# Patient Record
Sex: MALE | Race: BLACK OR AFRICAN AMERICAN | NOT HISPANIC OR LATINO | Employment: OTHER | URBAN - METROPOLITAN AREA
[De-identification: names, ages, dates, MRNs, and addresses within clinical notes are randomized per-mention and may not be internally consistent; named-entity substitution may affect disease eponyms.]

---

## 2017-01-02 DIAGNOSIS — I10 ESSENTIAL HYPERTENSION WITH GOAL BLOOD PRESSURE LESS THAN 140/90: Primary | ICD-10-CM

## 2017-01-02 NOTE — TELEPHONE ENCOUNTER
carvedilol (COREG) 12.5 MG tablet      Last Written Prescription Date: 7/1/16  Last Fill Quantity: 180, # refills: 1  Last Office Visit with G, P or [facility] prescribing provider: 10/17/16       POTASSIUM   Date Value Ref Range Status   10/17/2016 4.4 3.4 - 5.3 mmol/L Final     CREATININE   Date Value Ref Range Status   10/17/2016 1.74* 0.66 - 1.25 mg/dL Final     BP Readings from Last 3 Encounters:   12/14/16 97/67   10/17/16 120/80   09/29/16 131/87

## 2017-01-03 RX ORDER — CARVEDILOL 12.5 MG/1
TABLET ORAL
Qty: 180 TABLET | Refills: 1 | Status: SHIPPED | OUTPATIENT
Start: 2017-01-03 | End: 2017-09-04

## 2017-01-03 NOTE — TELEPHONE ENCOUNTER
Routing refill request to provider for review/approval because:  Labs out of range:  Creatinine  Nan Tapia RN

## 2017-01-29 DIAGNOSIS — F33.0 MAJOR DEPRESSIVE DISORDER, RECURRENT EPISODE, MILD (H): Primary | ICD-10-CM

## 2017-01-29 NOTE — TELEPHONE ENCOUNTER
DULoxetine (CYMBALTA) 60 MG capsule    Last Written Prescription Date: 11/1/16  Last Fill Quantity: 90, # refills: 0  Last Office Visit with [facility], [facility] or [facility] prescribing provider: 12/14/16        BP Readings from Last 3 Encounters:   12/14/16 97/67   10/17/16 120/80   09/29/16 131/87     Pulse: (for Fetzima)  CREATININE   Date Value Ref Range Status   10/17/2016 1.74* 0.66 - 1.25 mg/dL Final   ]    Last PHQ-9 score on record=   PHQ-9 SCORE 12/22/2016   Total Score -   Total Score 5

## 2017-01-30 RX ORDER — DULOXETIN HYDROCHLORIDE 60 MG/1
CAPSULE, DELAYED RELEASE ORAL
Qty: 90 CAPSULE | Refills: 1 | Status: SHIPPED | OUTPATIENT
Start: 2017-01-30 | End: 2017-06-11

## 2017-02-09 RX ORDER — AMLODIPINE BESYLATE 5 MG/1
TABLET ORAL
Qty: 90 TABLET | Refills: 0 | OUTPATIENT
Start: 2017-02-09

## 2017-02-14 DIAGNOSIS — F33.0 MAJOR DEPRESSIVE DISORDER, RECURRENT EPISODE, MILD (H): ICD-10-CM

## 2017-02-14 NOTE — TELEPHONE ENCOUNTER
traZODone (DESYREL) 100 MG tablet       Last Written Prescription Date: 5/23/16  Last Fill Quantity: 90; # refills: 3  Last Office Visit with [facility], [facility] or [facility] prescribing provider:  10/17/16        Last PHQ-9 score on record=   PHQ-9 SCORE 12/22/2016   Total Score -   Total Score 5       Lab Results   Component Value Date    AST 44 06/16/2016     Lab Results   Component Value Date    ALT 49 06/16/2016

## 2017-02-17 RX ORDER — TRAZODONE HYDROCHLORIDE 100 MG/1
TABLET ORAL
Qty: 180 TABLET | Refills: 0 | Status: SHIPPED | OUTPATIENT
Start: 2017-02-17 | End: 2017-05-19

## 2017-02-17 NOTE — TELEPHONE ENCOUNTER
For last refill remaining, patient was sent qty#180 tabs per request for 90 day supply to preferred pharmacy.   Jana Mendoza RN

## 2017-03-21 DIAGNOSIS — E78.5 HYPERLIPIDEMIA, UNSPECIFIED HYPERLIPIDEMIA TYPE: ICD-10-CM

## 2017-03-21 NOTE — TELEPHONE ENCOUNTER
simvastatin (ZOCOR) 40 MG tablet     Last Written Prescription Date: 07/20/16  Last Fill Quantity: 30, # refills: 4  Last Office Visit with G, P or [facility] prescribing provider: 10/17/16       Lab Results   Component Value Date    CHOL 122 06/16/2016     Lab Results   Component Value Date    HDL 44 06/16/2016     Lab Results   Component Value Date    LDL 59 06/16/2016     Lab Results   Component Value Date    TRIG 95 06/16/2016     Lab Results   Component Value Date    CHOLHDLRATIO 3.1 07/30/2015           [facility]

## 2017-03-23 RX ORDER — SIMVASTATIN 40 MG
TABLET ORAL
Qty: 30 TABLET | Refills: 0 | Status: SHIPPED | OUTPATIENT
Start: 2017-03-23 | End: 2017-05-03

## 2017-03-23 NOTE — TELEPHONE ENCOUNTER
Medication is being filled for 1 time refill only due to:  Patient needs labs for further refills.   Suzanne Sanabria RN

## 2017-04-24 DIAGNOSIS — E87.6 HYPOPOTASSEMIA: ICD-10-CM

## 2017-04-24 NOTE — TELEPHONE ENCOUNTER
potassium chloride SA (K-DUR,KLOR-CON M) 20 MEQ tablet      Last Written Prescription Date: 10/03/16  Last Fill Quantity: 90, # refills: 1  Last Office Visit with G, P or [facility] prescribing provider: 10/17/16    Next 5 appointments (look out 90 days)     Apr 28, 2017  8:40 AM CDT   Office Visit with Tori Rizvi MD   [facility] ([facility])    [address] [place] [id number] [phone]                   Potassium   Date Value Ref Range Status   10/17/2016 4.4 3.4 - 5.3 mmol/L Final     Creatinine   Date Value Ref Range Status   10/17/2016 1.74 (H) 0.66 - 1.25 mg/dL Final     BP Readings from Last 3 Encounters:   12/14/16 97/67   10/17/16 120/80   09/29/16 131/87         Krystal Xie  [facility]

## 2017-04-26 RX ORDER — POTASSIUM CHLORIDE 1500 MG/1
20 TABLET, EXTENDED RELEASE ORAL DAILY
Qty: 90 TABLET | Refills: 1 | Status: SHIPPED | OUTPATIENT
Start: 2017-04-26 | End: 2017-10-23

## 2017-04-28 ENCOUNTER — OFFICE VISIT (OUTPATIENT)
Dept: FAMILY MEDICINE | Facility: CLINIC | Age: 62
End: 2017-04-28
Payer: MEDICARE

## 2017-04-28 VITALS
HEART RATE: 90 BPM | OXYGEN SATURATION: 96 % | HEIGHT: 66 IN | BODY MASS INDEX: 33.62 KG/M2 | TEMPERATURE: 97.7 F | DIASTOLIC BLOOD PRESSURE: 75 MMHG | SYSTOLIC BLOOD PRESSURE: 120 MMHG | WEIGHT: 209.19 LBS

## 2017-04-28 DIAGNOSIS — E11.42 DIABETIC POLYNEUROPATHY ASSOCIATED WITH TYPE 2 DIABETES MELLITUS (H): ICD-10-CM

## 2017-04-28 DIAGNOSIS — N18.30 TYPE 2 DIABETES MELLITUS WITH STAGE 3 CHRONIC KIDNEY DISEASE, WITH LONG-TERM CURRENT USE OF INSULIN (H): ICD-10-CM

## 2017-04-28 DIAGNOSIS — Z13.89 SCREENING FOR DIABETIC PERIPHERAL NEUROPATHY: ICD-10-CM

## 2017-04-28 DIAGNOSIS — J45.31 MILD PERSISTENT ASTHMA WITH ACUTE EXACERBATION: ICD-10-CM

## 2017-04-28 DIAGNOSIS — E11.22 TYPE 2 DIABETES MELLITUS WITH STAGE 3 CHRONIC KIDNEY DISEASE, WITH LONG-TERM CURRENT USE OF INSULIN (H): ICD-10-CM

## 2017-04-28 DIAGNOSIS — I25.10 CORONARY ARTERY DISEASE INVOLVING NATIVE CORONARY ARTERY OF NATIVE HEART WITHOUT ANGINA PECTORIS: ICD-10-CM

## 2017-04-28 DIAGNOSIS — E78.5 HYPERLIPIDEMIA LDL GOAL <100: ICD-10-CM

## 2017-04-28 DIAGNOSIS — I50.23 ACUTE ON CHRONIC SYSTOLIC CONGESTIVE HEART FAILURE (H): ICD-10-CM

## 2017-04-28 DIAGNOSIS — Z79.4 TYPE 2 DIABETES MELLITUS WITH STAGE 3 CHRONIC KIDNEY DISEASE, WITH LONG-TERM CURRENT USE OF INSULIN (H): ICD-10-CM

## 2017-04-28 DIAGNOSIS — N18.2 CKD (CHRONIC KIDNEY DISEASE) STAGE 2, GFR 60-89 ML/MIN: ICD-10-CM

## 2017-04-28 DIAGNOSIS — I10 HYPERTENSION GOAL BP (BLOOD PRESSURE) < 140/90: ICD-10-CM

## 2017-04-28 DIAGNOSIS — B18.2 CHRONIC HEPATITIS C WITHOUT HEPATIC COMA (H): ICD-10-CM

## 2017-04-28 DIAGNOSIS — F33.0 MILD RECURRENT MAJOR DEPRESSION (H): ICD-10-CM

## 2017-04-28 DIAGNOSIS — L50.1 CHRONIC IDIOPATHIC URTICARIA: Primary | ICD-10-CM

## 2017-04-28 LAB
ALBUMIN SERPL-MCNC: 3.7 G/DL (ref 3.4–5)
ALP SERPL-CCNC: 73 U/L (ref 40–150)
ALT SERPL W P-5'-P-CCNC: 25 U/L (ref 0–70)
ANION GAP SERPL CALCULATED.3IONS-SCNC: 7 MMOL/L (ref 3–14)
AST SERPL W P-5'-P-CCNC: 14 U/L (ref 0–45)
BASOPHILS # BLD AUTO: 0 10E9/L (ref 0–0.2)
BASOPHILS NFR BLD AUTO: 0.2 %
BILIRUB SERPL-MCNC: 0.5 MG/DL (ref 0.2–1.3)
BUN SERPL-MCNC: 20 MG/DL (ref 7–30)
CALCIUM SERPL-MCNC: 9.4 MG/DL (ref 8.5–10.1)
CHLORIDE SERPL-SCNC: 103 MMOL/L (ref 94–109)
CHOLEST SERPL-MCNC: 135 MG/DL
CO2 SERPL-SCNC: 29 MMOL/L (ref 20–32)
CREAT SERPL-MCNC: 1.42 MG/DL (ref 0.66–1.25)
CREAT UR-MCNC: 17 MG/DL
CRP SERPL-MCNC: 13.5 MG/L (ref 0–8)
DIFFERENTIAL METHOD BLD: NORMAL
EOSINOPHIL # BLD AUTO: 0.3 10E9/L (ref 0–0.7)
EOSINOPHIL NFR BLD AUTO: 3.1 %
ERYTHROCYTE [DISTWIDTH] IN BLOOD BY AUTOMATED COUNT: 13.3 % (ref 10–15)
ERYTHROCYTE [SEDIMENTATION RATE] IN BLOOD BY WESTERGREN METHOD: 11 MM/H (ref 0–20)
GFR SERPL CREATININE-BSD FRML MDRD: 51 ML/MIN/1.7M2
GLUCOSE SERPL-MCNC: 104 MG/DL (ref 70–99)
HBA1C MFR BLD: 5.7 % (ref 4.3–6)
HCT VFR BLD AUTO: 46.8 % (ref 40–53)
HDLC SERPL-MCNC: 47 MG/DL
HGB BLD-MCNC: 15.9 G/DL (ref 13.3–17.7)
LDLC SERPL CALC-MCNC: 70 MG/DL
LYMPHOCYTES # BLD AUTO: 2 10E9/L (ref 0.8–5.3)
LYMPHOCYTES NFR BLD AUTO: 20.8 %
MCH RBC QN AUTO: 31.1 PG (ref 26.5–33)
MCHC RBC AUTO-ENTMCNC: 34 G/DL (ref 31.5–36.5)
MCV RBC AUTO: 91 FL (ref 78–100)
MICROALBUMIN UR-MCNC: <5 MG/L
MICROALBUMIN/CREAT UR: NORMAL MG/G CR (ref 0–17)
MONOCYTES # BLD AUTO: 1.2 10E9/L (ref 0–1.3)
MONOCYTES NFR BLD AUTO: 12.3 %
NEUTROPHILS # BLD AUTO: 6.2 10E9/L (ref 1.6–8.3)
NEUTROPHILS NFR BLD AUTO: 63.6 %
NONHDLC SERPL-MCNC: 88 MG/DL
PLATELET # BLD AUTO: 245 10E9/L (ref 150–450)
POTASSIUM SERPL-SCNC: 4.3 MMOL/L (ref 3.4–5.3)
PROT SERPL-MCNC: 8.2 G/DL (ref 6.8–8.8)
RBC # BLD AUTO: 5.12 10E12/L (ref 4.4–5.9)
SODIUM SERPL-SCNC: 139 MMOL/L (ref 133–144)
TRIGL SERPL-MCNC: 92 MG/DL
TSH SERPL DL<=0.005 MIU/L-ACNC: 0.74 MU/L (ref 0.4–4)
WBC # BLD AUTO: 9.8 10E9/L (ref 4–11)

## 2017-04-28 PROCEDURE — 36415 COLL VENOUS BLD VENIPUNCTURE: CPT | Performed by: FAMILY MEDICINE

## 2017-04-28 PROCEDURE — 80061 LIPID PANEL: CPT | Performed by: FAMILY MEDICINE

## 2017-04-28 PROCEDURE — 84443 ASSAY THYROID STIM HORMONE: CPT | Performed by: FAMILY MEDICINE

## 2017-04-28 PROCEDURE — 85025 COMPLETE CBC W/AUTO DIFF WBC: CPT | Performed by: FAMILY MEDICINE

## 2017-04-28 PROCEDURE — 83036 HEMOGLOBIN GLYCOSYLATED A1C: CPT | Performed by: FAMILY MEDICINE

## 2017-04-28 PROCEDURE — 82043 UR ALBUMIN QUANTITATIVE: CPT | Performed by: FAMILY MEDICINE

## 2017-04-28 PROCEDURE — 99207 C FOOT EXAM  NO CHARGE: CPT | Performed by: FAMILY MEDICINE

## 2017-04-28 PROCEDURE — 80053 COMPREHEN METABOLIC PANEL: CPT | Performed by: FAMILY MEDICINE

## 2017-04-28 PROCEDURE — 85652 RBC SED RATE AUTOMATED: CPT | Performed by: FAMILY MEDICINE

## 2017-04-28 PROCEDURE — 99214 OFFICE O/P EST MOD 30 MIN: CPT | Performed by: FAMILY MEDICINE

## 2017-04-28 PROCEDURE — 86140 C-REACTIVE PROTEIN: CPT | Performed by: FAMILY MEDICINE

## 2017-04-28 RX ORDER — TRAMADOL HYDROCHLORIDE 50 MG/1
50-100 TABLET ORAL 2 TIMES DAILY PRN
Qty: 60 TABLET | Refills: 3 | Status: SHIPPED | OUTPATIENT
Start: 2017-04-28 | End: 2017-09-29

## 2017-04-28 RX ORDER — CETIRIZINE HYDROCHLORIDE 10 MG/1
10 TABLET ORAL EVERY EVENING
Qty: 90 TABLET | Refills: 3 | Status: SHIPPED | OUTPATIENT
Start: 2017-04-28 | End: 2018-05-26

## 2017-04-28 NOTE — PATIENT INSTRUCTIONS
How to contact your care team: [phone] Pharmacy [phone]   MIGUEL OLEARY MD KATYA GEORGIEV, PA-C CHRIS JONES, PA-C NAM HO, MD JONATHAN BATES, MD ARVIN VOCAL, MD    Clinic hours M-Th 7am-7pm Fri 7am-5pm.   Urgent care M-F 11am-9pm  Sat/Sun 9am-5pm.   Pharmacy   Mon-Th:  8:00am-8pm   Fri:  8:00am-6:00pm  Sat/Sun  8:00am-5:00 pm       Hives (Adult)  Hives are pink or red bumps on the skin. These bumps are also known as  wheals.  The bumps can itch, burn, or sting. Hives can occur anywhere on the body. They vary in size and shape and can form in clusters. Individual hives can appear and go away quickly. New hives may develop as old ones fade. Hives are common and usually harmless. Occasionally hives are a sign of a serious allergy.  Hives are often caused by an allergic reaction. It may be an allergic reaction to foods such as fruit, shellfish, chocolate, nuts, or tomatoes. It may be a reaction to pollens, animal fur, or mold spores. Medications, chemicals, and insect bites can also cause hives. And hives can be caused by hot sun or cold air. The cause of hives can be difficult to find.  You may be given medications to relieve swelling and itching. Follow all instructions when using these medications. The hives will fade in a few days, but can last up to 2 weeks.  Home care  Follow these tips:    Try to find the cause of the hives and eliminate it. Discuss possible causes with your health care provider. Future reactions to the same allergen may be worse.    Don t scratch the hives. Scratching will delay healing. To reduce itching, apply cool, wet compresses to the skin.    Dress in soft, loose cotton clothing.    Don t bathe in hot water. This can make the itching worse.    Apply an ice pack or cool pack wrapped in a thin towel to your skin. This will help reduce redness and itching.    Try a topical spray or cream with benzocaine to help reduce itching.    Use oral  diphenhydramine to help reduce itching. This is an antihistamine you can buy at drug and grocery stores. It can make you sleepy, so use lower doses during the daytime. Or you can use loratadine. This is an antihistamine that will not make you sleepy. Don t use diphenhydramine if you have glaucoma or have trouble urinating because of an enlarged prostate.  Follow-up care  Follow up with your health care provider if your symptoms don't get better in 2 days. Ask your provider about allergy testing if you have had a severe reaction, or have had several episodes of hives. He or she can use the allergy testing to find out what you are allergic to.  When to seek medical advice  Call your health care provider right away if any of these occur:    Fever of 100.4 F (38.0 C) or higher    Swelling of the face, throat, or tongue    Trouble breathing or swallowing    Redness, swelling, or pain    Foul-smelling fluid coming from the rash    Dizziness, weakness, or fainting    5037-7389 [employer]. [address] [place] [address]. All rights reserved. This information is not intended as a substitute for professional medical care. Always follow your healthcare professional's instructions.        Understanding Urticaria (Hives)  Urticaria (hives) are red, itchy, and swollen areas on the skin. They are most often an allergic reaction from eating a food or taking a medicine. Sometimes the cause may be unknown. A single hive can vary in size from a half inch to several inches. Hives can appear all over the body. Or they may appear on only one part of the body.  Causes of Hives  Hives can be caused by food and beverages such as:    Nuts    Peanuts    Eggs    Shellfish    Milk  Hives can also be caused by medicines such as:    Antibiotics, especially penicillin and sulfa-based medicines     Anticonvulsant drugs or antiseizure medicines     Chemotherapy medicines   Other causes of hives include:    Dermatographism.  These are hives caused by scratching or rubbing of the skin, or wearing tight-fitting clothes that rub the skin.    Cold-induced. These are hives caused by exposure to cold air or water.    Solar hives. These are hives caused by exposure to sunlight or light bulb light.    Exercise-induced urticaria. These are allergic symptoms brought on by physical activity.    Chronic urticaria. These are hives that occur again and again with no known cause.  If You Have Hives    Avoid the food, drink, medicine, or other factor that may be causing the hives.    Make a thick paste of baking soda and water. Apply the paste directly to your skin. This can help lessen itching.    Talk with your health care provider right away if you think a medication gave you hives.  Watch for Anaphalaxis  If you have hives, watch for symptoms of a severe reaction that affects your entire body, called anaphylaxis. Symptoms can include swollen areas of the body, wheezing, trouble breathing or swallowing, and a hoarse voice. This reaction may happen right away. Or it may happen in an hour or more. In extreme cases, the airways from mouth to lungs may swell and prevent breathing. This is a medical emergency. Use epinephrine medication if you have it, and call 911 or go to the emergency room.     When to Call the Health Care Provider  Call 911 right away if you have:    Swelling in the lips, tongue, or throat (angioedema)     Trouble breathing or swallowing        0701-6485 [employer]. [address] [place] [address], [place] [address]. All rights reserved. This information is not intended as a substitute for professional medical care. Always follow your healthcare professional's instructions.

## 2017-04-28 NOTE — LETTER
[address] [id number] [phone]             May 1, 2017    Santiago Hylton  [address]  [facility] [address]            Dear Santiago Hylton,     Your test results are attached. I am happy to let you know that they are stable and your medications can stay the same.     The blood tests and urine all look great. No signs of severe allergic reaction in your body. Try the cetrizine to see if this helps with the rash. Enclosed are the results.  Results for orders placed or performed in visit on 04/28/17   Albumin Random Urine Quantitative   Result Value Ref Range    Creatinine Urine 17 mg/dL    Albumin Urine mg/L <5 mg/L    Albumin Urine mg/g Cr Unable to calculate due to low value 0 - 17 mg/g Cr   CBC with platelets differential   Result Value Ref Range    WBC 9.8 4.0 - 11.0 10e9/L    RBC Count 5.12 4.4 - 5.9 10e12/L    Hemoglobin 15.9 13.3 - 17.7 g/dL    Hematocrit 46.8 40.0 - 53.0 %    MCV 91 78 - 100 fl    MCH 31.1 26.5 - 33.0 pg    MCHC 34.0 31.5 - 36.5 g/dL    RDW 13.3 10.0 - 15.0 %    Platelet Count 245 150 - 450 10e9/L    Diff Method Automated Method     % Neutrophils 63.6 %    % Lymphocytes 20.8 %    % Monocytes 12.3 %    % Eosinophils 3.1 %    % Basophils 0.2 %    Absolute Neutrophil 6.2 1.6 - 8.3 10e9/L    Absolute Lymphocytes 2.0 0.8 - 5.3 10e9/L    Absolute Monocytes 1.2 0.0 - 1.3 10e9/L    Absolute Eosinophils 0.3 0.0 - 0.7 10e9/L    Absolute Basophils 0.0 0.0 - 0.2 10e9/L   Comprehensive metabolic panel   Result Value Ref Range    Sodium 139 133 - 144 mmol/L    Potassium 4.3 3.4 - 5.3 mmol/L    Chloride 103 94 - 109 mmol/L    Carbon Dioxide 29 20 - 32 mmol/L    Anion Gap 7 3 - 14 mmol/L    Glucose 104 (H) 70 - 99 mg/dL    Urea Nitrogen 20 7 - 30 mg/dL    Creatinine 1.42 (H) 0.66 - 1.25 mg/dL    GFR Estimate 51 (L) >60 mL/min/1.7m2    GFR Estimate If Black 61 >60 mL/min/1.7m2    Calcium 9.4 8.5 - 10.1 mg/dL    Bilirubin Total 0.5  0.2 - 1.3 mg/dL    Albumin 3.7 3.4 - 5.0 g/dL    Protein Total 8.2 6.8 - 8.8 g/dL    Alkaline Phosphatase 73 40 - 150 U/L    ALT 25 0 - 70 U/L    AST 14 0 - 45 U/L   CRP inflammation   Result Value Ref Range    CRP Inflammation 13.5 (H) 0.0 - 8.0 mg/L   Erythrocyte sedimentation rate auto   Result Value Ref Range    Sed Rate 11 0 - 20 mm/h   TSH with free T4 reflex   Result Value Ref Range    TSH 0.74 0.40 - 4.00 mU/L   Lipid panel reflex to direct LDL   Result Value Ref Range    Cholesterol 135 <200 mg/dL    Triglycerides 92 <150 mg/dL    HDL Cholesterol 47 >39 mg/dL    LDL Cholesterol Calculated 70 <100 mg/dL    Non HDL Cholesterol 88 <130 mg/dL   Hemoglobin A1c   Result Value Ref Range    Hemoglobin A1C 5.7 4.3 - 6.0 %       Please call me if you have any questions about these test results or about your care.     Sincerely,     Tori Rizvi MD/sukhi

## 2017-04-28 NOTE — NURSING NOTE
"Chief Complaint   Patient presents with     Derm Problem       Initial /75  Pulse 90  Temp 97.7  F (36.5  C) (Oral)  Ht 5' 5.7\" (1.669 m)  Wt 209 lb 3 oz (94.9 kg)  SpO2 96%  BMI 34.07 kg/m2 Estimated body mass index is 34.07 kg/(m^2) as calculated from the following:    Height as of this encounter: 5' 5.7\" (1.669 m).    Weight as of this encounter: 209 lb 3 oz (94.9 kg).  Medication Reconciliation: complete     Fouzia Berrios CMA      "

## 2017-04-28 NOTE — PROGRESS NOTES
SUBJECTIVE:                                                    Santiago Hylton is a 62 year old male who presents to clinic today for the following health issues:      Rash     Onset: 1 week ago most recent flare up but has had these for the past 2 years every few months.     Description:   Location: on legs and on right flank side and on neck   Character: blotchy, painful, red and change locations during the day from right to left chest and onto thighs.   Itching (Pruritis): YES    Progression of Symptoms:  worsening    Accompanying Signs & Symptoms:  Fever: no   Feet are painful: yes stabbing pain in feet  Body aches or joint pain: YES- sometimes   Sore throat symptoms: no   Recent cold symptoms: YES   History:   Previous similar rash: YES    Precipitating factors:   Exposure to similar rash: no   New exposures: None   Recent travel: no     Alleviating factors:  Nothing      Therapies Tried and outcome: itching cream       Diabetes Follow-up      Patient is checking blood sugars: not at all    Diabetic concerns: None     Symptoms of hypoglycemia (low blood sugar): none     Paresthesias (numbness or burning in feet) or sores: Yes both feet burning and numb     Date of last diabetic eye exam: 6 months     Hyperlipidemia Follow-Up      Rate your low fat/cholesterol diet?: good    Taking statin?  Yes, no muscle aches from statin    Other lipid medications/supplements?:  none     Hypertension Follow-up      Outpatient blood pressures are not being checked.    Low Salt Diet: no added salt     Vascular Disease Follow-up:  Coronary Artery and Peripheral Vascular Disease      Chest pain or pressure, left side neck or arm pain: No    Shortness of breath/increased sweats/nausea with exertion: No    Pain in calves walking 1-2 blocks: No    Worsened or new symptoms since last visit: No    Nitroglycerin use: no    Daily aspirin use: Yes     Heart Failure Follow-up    Symptoms:    Shortness of breath: happens with exertion only -  stable    Lower extremity edema: none    Chest pain: No    Using more pillows than normal: No    Cough at night: No    Weight:    Checking weight daily: No    Weight change: none    Cardiology visits, ER/UC, or hospital admissions since last visit: None    Medication side effects: fatigue         Depression and Anxiety Follow-Up    Status since last visit: No change    Other associated symptoms:None    Complicating factors:     Significant life event: No     Current substance abuse: None    PHQ-9 SCORE 6/16/2016 10/13/2016 12/22/2016   Total Score - - -   Total Score 10 7 5     GENARO-7 SCORE 11/13/2015 10/13/2016 12/22/2016   Total Score 4 0 6        PHQ-9  English      PHQ-9   Any Language     GAD7     Chronic Kidney Disease Follow-up      Current NSAID use?  No      Problem list and histories reviewed & adjusted, as indicated.  Additional history: as documented    Patient Active Problem List   Diagnosis     Hypertension goal BP (blood pressure) < 140/90     Hyperlipidemia LDL goal <100     CHF (congestive heart failure) (H)     Mild recurrent major depression (H)     Gout     GERD (gastroesophageal reflux disease)     Chronic rhinitis     CAD (coronary artery disease)     CKD (chronic kidney disease) stage 2, GFR 60-89 ml/min     Mild persistent asthma     Tobacco abuse     History of colonic polyps     Advanced directives, counseling/discussion     Chronic hepatitis C (H)     Type 2 diabetes mellitus with diabetic chronic kidney disease (H)     Microalbuminuria due to type 2 diabetes mellitus (H)     Obesity (BMI 30-39.9)     Herpes zoster without complication     Cataract, bilateral     Type 2 diabetes mellitus with diabetic polyneuropathy (H)     Coronary artery disease involving native coronary artery of native heart without angina pectoris     Past Surgical History:   Procedure Laterality Date     CARDIAC SURGERY      stent     CATARACT IOL, RT/LT       COLONOSCOPY  9/18/2012    Procedure: COLONOSCOPY;   COLONOSCOPY, SCREEN;  Surgeon: Cl Johnson MD;  Location: [facility]     ORTHOPEDIC SURGERY      ankle     PHACOEMULSIFICATION WITH STANDARD INTRAOCULAR LENS IMPLANT Right 1/28/2016    Procedure: PHACOEMULSIFICATION WITH STANDARD INTRAOCULAR LENS IMPLANT;  Surgeon: Fly Merrill MD;  Location: [facility]     PHACOEMULSIFICATION WITH STANDARD INTRAOCULAR LENS IMPLANT Left 2/11/2016    Procedure: PHACOEMULSIFICATION WITH STANDARD INTRAOCULAR LENS IMPLANT;  Surgeon: Fly Merrill MD;  Location: [facility] OR       Social History   Substance Use Topics     Smoking status: Former Smoker     Packs/day: 0.25     Years: 15.00     Types: Cigarettes     Quit date: 9/21/2016     Smokeless tobacco: Never Used      Comment: 3-4 a day     Alcohol use Yes      Comment: weekend beer     Family History   Problem Relation Age of Onset     HEART DISEASE Maternal Grandmother      Hypertension Maternal Grandmother      Hypertension Father      Hypertension Mother      Hypertension Sister      Thyroid Disease Sister      CANCER Brother      DIABETES Brother      Hypertension Brother      Hypertension Maternal Aunt      CANCER Maternal Uncle      Hypertension Maternal Uncle      Hypertension Paternal Aunt      Hypertension Paternal Uncle      Hypertension Maternal Grandfather      Hypertension Paternal Grandmother      Hypertension Paternal Grandfather      CEREBROVASCULAR DISEASE No family hx of      Glaucoma No family hx of      Macular Degeneration No family hx of          Current Outpatient Prescriptions   Medication Sig Dispense Refill     traMADol (ULTRAM) 50 MG tablet Take 1-2 tablets ( mg) by mouth 2 times daily as needed for pain maximum 2 tablet(s) per day 60 tablet 3     cetirizine (ZYRTEC) 10 MG tablet Take 1 tablet (10 mg) by mouth every evening 90 tablet 3     potassium chloride SA (K-DUR/KLOR-CON M) 20 MEQ CR tablet Take 1 tablet (20 mEq) by mouth daily 90 tablet 1     simvastatin (ZOCOR) 40 MG tablet  TAKE 1 TABLET BY MOUTH EVERY EVENING AT BEDTIME 30 tablet 0     traZODone (DESYREL) 100 MG tablet TAKE 1 TO 2 TABLETS BY MOUTH EVERY NIGHT AT BEDTIME 180 tablet 0     DULoxetine (CYMBALTA) 60 MG EC capsule TAKE 1 CAPSULE BY MOUTH DAILY 90 capsule 1     carvedilol (COREG) 12.5 MG tablet TAKE ONE TABLET BY MOUTH TWICE DAILY WITH MEALS 180 tablet 1     amLODIPine (NORVASC) 5 MG tablet TAKE 1 TABLET BY MOUTH EVERY DAY FOR BLOOD PRESSURE 90 tablet 1     lisinopril (PRINIVIL,ZESTRIL) 40 MG tablet TAKE ONE-HALF TABLET BY MOUTH ONCE DAILY 45 tablet 1     furosemide (LASIX) 40 MG tablet Take 1 tablet (40 mg) by mouth 2 times daily If weight increases or increased shortness of breath, then decrease to once a day when better. 135 tablet 2     ranitidine (ZANTAC) 150 MG tablet TAKE 1 TABLET BY MOUTH TWICE DAILY 180 tablet 2     montelukast (SINGULAIR) 10 MG tablet Take 1 tablet (10 mg) by mouth At Bedtime 90 tablet 3     isosorbide mononitrate (ISMO,MONOKET) 20 MG tablet TAKE 1 TABLET BY MOUTH TWICE DAILY 180 tablet 0     loratadine (CLARITIN) 10 MG tablet TAKE ONE TABLET BY MOUTH EVERY DAY AS NEEDED 90 tablet 1     aspirin  MG tablet TAKE 1 TABLET BY MOUTH EVERY DAY 90 tablet 3     SYMBICORT 160-4.5 MCG/ACT inhaler INHALE 2 PUFFS IN TO THE LUNGS TWICE DAILY (Patient not taking: Reported on 4/28/2017) 1 Inhaler 0     albuterol (2.5 MG/3ML) 0.083% nebulizer solution Take 1 vial (2.5 mg) by nebulization every 6 hours as needed for shortness of breath / dyspnea (Patient not taking: Reported on 4/28/2017) 120 vial 3     nitroglycerin (NITROSTAT) 0.4 MG SL tablet Place 1 tablet (0.4 mg) under the tongue every 5 minutes as needed for chest pain 0.4 mg by Sublingual route every 5 (five) minutes as needed. (Patient not taking: Reported on 4/28/2017) 25 tablet 11     Allergies   Allergen Reactions     No Known Drug Allergies      Recent Labs   Lab Test  04/28/17   0942  10/17/16   1206  06/16/16   0836  01/07/16   0818  07/30/15    "0752  04/20/15   0820   09/13/13   1103   A1C  5.7  5.5  5.6  5.4  5.6  5.3   < >   --    LDL   --    --   59  60  56   --    < >   --    HDL   --    --   44  46  36*   --    < >   --    TRIG   --    --   95  91  98   --    < >   --    ALT   --    --   49   --   27  37   < >   --    CR   --   1.74*  1.20  1.30*  1.36*  1.25   < >  1.17   GFRESTIMATED   --   40*  62  56*  54*  59*   < >  64   GFRESTBLACK   --   49*  75  68  65  71   < >  78   POTASSIUM   --   4.4  4.0  4.9  4.8  4.2   < >  4.1   TSH   --    --    --   0.71   --    --    --   1.11    < > = values in this interval not displayed.      BP Readings from Last 3 Encounters:   04/28/17 120/75   12/14/16 97/67   10/17/16 120/80    Wt Readings from Last 3 Encounters:   04/28/17 209 lb 3 oz (94.9 kg)   12/14/16 204 lb 1.6 oz (92.6 kg)   10/17/16 200 lb (90.7 kg)                  Labs reviewed in EPIC    Reviewed and updated as needed this visit by clinical staff       Reviewed and updated as needed this visit by Provider         ROS:  Constitutional, HEENT, cardiovascular, pulmonary, gi and gu systems are negative, except as otherwise noted.    OBJECTIVE:                                                    /75  Pulse 90  Temp 97.7  F (36.5  C) (Oral)  Ht 5' 5.7\" (1.669 m)  Wt 209 lb 3 oz (94.9 kg)  SpO2 96%  BMI 34.07 kg/m2  Body mass index is 34.07 kg/(m^2).  GENERAL: healthy, alert and no distress, is obese  EYES: Eyes grossly normal to inspection, PERRL and conjunctivae and sclerae normal  HENT: ear canals and TM's normal, nose and mouth without ulcers or lesions  NECK: no adenopathy, no asymmetry, masses, or scars and thyroid normal to palpation  RESP: lungs clear to auscultation - no rales, rhonchi or wheezes  CV: regular rate and rhythm, normal S1 S2, no S3 or S4, no murmur, click or rub, no peripheral edema and peripheral pulses strong  ABDOMEN: soft, nontender, no hepatosplenomegaly, no masses and bowel sounds normal  MS: no gross " "musculoskeletal defects noted, no edema  SKIN: no suspicious lesions, wheals and papules over right side of chest consistent with hives or urticaria.   NEURO: Normal strength and tone, mentation intact and speech normal  BACK: no CVA tenderness, no paralumbar tenderness  PSYCH: mentation appears normal, affect normal/bright  Diabetic foot exam: normal DP and PT pulses, no trophic changes or ulcerative lesions, normal calluses, decreased sensory exam and impaired monofilament exam     Diagnostic Test Results:  Results for orders placed or performed in visit on 04/28/17 (from the past 24 hour(s))   CBC with platelets differential   Result Value Ref Range    WBC 9.8 4.0 - 11.0 10e9/L    RBC Count 5.12 4.4 - 5.9 10e12/L    Hemoglobin 15.9 13.3 - 17.7 g/dL    Hematocrit 46.8 40.0 - 53.0 %    MCV 91 78 - 100 fl    MCH 31.1 26.5 - 33.0 pg    MCHC 34.0 31.5 - 36.5 g/dL    RDW 13.3 10.0 - 15.0 %    Platelet Count 245 150 - 450 10e9/L    Diff Method Automated Method     % Neutrophils 63.6 %    % Lymphocytes 20.8 %    % Monocytes 12.3 %    % Eosinophils 3.1 %    % Basophils 0.2 %    Absolute Neutrophil 6.2 1.6 - 8.3 10e9/L    Absolute Lymphocytes 2.0 0.8 - 5.3 10e9/L    Absolute Monocytes 1.2 0.0 - 1.3 10e9/L    Absolute Eosinophils 0.3 0.0 - 0.7 10e9/L    Absolute Basophils 0.0 0.0 - 0.2 10e9/L   Erythrocyte sedimentation rate auto   Result Value Ref Range    Sed Rate 11 0 - 20 mm/h   Hemoglobin A1c   Result Value Ref Range    Hemoglobin A1C 5.7 4.3 - 6.0 %        ASSESSMENT/PLAN:                                                        Tobacco Cessation:   reports that he quit smoking about 7 months ago. His smoking use included Cigarettes. He has a 3.75 pack-year smoking history. He has never used smokeless tobacco.      BMI:   Estimated body mass index is 34.07 kg/(m^2) as calculated from the following:    Height as of this encounter: 5' 5.7\" (1.669 m).    Weight as of this encounter: 209 lb 3 oz (94.9 kg).   Weight " management plan: Discussed healthy diet and exercise guidelines and patient will follow up in 3 months in clinic to re-evaluate.        ICD-10-CM    1. Chronic idiopathic urticaria- no real triggers as far as patient can tell. He will pay attention to foods and medications  L50.1 CRP inflammation     Erythrocyte sedimentation rate auto     cetirizine (ZYRTEC) 10 MG tablet     ALLERGY/ASTHMA ADULT REFERRAL   2. Acute on chronic systolic congestive heart failure (H) I50.23 Weight stable but persistent shortness of breath and dyspnea on exertion    3. Type 2 diabetes mellitus with stage 3 chronic kidney disease, with long-term current use of insulin (H)  Lab Results   Component Value Date    A1C 5.7 04/28/2017    A1C 5.5 10/17/2016    A1C 5.6 06/16/2016    A1C 5.4 01/07/2016    A1C 5.6 07/30/2015     E11.22 Albumin Random Urine Quantitative    N18.3 TSH with free T4 reflex    Z79.4 Hemoglobin A1c   4. Hypertension goal BP (blood pressure) < 140/90- well controlled on medications  I10 CBC with platelets differential     Comprehensive metabolic panel   5. Chronic hepatitis C without hepatic coma (H)- treatment last year and follow up with gastroenterology scheduled B18.2 Comprehensive metabolic panel   6. Hyperlipidemia LDL goal <100 E78.5 Lipid panel reflex to direct LDL   7. Mild recurrent major depression (H) F33.0 More of an issue with the chronic pain.   8. Coronary artery disease involving native coronary artery of native heart without angina pectoris I25.10 Stable with no chest pain but has progressive shortness of breath    9. CKD (chronic kidney disease) stage 2, GFR 60-89 ml/min N18.2 stable   10. Screening for diabetic peripheral neuropathy Z13.89 FOOT EXAM  NO CHARGE [23005.114]   11. Mild persistent asthma with acute exacerbation J45.31 Recurrent bronchitis this winter with hospital x2   12. Diabetic polyneuropathy associated with type 2 diabetes mellitus (H) E11.42 traMADol (ULTRAM) 50 MG tablet- will see  if this helps with pain. Did not tolerate gabapentin.        CONSULTATION/REFERRAL to allergist if rash persistent.   FUTURE LABS:       - Schedule fasting labs in 3 months  FUTURE APPOINTMENTS:       - Follow-up visit in 3 months or sooner if any questions or concerns.   Work on weight loss  Regular exercise  See Patient Instructions    Tori Rizvi MD  [facility]

## 2017-04-28 NOTE — MR AVS SNAPSHOT
After Visit Summary   4/28/2017    Santiago Hylton    MRN: [id number]           Patient Information     Date Of Birth          1955        Visit Information        Provider Department      4/28/2017 8:40 AM Tori Rizvi MD [facility]        Today's Diagnoses     Screening for diabetic peripheral neuropathy    -  1    Chronic idiopathic urticaria        Acute on chronic systolic congestive heart failure (H)        Type 2 diabetes mellitus with stage 3 chronic kidney disease, with long-term current use of insulin (H)        Hypertension goal BP (blood pressure) < 140/90        Chronic hepatitis C without hepatic coma (H)        Hyperlipidemia LDL goal <100        Mild recurrent major depression (H)        Coronary artery disease involving native coronary artery of native heart without angina pectoris        CKD (chronic kidney disease) stage 2, GFR 60-89 ml/min        Mild persistent asthma with acute exacerbation        Diabetic polyneuropathy associated with type 2 diabetes mellitus (H)          Care Instructions    How to contact your care team: [phone] Pharmacy [phone]   MIGUEL OLEARY MD KATYA GEORGIEV, PA-C CHRIS JONES, PA-C NAM HO, MD JONATHAN BATES, MD ARVIN VOCAL, MD    Clinic hours M-Th 7am-7pm Fri 7am-5pm.   Urgent care M-F 11am-9pm  Sat/Sun 9am-5pm.   Pharmacy   Mon-Th:  8:00am-8pm   Fri:  8:00am-6:00pm  Sat/Sun  8:00am-5:00 pm       Hives (Adult)  Hives are pink or red bumps on the skin. These bumps are also known as  wheals.  The bumps can itch, burn, or sting. Hives can occur anywhere on the body. They vary in size and shape and can form in clusters. Individual hives can appear and go away quickly. New hives may develop as old ones fade. Hives are common and usually harmless. Occasionally hives are a sign of a serious allergy.  Hives are often caused by an allergic reaction. It may be an allergic reaction to foods such  as fruit, shellfish, chocolate, nuts, or tomatoes. It may be a reaction to pollens, animal fur, or mold spores. Medications, chemicals, and insect bites can also cause hives. And hives can be caused by hot sun or cold air. The cause of hives can be difficult to find.  You may be given medications to relieve swelling and itching. Follow all instructions when using these medications. The hives will fade in a few days, but can last up to 2 weeks.  Home care  Follow these tips:    Try to find the cause of the hives and eliminate it. Discuss possible causes with your health care provider. Future reactions to the same allergen may be worse.    Don t scratch the hives. Scratching will delay healing. To reduce itching, apply cool, wet compresses to the skin.    Dress in soft, loose cotton clothing.    Don t bathe in hot water. This can make the itching worse.    Apply an ice pack or cool pack wrapped in a thin towel to your skin. This will help reduce redness and itching.    Try a topical spray or cream with benzocaine to help reduce itching.    Use oral diphenhydramine to help reduce itching. This is an antihistamine you can buy at drug and grocery stores. It can make you sleepy, so use lower doses during the daytime. Or you can use loratadine. This is an antihistamine that will not make you sleepy. Don t use diphenhydramine if you have glaucoma or have trouble urinating because of an enlarged prostate.  Follow-up care  Follow up with your health care provider if your symptoms don't get better in 2 days. Ask your provider about allergy testing if you have had a severe reaction, or have had several episodes of hives. He or she can use the allergy testing to find out what you are allergic to.  When to seek medical advice  Call your health care provider right away if any of these occur:    Fever of 100.4 F (38.0 C) or higher    Swelling of the face, throat, or tongue    Trouble breathing or swallowing    Redness, swelling, or  pain    Foul-smelling fluid coming from the rash    Dizziness, weakness, or fainting    2364-1864 [employer]. [address] [place] [address], [place] [address]. All rights reserved. This information is not intended as a substitute for professional medical care. Always follow your healthcare professional's instructions.        Understanding Urticaria (Hives)  Urticaria (hives) are red, itchy, and swollen areas on the skin. They are most often an allergic reaction from eating a food or taking a medicine. Sometimes the cause may be unknown. A single hive can vary in size from a half inch to several inches. Hives can appear all over the body. Or they may appear on only one part of the body.  Causes of Hives  Hives can be caused by food and beverages such as:    Nuts    Peanuts    Eggs    Shellfish    Milk  Hives can also be caused by medicines such as:    Antibiotics, especially penicillin and sulfa-based medicines     Anticonvulsant drugs or antiseizure medicines     Chemotherapy medicines   Other causes of hives include:    Dermatographism. These are hives caused by scratching or rubbing of the skin, or wearing tight-fitting clothes that rub the skin.    Cold-induced. These are hives caused by exposure to cold air or water.    Solar hives. These are hives caused by exposure to sunlight or light bulb light.    Exercise-induced urticaria. These are allergic symptoms brought on by physical activity.    Chronic urticaria. These are hives that occur again and again with no known cause.  If You Have Hives    Avoid the food, drink, medicine, or other factor that may be causing the hives.    Make a thick paste of baking soda and water. Apply the paste directly to your skin. This can help lessen itching.    Talk with your health care provider right away if you think a medication gave you hives.  Watch for Anaphalaxis  If you have hives, watch for symptoms of a severe reaction that affects your entire body, called  anaphylaxis. Symptoms can include swollen areas of the body, wheezing, trouble breathing or swallowing, and a hoarse voice. This reaction may happen right away. Or it may happen in an hour or more. In extreme cases, the airways from mouth to lungs may swell and prevent breathing. This is a medical emergency. Use epinephrine medication if you have it, and call 911 or go to the emergency room.     When to Call the Health Care Provider  Call 911 right away if you have:    Swelling in the lips, tongue, or throat (angioedema)     Trouble breathing or swallowing        3671-0010 [employer]. [address] [place] [address]. All rights reserved. This information is not intended as a substitute for professional medical care. Always follow your healthcare professional's instructions.              Follow-ups after your visit        Additional Services     ALLERGY/ASTHMA ADULT REFERRAL       Your provider has referred you to: [facility] [phone]  [email or web address]    Please be aware that coverage of these services is subject to the terms and limitations of your health insurance plan.  Call member services at your health plan with any benefit or coverage questions.      Please bring the following with you to your appointment:    (1) Any X-Rays, CTs or MRIs which have been performed.  Contact the facility where they were done to arrange for  prior to your scheduled appointment.    (2) List of current medications  (3) This referral request   (4) Any documents/labs given to you for this referral                  Follow-up notes from your care team     Return in about 3 months (around 7/28/2017) for BP Recheck, Lab Work, medication follow up.      Who to contact     If you have questions or need follow up information about today's clinic visit or your schedule please contact [facility] directly at [phone].  Normal or  "non-critical lab and imaging results will be communicated to you by MyChart, letter or phone within 4 business days after the clinic has received the results. If you do not hear from us within 7 days, please contact the clinic through [employer] or phone. If you have a critical or abnormal lab result, we will notify you by phone as soon as possible.  Submit refill requests through [employer] or call your pharmacy and they will forward the refill request to us. Please allow 3 business days for your refill to be completed.          Additional Information About Your Visit        [employer] Information     [employer] lets you send messages to your doctor, view your test results, renew your prescriptions, schedule appointments and more. To sign up, go to [email or web address] . Click on \"Log in\" on the left side of the screen, which will take you to the Welcome page. Then click on \"Sign up Now\" on the right side of the page.     You will be asked to enter the access code listed below, as well as some personal information. Please follow the directions to create your username and password.     Your access code is: [email or web address]  Expires: 2017  9:40 AM     Your access code will  in 90 days. If you need help or a new code, please call your [place] clinic or [phone].        Care EveryWhere ID     This is your Care EveryWhere ID. This could be used by other organizations to access your [place] medical records  [id number]        Your Vitals Were     Pulse Temperature Height Pulse Oximetry BMI (Body Mass Index)       90 97.7  F (36.5  C) (Oral) 5' 5.7\" (1.669 m) 96% 34.07 kg/m2        Blood Pressure from Last 3 Encounters:   17 120/75   16 97/67   10/17/16 120/80    Weight from Last 3 Encounters:   17 209 lb 3 oz (94.9 kg)   16 204 lb 1.6 oz (92.6 kg)   10/17/16 200 lb (90.7 kg)              We Performed the Following     Albumin Random Urine Quantitative     ALLERGY/ASTHMA ADULT REFERRAL     CBC " with platelets differential     Comprehensive metabolic panel     CRP inflammation     Erythrocyte sedimentation rate auto     FOOT EXAM  NO CHARGE [[id number]]     Hemoglobin A1c     Lipid panel reflex to direct LDL     TSH with free T4 reflex          Today's Medication Changes          These changes are accurate as of: 4/28/17  9:40 AM.  If you have any questions, ask your nurse or doctor.               Start taking these medicines.        Dose/Directions    cetirizine 10 MG tablet   Commonly known as:  zyrTEC   Used for:  Chronic idiopathic urticaria   Started by:  Tori Rizvi MD        Dose:  10 mg   Take 1 tablet (10 mg) by mouth every evening   Quantity:  90 tablet   Refills:  3       traMADol 50 MG tablet   Commonly known as:  ULTRAM   Used for:  Diabetic polyneuropathy associated with type 2 diabetes mellitus (H)   Started by:  Tori Rizvi MD        Dose:   mg   Take 1-2 tablets ( mg) by mouth 2 times daily as needed for pain maximum 2 tablet(s) per day   Quantity:  60 tablet   Refills:  3            Where to get your medicines      These medications were sent to [facility] [id number] [place] [address] AT SEC OF [employer] [address]     Phone:  [phone]     cetirizine 10 MG tablet         Some of these will need a paper prescription and others can be bought over the counter.  Ask your nurse if you have questions.     Bring a paper prescription for each of these medications     traMADol 50 MG tablet                Primary Care Provider Office Phone # Fax #    Tori Rizvi [phone]       [facility] [address] [place]  [facility] [address]        Thank you!     Thank you for choosing [facility]  for your care. Our goal is always to provide you with excellent care. Hearing back from our patients is one way we can continue to improve our services. Please  take a few minutes to complete the written survey that you may receive in the mail after your visit with us. Thank you!             Your Updated Medication List - Protect others around you: Learn how to safely use, store and throw away your medicines at www.disposemymeds.org.          This list is accurate as of: 4/28/17  9:40 AM.  Always use your most recent med list.                   Brand Name Dispense Instructions for use    albuterol (2.5 MG/3ML) 0.083% neb solution     120 vial    Take 1 vial (2.5 mg) by nebulization every 6 hours as needed for shortness of breath / dyspnea       amLODIPine 5 MG tablet    NORVASC    90 tablet    TAKE 1 TABLET BY MOUTH EVERY DAY FOR BLOOD PRESSURE       aspirin  MG EC tablet     90 tablet    TAKE 1 TABLET BY MOUTH EVERY DAY       carvedilol 12.5 MG tablet    COREG    180 tablet    TAKE ONE TABLET BY MOUTH TWICE DAILY WITH MEALS       cetirizine 10 MG tablet    zyrTEC    90 tablet    Take 1 tablet (10 mg) by mouth every evening       DULoxetine 60 MG EC capsule    CYMBALTA    90 capsule    TAKE 1 CAPSULE BY MOUTH DAILY       furosemide 40 MG tablet    LASIX    135 tablet    Take 1 tablet (40 mg) by mouth 2 times daily If weight increases or increased shortness of breath, then decrease to once a day when better.       isosorbide mononitrate 20 MG tablet    ISMO/MONOKET    180 tablet    TAKE 1 TABLET BY MOUTH TWICE DAILY       lisinopril 40 MG tablet    PRINIVIL/ZESTRIL    45 tablet    TAKE ONE-HALF TABLET BY MOUTH ONCE DAILY       loratadine 10 MG tablet    CLARITIN    90 tablet    TAKE ONE TABLET BY MOUTH EVERY DAY AS NEEDED       montelukast 10 MG tablet    SINGULAIR    90 tablet    Take 1 tablet (10 mg) by mouth At Bedtime       nitroglycerin 0.4 MG sublingual tablet    NITROSTAT    25 tablet    Place 1 tablet (0.4 mg) under the tongue every 5 minutes as needed for chest pain 0.4 mg by Sublingual route every 5 (five) minutes as needed.       potassium chloride SA 20 MEQ  CR tablet    K-DUR/KLOR-CON M    90 tablet    Take 1 tablet (20 mEq) by mouth daily       ranitidine 150 MG tablet    ZANTAC    180 tablet    TAKE 1 TABLET BY MOUTH TWICE DAILY       simvastatin 40 MG tablet    ZOCOR    30 tablet    TAKE 1 TABLET BY MOUTH EVERY EVENING AT BEDTIME       SYMBICORT 160-4.5 MCG/ACT Inhaler   Generic drug:  budesonide-formoterol     1 Inhaler    INHALE 2 PUFFS IN TO THE LUNGS TWICE DAILY       traMADol 50 MG tablet    ULTRAM    60 tablet    Take 1-2 tablets ( mg) by mouth 2 times daily as needed for pain maximum 2 tablet(s) per day       traZODone 100 MG tablet    DESYREL    180 tablet    TAKE 1 TO 2 TABLETS BY MOUTH EVERY NIGHT AT BEDTIME

## 2017-04-29 ASSESSMENT — ASTHMA QUESTIONNAIRES: ACT_TOTALSCORE: 11

## 2017-04-29 NOTE — PROGRESS NOTES
Dear Santiago Hylton,    Your test results are attached. I am happy to let you know that they are stable and your medications can stay the same.    The blood tests and urine all look great. No signs of severe allergic reaction in your body. Try the cetrizine to see if this helps with the rash.     Please call me if you have any questions about these test results or about your care.    Sincerely,    Tori Rizvi MD

## 2017-05-10 DIAGNOSIS — I10 ESSENTIAL HYPERTENSION WITH GOAL BLOOD PRESSURE LESS THAN 140/90: ICD-10-CM

## 2017-05-10 RX ORDER — LISINOPRIL 40 MG/1
TABLET ORAL
Qty: 45 TABLET | Refills: 3 | Status: SHIPPED | OUTPATIENT
Start: 2017-05-10 | End: 2019-06-04

## 2017-05-10 NOTE — TELEPHONE ENCOUNTER
lisinopril (PRINIVIL,ZESTRIL) 40 MG tablet      Last Written Prescription Date: 11/1/16  Last Fill Quantity: 45, # refills: 1  Last Office Visit with [facility], [facility] or [facility] prescribing provider: 4/28/17       Potassium   Date Value Ref Range Status   04/28/2017 4.3 3.4 - 5.3 mmol/L Final     Creatinine   Date Value Ref Range Status   04/28/2017 1.42 (H) 0.66 - 1.25 mg/dL Final     BP Readings from Last 3 Encounters:   04/28/17 120/75   12/14/16 97/67   10/17/16 120/80           Koby Faarax  [facility]

## 2017-05-19 DIAGNOSIS — F33.0 MAJOR DEPRESSIVE DISORDER, RECURRENT EPISODE, MILD (H): ICD-10-CM

## 2017-05-19 NOTE — TELEPHONE ENCOUNTER
traZODone (DESYREL) 100 MG tablet       Last Written Prescription Date: 02/17/17  Last Fill Quantity: 180; # refills: 0  Last Office Visit with [facility], [facility] or [facility] prescribing provider:  04/28/17        Last PHQ-9 score on record=   PHQ-9 SCORE 12/22/2016   Total Score -   Total Score 5       Lab Results   Component Value Date    AST 14 04/28/2017     Lab Results   Component Value Date    ALT 25 04/28/2017         [facility]

## 2017-05-23 RX ORDER — TRAZODONE HYDROCHLORIDE 100 MG/1
200 TABLET ORAL AT BEDTIME
Qty: 180 TABLET | Refills: 1 | Status: SHIPPED | OUTPATIENT
Start: 2017-05-23 | End: 2018-07-23

## 2017-05-23 NOTE — TELEPHONE ENCOUNTER
Routing refill request to provider for review/approval because:  PHQ-9 over 4  Nan Tapia RN

## 2017-06-11 DIAGNOSIS — F33.0 MAJOR DEPRESSIVE DISORDER, RECURRENT EPISODE, MILD (H): ICD-10-CM

## 2017-06-13 RX ORDER — DULOXETIN HYDROCHLORIDE 60 MG/1
CAPSULE, DELAYED RELEASE ORAL
Qty: 90 CAPSULE | Refills: 1 | Status: SHIPPED | OUTPATIENT
Start: 2017-06-13 | End: 2018-05-19

## 2017-06-15 DIAGNOSIS — E78.5 HYPERLIPIDEMIA LDL GOAL <100: ICD-10-CM

## 2017-06-15 NOTE — TELEPHONE ENCOUNTER
isosorbide mononitrate (ISMO,MONOKET) 20 MG tablet      Last Written Prescription Date: 4/25/16  Last Fill Quantity: 180,  # refills: 0   Last Office Visit with [facility], [facility] or [facility] prescribing provider: 4/28/17          Koby Faarax  [facility]

## 2017-06-18 DIAGNOSIS — J45.31 MILD PERSISTENT ASTHMA WITH ACUTE EXACERBATION: ICD-10-CM

## 2017-06-18 DIAGNOSIS — I10 ESSENTIAL HYPERTENSION WITH GOAL BLOOD PRESSURE LESS THAN 140/90: ICD-10-CM

## 2017-06-18 DIAGNOSIS — K21.9 GASTROESOPHAGEAL REFLUX DISEASE, ESOPHAGITIS PRESENCE NOT SPECIFIED: ICD-10-CM

## 2017-06-18 NOTE — TELEPHONE ENCOUNTER
furosemide (LASIX) 40 MG tablet      Last Written Prescription Date: 6/16/16  Last Fill Quantity: 90, # refills: 3  Last Office Visit with G, P or [facility] prescribing provider: 10/7/16       Potassium   Date Value Ref Range Status   04/28/2017 4.3 3.4 - 5.3 mmol/L Final     Creatinine   Date Value Ref Range Status   04/28/2017 1.42 (H) 0.66 - 1.25 mg/dL Final     BP Readings from Last 3 Encounters:   04/28/17 120/75   12/14/16 97/67   10/17/16 120/80

## 2017-06-18 NOTE — TELEPHONE ENCOUNTER
ranitidine (ZANTAC) 150 MG tablet      Last Written Prescription Date: 9/27/16  Last Fill Quantity: 180,  # refills: 2   Last Office Visit with [facility], [facility] or [facility] prescribing provider: 10/17/16      [facility]

## 2017-06-19 ENCOUNTER — TELEPHONE (OUTPATIENT)
Dept: FAMILY MEDICINE | Facility: CLINIC | Age: 62
End: 2017-06-19

## 2017-06-19 RX ORDER — MONTELUKAST SODIUM 10 MG/1
TABLET ORAL
Qty: 90 TABLET | Refills: 3 | Status: SHIPPED | OUTPATIENT
Start: 2017-06-19 | End: 2018-09-06

## 2017-06-19 NOTE — TELEPHONE ENCOUNTER
What type of form? [employer] forms    What day did you drop off your forms? 06/19/2017  Is there a due date? ASAP  How would you like to receive these forms? Mail     What is the best number to contact you?[phone]   What time works best to contact you with in 4 hrs? Any  Is it okay to leave a message? Yes    Nataly Del Angel

## 2017-06-19 NOTE — TELEPHONE ENCOUNTER
Routing refill request to provider for review/approval because:  ACT less than 20  Nan Tapia RN

## 2017-06-20 RX ORDER — ISOSORBIDE MONONITRATE 20 MG/1
TABLET ORAL
Qty: 180 TABLET | Refills: 3 | Status: SHIPPED | OUTPATIENT
Start: 2017-06-20 | End: 2018-08-01

## 2017-06-20 NOTE — TELEPHONE ENCOUNTER
Routing refill request to provider for review/approval because:  A break in medication  Nan Tapia RN

## 2017-06-20 NOTE — TELEPHONE ENCOUNTER
Form is put in our out going mail bin to be mailed to the self stamped envelope provided by pt to pt's home address.  Rene Alvarez,  [facility]

## 2017-06-20 NOTE — TELEPHONE ENCOUNTER
Form is received from the  and forward to Dr. Rizvi to address.  Rene Alvarez,  [facility]

## 2017-06-21 RX ORDER — FUROSEMIDE 40 MG
40 TABLET ORAL 2 TIMES DAILY
Qty: 135 TABLET | Refills: 2 | Status: SHIPPED | OUTPATIENT
Start: 2017-06-21 | End: 2018-05-19

## 2017-06-21 NOTE — TELEPHONE ENCOUNTER
Routing refill request to provider for review/approval because:  Labs out of range:  Creatinine    TONE Grossman, Clinical RN [facility] [place].

## 2017-07-01 DIAGNOSIS — J45.30 MILD PERSISTENT ASTHMA, UNCOMPLICATED: ICD-10-CM

## 2017-07-01 NOTE — TELEPHONE ENCOUNTER
albuterol (2.5 MG/3ML) 0.083% nebulizer solution       Last Written Prescription Date: 7/30/15  Last Fill Quantity: 120, # refills: 3    Last Office Visit with [facility], [facility] or [facility] prescribing provider:  4/28/17   Future Office Visit:       Date of Last Asthma Action Plan Letter:   Asthma Action Plan Q1 Year    Topic Date Due     Asthma Action Plan - yearly  09/29/2017      Asthma Control Test:   ACT Total Scores 4/28/2017   ACT TOTAL SCORE (Goal Greater than or Equal to 20) 11   In the past 12 months, how many times did you visit the emergency room for your asthma without being admitted to the hospital? 2   In the past 12 months, how many times were you hospitalized overnight because of your asthma? 0       Date of Last Spirometry Test:   No results found for this or any previous visit.

## 2017-07-03 RX ORDER — ALBUTEROL SULFATE 0.83 MG/ML
1 SOLUTION RESPIRATORY (INHALATION) EVERY 6 HOURS PRN
Qty: 120 VIAL | Refills: 3 | Status: SHIPPED | OUTPATIENT
Start: 2017-07-03 | End: 2018-07-04

## 2017-07-13 ENCOUNTER — OFFICE VISIT (OUTPATIENT)
Dept: FAMILY MEDICINE | Facility: CLINIC | Age: 62
End: 2017-07-13
Payer: MEDICARE

## 2017-07-13 VITALS
TEMPERATURE: 98.8 F | SYSTOLIC BLOOD PRESSURE: 133 MMHG | WEIGHT: 213 LBS | BODY MASS INDEX: 34.23 KG/M2 | HEIGHT: 66 IN | DIASTOLIC BLOOD PRESSURE: 88 MMHG | HEART RATE: 81 BPM | OXYGEN SATURATION: 95 %

## 2017-07-13 DIAGNOSIS — B18.2 CHRONIC HEPATITIS C WITHOUT HEPATIC COMA (H): ICD-10-CM

## 2017-07-13 DIAGNOSIS — E66.9 OBESITY (BMI 30-39.9): ICD-10-CM

## 2017-07-13 DIAGNOSIS — N52.1 ERECTILE DYSFUNCTION DUE TO DISEASES CLASSIFIED ELSEWHERE: ICD-10-CM

## 2017-07-13 DIAGNOSIS — R68.82 DECREASED LIBIDO: Primary | ICD-10-CM

## 2017-07-13 DIAGNOSIS — I25.10 CORONARY ARTERY DISEASE INVOLVING NATIVE CORONARY ARTERY OF NATIVE HEART WITHOUT ANGINA PECTORIS: ICD-10-CM

## 2017-07-13 DIAGNOSIS — E11.42 DIABETIC POLYNEUROPATHY ASSOCIATED WITH TYPE 2 DIABETES MELLITUS (H): ICD-10-CM

## 2017-07-13 DIAGNOSIS — I10 HYPERTENSION GOAL BP (BLOOD PRESSURE) < 140/90: ICD-10-CM

## 2017-07-13 DIAGNOSIS — F33.0 MILD RECURRENT MAJOR DEPRESSION (H): ICD-10-CM

## 2017-07-13 DIAGNOSIS — E78.5 HYPERLIPIDEMIA LDL GOAL <100: ICD-10-CM

## 2017-07-13 DIAGNOSIS — I50.23 ACUTE ON CHRONIC SYSTOLIC CONGESTIVE HEART FAILURE (H): ICD-10-CM

## 2017-07-13 DIAGNOSIS — Z13.5 SCREENING FOR DIABETIC RETINOPATHY: ICD-10-CM

## 2017-07-13 LAB
ALBUMIN SERPL-MCNC: 3.7 G/DL (ref 3.4–5)
ANION GAP SERPL CALCULATED.3IONS-SCNC: 8 MMOL/L (ref 3–14)
BUN SERPL-MCNC: 19 MG/DL (ref 7–30)
CALCIUM SERPL-MCNC: 9.8 MG/DL (ref 8.5–10.1)
CHLORIDE SERPL-SCNC: 104 MMOL/L (ref 94–109)
CO2 SERPL-SCNC: 30 MMOL/L (ref 20–32)
CREAT SERPL-MCNC: 1.43 MG/DL (ref 0.66–1.25)
ERYTHROCYTE [DISTWIDTH] IN BLOOD BY AUTOMATED COUNT: 13.9 % (ref 10–15)
GFR SERPL CREATININE-BSD FRML MDRD: 50 ML/MIN/1.7M2
GLUCOSE SERPL-MCNC: 93 MG/DL (ref 70–99)
HBA1C MFR BLD: 5.5 % (ref 4.3–6)
HCT VFR BLD AUTO: 44.8 % (ref 40–53)
HGB BLD-MCNC: 15.2 G/DL (ref 13.3–17.7)
MCH RBC QN AUTO: 31 PG (ref 26.5–33)
MCHC RBC AUTO-ENTMCNC: 33.9 G/DL (ref 31.5–36.5)
MCV RBC AUTO: 91 FL (ref 78–100)
PHOSPHATE SERPL-MCNC: 3.8 MG/DL (ref 2.5–4.5)
PLATELET # BLD AUTO: 215 10E9/L (ref 150–450)
POTASSIUM SERPL-SCNC: 4.4 MMOL/L (ref 3.4–5.3)
RBC # BLD AUTO: 4.9 10E12/L (ref 4.4–5.9)
SODIUM SERPL-SCNC: 142 MMOL/L (ref 133–144)
WBC # BLD AUTO: 11 10E9/L (ref 4–11)

## 2017-07-13 PROCEDURE — 80069 RENAL FUNCTION PANEL: CPT | Performed by: FAMILY MEDICINE

## 2017-07-13 PROCEDURE — 85027 COMPLETE CBC AUTOMATED: CPT | Performed by: FAMILY MEDICINE

## 2017-07-13 PROCEDURE — 36415 COLL VENOUS BLD VENIPUNCTURE: CPT | Performed by: FAMILY MEDICINE

## 2017-07-13 PROCEDURE — 83036 HEMOGLOBIN GLYCOSYLATED A1C: CPT | Performed by: FAMILY MEDICINE

## 2017-07-13 PROCEDURE — 99214 OFFICE O/P EST MOD 30 MIN: CPT | Performed by: FAMILY MEDICINE

## 2017-07-13 PROCEDURE — 84403 ASSAY OF TOTAL TESTOSTERONE: CPT | Performed by: FAMILY MEDICINE

## 2017-07-13 RX ORDER — GABAPENTIN 300 MG/1
CAPSULE ORAL
Qty: 90 CAPSULE | Refills: 3 | Status: SHIPPED | OUTPATIENT
Start: 2017-07-13 | End: 2018-05-19

## 2017-07-13 ASSESSMENT — PAIN SCALES - GENERAL: PAINLEVEL: EXTREME PAIN (9)

## 2017-07-13 NOTE — PATIENT INSTRUCTIONS
How to contact your care team: [phone] Pharmacy [phone]   MD KATHERINE FLORES PA-C CHRIS JONES, PA-C NAM HO, MD JONATHAN BATES, MD ARVIN VOCAL, MD    Clinic hours M-Th 7am-7pm Fri 7am-5pm.   Urgent care M-F 11am-9pm  Sat/Sun 9am-5pm.   Pharmacy   Mon-Th:  8:00am-8pm   Fri:  8:00am-6:00pm  Sat/Sun  8:00am-5:00 pm       Diabetes: Inspecting Your Feet    Diabetes increases your chances of developing foot problems. So inspect your feet every day. This helps you find small skin irritations before they become serious ulcers or infections. If you have trouble seeing the bottoms of your feet, use a mirror or ask a family member or friend to help.  How to check your feet  Below are tips to help you look for foot problems. Try to check your feet at the same time each day, such as when you get out of bed in the morning:    Check the top of each foot. The tops of toes, back of the heel, and outer edge of the foot can get a lot of rubbing from poor-fitting shoes.    Check the bottom of each foot. Daily wear and tear often leads to problems at pressure spots.    Check the toes and nails. Fungal infections often occur between toes. Toenail problems can also be a sign of fungal infections or lead to breaks in the skin.    Check your shoes, too. Loose objects inside a shoe can injure the foot. Use your hand to feel inside your shoes for things like raymond, loose stitching, or rough areas that could irritate your skin.  Warning signs  Look for any color changes in the foot. Redness with streaks can signal a severe infection, which needs immediate medical attention. Tell your healthcare provider right away if you have any of these problems:    Swelling, sometimes with color changes, may be a sign of poor blood flow or infection. Symptoms include tenderness and an increase in the size of your foot.    Warm or hot areas on your feet may be signs of infection. A foot that is cold may not be getting  enough blood.    Sensations such as burning, tingling, or  pins and needles  can be signs of a problem. Also check for areas that may be numb.    Hot spots are caused by friction or pressure. Look for hot spots in areas that get a lot of rubbing. Hot spots can turn into blisters, calluses, or sores.    Cracks and sores are caused by dry or irritated skin. They are a sign that the skin is breaking down, which can lead to infection.    Toenail problems to watch for include nails growing into the skin (ingrown toenail) and causing redness or pain. Thick, yellow, or discolored nails can signal a fungal infection.    Drainage and odor can develop from untreated sores and ulcers. Call your healthcare provider right away if you notice white or yellow drainage, bleeding, or unpleasant odor.   Date Last Reviewed: 6/1/2016 2000-2017 [employer]. [address] [place] [address], [place] [address]. All rights reserved. This information is not intended as a substitute for professional medical care. Always follow your healthcare professional's instructions.

## 2017-07-13 NOTE — PROGRESS NOTES
SUBJECTIVE:                                                    Santiago Hylton is a 62 year old male who presents to clinic today for the following health issues:    Diabetes Follow-up      Patient is checking blood sugars: stable    Diabetic concerns: None     Symptoms of hypoglycemia (low blood sugar): none     Paresthesias (numbness or burning in feet) or sores: Yes severe burning in the feet with numbness     Date of last diabetic eye exam: due    Hyperlipidemia Follow-Up      Rate your low fat/cholesterol diet?: good    Taking statin?  Yes, no muscle aches from statin    Other lipid medications/supplements?:  none    Hypertension Follow-up      Outpatient blood pressures are not being checked.    Low Salt Diet: no added salt    Vascular Disease Follow-up:  Coronary Artery and Peripheral Vascular Disease      Chest pain or pressure, left side neck or arm pain: No    Shortness of breath/increased sweats/nausea with exertion: No    Pain in calves walking 1-2 blocks: No    Worsened or new symptoms since last visit: No    Nitroglycerin use: no    Daily aspirin use: Yes    Heart Failure Follow-up    Symptoms:    Shortness of breath: happens with exertion only - stable    Lower extremity edema: stable     Chest pain: No    Using more pillows than normal: No    Cough at night: No    Weight:    Checking weight daily: Yes    Weight change: none    Cardiology visits, ER/UC, or hospital admissions since last visit: None    Medication side effects: none      Amount of exercise or physical activity: 2-3 days/week for an average of 15-30 minutes    Problems taking medications regularly: No    Medication side effects: none    Diet: low salt and low fat/cholesterol    Joint Pain - Foot pain follow up    Onset: Persistent    Description:   Location: bilateral feet  Character: Sharp    Intensity: severe, 9/10    Progression of Symptoms: worse, daily    Accompanying Signs & Symptoms:  Other symptoms: numbness and tingling, sore  "heels    History:   Previous similar pain: YES      Precipitating factors:   Trauma or overuse: no     Alleviating factors:  Improved by: nothing    Therapies Tried and outcome: Tramadol, elevating tried without relief        Concern - Erectile Dysfunction  Onset: x1year    Description:   Patient complains of lack of sex \"drive\"    Intensity: severe    Progression of Symptoms:  worsening    Accompanying Signs & Symptoms:  None    Previous history of similar problem:   Hx    Precipitating factors:   Worsened by: None    Alleviating factors:  Improved by: None    Therapies Tried and outcome: Testosterone tried and failed    Problem list and histories reviewed & adjusted, as indicated.  Additional history: as documented    Patient Active Problem List   Diagnosis     Hypertension goal BP (blood pressure) < 140/90     Hyperlipidemia LDL goal <100     CHF (congestive heart failure) (H)     Mild recurrent major depression (H)     Gout     GERD (gastroesophageal reflux disease)     Chronic rhinitis     CAD (coronary artery disease)     CKD (chronic kidney disease) stage 2, GFR 60-89 ml/min     Mild persistent asthma     Tobacco abuse     History of colonic polyps     Advanced directives, counseling/discussion     Chronic hepatitis C (H)     Type 2 diabetes mellitus with diabetic chronic kidney disease (H)     Microalbuminuria due to type 2 diabetes mellitus (H)     Obesity (BMI 30-39.9)     Herpes zoster without complication     Cataract, bilateral     Type 2 diabetes mellitus with diabetic polyneuropathy (H)     Coronary artery disease involving native coronary artery of native heart without angina pectoris     Diabetic polyneuropathy associated with type 2 diabetes mellitus (H)     Past Surgical History:   Procedure Laterality Date     CARDIAC SURGERY      stent     CATARACT IOL, RT/LT       COLONOSCOPY  9/18/2012    Procedure: COLONOSCOPY;  COLONOSCOPY, SCREEN;  Surgeon: Cl Johnson MD;  Location:  OR     " ORTHOPEDIC SURGERY      ankle     PHACOEMULSIFICATION WITH STANDARD INTRAOCULAR LENS IMPLANT Right 1/28/2016    Procedure: PHACOEMULSIFICATION WITH STANDARD INTRAOCULAR LENS IMPLANT;  Surgeon: Fly Merrill MD;  Location: [facility] OR     PHACOEMULSIFICATION WITH STANDARD INTRAOCULAR LENS IMPLANT Left 2/11/2016    Procedure: PHACOEMULSIFICATION WITH STANDARD INTRAOCULAR LENS IMPLANT;  Surgeon: Fly Merrill MD;  Location: [facility] OR       Social History   Substance Use Topics     Smoking status: Former Smoker     Packs/day: 0.25     Years: 15.00     Types: Cigarettes     Quit date: 9/21/2016     Smokeless tobacco: Never Used      Comment: 3-4 a day     Alcohol use Yes      Comment: weekend beer     Family History   Problem Relation Age of Onset     HEART DISEASE Maternal Grandmother      Hypertension Maternal Grandmother      Hypertension Father      Hypertension Mother      Hypertension Sister      Thyroid Disease Sister      CANCER Brother      DIABETES Brother      Hypertension Brother      Hypertension Maternal Aunt      CANCER Maternal Uncle      Hypertension Maternal Uncle      Hypertension Paternal Aunt      Hypertension Paternal Uncle      Hypertension Maternal Grandfather      Hypertension Paternal Grandmother      Hypertension Paternal Grandfather      CEREBROVASCULAR DISEASE No family hx of      Glaucoma No family hx of      Macular Degeneration No family hx of          Current Outpatient Prescriptions   Medication Sig Dispense Refill     gabapentin (NEURONTIN) 300 MG capsule Take 1 tablet (300 mg) every night for 1 week, then 1 tablet twice daily for 1 week, then 1 tablet three times daily if tolerated. 90 capsule 3     albuterol (2.5 MG/3ML) 0.083% neb solution Take 1 vial (2.5 mg) by nebulization every 6 hours as needed for shortness of breath / dyspnea 120 vial 3     ranitidine (ZANTAC) 150 MG tablet Take 1 tablet (150 mg) by mouth 2 times daily 180 tablet 2     furosemide (LASIX) 40 MG  tablet Take 1 tablet (40 mg) by mouth 2 times daily If weight increases or increased shortness of breath, then decrease to once a day when better. 135 tablet 2     isosorbide mononitrate (ISMO/MONOKET) 20 MG tablet TAKE 1 TABLET BY MOUTH TWICE DAILY 180 tablet 3     montelukast (SINGULAIR) 10 MG tablet TAKE 1 TABLET BY MOUTH AT BEDTIME 90 tablet 3     DULoxetine (CYMBALTA) 60 MG EC capsule TAKE 1 CAPSULE BY MOUTH DAILY 90 capsule 1     traZODone (DESYREL) 100 MG tablet Take 2 tablets (200 mg) by mouth At Bedtime 180 tablet 1     lisinopril (PRINIVIL/ZESTRIL) 40 MG tablet TAKE 1/2 TABLET BY MOUTH EVERY DAY 45 tablet 3     simvastatin (ZOCOR) 40 MG tablet Take 1 tablet (40 mg) by mouth At Bedtime 90 tablet 3     traMADol (ULTRAM) 50 MG tablet Take 1-2 tablets ( mg) by mouth 2 times daily as needed for pain maximum 2 tablet(s) per day 60 tablet 3     cetirizine (ZYRTEC) 10 MG tablet Take 1 tablet (10 mg) by mouth every evening 90 tablet 3     potassium chloride SA (K-DUR/KLOR-CON M) 20 MEQ CR tablet Take 1 tablet (20 mEq) by mouth daily 90 tablet 1     carvedilol (COREG) 12.5 MG tablet TAKE ONE TABLET BY MOUTH TWICE DAILY WITH MEALS 180 tablet 1     amLODIPine (NORVASC) 5 MG tablet TAKE 1 TABLET BY MOUTH EVERY DAY FOR BLOOD PRESSURE 90 tablet 1     SYMBICORT 160-4.5 MCG/ACT inhaler INHALE 2 PUFFS IN TO THE LUNGS TWICE DAILY 1 Inhaler 0     loratadine (CLARITIN) 10 MG tablet TAKE ONE TABLET BY MOUTH EVERY DAY AS NEEDED 90 tablet 1     aspirin  MG tablet TAKE 1 TABLET BY MOUTH EVERY DAY 90 tablet 3     nitroglycerin (NITROSTAT) 0.4 MG SL tablet Place 1 tablet (0.4 mg) under the tongue every 5 minutes as needed for chest pain 0.4 mg by Sublingual route every 5 (five) minutes as needed. 25 tablet 11     Allergies   Allergen Reactions     No Known Drug Allergies      Recent Labs   Lab Test  07/13/17   0854  04/28/17   0942  10/17/16   1206  06/16/16   0836  01/07/16   0818  07/30/15   0752   A1C  5.5  5.7  5.5   "5.6  5.4  5.6   LDL   --   70   --   59  60  56   HDL   --   47   --   44  46  36*   TRIG   --   92   --   95  91  98   ALT   --   25   --   49   --   27   CR  1.43*  1.42*  1.74*  1.20  1.30*  1.36*   GFRESTIMATED  50*  51*  40*  62  56*  54*   GFRESTBLACK  61  61  49*  75  68  65   POTASSIUM  4.4  4.3  4.4  4.0  4.9  4.8   TSH   --   0.74   --    --   0.71   --       BP Readings from Last 3 Encounters:   07/13/17 133/88   04/28/17 120/75   12/14/16 97/67    Wt Readings from Last 3 Encounters:   07/13/17 213 lb (96.6 kg)   04/28/17 209 lb 3 oz (94.9 kg)   12/14/16 204 lb 1.6 oz (92.6 kg)                  Labs reviewed in EPIC    Reviewed and updated as needed this visit by clinical staff       Reviewed and updated as needed this visit by Provider         ROS:  Constitutional, HEENT, cardiovascular, pulmonary, gi and gu systems are negative, except as otherwise noted.    OBJECTIVE:     /88 (BP Location: Right arm, Patient Position: Chair, Cuff Size: Adult Regular)  Pulse 81  Temp 98.8  F (37.1  C) (Oral)  Ht 5' 5.7\" (1.669 m)  Wt 213 lb (96.6 kg)  SpO2 95%  BMI 34.69 kg/m2  Body mass index is 34.69 kg/(m^2).  GENERAL: healthy, alert and no distress, is obese  EYES: Eyes grossly normal to inspection, PERRL and conjunctivae and sclerae normal  HENT: ear canals and TM's normal, nose and mouth without ulcers or lesions  NECK: no adenopathy, no asymmetry, masses, or scars and thyroid normal to palpation  RESP: lungs clear to auscultation - no rales, rhonchi or wheezes  CV: regular rate and rhythm, normal S1 S2, no S3 or S4, no murmur, click or rub, no peripheral edema and peripheral pulses strong  ABDOMEN: soft, nontender, no hepatosplenomegaly, no masses and bowel sounds normal  MS: no gross musculoskeletal defects noted, no edema  SKIN: no suspicious lesions or rashes  NEURO: Normal strength and tone, mentation intact and speech normal  BACK: no CVA tenderness, no paralumbar tenderness  PSYCH: mentation " "appears normal, affect normal/bright  Diabetic foot exam: normal DP and PT pulses, no trophic changes or ulcerative lesions, normal calluses, decreased sensory exam and impaired monofilament exam     Diagnostic Test Results:  Results for orders placed or performed in visit on 07/13/17 (from the past 24 hour(s))   CBC with platelets   Result Value Ref Range    WBC 11.0 4.0 - 11.0 10e9/L    RBC Count 4.90 4.4 - 5.9 10e12/L    Hemoglobin 15.2 13.3 - 17.7 g/dL    Hematocrit 44.8 40.0 - 53.0 %    MCV 91 78 - 100 fl    MCH 31.0 26.5 - 33.0 pg    MCHC 33.9 31.5 - 36.5 g/dL    RDW 13.9 10.0 - 15.0 %    Platelet Count 215 150 - 450 10e9/L   Renal panel   Result Value Ref Range    Sodium 142 133 - 144 mmol/L    Potassium 4.4 3.4 - 5.3 mmol/L    Chloride 104 94 - 109 mmol/L    Carbon Dioxide 30 20 - 32 mmol/L    Anion Gap 8 3 - 14 mmol/L    Glucose 93 70 - 99 mg/dL    Urea Nitrogen 19 7 - 30 mg/dL    Creatinine 1.43 (H) 0.66 - 1.25 mg/dL    GFR Estimate 50 (L) >60 mL/min/1.7m2    GFR Estimate If Black 61 >60 mL/min/1.7m2    Calcium 9.8 8.5 - 10.1 mg/dL    Phosphorus 3.8 2.5 - 4.5 mg/dL    Albumin 3.7 3.4 - 5.0 g/dL   Hemoglobin A1c   Result Value Ref Range    Hemoglobin A1C 5.5 4.3 - 6.0 %       ASSESSMENT/PLAN:         Tobacco Cessation:   reports that he quit smoking about 9 months ago. His smoking use included Cigarettes. He has a 3.75 pack-year smoking history. He has never used smokeless tobacco.      BMI:   Estimated body mass index is 34.69 kg/(m^2) as calculated from the following:    Height as of this encounter: 5' 5.7\" (1.669 m).    Weight as of this encounter: 213 lb (96.6 kg).   Weight management plan: Discussed healthy diet and exercise guidelines and patient will follow up in 3 months in clinic to re-evaluate.        ICD-10-CM    1. Decreased libido R68.82 Testosterone, total- to screen for very low testosterone level   2. Acute on chronic systolic congestive heart failure (H) I50.23 Improved but some continued " weight gain and dependent edema   3. Diabetic polyneuropathy associated with type 2 diabetes mellitus (H) E11.42 gabapentin (NEURONTIN) 300 MG capsule- start at night and increase to three times a day as tolerated.     Hemoglobin A1c   4. Mild recurrent major depression (H) F33.0 stable   5. Chronic hepatitis C without hepatic coma (H) B18.2 Cured with treatment   6. Hypertension goal BP (blood pressure) < 140/90 I10 CBC with platelets, well controlled on medications      Renal panel   7. Hyperlipidemia LDL goal <100 E78.5 stable   8. Coronary artery disease involving native coronary artery of native heart without angina pectoris I25.10 No recurrent angina   9. Screening for diabetic retinopathy Z13.5 OPHTHALMOLOGY ADULT REFERRAL   10. Obesity (BMI 30-39.9) E66.9 Weight loss counseling done    11. Erectile dysfunction due to diseases classified elsewhere N52.1 May need urology referral. Not a candidate for medications       CONSULTATION/REFERRAL to endocrinology or urology depending on lab results.   FUTURE LABS:       - Schedule fasting labs in 3 months  FUTURE APPOINTMENTS:       - Follow-up visit in 1-2 months or sooner if any questions or concerns.   Work on weight loss  Regular exercise  See Patient Instructions    Tori Rizvi MD  [facility]

## 2017-07-13 NOTE — MR AVS SNAPSHOT
After Visit Summary   7/13/2017    Santiago Hylton    MRN: [id number]           Patient Information     Date Of Birth          1955        Visit Information        Provider Department      7/13/2017 8:00 AM Tori Rizvi MD [facility]        Today's Diagnoses     Hypertension goal BP (blood pressure) < 140/90    -  1    Hyperlipidemia LDL goal <100        Acute on chronic systolic congestive heart failure (H)        Mild recurrent major depression (H)        Coronary artery disease involving native coronary artery of native heart without angina pectoris        Screening for diabetic retinopathy        Chronic hepatitis C without hepatic coma (H)        Obesity (BMI 30-39.9)        Decreased libido        Erectile dysfunction due to diseases classified elsewhere        Diabetic polyneuropathy associated with type 2 diabetes mellitus (H)          Care Instructions    How to contact your care team: [phone] Pharmacy [phone]   MD KATHERINE FLORES PA-C CHRIS JONES, PA-C NAM HO, MD JONATHAN BATES, MD ARVIN VOCAL, MD    Clinic hours M-Th 7am-7pm Fri 7am-5pm.   Urgent care M-F 11am-9pm  Sat/Sun 9am-5pm.   Pharmacy   Mon-Th:  8:00am-8pm   Fri:  8:00am-6:00pm  Sat/Sun  8:00am-5:00 pm       Diabetes: Inspecting Your Feet    Diabetes increases your chances of developing foot problems. So inspect your feet every day. This helps you find small skin irritations before they become serious ulcers or infections. If you have trouble seeing the bottoms of your feet, use a mirror or ask a family member or friend to help.  How to check your feet  Below are tips to help you look for foot problems. Try to check your feet at the same time each day, such as when you get out of bed in the morning:    Check the top of each foot. The tops of toes, back of the heel, and outer edge of the foot can get a lot of rubbing from poor-fitting shoes.    Check the bottom of each  foot. Daily wear and tear often leads to problems at pressure spots.    Check the toes and nails. Fungal infections often occur between toes. Toenail problems can also be a sign of fungal infections or lead to breaks in the skin.    Check your shoes, too. Loose objects inside a shoe can injure the foot. Use your hand to feel inside your shoes for things like raymond, loose stitching, or rough areas that could irritate your skin.  Warning signs  Look for any color changes in the foot. Redness with streaks can signal a severe infection, which needs immediate medical attention. Tell your healthcare provider right away if you have any of these problems:    Swelling, sometimes with color changes, may be a sign of poor blood flow or infection. Symptoms include tenderness and an increase in the size of your foot.    Warm or hot areas on your feet may be signs of infection. A foot that is cold may not be getting enough blood.    Sensations such as burning, tingling, or  pins and needles  can be signs of a problem. Also check for areas that may be numb.    Hot spots are caused by friction or pressure. Look for hot spots in areas that get a lot of rubbing. Hot spots can turn into blisters, calluses, or sores.    Cracks and sores are caused by dry or irritated skin. They are a sign that the skin is breaking down, which can lead to infection.    Toenail problems to watch for include nails growing into the skin (ingrown toenail) and causing redness or pain. Thick, yellow, or discolored nails can signal a fungal infection.    Drainage and odor can develop from untreated sores and ulcers. Call your healthcare provider right away if you notice white or yellow drainage, bleeding, or unpleasant odor.   Date Last Reviewed: 6/1/2016 2000-2017 [employer]. [address] [place] [address], [place] [address]. All rights reserved. This information is not intended as a substitute for professional medical care. Always follow your  "healthcare professional's instructions.                Follow-ups after your visit        Additional Services     OPHTHALMOLOGY ADULT REFERRAL       Your provider has referred you to: [facility]: [facility] [place] [phone]   [email or web address]    Please be aware that coverage of these services is subject to the terms and limitations of your health insurance plan.  Call member services at your health plan with any benefit or coverage questions.      Please bring the following with you to your appointment:    (1) Any X-Rays, CTs or MRIs which have been performed.  Contact the facility where they were done to arrange for  prior to your scheduled appointment.    (2) List of current medications  (3) This referral request   (4) Any documents/labs given to you for this referral                  Who to contact     If you have questions or need follow up information about today's clinic visit or your schedule please contact [facility] [place] directly at [phone].  Normal or non-critical lab and imaging results will be communicated to you by [employer], letter or phone within 4 business days after the clinic has received the results. If you do not hear from us within 7 days, please contact the clinic through [employer] or phone. If you have a critical or abnormal lab result, we will notify you by phone as soon as possible.  Submit refill requests through [employer] or call your pharmacy and they will forward the refill request to us. Please allow 3 business days for your refill to be completed.          Additional Information About Your Visit        [employer] Information     [employer] lets you send messages to your doctor, view your test results, renew your prescriptions, schedule appointments and more. To sign up, go to [email or web address] . Click on \"Log in\" on the left side of the screen, which will take you to the Welcome page. Then click on \"Sign up Now\" on the right " "side of the page.     You will be asked to enter the access code listed below, as well as some personal information. Please follow the directions to create your username and password.     Your access code is: [email or web address]  Expires: 2017  9:40 AM     Your access code will  in 90 days. If you need help or a new code, please call your [facility] or [phone].        Care EveryWhere ID     This is your Care EveryWhere ID. This could be used by other organizations to access your [facility] medical records  [id number]        Your Vitals Were     Pulse Temperature Height Pulse Oximetry BMI (Body Mass Index)       81 98.8  F (37.1  C) (Oral) 5' 5.7\" (1.669 m) 95% 34.69 kg/m2        Blood Pressure from Last 3 Encounters:   17 133/88   17 120/75   16 97/67    Weight from Last 3 Encounters:   17 213 lb (96.6 kg)   17 209 lb 3 oz (94.9 kg)   16 204 lb 1.6 oz (92.6 kg)              We Performed the Following     CBC with platelets     Hemoglobin A1c     OPHTHALMOLOGY ADULT REFERRAL     Renal panel     Testosterone, total          Today's Medication Changes          These changes are accurate as of: 17  8:53 AM.  If you have any questions, ask your nurse or doctor.               Start taking these medicines.        Dose/Directions    gabapentin 300 MG capsule   Commonly known as:  NEURONTIN   Used for:  Diabetic polyneuropathy associated with type 2 diabetes mellitus (H)   Started by:  Tori Rizvi MD        Take 1 tablet (300 mg) every night for 1 week, then 1 tablet twice daily for 1 week, then 1 tablet three times daily if tolerated.   Quantity:  90 capsule   Refills:  3            Where to get your medicines      These medications were sent to [facility] [address] [facility] [address] [address]     Phone:  [phone]     gabapentin 300 MG capsule                Primary Care " Provider Office Phone # Fax #    Tori Tessa Rizvi [phone]       [facility] [address]  [facility] [address]        Equal Access to Services     SARAI CALDWELL : Hadii aad ku hadricardoo Soomaali, waaxda luqadaha, qaybta kaalmada adeegyada, waxay idiin donyan adeliza cruz ladenis ortiz. So [facility] [phone].    ATENCIÓN: Si habla español, tiene a paz disposición servicios gratuitos de asistencia lingüística. Llame al [phone].    We comply with applicable federal civil rights laws and [place] laws. We do not discriminate on the basis of race, color, national origin, age, disability sex, sexual orientation or gender identity.            Thank you!     Thank you for choosing [facility]  for your care. Our goal is always to provide you with excellent care. Hearing back from our patients is one way we can continue to improve our services. Please take a few minutes to complete the written survey that you may receive in the mail after your visit with us. Thank you!             Your Updated Medication List - Protect others around you: Learn how to safely use, store and throw away your medicines at www.disposemymeds.org.          This list is accurate as of: 7/13/17  8:53 AM.  Always use your most recent med list.                   Brand Name Dispense Instructions for use Diagnosis    albuterol (2.5 MG/3ML) 0.083% neb solution     120 vial    Take 1 vial (2.5 mg) by nebulization every 6 hours as needed for shortness of breath / dyspnea    Mild persistent asthma, uncomplicated       amLODIPine 5 MG tablet    NORVASC    90 tablet    TAKE 1 TABLET BY MOUTH EVERY DAY FOR BLOOD PRESSURE    Essential hypertension with goal blood pressure less than 140/90       aspirin  MG EC tablet     90 tablet    TAKE 1 TABLET BY MOUTH EVERY DAY    Cardiovascular disease       carvedilol 12.5 MG tablet    COREG    180 tablet    TAKE ONE TABLET BY MOUTH TWICE DAILY WITH MEALS     Essential hypertension with goal blood pressure less than 140/90       cetirizine 10 MG tablet    zyrTEC    90 tablet    Take 1 tablet (10 mg) by mouth every evening    Chronic idiopathic urticaria       DULoxetine 60 MG EC capsule    CYMBALTA    90 capsule    TAKE 1 CAPSULE BY MOUTH DAILY    Major depressive disorder, recurrent episode, mild (H)       furosemide 40 MG tablet    LASIX    135 tablet    Take 1 tablet (40 mg) by mouth 2 times daily If weight increases or increased shortness of breath, then decrease to once a day when better.    Essential hypertension with goal blood pressure less than 140/90       gabapentin 300 MG capsule    NEURONTIN    90 capsule    Take 1 tablet (300 mg) every night for 1 week, then 1 tablet twice daily for 1 week, then 1 tablet three times daily if tolerated.    Diabetic polyneuropathy associated with type 2 diabetes mellitus (H)       isosorbide mononitrate 20 MG tablet    ISMO/MONOKET    180 tablet    TAKE 1 TABLET BY MOUTH TWICE DAILY    Hyperlipidemia LDL goal <100       lisinopril 40 MG tablet    PRINIVIL/ZESTRIL    45 tablet    TAKE 1/2 TABLET BY MOUTH EVERY DAY    Essential hypertension with goal blood pressure less than 140/90       loratadine 10 MG tablet    CLARITIN    90 tablet    TAKE ONE TABLET BY MOUTH EVERY DAY AS NEEDED    Allergic rhinitis, unspecified allergic rhinitis type       montelukast 10 MG tablet    SINGULAIR    90 tablet    TAKE 1 TABLET BY MOUTH AT BEDTIME    Mild persistent asthma with acute exacerbation       nitroGLYcerin 0.4 MG sublingual tablet    NITROSTAT    25 tablet    Place 1 tablet (0.4 mg) under the tongue every 5 minutes as needed for chest pain 0.4 mg by Sublingual route every 5 (five) minutes as needed.    CAD (coronary artery disease)       potassium chloride SA 20 MEQ CR tablet    K-DUR/KLOR-CON M    90 tablet    Take 1 tablet (20 mEq) by mouth daily    Hypopotassemia       ranitidine 150 MG tablet    ZANTAC    180 tablet    Take 1  tablet (150 mg) by mouth 2 times daily    Gastroesophageal reflux disease, esophagitis presence not specified       simvastatin 40 MG tablet    ZOCOR    90 tablet    Take 1 tablet (40 mg) by mouth At Bedtime    Hyperlipidemia, unspecified hyperlipidemia type       SYMBICORT 160-4.5 MCG/ACT Inhaler   Generic drug:  budesonide-formoterol     1 Inhaler    INHALE 2 PUFFS IN TO THE LUNGS TWICE DAILY    Mild persistent asthma without complication       traMADol 50 MG tablet    ULTRAM    60 tablet    Take 1-2 tablets ( mg) by mouth 2 times daily as needed for pain maximum 2 tablet(s) per day    Diabetic polyneuropathy associated with type 2 diabetes mellitus (H)       traZODone 100 MG tablet    DESYREL    180 tablet    Take 2 tablets (200 mg) by mouth At Bedtime    Major depressive disorder, recurrent episode, mild (H)

## 2017-07-13 NOTE — LETTER
July 18, 2017      Santiago Hylton  [address]  [facility] [address]          Dear Santiago,    Your test results are attached. I am happy to let you know that they are stable and your medications can stay the same.    Your testosterone level is in good range. The diabetes test looks great. The kidneys are healthy. I think that the problem you are having may be medication related or due to heart problems. We can try cutting back on the antidepressant at night to see if this helps. You can try taking one trazodone at night and see if this makes any difference.    Please call me if you have any questions about these test results or about your care.    Sincerely,      Tori Rizvi MD/myles    Results for orders placed or performed in visit on 07/13/17   Testosterone, total   Result Value Ref Range    Testosterone Total 256 240 - 950 ng/dL   CBC with platelets   Result Value Ref Range    WBC 11.0 4.0 - 11.0 10e9/L    RBC Count 4.90 4.4 - 5.9 10e12/L    Hemoglobin 15.2 13.3 - 17.7 g/dL    Hematocrit 44.8 40.0 - 53.0 %    MCV 91 78 - 100 fl    MCH 31.0 26.5 - 33.0 pg    MCHC 33.9 31.5 - 36.5 g/dL    RDW 13.9 10.0 - 15.0 %    Platelet Count 215 150 - 450 10e9/L   Renal panel   Result Value Ref Range    Sodium 142 133 - 144 mmol/L    Potassium 4.4 3.4 - 5.3 mmol/L    Chloride 104 94 - 109 mmol/L    Carbon Dioxide 30 20 - 32 mmol/L    Anion Gap 8 3 - 14 mmol/L    Glucose 93 70 - 99 mg/dL    Urea Nitrogen 19 7 - 30 mg/dL    Creatinine 1.43 (H) 0.66 - 1.25 mg/dL    GFR Estimate 50 (L) >60 mL/min/1.7m2    GFR Estimate If Black 61 >60 mL/min/1.7m2    Calcium 9.8 8.5 - 10.1 mg/dL    Phosphorus 3.8 2.5 - 4.5 mg/dL    Albumin 3.7 3.4 - 5.0 g/dL   Hemoglobin A1c   Result Value Ref Range    Hemoglobin A1C 5.5 4.3 - 6.0 %

## 2017-07-13 NOTE — NURSING NOTE
"Chief Complaint   Patient presents with     Foot Pain     Medication check     Erectile Dysfunction     Follow up       Initial /88 (BP Location: Right arm, Patient Position: Chair, Cuff Size: Adult Regular)  Pulse 81  Temp 98.8  F (37.1  C) (Oral)  Ht 5' 5.7\" (1.669 m)  Wt 213 lb (96.6 kg)  SpO2 95%  BMI 34.69 kg/m2 Estimated body mass index is 34.69 kg/(m^2) as calculated from the following:    Height as of this encounter: 5' 5.7\" (1.669 m).    Weight as of this encounter: 213 lb (96.6 kg).  Medication Reconciliation: complete     Brandon Mills CMA    "

## 2017-07-14 ASSESSMENT — PATIENT HEALTH QUESTIONNAIRE - PHQ9: SUM OF ALL RESPONSES TO PHQ QUESTIONS 1-9: 9

## 2017-07-17 LAB — TESTOST SERPL-MCNC: 256 NG/DL (ref 240–950)

## 2017-07-18 NOTE — PROGRESS NOTES
Dear Santiago Hylton,    Your test results are attached. I am happy to let you know that they are stable and your medications can stay the same.    Your testosterone level is in good range. The diabetes test looks great. The kidneys are healthy. I think that the problem you are having may be medication related or due to heart problems. We can try cutting back on the antidepressant at night to see if this helps. You can try taking one trazodone at night and see if this makes any difference.    Please call me if you have any questions about these test results or about your care.    Sincerely,    Tori Rizvi MD

## 2017-09-04 DIAGNOSIS — I10 ESSENTIAL HYPERTENSION WITH GOAL BLOOD PRESSURE LESS THAN 140/90: ICD-10-CM

## 2017-09-05 RX ORDER — CARVEDILOL 12.5 MG/1
TABLET ORAL
Qty: 180 TABLET | Refills: 1 | Status: SHIPPED | OUTPATIENT
Start: 2017-09-05 | End: 2018-03-26

## 2017-09-05 NOTE — TELEPHONE ENCOUNTER
carvedilol (COREG) 12.5 MG tablet      Last Written Prescription Date: 01/03/17  Last Fill Quantity: 180, # refills: 1    Last Office Visit with [facility], [facility] or [facility] prescribing provider:  07/13/17   Future Office Visit:        BP Readings from Last 3 Encounters:   07/13/17 133/88   04/28/17 120/75   12/14/16 97/67           Krystal Xie  [facility]

## 2017-10-23 DIAGNOSIS — E87.6 HYPOPOTASSEMIA: ICD-10-CM

## 2017-10-26 RX ORDER — POTASSIUM CHLORIDE 1500 MG/1
TABLET, EXTENDED RELEASE ORAL
Qty: 90 TABLET | Refills: 0 | Status: SHIPPED | OUTPATIENT
Start: 2017-10-26 | End: 2018-01-02

## 2017-11-06 ENCOUNTER — OFFICE VISIT (OUTPATIENT)
Dept: FAMILY MEDICINE | Facility: CLINIC | Age: 62
End: 2017-11-06
Payer: MEDICARE

## 2017-11-06 VITALS
TEMPERATURE: 97.2 F | DIASTOLIC BLOOD PRESSURE: 82 MMHG | HEIGHT: 66 IN | HEART RATE: 87 BPM | RESPIRATION RATE: 20 BRPM | WEIGHT: 216 LBS | SYSTOLIC BLOOD PRESSURE: 150 MMHG | BODY MASS INDEX: 34.72 KG/M2 | OXYGEN SATURATION: 93 %

## 2017-11-06 DIAGNOSIS — E78.5 HYPERLIPIDEMIA LDL GOAL <100: ICD-10-CM

## 2017-11-06 DIAGNOSIS — F33.0 MILD RECURRENT MAJOR DEPRESSION (H): ICD-10-CM

## 2017-11-06 DIAGNOSIS — J18.9 PNEUMONIA OF RIGHT LOWER LOBE DUE TO INFECTIOUS ORGANISM: Primary | ICD-10-CM

## 2017-11-06 DIAGNOSIS — J45.30 MILD PERSISTENT ASTHMA WITHOUT COMPLICATION: ICD-10-CM

## 2017-11-06 DIAGNOSIS — I25.10 CORONARY ARTERY DISEASE INVOLVING NATIVE CORONARY ARTERY OF NATIVE HEART WITHOUT ANGINA PECTORIS: ICD-10-CM

## 2017-11-06 DIAGNOSIS — I10 HYPERTENSION GOAL BP (BLOOD PRESSURE) < 140/90: ICD-10-CM

## 2017-11-06 DIAGNOSIS — G89.4 CHRONIC PAIN SYNDROME: ICD-10-CM

## 2017-11-06 DIAGNOSIS — B18.2 CHRONIC HEPATITIS C WITHOUT HEPATIC COMA (H): ICD-10-CM

## 2017-11-06 DIAGNOSIS — I50.23 ACUTE ON CHRONIC SYSTOLIC CONGESTIVE HEART FAILURE (H): ICD-10-CM

## 2017-11-06 PROCEDURE — 99214 OFFICE O/P EST MOD 30 MIN: CPT | Performed by: FAMILY MEDICINE

## 2017-11-06 RX ORDER — CEFDINIR 300 MG/1
300 CAPSULE ORAL
COMMUNITY
Start: 2017-11-02 | End: 2017-11-10

## 2017-11-06 RX ORDER — GABAPENTIN 300 MG/1
300 CAPSULE ORAL
COMMUNITY
End: 2017-12-07

## 2017-11-06 RX ORDER — AZITHROMYCIN 250 MG/1
250 TABLET, FILM COATED ORAL
COMMUNITY
Start: 2017-11-02 | End: 2019-01-16

## 2017-11-06 RX ORDER — OXYCODONE HYDROCHLORIDE 5 MG/1
5 TABLET ORAL
COMMUNITY
Start: 2017-11-02 | End: 2017-11-06

## 2017-11-06 RX ORDER — PREDNISONE 20 MG/1
40 TABLET ORAL
COMMUNITY
Start: 2017-11-02 | End: 2017-11-06

## 2017-11-06 RX ORDER — OXYCODONE HYDROCHLORIDE 5 MG/1
5 TABLET ORAL 2 TIMES DAILY PRN
Qty: 60 TABLET | Refills: 0 | Status: SHIPPED | OUTPATIENT
Start: 2017-11-06 | End: 2018-01-11

## 2017-11-06 RX ORDER — LIDOCAINE 50 MG/G
1 PATCH TOPICAL
COMMUNITY
Start: 2017-11-02 | End: 2020-08-27

## 2017-11-06 ASSESSMENT — PAIN SCALES - GENERAL: PAINLEVEL: MODERATE PAIN (4)

## 2017-11-06 ASSESSMENT — PATIENT HEALTH QUESTIONNAIRE - PHQ9: SUM OF ALL RESPONSES TO PHQ QUESTIONS 1-9: 3

## 2017-11-06 NOTE — PATIENT INSTRUCTIONS
At [facility], we strive to deliver an exceptional experience to you, every time we see you.  If you receive a survey in the mail, please send us back your thoughts. We really do value your feedback.    Based on your medical history, these are the current health maintenance/preventive care services that you are due for (some may have been done at this visit.)  Health Maintenance Due   Topic Date Due     EYE EXAM Q1 YEAR  12/09/2016     HF ACTION PLAN Q3 YR  02/12/2017     INFLUENZA VACCINE (SYSTEM ASSIGNED)  09/01/2017     ASTHMA ACTION PLAN Q1 YR  09/29/2017     DEPRESSION ACTION PLAN Q1 YR  09/29/2017     ASTHMA CONTROL TEST Q6 MOS  10/28/2017         Suggested websites for health information:  Www.[employer].org : Up to date and easily searchable information on multiple topics.  Www.medlineplus.gov : medication info, interactive tutorials, watch real surgeries online  Www.familydoctor.org : good info from the Academy of Family Physicians  Www.cdc.gov : public health info, travel advisories, epidemics (H1N1)  Www.aap.org : children's health info, normal development, vaccinations  Www.health.state.[place].us : [place] dept of health, public health issues in [place], N1N1    Your care team:                            Family Medicine Internal Medicine   MD Robb Teague MD Shantel Branch-Fleming, MD Katya Georgiev PA-C Nam Ho, MD Pediatrics   MIGUEL Ramírez, DERIC Muller APRN MD Yoly Ramirez MD Deborah Mielke, MD Kim Thein, APRN CNP      Clinic hours: Monday - Thursday 7 am-7 pm; Fridays 7 am-5 pm.   Urgent care: Monday - Friday 11 am-9 pm; Saturday and Sunday 9 am-5 pm.  Pharmacy : Monday -Thursday 8 am-8 pm; Friday 8 am-6 pm; Saturday and Sunday 9 am-5 pm.     Clinic: [phone]   Pharmacy: [phone]

## 2017-11-06 NOTE — NURSING NOTE
"Chief Complaint   Patient presents with     Hospital F/U       Initial /82 (BP Location: Left arm, Patient Position: Chair, Cuff Size: Adult Large)  Pulse 87  Temp 97.2  F (36.2  C) (Oral)  Resp 20  Ht 5' 5.7\" (1.669 m)  Wt 216 lb (98 kg)  SpO2 93%  BMI 35.18 kg/m2 Estimated body mass index is 35.18 kg/(m^2) as calculated from the following:    Height as of this encounter: 5' 5.7\" (1.669 m).    Weight as of this encounter: 216 lb (98 kg).  Medication Reconciliation: complete     Brandon Mills CMA    "

## 2017-11-06 NOTE — PROGRESS NOTES
"  SUBJECTIVE:   Santiago Hylton is a 62 year old male who presents to clinic today for the following health issues:    Hospital Follow-up Visit:    Hospital/Nursing Home/IP Rehab Facility: [facility]  Date of Admission: 10/31/17  Date of Discharge: 11/2/17  Reason(s) for Admission: Pneumonia            Problems taking medications regularly:  None       Medication changes since discharge: yes antibiotic       Problems adhering to non-medication therapy:  None    Summary of hospitalization:  CareEverywhere information obtained and reviewed  Diagnostic Tests/Treatments reviewed.  Follow up needed: none  Other Healthcare Providers Involved in Patient s Care:         None  Update since discharge: improved.     Post Discharge Medication Reconciliation: discharge medications reconciled, continue medications without change.  Plan of care communicated with patient     Coding guidelines for this visit:  Type of Medical   Decision Making Face-to-Face Visit       within 7 Days of discharge Face-to-Face Visit        within 14 days of discharge   Moderate Complexity [id number]   High Complexity [id number]          Admission Date/Time: 10/31/2017 12:04 PM  Admitting provider: No admitting provider for patient encounter.  Primary Care Provider: Kassandra Ogden   Informant: patient as well as chart review.    CHIEF COMPLAINT: shortness of breath, right sided chest pain     HPI: Santiago Hylton is a 62 y.o. male with a past medical history significant for asthma, Diabetes Mellitus type 2, Chronic Kidney Disease, Coronary Artery Disease, hypertension, hyperlipidemia who presents to the ED with shortness of breath and right sided chest pain. He states he was at a [employer] cleaning yesterday and when he returned, he developed worsened shortness of breath and right sided chest pain that radiates down to his right flank. He states these symptoms \"came out of the blue\". He has some dyspnea on exertion at baseline and " this has not really worsened. He has inhalers and nebulizers he uses for asthma but states he only uses his nebulizers when the weather gets colder and stops using his inhalers. He has had to use his nebulizer 2 times this week. He quit smoking in April of this year. He does feel as though his chest is tight. He denies exertional chest pain. He notes the chest pain is worse with deep inspiration. He endorses a dry, nonproductive cough. He reports a subjective fever yesterday evening. He denies sick contacts. He denies dysuria, hematuria, nausea, vomiting, diarrhea, abdominal pain. He states he took his medications today.     VQ Scan on 10/31/2017:  Per Radiology:  IMPRESSION:  Moderate sized triple matched defect in the right lower lobe as detailed above. Findings meet PIOPED criteria for intermediate probability (by definition, 20 -- 79% likelihood) of pulmonary embolism diagnosis. The diagnosis of pulmonary embolism is therefore neither established nor excluded by this exam.    CT Abdomen and Pelvis on 10/31/2017:  Per Radiology:  IMPRESSION:   1. No renal or ureteral stone is seen.  2. Right lower lobe pneumonia with a small loculated right pleural effusion.  3. No other abnormality is identified.    EKG on 10/31/2017:  Sinus rhythm     Diabetes Follow-up      Patient is checking blood sugars: not at all    Diabetic concerns: None     Symptoms of hypoglycemia (low blood sugar): none     Paresthesias (numbness or burning in feet) or sores: No     Date of last diabetic eye exam: due    Hyperlipidemia Follow-Up      Rate your low fat/cholesterol diet?: good    Taking statin?  Yes, no muscle aches from statin    Other lipid medications/supplements?:  none    Hypertension Follow-up      Outpatient blood pressures are not being checked.    Low Salt Diet: no added salt    Vascular Disease Follow-up:  Coronary Artery Disease (CAD)      Chest pain or pressure, left side neck or arm pain: No    Shortness of  breath/increased sweats/nausea with exertion: No    Pain in calves walking 1-2 blocks: No    Worsened or new symptoms since last visit: No    Nitroglycerin use: no    Daily aspirin use: Yes    Heart Failure Follow-up    Symptoms:    Shortness of breath: happens with rest and exertion - stable    Lower extremity edema: stable     Chest pain: No    Using more pillows than normal: No    Cough at night: Yes-  Since pneumonia    Weight:    Checking weight daily: No    Weight change: none    Cardiology visits, ER/UC, or hospital admissions since last visit: Hospitalizations - see above    Medication side effects: none                  Problem list and histories reviewed & adjusted, as indicated.  Additional history: as documented    Patient Active Problem List   Diagnosis     Hypertension goal BP (blood pressure) < 140/90     Hyperlipidemia LDL goal <100     CHF (congestive heart failure) (H)     Mild recurrent major depression (H)     Gout     GERD (gastroesophageal reflux disease)     Chronic rhinitis     CAD (coronary artery disease)     CKD (chronic kidney disease) stage 2, GFR 60-89 ml/min     Mild persistent asthma     Tobacco abuse     History of colonic polyps     Advanced directives, counseling/discussion     Chronic hepatitis C (H)     Type 2 diabetes mellitus with diabetic chronic kidney disease (H)     Microalbuminuria due to type 2 diabetes mellitus (H)     Obesity (BMI 30-39.9)     Herpes zoster without complication     Cataract, bilateral     Type 2 diabetes mellitus with diabetic polyneuropathy (H)     Coronary artery disease involving native coronary artery of native heart without angina pectoris     Diabetic polyneuropathy associated with type 2 diabetes mellitus (H)     Past Surgical History:   Procedure Laterality Date     CARDIAC SURGERY      stent     CATARACT IOL, RT/LT       COLONOSCOPY  9/18/2012    Procedure: COLONOSCOPY;  COLONOSCOPY, SCREEN;  Surgeon: Cl Johnson MD;  Location:  OR      ORTHOPEDIC SURGERY      ankle     PHACOEMULSIFICATION WITH STANDARD INTRAOCULAR LENS IMPLANT Right 1/28/2016    Procedure: PHACOEMULSIFICATION WITH STANDARD INTRAOCULAR LENS IMPLANT;  Surgeon: Fly Merrlil MD;  Location: [facility] OR     PHACOEMULSIFICATION WITH STANDARD INTRAOCULAR LENS IMPLANT Left 2/11/2016    Procedure: PHACOEMULSIFICATION WITH STANDARD INTRAOCULAR LENS IMPLANT;  Surgeon: Fly Merrill MD;  Location: [facility] OR       Social History   Substance Use Topics     Smoking status: Former Smoker     Packs/day: 0.25     Years: 15.00     Types: Cigarettes     Quit date: 9/21/2016     Smokeless tobacco: Never Used      Comment: 3-4 a day     Alcohol use Yes      Comment: weekend beer     Family History   Problem Relation Age of Onset     HEART DISEASE Maternal Grandmother      Hypertension Maternal Grandmother      Hypertension Father      Hypertension Mother      Hypertension Sister      Thyroid Disease Sister      CANCER Brother      DIABETES Brother      Hypertension Brother      Hypertension Maternal Aunt      CANCER Maternal Uncle      Hypertension Maternal Uncle      Hypertension Paternal Aunt      Hypertension Paternal Uncle      Hypertension Maternal Grandfather      Hypertension Paternal Grandmother      Hypertension Paternal Grandfather      CEREBROVASCULAR DISEASE No family hx of      Glaucoma No family hx of      Macular Degeneration No family hx of          Current Outpatient Prescriptions   Medication Sig Dispense Refill     azithromycin (ZITHROMAX) 250 MG tablet Take 250 mg by mouth       lidocaine (LIDODERM) 5 % Patch 1 patch       oxyCODONE IR (ROXICODONE) 5 MG tablet Take 5 mg by mouth       predniSONE (DELTASONE) 20 MG tablet Take 40 mg by mouth       gabapentin (NEURONTIN) 300 MG capsule Take 300 mg by mouth       cefdinir (OMNICEF) 300 MG capsule Take 300 mg by mouth       potassium chloride SA (K-DUR/KLOR-CON M) 20 MEQ CR tablet TAKE 1 TABLET(20 MEQ) BY MOUTH DAILY 90  tablet 0     traMADol (ULTRAM) 50 MG tablet TAKE 1-2 TABLETS BY MOUTH TWICE DAILY AS NEEDED. MAX OF 2 TABLETS IN 24 HOURS 60 tablet 3     carvedilol (COREG) 12.5 MG tablet TAKE 1 TABLET BY MOUTH TWICE DAILY WITH MEALS 180 tablet 1     gabapentin (NEURONTIN) 300 MG capsule Take 1 tablet (300 mg) every night for 1 week, then 1 tablet twice daily for 1 week, then 1 tablet three times daily if tolerated. 90 capsule 3     albuterol (2.5 MG/3ML) 0.083% neb solution Take 1 vial (2.5 mg) by nebulization every 6 hours as needed for shortness of breath / dyspnea 120 vial 3     ranitidine (ZANTAC) 150 MG tablet Take 1 tablet (150 mg) by mouth 2 times daily 180 tablet 2     furosemide (LASIX) 40 MG tablet Take 1 tablet (40 mg) by mouth 2 times daily If weight increases or increased shortness of breath, then decrease to once a day when better. 135 tablet 2     isosorbide mononitrate (ISMO/MONOKET) 20 MG tablet TAKE 1 TABLET BY MOUTH TWICE DAILY 180 tablet 3     montelukast (SINGULAIR) 10 MG tablet TAKE 1 TABLET BY MOUTH AT BEDTIME 90 tablet 3     DULoxetine (CYMBALTA) 60 MG EC capsule TAKE 1 CAPSULE BY MOUTH DAILY 90 capsule 1     traZODone (DESYREL) 100 MG tablet Take 2 tablets (200 mg) by mouth At Bedtime 180 tablet 1     lisinopril (PRINIVIL/ZESTRIL) 40 MG tablet TAKE 1/2 TABLET BY MOUTH EVERY DAY 45 tablet 3     simvastatin (ZOCOR) 40 MG tablet Take 1 tablet (40 mg) by mouth At Bedtime 90 tablet 3     cetirizine (ZYRTEC) 10 MG tablet Take 1 tablet (10 mg) by mouth every evening 90 tablet 3     amLODIPine (NORVASC) 5 MG tablet TAKE 1 TABLET BY MOUTH EVERY DAY FOR BLOOD PRESSURE 90 tablet 1     SYMBICORT 160-4.5 MCG/ACT inhaler INHALE 2 PUFFS IN TO THE LUNGS TWICE DAILY 1 Inhaler 0     loratadine (CLARITIN) 10 MG tablet TAKE ONE TABLET BY MOUTH EVERY DAY AS NEEDED 90 tablet 1     aspirin  MG tablet TAKE 1 TABLET BY MOUTH EVERY DAY 90 tablet 3     nitroglycerin (NITROSTAT) 0.4 MG SL tablet Place 1 tablet (0.4 mg) under  "the tongue every 5 minutes as needed for chest pain 0.4 mg by Sublingual route every 5 (five) minutes as needed. 25 tablet 11     Allergies   Allergen Reactions     No Known Drug Allergies      Recent Labs   Lab Test  07/13/17   0854  04/28/17   0942  10/17/16   1206  06/16/16   0836  01/07/16   0818  07/30/15   0752   A1C  5.5  5.7  5.5  5.6  5.4  5.6   LDL   --   70   --   59  60  56   HDL   --   47   --   44  46  36*   TRIG   --   92   --   95  91  98   ALT   --   25   --   49   --   27   CR  1.43*  1.42*  1.74*  1.20  1.30*  1.36*   GFRESTIMATED  50*  51*  40*  62  56*  54*   GFRESTBLACK  61  61  49*  75  68  65   POTASSIUM  4.4  4.3  4.4  4.0  4.9  4.8   TSH   --   0.74   --    --   0.71   --       BP Readings from Last 3 Encounters:   11/06/17 150/82   07/13/17 133/88   04/28/17 120/75    Wt Readings from Last 3 Encounters:   11/06/17 216 lb (98 kg)   07/13/17 213 lb (96.6 kg)   04/28/17 209 lb 3 oz (94.9 kg)                  Labs reviewed in EPIC          Reviewed and updated as needed this visit by clinical staff       Reviewed and updated as needed this visit by Provider         ROS:  Constitutional, HEENT, cardiovascular, pulmonary, gi and gu systems are negative, except as otherwise noted.      OBJECTIVE:   /82 (BP Location: Left arm, Patient Position: Chair, Cuff Size: Adult Large)  Pulse 87  Temp 97.2  F (36.2  C) (Oral)  Resp 20  Ht 5' 5.7\" (1.669 m)  Wt 216 lb (98 kg)  SpO2 93%  BMI 35.18 kg/m2  Body mass index is 35.18 kg/(m^2).  GENERAL: healthy, alert, well nourished, well hydrated, no distress, obese  HENT: ear canals- normal; TMs- normal; Nose- normal; Mouth- no ulcers, no lesions, throat is clear with no erythema or exudate.   NECK: no tenderness, no adenopathy, no asymmetry, no masses, no stiffness; thyroid- normal to palpation  RESP: lungs clear to auscultation - no rales, no rhonchi, no wheezes  CV: regular rates and rhythm, normal S1 S2, no S3 or S4 and no murmur, no click or " rub -  ABDOMEN: soft, no tenderness, no  hepatosplenomegaly, no masses, normal bowel sounds  MS: extremities- no gross deformities noted, no edema  SKIN: no suspicious lesions, no rashes  NEURO: strength and tone- normal, sensory exam- grossly normal, mentation- intact, speech- normal, reflexes- symmetric  BACK: no CVA tenderness, no paralumbar tenderness  PSYCH: Alert and oriented times 3; speech- coherent , normal rate and volume; able to articulate logical thoughts, able to abstract reason, no tangential thoughts, no hallucinations or delusions, affect- normal     Diagnostic Test Results:  Results for orders placed or performed in visit on 07/13/17   Testosterone, total   Result Value Ref Range    Testosterone Total 256 240 - 950 ng/dL   CBC with platelets   Result Value Ref Range    WBC 11.0 4.0 - 11.0 10e9/L    RBC Count 4.90 4.4 - 5.9 10e12/L    Hemoglobin 15.2 13.3 - 17.7 g/dL    Hematocrit 44.8 40.0 - 53.0 %    MCV 91 78 - 100 fl    MCH 31.0 26.5 - 33.0 pg    MCHC 33.9 31.5 - 36.5 g/dL    RDW 13.9 10.0 - 15.0 %    Platelet Count 215 150 - 450 10e9/L   Renal panel   Result Value Ref Range    Sodium 142 133 - 144 mmol/L    Potassium 4.4 3.4 - 5.3 mmol/L    Chloride 104 94 - 109 mmol/L    Carbon Dioxide 30 20 - 32 mmol/L    Anion Gap 8 3 - 14 mmol/L    Glucose 93 70 - 99 mg/dL    Urea Nitrogen 19 7 - 30 mg/dL    Creatinine 1.43 (H) 0.66 - 1.25 mg/dL    GFR Estimate 50 (L) >60 mL/min/1.7m2    GFR Estimate If Black 61 >60 mL/min/1.7m2    Calcium 9.8 8.5 - 10.1 mg/dL    Phosphorus 3.8 2.5 - 4.5 mg/dL    Albumin 3.7 3.4 - 5.0 g/dL   Hemoglobin A1c   Result Value Ref Range    Hemoglobin A1C 5.5 4.3 - 6.0 %       ASSESSMENT/PLAN:         Tobacco Cessation:   reports that he quit smoking about 13 months ago. His smoking use included Cigarettes. He has a 3.75 pack-year smoking history. He has never used smokeless tobacco.      BMI:   Estimated body mass index is 35.18 kg/(m^2) as calculated from the following:     "Height as of this encounter: 5' 5.7\" (1.669 m).    Weight as of this encounter: 216 lb (98 kg).   Weight management plan: Discussed healthy diet and exercise guidelines and patient will follow up in 3 months in clinic to re-evaluate.            ICD-10-CM    1. Pneumonia of right lower lobe due to infectious organism (H) J18.1 XR Chest 2 Views in 3 days to check on resolution of pneumonia. Will also need x ray in 6 weeks.    2. Hypertension goal BP (blood pressure) < 140/90 I10 Renal panel- renal function recheck and potassium check     CBC with platelets   3. Acute on chronic systolic congestive heart failure (H) I50.23 Some fluid retention and increased shortness of breath, orthopnea. Will increase furosemide 40 mg to twice a day and recheck in 3 days. Follow up with cardiology.    4. Mild recurrent major depression (H) F33.0 Stable and was feeling better. Doing part time work at [employer]   5. Hyperlipidemia LDL goal <100 E78.5 Stable on statin   6. Coronary artery disease involving native coronary artery of native heart without angina pectoris I25.10 No chest pain at this time.    7. Mild persistent asthma without complication J45.30 Stable    8. Chronic hepatitis C without hepatic coma (H) B18.2 Resolved after treatment   9. Chronic pain syndrome G89.4 oxyCODONE IR (ROXICODONE) 5 MG tablet up to 2 a day for chronic pain syndrome. Tramadol was not effective.        CONSULTATION/REFERRAL to cardiology  FUTURE LABS:       - Schedule fasting labs in 3 months  FUTURE APPOINTMENTS:       - Follow-up visit in 3 months or sooner if any questions or concerns.   Work on weight loss  Regular exercise  See Patient Instructions    Tori Rizvi MD  [facility]  "

## 2017-11-30 ENCOUNTER — TRANSFERRED RECORDS (OUTPATIENT)
Dept: HEALTH INFORMATION MANAGEMENT | Facility: CLINIC | Age: 62
End: 2017-11-30

## 2017-12-08 DIAGNOSIS — E11.42 DIABETIC POLYNEUROPATHY ASSOCIATED WITH TYPE 2 DIABETES MELLITUS (H): ICD-10-CM

## 2017-12-08 RX ORDER — GABAPENTIN 300 MG/1
CAPSULE ORAL
Qty: 90 CAPSULE | Refills: 0 | OUTPATIENT
Start: 2017-12-08

## 2018-01-02 DIAGNOSIS — E87.6 HYPOPOTASSEMIA: ICD-10-CM

## 2018-01-02 DIAGNOSIS — J45.30 MILD PERSISTENT ASTHMA WITHOUT COMPLICATION: ICD-10-CM

## 2018-01-02 NOTE — TELEPHONE ENCOUNTER
Reason for Call:  Medication or medication refill:    Do you use a [facility]?  Name of the pharmacy and phone number for the current request:  [facility]/pharmacy #[id number] - [address] AT CORNER OF [address] [place] [address]    Name of the medication requested: potassium chloride SA (K-DUR/KLOR-CON M) 20 MEQ CR tablet   SYMBICORT 160-4.5 MCG/ACT inhaler    Other request:     Can we leave a detailed message on this number? YES    Phone number patient can be reached at: Other phone number:  [phone]    Best Time: anytime     Call taken on 1/2/2018 at 10:19 AM by Goldie Fernandez

## 2018-01-02 NOTE — TELEPHONE ENCOUNTER
Requested Prescriptions   Pending Prescriptions Disp Refills     budesonide-formoterol (SYMBICORT) 160-4.5 MCG/ACT Inhaler    Last Written Prescription Date:  8/25/16  Last Fill Quantity: 1,  # refills: 0   Last Office Visit with [facility], [facility] or [facility] prescribing provider:  11/6/17   Future Office Visit:      1 Inhaler 0    Inhaled Steroids Protocol Failed    1/2/2018 10:23 AM       Failed - Asthma control test 20 or greater in last 6 months    Please review ACT score.          Passed - Patient is age 12 or older       Passed - Recent (6 mo) or future visit with authorizing provider's specialty    Patient had office visit in the last 6 months or has a visit in the next 30 days with authorizing provider.  See chart review.             potassium chloride SA (K-DUR/KLOR-CON M) 20 MEQ CR tablet    Last Written Prescription Date:  10/26/17  Last Fill Quantity: 90,  # refills: 0   Last Office Visit with [facility], [facility] or [facility] prescribing provider:  11/6/17   Future Office Visit:      90 tablet 0    Potassium Supplements Protocol Passed    1/2/2018 10:23 AM       Passed - Recent or future visit with authorizing provider's specialty    Patient had office visit in the last year or has a visit in the next 30 days with authorizing provider.  See chart review.              Passed - Patient is age 18 or older       Passed - Normal serum potassium in past 12 months    Recent Labs   Lab Test  07/13/17   0854   POTASSIUM  4.4                          Koby Faarax  [facility]

## 2018-01-05 RX ORDER — POTASSIUM CHLORIDE 1500 MG/1
TABLET, EXTENDED RELEASE ORAL
Qty: 90 TABLET | Refills: 0 | Status: SHIPPED | OUTPATIENT
Start: 2018-01-05 | End: 2018-01-23

## 2018-01-05 RX ORDER — BUDESONIDE AND FORMOTEROL FUMARATE DIHYDRATE 160; 4.5 UG/1; UG/1
AEROSOL RESPIRATORY (INHALATION)
Qty: 1 INHALER | Refills: 0 | Status: SHIPPED | OUTPATIENT
Start: 2018-01-05 | End: 2018-02-01

## 2018-01-09 ENCOUNTER — OFFICE VISIT (OUTPATIENT)
Dept: FAMILY MEDICINE | Facility: CLINIC | Age: 63
End: 2018-01-09
Payer: MEDICARE

## 2018-01-09 VITALS
OXYGEN SATURATION: 97 % | SYSTOLIC BLOOD PRESSURE: 130 MMHG | TEMPERATURE: 97.8 F | HEIGHT: 66 IN | DIASTOLIC BLOOD PRESSURE: 88 MMHG | WEIGHT: 209.1 LBS | HEART RATE: 73 BPM | BODY MASS INDEX: 33.61 KG/M2

## 2018-01-09 DIAGNOSIS — E11.42 DIABETIC POLYNEUROPATHY ASSOCIATED WITH TYPE 2 DIABETES MELLITUS (H): ICD-10-CM

## 2018-01-09 DIAGNOSIS — J44.89 CHRONIC OBSTRUCTIVE AIRWAY DISEASE WITH ASTHMA (H): Primary | ICD-10-CM

## 2018-01-09 DIAGNOSIS — B18.2 CHRONIC HEPATITIS C WITHOUT HEPATIC COMA (H): ICD-10-CM

## 2018-01-09 DIAGNOSIS — I25.10 CORONARY ARTERY DISEASE INVOLVING NATIVE CORONARY ARTERY OF NATIVE HEART WITHOUT ANGINA PECTORIS: ICD-10-CM

## 2018-01-09 LAB — HBA1C MFR BLD: 6 % (ref 4.3–6)

## 2018-01-09 PROCEDURE — 36415 COLL VENOUS BLD VENIPUNCTURE: CPT | Performed by: FAMILY MEDICINE

## 2018-01-09 PROCEDURE — 99214 OFFICE O/P EST MOD 30 MIN: CPT | Performed by: FAMILY MEDICINE

## 2018-01-09 PROCEDURE — 83036 HEMOGLOBIN GLYCOSYLATED A1C: CPT | Performed by: FAMILY MEDICINE

## 2018-01-09 RX ORDER — PREDNISONE 20 MG/1
20 TABLET ORAL 2 TIMES DAILY
Qty: 10 TABLET | Refills: 1 | Status: SHIPPED | OUTPATIENT
Start: 2018-01-09 | End: 2019-07-09

## 2018-01-09 ASSESSMENT — PAIN SCALES - GENERAL: PAINLEVEL: NO PAIN (0)

## 2018-01-09 NOTE — MR AVS SNAPSHOT
"              After Visit Summary   1/9/2018    Santiago Hylton    MRN: [id number]           Patient Information     Date Of Birth          1955        Visit Information        Provider Department      1/9/2018 11:40 AM Tori Rizvi MD [facility]        Today's Diagnoses     Chronic obstructive airway disease with asthma (H)    -  1    Diabetic polyneuropathy associated with type 2 diabetes mellitus (H)        Coronary artery disease involving native coronary artery of native heart without angina pectoris        Chronic hepatitis C without hepatic coma (H)           Follow-ups after your visit        Who to contact     If you have questions or need follow up information about today's clinic visit or your schedule please contact [facility] directly at [phone].  Normal or non-critical lab and imaging results will be communicated to you by 3DMGAMEhart, letter or phone within 4 business days after the clinic has received the results. If you do not hear from us within 7 days, please contact the clinic through 3DMGAMEhart or phone. If you have a critical or abnormal lab result, we will notify you by phone as soon as possible.  Submit refill requests through Streaming Era or call your pharmacy and they will forward the refill request to us. Please allow 3 business days for your refill to be completed.          Additional Information About Your Visit        MyChart Information     [employer] lets you send messages to your doctor, view your test results, renew your prescriptions, schedule appointments and more. To sign up, go to [email or web address] . Click on \"Log in\" on the left side of the screen, which will take you to the Welcome page. Then click on \"Sign up Now\" on the right side of the page.     You will be asked to enter the access code listed below, as well as some personal information. Please follow the directions to create your username and password.     Your access code is: " "[email or web address]  Expires: 2018  3:15 PM     Your access code will  in 90 days. If you need help or a new code, please call your [place] clinic or [phone].        Care EveryWhere ID     This is your Care EveryWhere ID. This could be used by other organizations to access your [place] medical records  [id number]        Your Vitals Were     Pulse Temperature Height Pulse Oximetry BMI (Body Mass Index)       73 97.8  F (36.6  C) (Oral) 5' 5.7\" (1.669 m) 97% 34.06 kg/m2        Blood Pressure from Last 3 Encounters:   18 130/88   17 150/82   17 133/88    Weight from Last 3 Encounters:   18 209 lb 1.6 oz (94.8 kg)   17 216 lb (98 kg)   17 213 lb (96.6 kg)              We Performed the Following     Hemoglobin A1c          Today's Medication Changes          These changes are accurate as of: 18 12:20 PM.  If you have any questions, ask your nurse or doctor.               Start taking these medicines.        Dose/Directions    predniSONE 20 MG tablet   Commonly known as:  DELTASONE   Used for:  Chronic obstructive airway disease with asthma (H)   Started by:  Tori Rizvi MD        Dose:  20 mg   Take 1 tablet (20 mg) by mouth 2 times daily   Quantity:  10 tablet   Refills:  1            Where to get your medicines      These medications were sent to [facility]/pharmacy #[id number] [address] AT CORNER OF [address] [place] [address]     Phone:  [phone]     predniSONE 20 MG tablet                Primary Care Provider Office Phone # Fax #    Tori Rizvi [phone]       [address]  [facility] [address]        Equal Access to Services     ADAN CALDWELL AH: Naya saez Soalexandria, waaxda luqadaha, qaybta kaalmada adeegyabreezy, oren ortiz. [facility] [phone].    ATENCIÓN: Si habla español, tiene a paz disposición servicios gratuitos de asistencia " lingüística. [place] al [phone].    We comply with applicable federal civil rights laws and [place] laws. We do not discriminate on the basis of race, color, national origin, age, disability, sex, sexual orientation, or gender identity.            Thank you!     Thank you for choosing [facility]  for your care. Our goal is always to provide you with excellent care. Hearing back from our patients is one way we can continue to improve our services. Please take a few minutes to complete the written survey that you may receive in the mail after your visit with us. Thank you!             Your Updated Medication List - Protect others around you: Learn how to safely use, store and throw away your medicines at www.disposemymeds.org.          This list is accurate as of: 1/9/18 12:20 PM.  Always use your most recent med list.                   Brand Name Dispense Instructions for use Diagnosis    albuterol (2.5 MG/3ML) 0.083% neb solution     120 vial    Take 1 vial (2.5 mg) by nebulization every 6 hours as needed for shortness of breath / dyspnea    Mild persistent asthma, uncomplicated       amLODIPine 5 MG tablet    NORVASC    90 tablet    TAKE 1 TABLET BY MOUTH EVERY DAY FOR BLOOD PRESSURE    Essential hypertension with goal blood pressure less than 140/90       aspirin  MG EC tablet     90 tablet    TAKE 1 TABLET BY MOUTH EVERY DAY    Cardiovascular disease       azithromycin 250 MG tablet    ZITHROMAX     Take 250 mg by mouth        budesonide-formoterol 160-4.5 MCG/ACT Inhaler    SYMBICORT    1 Inhaler    INHALE 2 PUFFS IN TO THE LUNGS TWICE DAILY    Mild persistent asthma without complication       carvedilol 12.5 MG tablet    COREG    180 tablet    TAKE 1 TABLET BY MOUTH TWICE DAILY WITH MEALS    Essential hypertension with goal blood pressure less than 140/90       cetirizine 10 MG tablet    zyrTEC    90 tablet    Take 1 tablet (10 mg) by mouth every evening    Chronic idiopathic  urticaria       DULoxetine 60 MG EC capsule    CYMBALTA    90 capsule    TAKE 1 CAPSULE BY MOUTH DAILY    Major depressive disorder, recurrent episode, mild (H)       * furosemide 40 MG tablet    LASIX    135 tablet    Take 1 tablet (40 mg) by mouth 2 times daily If weight increases or increased shortness of breath, then decrease to once a day when better.    Essential hypertension with goal blood pressure less than 140/90       * furosemide 40 MG tablet    LASIX    135 tablet    TAKE 1 TABLET BY MOUTH 2 TIMES DAILY IF WEIGHT OR SHORTNESS OF BREATH INCREASES, THEN DECREASE TO ONCE A DAY WHEN BETTER    Essential hypertension with goal blood pressure less than 140/90       * gabapentin 300 MG capsule    NEURONTIN    90 capsule    Take 1 tablet (300 mg) every night for 1 week, then 1 tablet twice daily for 1 week, then 1 tablet three times daily if tolerated.    Diabetic polyneuropathy associated with type 2 diabetes mellitus (H)       * gabapentin 300 MG capsule    NEURONTIN    270 capsule    Take 1 capsule (300 mg) by mouth 3 times daily    Diabetic polyneuropathy associated with type 2 diabetes mellitus (H)       isosorbide mononitrate 20 MG tablet    ISMO/MONOKET    180 tablet    TAKE 1 TABLET BY MOUTH TWICE DAILY    Hyperlipidemia LDL goal <100       lidocaine 5 % Patch    LIDODERM     1 patch        lisinopril 40 MG tablet    PRINIVIL/ZESTRIL    45 tablet    TAKE 1/2 TABLET BY MOUTH EVERY DAY    Essential hypertension with goal blood pressure less than 140/90       loratadine 10 MG tablet    CLARITIN    90 tablet    TAKE ONE TABLET BY MOUTH EVERY DAY AS NEEDED    Allergic rhinitis, unspecified allergic rhinitis type       montelukast 10 MG tablet    SINGULAIR    90 tablet    TAKE 1 TABLET BY MOUTH AT BEDTIME    Mild persistent asthma with acute exacerbation       nitroGLYcerin 0.4 MG sublingual tablet    NITROSTAT    25 tablet    Place 1 tablet (0.4 mg) under the tongue every 5 minutes as needed for chest pain  0.4 mg by Sublingual route every 5 (five) minutes as needed.    CAD (coronary artery disease)       oxyCODONE IR 5 MG tablet    ROXICODONE    60 tablet    Take 1 tablet (5 mg) by mouth 2 times daily as needed for moderate to severe pain (up to 2 a day)    Chronic pain syndrome       potassium chloride SA 20 MEQ CR tablet    K-DUR/KLOR-CON M    90 tablet    TAKE 1 TABLET(20 MEQ) BY MOUTH DAILY    Hypopotassemia       predniSONE 20 MG tablet    DELTASONE    10 tablet    Take 1 tablet (20 mg) by mouth 2 times daily    Chronic obstructive airway disease with asthma (H)       ranitidine 150 MG tablet    ZANTAC    180 tablet    Take 1 tablet (150 mg) by mouth 2 times daily    Gastroesophageal reflux disease, esophagitis presence not specified       simvastatin 40 MG tablet    ZOCOR    90 tablet    Take 1 tablet (40 mg) by mouth At Bedtime    Hyperlipidemia, unspecified hyperlipidemia type       traZODone 100 MG tablet    DESYREL    180 tablet    Take 2 tablets (200 mg) by mouth At Bedtime    Major depressive disorder, recurrent episode, mild (H)       * Notice:  This list has 4 medication(s) that are the same as other medications prescribed for you. Read the directions carefully, and ask your doctor or other care provider to review them with you.

## 2018-01-09 NOTE — PROGRESS NOTES
SUBJECTIVE:   Santiago Hylton is a 62 year old male who presents to clinic today for the following health issues:    Diabetes Follow-up      Patient is checking blood sugars: not at all    Diabetic concerns: None     Symptoms of hypoglycemia (low blood sugar): none     Paresthesias (numbness or burning in feet) or sores: Yes      Date of last diabetic eye exam: Yearly    Hyperlipidemia Follow-Up      Rate your low fat/cholesterol diet?: good    Taking statin?  Yes, no muscle aches from statin    Other lipid medications/supplements?:  none    Hypertension Follow-up      Outpatient blood pressures are not being checked.    Low Salt Diet: no added salt    BP Readings from Last 2 Encounters:   01/09/18 130/88   11/06/17 150/82     Hemoglobin A1C (%)   Date Value   07/13/2017 5.5   04/28/2017 5.7     LDL Cholesterol Calculated (mg/dL)   Date Value   04/28/2017 70   06/16/2016 59     Heart Failure Follow-up    Symptoms:    Shortness of breath: happens with exertion only - slightly worsened    Lower extremity edema: none    Chest pain: No    Using more pillows than normal: No    Cough at night: Yes-  ER visit     Weight:    Checking weight daily: Yes    Weight change: none    Cardiology visits, ER/UC, or hospital admissions since last visit: None    Medication side effects: none    Depression Followup    Status since last visit: Stable     See PHQ-9 for current symptoms.  Other associated symptoms: None    Complicating factors:   Significant life event:  No   Current substance abuse:  None  Anxiety or Panic symptoms:  No    PHQ-9 Score and MyChart F/U Questions 12/22/2016 7/13/2017 11/6/2017   Total Score 5 9 3   Q9: Suicide Ideation Not at all Not at all Not at all     none  PHQ-9  English  PHQ-9   Any Language  Suicide Assessment Five-step Evaluation and Treatment (SAFE-T)  Asthma Follow-Up    Was ACT completed today?    Yes    ACT Total Scores 4/28/2017   ACT TOTAL SCORE -   ASTHMA ER VISITS -   ASTHMA HOSPITALIZATIONS -    ACT TOTAL SCORE (Goal Greater than or Equal to 20) 11   In the past 12 months, how many times did you visit the emergency room for your asthma without being admitted to the hospital? 2   In the past 12 months, how many times were you hospitalized overnight because of your asthma? 0       Recent asthma triggers that patient is dealing with: upper respiratory infections  Was better after being seen and treated in emergency department as below, but now has recurrent upper respiratory infection symptoms with cough and is getting wheezy again. Does not want to end up in the emergency department. No fever or chills. Runny nose and cough for 2 days.     ED COURSE:    EKG:  (0533 Hours):  Indication: Shortness of breath  Ventricular Rate: 60  Interpretation: Septal myocardial infarction of indeterminate age. Compared to ECG 11/08/2017. Sinus tachycardia no longer present.     Laboratory:   Troponin: <0.045 WNL  Venous Blood Gases: pH 7.29 (low), O2 23.5 (low), PO2 21 (low), BE 3.1 (high), PCO2 67 (high), HCO3 32 (high)  BNP: 64 WNL  CBC: MCHC 32 (low), RDW 15.5 (high), ANC 7.81 (high) o/w WNL (WBC 10.5, RBC 4.93, HGB 14.7, )  Basic Metabolic Panel: CR 1.57 (high), EGFRAB 55 (low),  (high) o/w WNL    Imaging:   Chest x-ray (PA & Lateral):  No active disease in the chest.  Reading per radiology     Interventions:   0534: Solu-Medrol 125 mg, IV    MDM:   Santiago Hylton is a very nice 62 y.o. male with a PMH of COPD, who presents here for further evaluation of wheezing, cough for the last week. He tells me that he has been coughing up a little bit of sputum. Here, in triage, he was hypoxic and had significant wheezing. He was given 2 duo-nebs. On my assessment, he tells me that he actually feels a lot better now. He did have what seemed to be a kanika-pneumonic effusion in November. Chest x-ray here today is clear. On reassessment, his oxygen saturations are in the mid 90's on room air and he tells me that he feels  much better and would like to go home. At this point, I do not see a clear indication for antibiotics. At this point, he will be started on some Prednisone at home. I discussed at length with him that if he develops new or worsening symptoms, he should be re-evaluated and he is very agreeable to that.     DIAGNOSIS:   COPD Exacerbation    DISPOSITION:   Discharge    New Prescriptions   PREDNISONE (DELTASONE) 20 MG ORAL TABLET Take 3 tablets (60 mg) by mouth once daily for 5 days. .         Chronic Kidney Disease Follow-up      Current NSAID use?  No      Amount of exercise or physical activity: 2-3 days/week for an average of greater than 60 minutes    Problems taking medications regularly: No    Medication side effects: none  Diet: regular (no restrictions)          Problem list and histories reviewed & adjusted, as indicated.  Additional history: as documented    Patient Active Problem List   Diagnosis     Hypertension goal BP (blood pressure) < 140/90     Hyperlipidemia LDL goal <100     CHF (congestive heart failure) (H)     Mild recurrent major depression (H)     Gout     GERD (gastroesophageal reflux disease)     Chronic rhinitis     CAD (coronary artery disease)     CKD (chronic kidney disease) stage 2, GFR 60-89 ml/min     Mild persistent asthma     History of colonic polyps     Advanced directives, counseling/discussion     Chronic hepatitis C (H)     Type 2 diabetes mellitus with diabetic chronic kidney disease (H)     Microalbuminuria due to type 2 diabetes mellitus (H)     Obesity (BMI 30-39.9)     Herpes zoster without complication     Cataract, bilateral     Type 2 diabetes mellitus with diabetic polyneuropathy (H)     Coronary artery disease involving native coronary artery of native heart without angina pectoris     Diabetic polyneuropathy associated with type 2 diabetes mellitus (H)     Chronic obstructive airway disease with asthma (H)     Past Surgical History:   Procedure Laterality Date      CARDIAC SURGERY      stent     CATARACT IOL, RT/LT       COLONOSCOPY  9/18/2012    Procedure: COLONOSCOPY;  COLONOSCOPY, SCREEN;  Surgeon: Cl Johnson MD;  Location: [facility] OR     ORTHOPEDIC SURGERY      ankle     PHACOEMULSIFICATION WITH STANDARD INTRAOCULAR LENS IMPLANT Right 1/28/2016    Procedure: PHACOEMULSIFICATION WITH STANDARD INTRAOCULAR LENS IMPLANT;  Surgeon: Fly Merrill MD;  Location: [facility] OR     PHACOEMULSIFICATION WITH STANDARD INTRAOCULAR LENS IMPLANT Left 2/11/2016    Procedure: PHACOEMULSIFICATION WITH STANDARD INTRAOCULAR LENS IMPLANT;  Surgeon: Fly Merrill MD;  Location: [facility] OR       Social History   Substance Use Topics     Smoking status: Former Smoker     Packs/day: 0.25     Years: 15.00     Types: Cigarettes     Quit date: 9/21/2016     Smokeless tobacco: Never Used      Comment: 3-4 a day     Alcohol use Yes      Comment: weekend beer     Family History   Problem Relation Age of Onset     HEART DISEASE Maternal Grandmother      Hypertension Maternal Grandmother      Hypertension Father      Hypertension Mother      Hypertension Sister      Thyroid Disease Sister      CANCER Brother      DIABETES Brother      Hypertension Brother      Hypertension Maternal Aunt      CANCER Maternal Uncle      Hypertension Maternal Uncle      Hypertension Paternal Aunt      Hypertension Paternal Uncle      Hypertension Maternal Grandfather      Hypertension Paternal Grandmother      Hypertension Paternal Grandfather      CEREBROVASCULAR DISEASE No family hx of      Glaucoma No family hx of      Macular Degeneration No family hx of          Current Outpatient Prescriptions   Medication Sig Dispense Refill     predniSONE (DELTASONE) 20 MG tablet Take 1 tablet (20 mg) by mouth 2 times daily 10 tablet 1     budesonide-formoterol (SYMBICORT) 160-4.5 MCG/ACT Inhaler INHALE 2 PUFFS IN TO THE LUNGS TWICE DAILY 1 Inhaler 0     potassium chloride SA (K-DUR/KLOR-CON M) 20 MEQ CR tablet TAKE 1  TABLET(20 MEQ) BY MOUTH DAILY 90 tablet 0     furosemide (LASIX) 40 MG tablet TAKE 1 TABLET BY MOUTH 2 TIMES DAILY IF WEIGHT OR SHORTNESS OF BREATH INCREASES, THEN DECREASE TO ONCE A DAY WHEN BETTER 135 tablet 3     gabapentin (NEURONTIN) 300 MG capsule Take 1 capsule (300 mg) by mouth 3 times daily 270 capsule 3     azithromycin (ZITHROMAX) 250 MG tablet Take 250 mg by mouth       lidocaine (LIDODERM) 5 % Patch 1 patch       oxyCODONE IR (ROXICODONE) 5 MG tablet Take 1 tablet (5 mg) by mouth 2 times daily as needed for moderate to severe pain (up to 2 a day) 60 tablet 0     carvedilol (COREG) 12.5 MG tablet TAKE 1 TABLET BY MOUTH TWICE DAILY WITH MEALS 180 tablet 1     gabapentin (NEURONTIN) 300 MG capsule Take 1 tablet (300 mg) every night for 1 week, then 1 tablet twice daily for 1 week, then 1 tablet three times daily if tolerated. 90 capsule 3     albuterol (2.5 MG/3ML) 0.083% neb solution Take 1 vial (2.5 mg) by nebulization every 6 hours as needed for shortness of breath / dyspnea 120 vial 3     ranitidine (ZANTAC) 150 MG tablet Take 1 tablet (150 mg) by mouth 2 times daily 180 tablet 2     furosemide (LASIX) 40 MG tablet Take 1 tablet (40 mg) by mouth 2 times daily If weight increases or increased shortness of breath, then decrease to once a day when better. 135 tablet 2     isosorbide mononitrate (ISMO/MONOKET) 20 MG tablet TAKE 1 TABLET BY MOUTH TWICE DAILY 180 tablet 3     montelukast (SINGULAIR) 10 MG tablet TAKE 1 TABLET BY MOUTH AT BEDTIME 90 tablet 3     DULoxetine (CYMBALTA) 60 MG EC capsule TAKE 1 CAPSULE BY MOUTH DAILY 90 capsule 1     traZODone (DESYREL) 100 MG tablet Take 2 tablets (200 mg) by mouth At Bedtime 180 tablet 1     lisinopril (PRINIVIL/ZESTRIL) 40 MG tablet TAKE 1/2 TABLET BY MOUTH EVERY DAY 45 tablet 3     simvastatin (ZOCOR) 40 MG tablet Take 1 tablet (40 mg) by mouth At Bedtime 90 tablet 3     cetirizine (ZYRTEC) 10 MG tablet Take 1 tablet (10 mg) by mouth every evening 90 tablet 3  "    amLODIPine (NORVASC) 5 MG tablet TAKE 1 TABLET BY MOUTH EVERY DAY FOR BLOOD PRESSURE 90 tablet 1     loratadine (CLARITIN) 10 MG tablet TAKE ONE TABLET BY MOUTH EVERY DAY AS NEEDED 90 tablet 1     aspirin  MG tablet TAKE 1 TABLET BY MOUTH EVERY DAY 90 tablet 3     nitroglycerin (NITROSTAT) 0.4 MG SL tablet Place 1 tablet (0.4 mg) under the tongue every 5 minutes as needed for chest pain 0.4 mg by Sublingual route every 5 (five) minutes as needed. 25 tablet 11     Allergies   Allergen Reactions     No Known Drug Allergies      Recent Labs   Lab Test  01/09/18   1153  07/13/17   0854  04/28/17   0942   06/16/16   0836  01/07/16   0818  07/30/15   0752   A1C  6.0  5.5  5.7   < >  5.6  5.4  5.6   LDL   --    --   70   --   59  60  56   HDL   --    --   47   --   44  46  36*   TRIG   --    --   92   --   95  91  98   ALT   --    --   25   --   49   --   27   CR   --   1.43*  1.42*   < >  1.20  1.30*  1.36*   GFRESTIMATED   --   50*  51*   < >  62  56*  54*   GFRESTBLACK   --   61  61   < >  75  68  65   POTASSIUM   --   4.4  4.3   < >  4.0  4.9  4.8   TSH   --    --   0.74   --    --   0.71   --     < > = values in this interval not displayed.      BP Readings from Last 3 Encounters:   01/09/18 130/88   11/06/17 150/82   07/13/17 133/88    Wt Readings from Last 3 Encounters:   01/09/18 209 lb 1.6 oz (94.8 kg)   11/06/17 216 lb (98 kg)   07/13/17 213 lb (96.6 kg)                  Labs reviewed in EPIC          Reviewed and updated as needed this visit by clinical staffTobacco  Problems       Reviewed and updated as needed this visit by Provider         ROS:  Constitutional, HEENT, cardiovascular, pulmonary, gi and gu systems are negative, except as otherwise noted.      OBJECTIVE:   /88 (BP Location: Left arm, Patient Position: Chair, Cuff Size: Adult Large)  Pulse 73  Temp 97.8  F (36.6  C) (Oral)  Ht 5' 5.7\" (1.669 m)  Wt 209 lb 1.6 oz (94.8 kg)  SpO2 97%  BMI 34.06 kg/m2  Body mass index is " "34.06 kg/(m^2).  GENERAL: acutely ill, alert, well nourished, well hydrated, no distress, with cough  HENT: ear canals- normal; TMs- normal; Nose- rhinorrhea ; Mouth- no ulcers, no lesions, throat is erythematous without exudate. Sinuses without tenderness to percussion.   NECK: no tenderness, negative anterior cervical adenopathy, no asymmetry, no masses, no stiffness; thyroid- normal to palpation  RESP: lungs clear to auscultation - no rales, no rhonchi, some expiratory wheezes  CV: regular rates and rhythm, normal S1 S2, no S3 or S4 and no murmur, no click or rub -  ABDOMEN: soft, no tenderness, no  hepatosplenomegaly, no masses, normal bowel sounds  MS: extremities- no gross deformities noted, no edema  SKIN: no suspicious lesions, no rashes  PSYCH: Alert and oriented times 3; affect- normal     Diagnostic Test Results:  Results for orders placed or performed in visit on 01/09/18 (from the past 24 hour(s))   Hemoglobin A1c   Result Value Ref Range    Hemoglobin A1C 6.0 4.3 - 6.0 %       ASSESSMENT/PLAN:         Tobacco Cessation:   reports that he quit smoking about 15 months ago. His smoking use included Cigarettes. He has a 3.75 pack-year smoking history. He has never used smokeless tobacco.      BMI:   Estimated body mass index is 34.06 kg/(m^2) as calculated from the following:    Height as of this encounter: 5' 5.7\" (1.669 m).    Weight as of this encounter: 209 lb 1.6 oz (94.8 kg).   Weight management plan: Discussed healthy diet and exercise guidelines and patient will follow up in 3 months in clinic to re-evaluate.            ICD-10-CM    1. Chronic obstructive airway disease with asthma (H) J44.9 predniSONE (DELTASONE) 20 MG tablet twice a day for 5 days. Call if symptoms persist or worse. Seems to be more of a flare up of symptoms with a viral upper respiratory infection.    2. Diabetic polyneuropathy associated with type 2 diabetes mellitus (H) E11.42 Hemoglobin A1c  Lab Results   Component Value Date "    A1C 6.0 01/09/2018    A1C 5.5 07/13/2017    A1C 5.7 04/28/2017    A1C 5.5 10/17/2016    A1C 5.6 06/16/2016         3. Coronary artery disease involving native coronary artery of native heart without angina pectoris I25.10 No recurrent chest pain. Able to lay flat at night.   4. Chronic hepatitis C without hepatic coma (H) B18.2 Resolved with treatment.       CONSULTATION/REFERRAL to cardiology as scheduled.   FUTURE LABS:       - Schedule fasting labs in 3 months  FUTURE APPOINTMENTS:       - Follow-up visit in 3 months or sooner if any questions or concerns.   Work on weight loss  Regular exercise  See Patient Instructions    Tori Rizvi MD  [facility]

## 2018-01-11 ENCOUNTER — TELEPHONE (OUTPATIENT)
Dept: FAMILY MEDICINE | Facility: CLINIC | Age: 63
End: 2018-01-11

## 2018-01-11 DIAGNOSIS — G89.4 CHRONIC PAIN SYNDROME: ICD-10-CM

## 2018-01-11 RX ORDER — OXYCODONE HYDROCHLORIDE 5 MG/1
5 TABLET ORAL 2 TIMES DAILY PRN
Qty: 60 TABLET | Refills: 0 | Status: SHIPPED | OUTPATIENT
Start: 2018-01-11 | End: 2018-03-01

## 2018-01-11 NOTE — TELEPHONE ENCOUNTER
Reason for Call:  Medication or medication refill:    Do you use a [facility]?  Name of the pharmacy and phone number for the current request:  WRITTEN PRESCRIPTION REQUESTED    Name of the medication requested: Pending Prescriptions:                       Disp   Refills    oxyCODONE IR (ROXICODONE) 5 MG tablet     60 tab*0            Sig: Take 1 tablet (5 mg) by mouth 2 times daily as           needed for moderate to severe pain (up to 2 a           day)    Other request: please call when approved    Can we leave a detailed message on this number? YES    Phone number patient can be reached at: [phone]    Best Time: any    Call taken on 1/11/2018 at 12:09 PM by Meron Nelson

## 2018-01-11 NOTE — TELEPHONE ENCOUNTER
Requested Prescriptions   Pending Prescriptions Disp Refills     oxyCODONE IR (ROXICODONE) 5 MG tablet 60 tablet 0     Sig: Take 1 tablet (5 mg) by mouth 2 times daily as needed for moderate to severe pain (up to 2 a day)    There is no refill protocol information for this order        Justyna Tafoya RN, [facility]

## 2018-01-12 NOTE — TELEPHONE ENCOUNTER
Written rx will be deliver to the  this afternoon for pickup after 1pm.  Rene Alvarez,  [facility]    Please call patient  to let them know the above info.  And remind the person who is picking up to bring their photo ID.  Rene Alvarez,  [facility]     NOTE: If patient/gaurdian calls the clinic before the care teams gets a chance to call them, please let pt know the above information regarding pickup times, remind them to bring a photo ID and close the encounter.  Rene Alvarez,  [facility]    FYI:  Anything completed after 2:00p will not be delivered until the next business day after 11a.   Rene Alvarez,  for [facility].

## 2018-01-22 DIAGNOSIS — E87.6 HYPOPOTASSEMIA: ICD-10-CM

## 2018-01-22 NOTE — TELEPHONE ENCOUNTER
Rx sent to [facility] is now requesting this.  potassium chloride SA (K-DUR/KLOR-CON M) 20 MEQ CR tablet 90 tablet 0 1/5/2018

## 2018-01-23 RX ORDER — POTASSIUM CHLORIDE 1500 MG/1
TABLET, EXTENDED RELEASE ORAL
Qty: 90 TABLET | Refills: 0 | Status: SHIPPED | OUTPATIENT
Start: 2018-01-23 | End: 2019-05-07

## 2018-01-23 RX ORDER — POTASSIUM CHLORIDE 1500 MG/1
TABLET, EXTENDED RELEASE ORAL
Qty: 90 TABLET | Refills: 0
Start: 2018-01-23

## 2018-01-23 NOTE — TELEPHONE ENCOUNTER
"New prescription sent to [facility] pharmacy.   Last OV: 1/5/18    Requested Prescriptions   Pending Prescriptions Disp Refills     potassium chloride SA (K-DUR/KLOR-CON M) 20 MEQ CR tablet 90 tablet 0     Sig: TAKE 1 TABLET(20 MEQ) BY MOUTH DAILY    There is no refill protocol information for this order      Refused Prescriptions Disp Refills     potassium chloride SA (K-DUR/KLOR-CON M) 20 MEQ CR tablet [Pharmacy Med Name: POTASSIUM CL 20MEQ ER TABLETS] 90 tablet 0     Sig: TAKE 1 TABLET(20 MEQ) BY MOUTH DAILY    Potassium Supplements Protocol Passed    1/22/2018 11:57 AM       Passed - Recent or future visit with authorizing provider's specialty    Patient had office visit in the last year or has a visit in the next 30 days with authorizing provider.  See \"Patient Info\" tab in inbasket, or \"Choose Columns\" in Meds & Orders section of the refill encounter.            Passed - Patient is age 18 or older       Passed - Normal serum potassium in past 12 months    Recent Labs   Lab Test  07/13/17   0854   POTASSIUM  4.4                    Prescription approved per [facility] Refill Protocol.    Ibeth Deluna RN, BSN    "

## 2018-02-01 DIAGNOSIS — J45.30 MILD PERSISTENT ASTHMA WITHOUT COMPLICATION: ICD-10-CM

## 2018-02-01 NOTE — TELEPHONE ENCOUNTER
"Requested Prescriptions   Pending Prescriptions Disp Refills     SYMBICORT 160-4.5 MCG/ACT Inhaler [Pharmacy Med Name: SYMBICORT 160-4.5 MCG INHALER]  Last Written Prescription Date:  01/05/18  Last Fill Quantity: 1,  # refills: 0   Last Office Visit with [facility], [facility] or [facility] prescribing provider:  01/09/18   Future Office Visit:    10.2 Inhaler 0     Sig: INHALE 2 PUFFS IN TO THE LUNGS TWICE DAILY    Inhaled Steroids Protocol Failed    2/1/2018 10:28 AM       Failed - Asthma control test 20 or greater in last 6 months    Please review ACT score.          Passed - Patient is age 12 or older       Passed - Recent (6 mo) or future visit with authorizing provider's specialty    Patient had office visit in the last 6 months or has a visit in the next 30 days with authorizing provider.  See \"Patient Info\" tab in inbasket, or \"Choose Columns\" in Meds & Orders section of the refill encounter.            ACT Total Scores 1/7/2016 6/16/2016 4/28/2017   ACT TOTAL SCORE - - -   ASTHMA ER VISITS - - -   ASTHMA HOSPITALIZATIONS - - -   ACT TOTAL SCORE (Goal Greater than or Equal to 20) 9 10 11   In the past 12 months, how many times did you visit the emergency room for your asthma without being admitted to the hospital? 0 0 2   In the past 12 months, how many times were you hospitalized overnight because of your asthma? 0 0 0       "

## 2018-02-03 DIAGNOSIS — F33.0 MILD RECURRENT MAJOR DEPRESSION (H): ICD-10-CM

## 2018-02-03 NOTE — TELEPHONE ENCOUNTER
"Requested Prescriptions   Pending Prescriptions Disp Refills     DULoxetine (CYMBALTA) 60 MG EC capsule [Pharmacy Med Name: DULOXETINE DR 60MG CAPSULES]    Last Written Prescription Date:  6/13/17  Last Fill Quantity: 90,  # refills: 1   Last Office Visit with [facility], [facility] or [facility] prescribing provider:  1/9/18   Future Office Visit:      90 capsule 0     Sig: TAKE 1 CAPSULE BY MOUTH DAILY    Serotonin-Norepinephrine Reuptake Inhibitors  Passed    2/3/2018  3:11 AM       Passed - Blood pressure under 140/90    BP Readings from Last 3 Encounters:   01/09/18 130/88   11/06/17 150/82   07/13/17 133/88                Passed - PHQ-9 score of less than 5 in past 6 months    The PHQ-9 criteria is meant to fail. It requires a PHQ-9 score review         Passed - Patient is age 18 or older       Passed - Recent (6 mo) or future visit with authorizing provider's specialty    Patient had office visit in the last 6 months or has a visit in the next 30 days with authorizing provider.  See \"Patient Info\" tab in inbasket, or \"Choose Columns\" in Meds & Orders section of the refill encounter.                  Koby Faarax  [facility]  "

## 2018-02-06 RX ORDER — BUDESONIDE AND FORMOTEROL FUMARATE DIHYDRATE 160; 4.5 UG/1; UG/1
AEROSOL RESPIRATORY (INHALATION)
Qty: 10.2 INHALER | Refills: 3 | Status: SHIPPED | OUTPATIENT
Start: 2018-02-06 | End: 2018-06-04

## 2018-02-06 NOTE — TELEPHONE ENCOUNTER
"Requested Prescriptions   Pending Prescriptions Disp Refills     SYMBICORT 160-4.5 MCG/ACT Inhaler [Pharmacy Med Name: SYMBICORT 160-4.5 MCG INHALER] 10.2 Inhaler 0     Sig: INHALE 2 PUFFS IN TO THE LUNGS TWICE DAILY    Inhaled Steroids Protocol Failed    2/1/2018 10:46 AM       Failed - Asthma control test 20 or greater in last 6 months    Please review ACT score.          Passed - Patient is age 12 or older       Passed - Recent (6 mo) or future visit with authorizing provider's specialty    Patient had office visit in the last 6 months or has a visit in the next 30 days with authorizing provider.  See \"Patient Info\" tab in inbasket, or \"Choose Columns\" in Meds & Orders section of the refill encounter.            Routing refill request to provider for review/approval because:  Labs not current:  ACT score    Ibeth Deluna RN, BSN          "

## 2018-02-07 RX ORDER — DULOXETIN HYDROCHLORIDE 60 MG/1
CAPSULE, DELAYED RELEASE ORAL
Qty: 90 CAPSULE | Refills: 0 | Status: SHIPPED | OUTPATIENT
Start: 2018-02-07 | End: 2018-05-19

## 2018-02-07 NOTE — TELEPHONE ENCOUNTER
Prescription approved per [facility] Refill Protocol.    America Acosta RN

## 2018-03-01 ENCOUNTER — TELEPHONE (OUTPATIENT)
Dept: FAMILY MEDICINE | Facility: CLINIC | Age: 63
End: 2018-03-01

## 2018-03-01 DIAGNOSIS — G89.4 CHRONIC PAIN SYNDROME: ICD-10-CM

## 2018-03-01 RX ORDER — OXYCODONE HYDROCHLORIDE 5 MG/1
5 TABLET ORAL 2 TIMES DAILY PRN
Qty: 60 TABLET | Refills: 0 | Status: SHIPPED | OUTPATIENT
Start: 2018-03-01 | End: 2018-03-30 | Stop reason: ALTCHOICE

## 2018-03-01 NOTE — TELEPHONE ENCOUNTER
Requested Prescriptions   Pending Prescriptions Disp Refills     oxyCODONE IR (ROXICODONE) 5 MG tablet 60 tablet 0     Sig: Take 1 tablet (5 mg) by mouth 2 times daily as needed for moderate to severe pain (up to 2 a day)    There is no refill protocol information for this order        Routing refill request to provider for review/approval because:  Drug not on the [facility] refill protocol     Ibeth Deluna RN, BSN

## 2018-03-01 NOTE — TELEPHONE ENCOUNTER
Reason for Call:  Medication or medication refill:    Do you use a [facility]?  Name of the pharmacy and phone number for the current request:  Written RX    Name of the medication requested: Pending Prescriptions:                       Disp   Refills    oxyCODONE IR (ROXICODONE) 5 MG tablet     60 tab*0            Sig: Take 1 tablet (5 mg) by mouth 2 times daily as           needed for moderate to severe pain (up to 2 a           day)    Other request: Please call when approved so I can  prescription.    Can we leave a detailed message on this number? YES    Phone number patient can be reached at: Cell number on file:    Telephone Information:   Mobile [phone]     Best Time: any    Call taken on 3/1/2018 at 2:56 PM by Meron Nelson

## 2018-03-26 DIAGNOSIS — K21.9 GASTROESOPHAGEAL REFLUX DISEASE, ESOPHAGITIS PRESENCE NOT SPECIFIED: ICD-10-CM

## 2018-03-26 DIAGNOSIS — I10 ESSENTIAL HYPERTENSION WITH GOAL BLOOD PRESSURE LESS THAN 140/90: ICD-10-CM

## 2018-03-26 NOTE — TELEPHONE ENCOUNTER
"Requested Prescriptions   Pending Prescriptions Disp Refills     carvedilol (COREG) 12.5 MG tablet [Pharmacy Med Name: CARVEDILOL 12.5 MG TABLET]  Last Written Prescription Date:  09/05/17  Last Fill Quantity: 180,  # refills: 1   [facility] Office Visit with [facility] or [facility] prescribing provider:  01/09/18   Future Office Visit:    180 tablet 0     Sig: TAKE 1 TABLET BY MOUTH TWICE DAILY WITH MEALS    Beta-Blockers Protocol Passed    3/26/2018  2:34 AM       Passed - Blood pressure under 140/90 in past 12 months    BP Readings from Last 3 Encounters:   01/09/18 130/88   11/06/17 150/82   07/13/17 133/88                Passed - Patient is age 6 or older       Passed - Recent (12 mo) or future (30 days) visit within the authorizing provider's specialty    Patient had office visit in the last 12 months or has a visit in the next 30 days with authorizing provider or within the authorizing provider's specialty.  See \"Patient Info\" tab in [employer], or \"Choose Columns\" in Meds & Orders section of the refill encounter.            ranitidine (ZANTAC) 150 MG tablet [Pharmacy Med Name: RANITIDINE 150 MG TABLET]  Last Written Prescription Date:  06/21/17  Last Fill Quantity: 180,  # refills: 2   Last Office Visit with [facility] or [facility] prescribing provider:  01/09/18   Future Office Visit:      180 tablet 0     Sig: TAKE 1 TABLET BY MOUTH TWICE DAILY    H2 Blockers Protocol Passed    3/26/2018  2:34 AM       Passed - Patient is age 12 or older       Passed - Recent (12 mo) or future (30 days) visit within the authorizing provider's specialty    Patient had office visit in the last 12 months or has a visit in the next 30 days with authorizing provider or within the authorizing provider's specialty.  See \"Patient Info\" tab in inbasket, or \"Choose Columns\" in Meds & Orders section of the refill encounter.              "

## 2018-03-28 RX ORDER — CARVEDILOL 12.5 MG/1
TABLET ORAL
Qty: 180 TABLET | Refills: 1 | Status: SHIPPED | OUTPATIENT
Start: 2018-03-28 | End: 2018-10-24

## 2018-03-28 NOTE — TELEPHONE ENCOUNTER
Prescription approved per [facility] Refill Protocol.    Ibeth Deluna RN, BSN

## 2018-03-29 ENCOUNTER — TELEPHONE (OUTPATIENT)
Dept: FAMILY MEDICINE | Facility: CLINIC | Age: 63
End: 2018-03-29

## 2018-03-29 DIAGNOSIS — E11.42 TYPE 2 DIABETES MELLITUS WITH DIABETIC POLYNEUROPATHY, UNSPECIFIED LONG TERM INSULIN USE STATUS: Primary | ICD-10-CM

## 2018-03-29 NOTE — TELEPHONE ENCOUNTER
Requested Prescriptions   Pending Prescriptions Disp Refills     traMADol (ULTRAM) 50 MG tablet [Pharmacy Med Name: TRAMADOL HCL 50 MG TABLET] 60 tablet      Sig: TAKE 1-2 TABLET BY MOUTH TWICE DAILY. NO MORE THAN 2 TABLETS IN 24 HOURS    There is no refill protocol information for this order        Last Written Prescription Date:  9/27/17  Last Fill Quantity: 60,  # refills: 3   Last Office Visit with [facility], [facility] or [facility] prescribing provider:  1/9/2018     Future Office Visit:

## 2018-03-30 RX ORDER — TRAMADOL HYDROCHLORIDE 50 MG/1
TABLET ORAL
Qty: 60 TABLET | Refills: 3 | Status: SHIPPED | OUTPATIENT
Start: 2018-03-30 | End: 2018-05-26

## 2018-03-30 NOTE — TELEPHONE ENCOUNTER
Written rx faxed to the pharmacy, Pharmacy will contact pt when medication is ready for pickup.  Rene Alvarez,  [facility]

## 2018-03-30 NOTE — TELEPHONE ENCOUNTER
Routing refill request to provider for review/approval because:  Drug not on the [facility] refill protocol   Not on active medication list    Ibeth Deluna RN, BSN

## 2018-04-03 ENCOUNTER — TELEPHONE (OUTPATIENT)
Dept: FAMILY MEDICINE | Facility: CLINIC | Age: 63
End: 2018-04-03

## 2018-04-03 NOTE — TELEPHONE ENCOUNTER
This writer attempted to contact patient on 04/03/18    Reason for call see provider's notes below and left message.    If patient calls back:   Patient contacted by 1st floor [facility] Team (MA/TC). Inform patient that someone from the team will contact them, document that pt called and route to care team.     Gladis Knight MA

## 2018-04-03 NOTE — LETTER
April 5, 2018          Santiago Hylton  [address]  [place] [address]          Dear Santiago,    At [facility] we care about your health and are committed to providing quality patient care. Regular appointments are a vital part of the care and management of your health and can help prevent many of the complications that can occur.      It has come to our attention that you are due for a Spirometry test in the office now that your Pneumonia is cleared up to check on baseline lung function.  Please call [facility] at [phone] to schedule you appointment at your earliest convenience. We can also do this test at your follow up visit and check the diabetes labs also.    Sincerely,        Tori Rizvi MD/myles

## 2018-04-03 NOTE — TELEPHONE ENCOUNTER
Please call Santiago. Need to do a spirometry in clinic now that pneumonia is cleared up to check on baseline lung function. Please schedule when convenient. We can do this at the next follow up visit and check the diabetes labs also.  Tori Rizvi MD

## 2018-04-03 NOTE — LETTER
April 5, 2018          Santiago Hyltno  [address]  [place] [address]          Dear Santiago,    At [facility] we care about your health and are committed to providing quality patient care. Regular appointments are a vital part of the care and management of your health and can help prevent many of the complications that can occur.      It has come to our attention that you are due for a Spirometry test in the office now that your Pneumonia is cleared up to check on baseline lung function.  Please call [facility] at [phone] to schedule you appointment at your earliest convenience. We can also do this test at your follow up visit and check the diabetes labs also.    Sincerely,        Tori Rizvi MD team/[facility]

## 2018-04-04 NOTE — TELEPHONE ENCOUNTER
This writer attempted to contact patient on 04/04/18    Reason for call see provider's note and left message.    If patient calls back:   Patient contacted by a first floor care team. If patient return call, please contact first floor MAs    Gladis Knight MA

## 2018-04-05 NOTE — TELEPHONE ENCOUNTER
Phone does not accept incoming calls. Due to multiple attempts, letter will be mailed to patient.    Brandon Mills CMA

## 2018-04-11 DIAGNOSIS — E87.6 HYPOPOTASSEMIA: ICD-10-CM

## 2018-04-12 NOTE — TELEPHONE ENCOUNTER
"Requested Prescriptions   Pending Prescriptions Disp Refills     KLOR-CON 20 MEQ CR tablet [Pharmacy Med Name: KLOR-CON M20 TABLET]    Last Written Prescription Date:  1/23/18  Last Fill Quantity: 90,  # refills: 0   Last Office Visit with [facility], [facility] or [facility] prescribing provider:  1/9/18   Future Office Visit:      90 tablet 0     Sig: TAKE 1 TABLET(20 MEQ) BY MOUTH DAILY    Potassium Supplements Protocol Passed    4/11/2018  5:39 PM       Passed - Recent (12 mo) or future (30 days) visit within the authorizing provider's specialty    Patient had office visit in the last 12 months or has a visit in the next 30 days with authorizing provider or within the authorizing provider's specialty.  See \"Patient Info\" tab in inbasket, or \"Choose Columns\" in Meds & Orders section of the refill encounter.           Passed - Patient is age 18 or older       Passed - Normal serum potassium in past 12 months    Recent Labs   Lab Test  07/13/17   0854   POTASSIUM  4.4                          Koby Faarax  [facility]  "

## 2018-04-16 RX ORDER — POTASSIUM CHLORIDE 1500 MG/1
TABLET, EXTENDED RELEASE ORAL
Qty: 90 TABLET | Refills: 0 | Status: SHIPPED | OUTPATIENT
Start: 2018-04-16 | End: 2018-07-04

## 2018-04-16 NOTE — TELEPHONE ENCOUNTER
Prescription approved per [facility] Refill Protocol.      Justyna Tafoya RN, [facility]

## 2018-04-29 DIAGNOSIS — F33.0 MAJOR DEPRESSIVE DISORDER, RECURRENT EPISODE, MILD (H): Primary | ICD-10-CM

## 2018-05-02 RX ORDER — DULOXETIN HYDROCHLORIDE 60 MG/1
CAPSULE, DELAYED RELEASE ORAL
Qty: 90 CAPSULE | Refills: 1 | Status: SHIPPED | OUTPATIENT
Start: 2018-05-02 | End: 2018-11-10

## 2018-05-02 NOTE — TELEPHONE ENCOUNTER
Routing refill request to provider for review/approval because:  Labs out of range:  Creat  No associated DXN on pended medication.    Saskia Padron RN

## 2018-05-07 ENCOUNTER — TELEPHONE (OUTPATIENT)
Dept: FAMILY MEDICINE | Facility: CLINIC | Age: 63
End: 2018-05-07

## 2018-05-07 DIAGNOSIS — G89.4 CHRONIC PAIN SYNDROME: ICD-10-CM

## 2018-05-07 NOTE — TELEPHONE ENCOUNTER
..Reason for Call:  Medication or medication refill:    Do you use a [facility]?  Name of the pharmacy and phone number for the current request:  will  script    Name of the medication requested: oxycodone 5 mg    Other request: this medication is not on medication list    Can we leave a detailed message on this number? YES    Phone number patient can be reached at: Home number on file [phone] (home)    Best Time: anytime    Call taken on 5/7/2018 at 2:00 PM by Pamela Ryan

## 2018-05-07 NOTE — TELEPHONE ENCOUNTER
Requested Prescriptions   Pending Prescriptions Disp Refills     oxyCODONE IR (ROXICODONE) 5 MG tablet 60 tablet 0     Sig: Take 1 tablet (5 mg) by mouth 2 times daily as needed for moderate to severe pain (up to 2 a day)    There is no refill protocol information for this order        Routing refill request to provider for review/approval because:  Drug not on the [facility] refill protocol   Drug not active on patient's medication list    Ibeth Deluna RN, BSN

## 2018-05-08 RX ORDER — OXYCODONE HYDROCHLORIDE 5 MG/1
5 TABLET ORAL 2 TIMES DAILY PRN
Qty: 60 TABLET | Refills: 0 | Status: SHIPPED | OUTPATIENT
Start: 2018-05-08 | End: 2018-06-05

## 2018-05-08 NOTE — TELEPHONE ENCOUNTER
Please call patient to schedule follow up appointment. Needs spirometry to check his breathing. Prescription signed and put in box/printer. Dr.Deborah Rizvi

## 2018-05-08 NOTE — TELEPHONE ENCOUNTER
Written rx will be deliver to the  this afternoon for pickup after 3pm.  Rene Alvarez,  [facility]    Please call patient to let them know the above info.  And remind the person who is picking up to bring their photo ID.  Also notify patient he needs to schedule a follow up appointment to check his breathing, patient needs a spirometry done during that appointment.  Rene Alvarez,  [facility]     NOTE: If patient/gaurdian calls the clinic before the care teams gets a chance to call them, please let pt know the above information regarding pickup times, remind them to bring a photo ID and close the encounter.  Rene Alvarez,  [facility]    FYI:  Anything completed after 2:00p will not be delivered until the next business day after 11a.   Rene Alvarez,  for [facility].

## 2018-05-17 ENCOUNTER — OFFICE VISIT (OUTPATIENT)
Dept: FAMILY MEDICINE | Facility: CLINIC | Age: 63
End: 2018-05-17
Payer: MEDICARE

## 2018-05-17 VITALS
OXYGEN SATURATION: 96 % | BODY MASS INDEX: 34.23 KG/M2 | HEART RATE: 66 BPM | DIASTOLIC BLOOD PRESSURE: 78 MMHG | RESPIRATION RATE: 16 BRPM | TEMPERATURE: 97.7 F | SYSTOLIC BLOOD PRESSURE: 115 MMHG | WEIGHT: 213 LBS | HEIGHT: 66 IN

## 2018-05-17 DIAGNOSIS — I25.118 CORONARY ARTERY DISEASE OF NATIVE ARTERY OF NATIVE HEART WITH STABLE ANGINA PECTORIS (H): ICD-10-CM

## 2018-05-17 DIAGNOSIS — Z13.89 SCREENING FOR DIABETIC PERIPHERAL NEUROPATHY: ICD-10-CM

## 2018-05-17 DIAGNOSIS — I25.10 CORONARY ARTERY DISEASE INVOLVING NATIVE CORONARY ARTERY OF NATIVE HEART WITHOUT ANGINA PECTORIS: ICD-10-CM

## 2018-05-17 DIAGNOSIS — I10 HYPERTENSION GOAL BP (BLOOD PRESSURE) < 140/90: Primary | ICD-10-CM

## 2018-05-17 DIAGNOSIS — Z13.5 SCREENING FOR DIABETIC RETINOPATHY: ICD-10-CM

## 2018-05-17 DIAGNOSIS — E55.9 VITAMIN D DEFICIENCY: ICD-10-CM

## 2018-05-17 DIAGNOSIS — I50.23 ACUTE ON CHRONIC SYSTOLIC CONGESTIVE HEART FAILURE (H): ICD-10-CM

## 2018-05-17 DIAGNOSIS — N18.30 TYPE 2 DIABETES MELLITUS WITH STAGE 3 CHRONIC KIDNEY DISEASE, WITH LONG-TERM CURRENT USE OF INSULIN (H): ICD-10-CM

## 2018-05-17 DIAGNOSIS — J30.1 CHRONIC SEASONAL ALLERGIC RHINITIS DUE TO POLLEN: ICD-10-CM

## 2018-05-17 DIAGNOSIS — J44.89 CHRONIC OBSTRUCTIVE AIRWAY DISEASE WITH ASTHMA (H): ICD-10-CM

## 2018-05-17 DIAGNOSIS — F33.0 MILD RECURRENT MAJOR DEPRESSION (H): ICD-10-CM

## 2018-05-17 DIAGNOSIS — E78.5 HYPERLIPIDEMIA LDL GOAL <100: ICD-10-CM

## 2018-05-17 DIAGNOSIS — B18.2 CHRONIC HEPATITIS C WITHOUT HEPATIC COMA (H): ICD-10-CM

## 2018-05-17 DIAGNOSIS — E11.22 TYPE 2 DIABETES MELLITUS WITH STAGE 3 CHRONIC KIDNEY DISEASE, WITH LONG-TERM CURRENT USE OF INSULIN (H): ICD-10-CM

## 2018-05-17 DIAGNOSIS — Z79.4 TYPE 2 DIABETES MELLITUS WITH STAGE 3 CHRONIC KIDNEY DISEASE, WITH LONG-TERM CURRENT USE OF INSULIN (H): ICD-10-CM

## 2018-05-17 DIAGNOSIS — M1A.3790 CHRONIC GOUT DUE TO RENAL IMPAIRMENT INVOLVING ANKLE WITHOUT TOPHUS, UNSPECIFIED LATERALITY: ICD-10-CM

## 2018-05-17 LAB
ALBUMIN SERPL-MCNC: 3.9 G/DL (ref 3.4–5)
ALP SERPL-CCNC: 76 U/L (ref 40–150)
ALT SERPL W P-5'-P-CCNC: 25 U/L (ref 0–70)
ANION GAP SERPL CALCULATED.3IONS-SCNC: 3 MMOL/L (ref 3–14)
AST SERPL W P-5'-P-CCNC: 22 U/L (ref 0–45)
BILIRUB SERPL-MCNC: 0.5 MG/DL (ref 0.2–1.3)
BUN SERPL-MCNC: 22 MG/DL (ref 7–30)
CALCIUM SERPL-MCNC: 9.3 MG/DL (ref 8.5–10.1)
CHLORIDE SERPL-SCNC: 102 MMOL/L (ref 94–109)
CHOLEST SERPL-MCNC: 146 MG/DL
CO2 SERPL-SCNC: 35 MMOL/L (ref 20–32)
CREAT SERPL-MCNC: 1.44 MG/DL (ref 0.66–1.25)
CREAT UR-MCNC: 26 MG/DL
ERYTHROCYTE [DISTWIDTH] IN BLOOD BY AUTOMATED COUNT: 13.8 % (ref 10–15)
FEF 25/75: NORMAL
FEV-1: NORMAL
FEV1/FVC: NORMAL
FVC: NORMAL
GFR SERPL CREATININE-BSD FRML MDRD: 50 ML/MIN/1.7M2
GLUCOSE SERPL-MCNC: 97 MG/DL (ref 70–99)
HBA1C MFR BLD: 5.8 % (ref 0–5.6)
HCT VFR BLD AUTO: 47.7 % (ref 40–53)
HDLC SERPL-MCNC: 52 MG/DL
HGB BLD-MCNC: 15.8 G/DL (ref 13.3–17.7)
LDLC SERPL CALC-MCNC: 70 MG/DL
MCH RBC QN AUTO: 30.6 PG (ref 26.5–33)
MCHC RBC AUTO-ENTMCNC: 33.1 G/DL (ref 31.5–36.5)
MCV RBC AUTO: 92 FL (ref 78–100)
MICROALBUMIN UR-MCNC: 9 MG/L
MICROALBUMIN/CREAT UR: 34.81 MG/G CR (ref 0–17)
NONHDLC SERPL-MCNC: 94 MG/DL
PLATELET # BLD AUTO: 190 10E9/L (ref 150–450)
POTASSIUM SERPL-SCNC: 4.4 MMOL/L (ref 3.4–5.3)
PROT SERPL-MCNC: 7.8 G/DL (ref 6.8–8.8)
RBC # BLD AUTO: 5.16 10E12/L (ref 4.4–5.9)
SODIUM SERPL-SCNC: 140 MMOL/L (ref 133–144)
TRIGL SERPL-MCNC: 118 MG/DL
TSH SERPL DL<=0.005 MIU/L-ACNC: 2.04 MU/L (ref 0.4–4)
URATE SERPL-MCNC: 10.4 MG/DL (ref 3.5–7.2)
WBC # BLD AUTO: 9.3 10E9/L (ref 4–11)

## 2018-05-17 PROCEDURE — 94010 BREATHING CAPACITY TEST: CPT | Performed by: FAMILY MEDICINE

## 2018-05-17 PROCEDURE — 80061 LIPID PANEL: CPT | Performed by: FAMILY MEDICINE

## 2018-05-17 PROCEDURE — 99214 OFFICE O/P EST MOD 30 MIN: CPT | Mod: 25 | Performed by: FAMILY MEDICINE

## 2018-05-17 PROCEDURE — 82306 VITAMIN D 25 HYDROXY: CPT | Performed by: FAMILY MEDICINE

## 2018-05-17 PROCEDURE — 80053 COMPREHEN METABOLIC PANEL: CPT | Performed by: FAMILY MEDICINE

## 2018-05-17 PROCEDURE — 84443 ASSAY THYROID STIM HORMONE: CPT | Performed by: FAMILY MEDICINE

## 2018-05-17 PROCEDURE — 84550 ASSAY OF BLOOD/URIC ACID: CPT | Performed by: FAMILY MEDICINE

## 2018-05-17 PROCEDURE — 85027 COMPLETE CBC AUTOMATED: CPT | Performed by: FAMILY MEDICINE

## 2018-05-17 PROCEDURE — 83036 HEMOGLOBIN GLYCOSYLATED A1C: CPT | Performed by: FAMILY MEDICINE

## 2018-05-17 PROCEDURE — 36415 COLL VENOUS BLD VENIPUNCTURE: CPT | Performed by: FAMILY MEDICINE

## 2018-05-17 PROCEDURE — 82043 UR ALBUMIN QUANTITATIVE: CPT | Performed by: FAMILY MEDICINE

## 2018-05-17 RX ORDER — NITROGLYCERIN 0.4 MG/1
0.4 TABLET SUBLINGUAL EVERY 5 MIN PRN
Qty: 25 TABLET | Refills: 11 | Status: SHIPPED | OUTPATIENT
Start: 2018-05-17 | End: 2020-08-27

## 2018-05-17 RX ORDER — LORATADINE 10 MG/1
10 TABLET ORAL DAILY
Qty: 90 TABLET | Refills: 1 | Status: SHIPPED | OUTPATIENT
Start: 2018-05-17 | End: 2020-08-27

## 2018-05-17 ASSESSMENT — ANXIETY QUESTIONNAIRES
5. BEING SO RESTLESS THAT IT IS HARD TO SIT STILL: NOT AT ALL
GAD7 TOTAL SCORE: 7
2. NOT BEING ABLE TO STOP OR CONTROL WORRYING: NOT AT ALL
3. WORRYING TOO MUCH ABOUT DIFFERENT THINGS: NEARLY EVERY DAY
IF YOU CHECKED OFF ANY PROBLEMS ON THIS QUESTIONNAIRE, HOW DIFFICULT HAVE THESE PROBLEMS MADE IT FOR YOU TO DO YOUR WORK, TAKE CARE OF THINGS AT HOME, OR GET ALONG WITH OTHER PEOPLE: SOMEWHAT DIFFICULT
7. FEELING AFRAID AS IF SOMETHING AWFUL MIGHT HAPPEN: SEVERAL DAYS
1. FEELING NERVOUS, ANXIOUS, OR ON EDGE: SEVERAL DAYS
6. BECOMING EASILY ANNOYED OR IRRITABLE: MORE THAN HALF THE DAYS

## 2018-05-17 ASSESSMENT — PAIN SCALES - GENERAL: PAINLEVEL: WORST PAIN (10)

## 2018-05-17 ASSESSMENT — PATIENT HEALTH QUESTIONNAIRE - PHQ9: 5. POOR APPETITE OR OVEREATING: NOT AT ALL

## 2018-05-17 NOTE — PATIENT INSTRUCTIONS
At [facility], we strive to deliver an exceptional experience to you, every time we see you.  If you receive a survey in the mail, please send us back your thoughts. We really do value your feedback.    Based on your medical history, these are the current health maintenance/preventive care services that you are due for (some may have been done at this visit.)  Health Maintenance Due   Topic Date Due     COPD ACTION PLAN Q1 YR  1955     URINE DRUG SCREEN Q1 YR 02/02/1971     EYE EXAM Q1 YEAR  12/09/2016     HF ACTION PLAN Q3 YR  02/12/2017     ASTHMA ACTION PLAN Q1 YR 09/29/2017     DEPRESSION ACTION PLAN Q1 YR  09/29/2017     ASTHMA CONTROL TEST Q6 MOS  10/28/2017     BMP Q6 MOS  01/13/2018     FOOT EXAM Q1 YEAR  04/28/2018     ALT Q1 YR  04/28/2018     LIPID MONITORING Q1 YEAR  04/28/2018     MICROALBUMIN Q1 YEAR  04/28/2018     PHQ-9 Q6 MONTHS  05/06/2018         Suggested websites for health information:  Www.[facility].[employer] : Up to date and easily searchable information on multiple topics.  Www.medlineplus.gov : medication info, interactive tutorials, watch real surgeries online  Www.familydoctor.org : good info from the Academy of Family Physicians  Www.cdc.gov : public health info, travel advisories, epidemics (H1N1)  Www.aap.org : children's health info, normal development, vaccinations  Www.health.state.[place].us : [place] dept of health, public health issues in [place], N1N1    Your care team:                            Family Medicine Internal Medicine   MD Robb Teague MD Shantel Branch-Fleming, MD Katya Georgiev PA-C Nam Ho, MD Pediatrics   MIGUEL Ramírez, MD Yoly Ling CNP, MD Deborah Mielke, MD Kim Thein, APRN DERIC      Clinic hours: Monday - Thursday 7 am-7 pm; Fridays 7 am-5 pm.   Urgent care: Monday - Friday 11 am-9 pm; Saturday and Sunday 9 am-5 pm.  Pharmacy : Monday -Thursday 8 am-8 pm;  Friday 8 am-6 pm; Saturday and Sunday 9 am-5 pm.     Clinic: [phone]   Pharmacy: [phone]

## 2018-05-17 NOTE — MR AVS SNAPSHOT
After Visit Summary   5/17/2018    Santiago Hylton    MRN: [id number]           Patient Information     Date Of Birth          1955        Visit Information        Provider Department      5/17/2018 9:20 AM Tori Rizvi MD [facility]        Today's Diagnoses     Hypertension goal BP (blood pressure) < 140/90    -  1    Screening for diabetic retinopathy        Screening for diabetic peripheral neuropathy        Hyperlipidemia LDL goal <100        Acute on chronic systolic congestive heart failure (H)        Mild recurrent major depression (H)        Chronic hepatitis C without hepatic coma (H)        Type 2 diabetes mellitus with stage 3 chronic kidney disease, with long-term current use of insulin (H)        Coronary artery disease involving native coronary artery of native heart without angina pectoris        Chronic obstructive airway disease with asthma (H)        Vitamin D deficiency        Coronary artery disease of native artery of native heart with stable angina pectoris (H)        Chronic gout due to renal impairment involving ankle without tophus, unspecified laterality        Chronic seasonal allergic rhinitis due to pollen          Care Instructions    At [facility], we strive to deliver an exceptional experience to you, every time we see you.  If you receive a survey in the mail, please send us back your thoughts. We really do value your feedback.    Based on your medical history, these are the current health maintenance/preventive care services that you are due for (some may have been done at this visit.)  Health Maintenance Due   Topic Date Due     COPD ACTION PLAN Q1 YR  1955     URINE DRUG SCREEN Q1 YR  02/02/1971     EYE EXAM Q1 YEAR  12/09/2016     HF ACTION PLAN Q3 YR  02/12/2017     ASTHMA ACTION PLAN Q1 YR  09/29/2017     DEPRESSION ACTION PLAN Q1 YR  09/29/2017     ASTHMA CONTROL TEST Q6 MOS  10/28/2017     BMP Q6 MOS   01/13/2018     FOOT EXAM Q1 YEAR  04/28/2018     ALT Q1 YR  04/28/2018     LIPID MONITORING Q1 YEAR  04/28/2018     MICROALBUMIN Q1 YEAR  04/28/2018     PHQ-9 Q6 MONTHS  05/06/2018         Suggested websites for health information:  Www.[employer].org : Up to date and easily searchable information on multiple topics.  Www.medlineplus.gov : medication info, interactive tutorials, watch real surgeries online  Www.familydoctor.org : good info from the Academy of Family Physicians  Www.cdc.gov : public health info, travel advisories, epidemics (H1N1)  Www.aap.org : children's health info, normal development, vaccinations  Www.health.[facility].[place].us : [place] dept of health, public health issues in [place], N1N1    Your care team:                            Family Medicine Internal Medicine   MD Robb Teague MD Shantel Branch-Fleming, MD Katya Georgiev PA-C Nam Ho, MD Pediatrics   MIGUEL Ramírez, CNP Araceli Muller APRN CNP   MD Yoly Thomas MD Deborah Mielke, MD Kim Thein, APRN CNP      Clinic hours: Monday - Thursday 7 am-7 pm; Fridays 7 am-5 pm.   Urgent care: Monday - Friday 11 am-9 pm; Saturday and Sunday 9 am-5 pm.  Pharmacy : Monday -Thursday 8 am-8 pm; Friday 8 am-6 pm; Saturday and Sunday 9 am-5 pm.     Clinic: [phone]   Pharmacy: [phone]            Follow-ups after your visit        Follow-up notes from your care team     Return in about 3 months (around 8/17/2018) for medication follow up, Lab Work, Weight check, Routine Visit.      Who to contact     If you have questions or need follow up information about today's clinic visit or your schedule please contact [facility] directly at [phone].  Normal or non-critical lab and imaging results will be communicated to you by MyChart, letter or phone within 4 business days after the clinic has received the results. If you do not hear from us within 7 days, please contact the  "clinic through [employer] or phone. If you have a critical or abnormal lab result, we will notify you by phone as soon as possible.  Submit refill requests through [employer] or call your pharmacy and they will forward the refill request to us. Please allow 3 business days for your refill to be completed.          Additional Information About Your Visit        [employer] Information     [employer] lets you send messages to your doctor, view your test results, renew your prescriptions, schedule appointments and more. To sign up, go to [email or web address] . Click on \"Log in\" on the left side of the screen, which will take you to the Welcome page. Then click on \"Sign up Now\" on the right side of the page.     You will be asked to enter the access code listed below, as well as some personal information. Please follow the directions to create your username and password.     Your access code is: [email or web address]  Expires: 8/15/2018  6:57 PM     Your access code will  in 90 days. If you need help or a new code, please call your [facility] clinic or [phone].        Care EveryWhere ID     This is your Care EveryWhere ID. This could be used by other organizations to access your [facility] medical records  [id number]        Your Vitals Were     Pulse Temperature Respirations Height Pulse Oximetry BMI (Body Mass Index)    66 97.7  F (36.5  C) (Oral) 16 5' 5.7\" (1.669 m) 96% 34.69 kg/m2       Blood Pressure from Last 3 Encounters:   18 115/78   18 130/88   17 150/82    Weight from Last 3 Encounters:   18 213 lb (96.6 kg)   18 209 lb 1.6 oz (94.8 kg)   17 216 lb (98 kg)              We Performed the Following     Albumin Random Urine Quantitative with Creat Ratio     CBC with platelets     Comprehensive metabolic panel     FOOT EXAM  NO CHARGE [[id number]]     Hemoglobin A1c     Lipid panel reflex to direct LDL Fasting     Spirometry, Breathing Capacity: Normal Order, Clinic Performed     TSH with " free T4 reflex     Uric acid     Vitamin D Deficiency          Today's Medication Changes          These changes are accurate as of 5/17/18  6:57 PM.  If you have any questions, ask your nurse or doctor.               These medicines have changed or have updated prescriptions.        Dose/Directions    loratadine 10 MG tablet   Commonly known as:  CLARITIN   This may have changed:  See the new instructions.   Used for:  Chronic seasonal allergic rhinitis due to pollen   Changed by:  Tori Rizvi MD        Dose:  10 mg   Take 1 tablet (10 mg) by mouth daily   Quantity:  90 tablet   Refills:  1            Where to get your medicines      These medications were sent to [facility]/pharmacy #[id number] - [place], [place] - [address] [place] [address] AT CORNER OF [address] [place] [id number]     Phone:  [phone]     loratadine 10 MG tablet         Some of these will need a paper prescription and others can be bought over the counter.  Ask your nurse if you have questions.     Bring a paper prescription for each of these medications     nitroGLYcerin 0.4 MG sublingual tablet                Primary Care Provider Office Phone # Fax #    Tori Rizvi [phone]       [id number] [place]  [facility] [address]        Equal Access to Services     ADAN CALDWELL AH: Hadii javier ku hadasho Sosheilaali, waaxda luqadaha, qaybta kaalmada adeegyada, oren ortiz. So [facility] [phone].    ATENCIÓN: Si habla español, tiene a paz disposición servicios gratuitos de asistencia lingüística. [place] al [phone].    We comply with applicable federal civil rights laws and [place] laws. We do not discriminate on the basis of race, color, national origin, age, disability, sex, sexual orientation, or gender identity.            Thank you!     Thank you for choosing [facility]  for your care. Our goal is always to provide you with excellent care. Hearing back  from our patients is one way we can continue to improve our services. Please take a few minutes to complete the written survey that you may receive in the mail after your visit with us. Thank you!             Your Updated Medication List - Protect others around you: Learn how to safely use, store and throw away your medicines at www.disposemymeds.org.          This list is accurate as of 5/17/18  6:57 PM.  Always use your most recent med list.                   Brand Name Dispense Instructions for use Diagnosis    albuterol (2.5 MG/3ML) 0.083% neb solution     120 vial    Take 1 vial (2.5 mg) by nebulization every 6 hours as needed for shortness of breath / dyspnea    Mild persistent asthma, uncomplicated       amLODIPine 5 MG tablet    NORVASC    90 tablet    TAKE 1 TABLET BY MOUTH EVERY DAY FOR BLOOD PRESSURE    Essential hypertension with goal blood pressure less than 140/90       aspirin 325 MG EC tablet     90 tablet    TAKE 1 TABLET BY MOUTH EVERY DAY    Cardiovascular disease       azithromycin 250 MG tablet    ZITHROMAX     Take 250 mg by mouth        carvedilol 12.5 MG tablet    COREG    180 tablet    TAKE 1 TABLET BY MOUTH TWICE DAILY WITH MEALS    Essential hypertension with goal blood pressure less than 140/90       cetirizine 10 MG tablet    zyrTEC    90 tablet    Take 1 tablet (10 mg) by mouth every evening    Chronic idiopathic urticaria       * DULoxetine 60 MG EC capsule    CYMBALTA    90 capsule    TAKE 1 CAPSULE BY MOUTH DAILY    Major depressive disorder, recurrent episode, mild (H)       * DULoxetine 60 MG EC capsule    CYMBALTA    90 capsule    TAKE 1 CAPSULE BY MOUTH DAILY    Mild recurrent major depression (H)       * DULoxetine 60 MG EC capsule    CYMBALTA    90 capsule    TAKE ONE CAPSULE BY MOUTH EVERY DAY    Major depressive disorder, recurrent episode, mild (H)       * furosemide 40 MG tablet    LASIX    135 tablet    Take 1 tablet (40 mg) by mouth 2 times daily If weight increases or  increased shortness of breath, then decrease to once a day when better.    Essential hypertension with goal blood pressure less than 140/90       * furosemide 40 MG tablet    LASIX    135 tablet    TAKE 1 TABLET BY MOUTH 2 TIMES DAILY IF WEIGHT OR SHORTNESS OF BREATH INCREASES, THEN DECREASE TO ONCE A DAY WHEN BETTER    Essential hypertension with goal blood pressure less than 140/90       * gabapentin 300 MG capsule    NEURONTIN    90 capsule    Take 1 tablet (300 mg) every night for 1 week, then 1 tablet twice daily for 1 week, then 1 tablet three times daily if tolerated.    Diabetic polyneuropathy associated with type 2 diabetes mellitus (H)       * gabapentin 300 MG capsule    NEURONTIN    270 capsule    Take 1 capsule (300 mg) by mouth 3 times daily    Diabetic polyneuropathy associated with type 2 diabetes mellitus (H)       isosorbide mononitrate 20 MG tablet    ISMO/MONOKET    180 tablet    TAKE 1 TABLET BY MOUTH TWICE DAILY    Hyperlipidemia LDL goal <100       lidocaine 5 % Patch    LIDODERM     1 patch        lisinopril 40 MG tablet    PRINIVIL/ZESTRIL    45 tablet    TAKE 1/2 TABLET BY MOUTH EVERY DAY    Essential hypertension with goal blood pressure less than 140/90       loratadine 10 MG tablet    CLARITIN    90 tablet    Take 1 tablet (10 mg) by mouth daily    Chronic seasonal allergic rhinitis due to pollen       montelukast 10 MG tablet    SINGULAIR    90 tablet    TAKE 1 TABLET BY MOUTH AT BEDTIME    Mild persistent asthma with acute exacerbation       nitroGLYcerin 0.4 MG sublingual tablet    NITROSTAT    25 tablet    Place 1 tablet (0.4 mg) under the tongue every 5 minutes as needed for chest pain 0.4 mg by Sublingual route every 5 (five) minutes as needed.    Coronary artery disease of native artery of native heart with stable angina pectoris (H)       oxyCODONE IR 5 MG tablet    ROXICODONE    60 tablet    Take 1 tablet (5 mg) by mouth 2 times daily as needed for moderate to severe pain (up to  2 a day)    Chronic pain syndrome       * potassium chloride SA 20 MEQ CR tablet    K-DUR/KLOR-CON M    90 tablet    TAKE 1 TABLET(20 MEQ) BY MOUTH DAILY    Hypopotassemia       * KLOR-CON 20 MEQ CR tablet   Generic drug:  potassium chloride SA     90 tablet    TAKE 1 TABLET(20 MEQ) BY MOUTH DAILY    Hypopotassemia       predniSONE 20 MG tablet    DELTASONE    10 tablet    Take 1 tablet (20 mg) by mouth 2 times daily    Chronic obstructive airway disease with asthma (H)       ranitidine 150 MG tablet    ZANTAC    180 tablet    TAKE 1 TABLET BY MOUTH TWICE DAILY    Gastroesophageal reflux disease, esophagitis presence not specified       simvastatin 40 MG tablet    ZOCOR    90 tablet    Take 1 tablet (40 mg) by mouth At Bedtime    Hyperlipidemia, unspecified hyperlipidemia type       SYMBICORT 160-4.5 MCG/ACT Inhaler   Generic drug:  budesonide-formoterol     10.2 Inhaler    INHALE 2 PUFFS IN TO THE LUNGS TWICE DAILY    Mild persistent asthma without complication       traMADol 50 MG tablet    ULTRAM    60 tablet    TAKE 1-2 TABLET BY MOUTH TWICE DAILY. NO MORE THAN 2 TABLETS IN 24 HOURS    Type 2 diabetes mellitus with diabetic polyneuropathy, unspecified long term insulin use status (H)       traZODone 100 MG tablet    DESYREL    180 tablet    Take 2 tablets (200 mg) by mouth At Bedtime    Major depressive disorder, recurrent episode, mild (H)       * Notice:  This list has 9 medication(s) that are the same as other medications prescribed for you. Read the directions carefully, and ask your doctor or other care provider to review them with you.

## 2018-05-17 NOTE — PROGRESS NOTES
SUBJECTIVE:   Santiago Hylton is a 63 year old male who presents to clinic today for the following health issues:      Diabetes Follow-up      Patient is checking blood sugars: not at all    Diabetic concerns: None     Symptoms of hypoglycemia (low blood sugar): shaky, dizzy, weak then finds something to eat to help     Paresthesias (numbness or burning in feet) or sores: Yes both and pain, swelling     Date of last diabetic eye exam: Due and will schedule, needs new glasses.     Hyperlipidemia Follow-Up      Rate your low fat/cholesterol diet?: good    Taking statin?  Yes, no muscle aches from statin    Other lipid medications/supplements?:  none    Hypertension Follow-up      Outpatient blood pressures are not being checked.    Low Salt Diet: no added salt    BP Readings from Last 2 Encounters:   18 115/78   18 130/88     Hemoglobin A1C (%)   Date Value   2018 5.8 (H)   2018 6.0     LDL Cholesterol Calculated (mg/dL)   Date Value   2017 70   2016 59     Vascular Disease Follow-up:  Coronary Artery Disease (CAD)      Chest pain or pressure, left side neck or arm pain: Yes chest pain left side more frequently, left sided arm/neck pain intermittent    Shortness of breath/increased sweats/nausea with exertion: Yes all the time    Pain in calves walking 1-2 blocks: No    Worsened or new symptoms since last visit: No    Nitroglycerin use: have at home but  have not used lately    Daily aspirin use: Yes    Heart Failure Follow-up    Symptoms:    Shortness of breath: happens with rest and exertion - slightly worsened    Lower extremity edema: worsened from baseline last night but improving today    Chest pain: Yes-  Frequently lately    Using more pillows than normal: Yes    Cough at night: Yes-  Sometimes when head slips off the pillow    Weight:    Checking weight daily: No    Weight change: none    Cardiology visits, ER/UC, or hospital admissions since last visit:  None    Medication side effects: none    Depression Followup    Status since last visit: Stable    See PHQ-9 for current symptoms.  Other associated symptoms: None    Complicating factors:   Significant life event:  Yes-  currently within the 2 month period before moving out, looking for a house to buy   Current substance abuse:  None  Anxiety or Panic symptoms:  Yes-  sometimes    PHQ-9 7/13/2017 11/6/2017 5/17/2018   Total Score 9 3 7   Q9: Suicide Ideation Not at all Not at all Not at all       PHQ-9  English  PHQ-9   Any Language  Suicide Assessment Five-step Evaluation and Treatment (SAFE-T)  Asthma Follow-Up    Was ACT completed today?  Complicated by COPD  Yes    ACT Total Scores 5/17/2018   ACT TOTAL SCORE -   ASTHMA ER VISITS -   ASTHMA HOSPITALIZATIONS -   ACT TOTAL SCORE (Goal Greater than or Equal to 20) 13   In the past 12 months, how many times did you visit the emergency room for your asthma without being admitted to the hospital? 3   In the past 12 months, how many times were you hospitalized overnight because of your asthma? 1       Recent asthma triggers that patient is dealing with: dust mites and pollens      Chronic Kidney Disease Follow-up      Current NSAID use?  Yes:   ibuprofen (Motrin)  Frequency: sometimes      Amount of exercise or physical activity: 2-3 days/week for an average of greater than 60 minutes    Problems taking medications regularly: No    Medication side effects: none    Diet: low salt, low fat/cholesterol and low sugar intake              Problem list and histories reviewed & adjusted, as indicated.  Additional history: as documented    Patient Active Problem List   Diagnosis     Hypertension goal BP (blood pressure) < 140/90     Hyperlipidemia LDL goal <100     CHF (congestive heart failure) (H)     Mild recurrent major depression (H)     Gout     GERD (gastroesophageal reflux disease)     Chronic rhinitis     CKD (chronic kidney disease) stage 2, GFR 60-89 ml/min     Mild  persistent asthma     History of colonic polyps     Advanced directives, counseling/discussion     Chronic hepatitis C (H)     Type 2 diabetes mellitus with diabetic chronic kidney disease (H)     Microalbuminuria due to type 2 diabetes mellitus (H)     Obesity (BMI 30-39.9)     Herpes zoster without complication     Cataract, bilateral     Type 2 diabetes mellitus with diabetic polyneuropathy (H)     Coronary artery disease involving native coronary artery of native heart without angina pectoris     Diabetic polyneuropathy associated with type 2 diabetes mellitus (H)     Chronic obstructive airway disease with asthma (H)     Past Surgical History:   Procedure Laterality Date     CARDIAC SURGERY      stent     CATARACT IOL, RT/LT       COLONOSCOPY  9/18/2012    Procedure: COLONOSCOPY;  COLONOSCOPY, SCREEN;  Surgeon: Cl Johnson MD;  Location: [facility]     ORTHOPEDIC SURGERY      ankle     PHACOEMULSIFICATION WITH STANDARD INTRAOCULAR LENS IMPLANT Right 1/28/2016    Procedure: PHACOEMULSIFICATION WITH STANDARD INTRAOCULAR LENS IMPLANT;  Surgeon: Fly Merrill MD;  Location: [facility] OR     PHACOEMULSIFICATION WITH STANDARD INTRAOCULAR LENS IMPLANT Left 2/11/2016    Procedure: PHACOEMULSIFICATION WITH STANDARD INTRAOCULAR LENS IMPLANT;  Surgeon: Fly Merrill MD;  Location: [facility] OR       Social History   Substance Use Topics     Smoking status: Former Smoker     Packs/day: 0.25     Years: 15.00     Types: Cigarettes     Quit date: 9/21/2016     Smokeless tobacco: Never Used      Comment: 3-4 a day     Alcohol use Yes      Comment: weekend beer     Family History   Problem Relation Age of Onset     HEART DISEASE Maternal Grandmother      Hypertension Maternal Grandmother      Hypertension Father      Hypertension Mother      Hypertension Sister      Thyroid Disease Sister      CANCER Brother      DIABETES Brother      Hypertension Brother      Hypertension Maternal Aunt      CANCER Maternal Uncle       Hypertension Maternal Uncle      Hypertension Paternal Aunt      Hypertension Paternal Uncle      Hypertension Maternal Grandfather      Hypertension Paternal Grandmother      Hypertension Paternal Grandfather      CEREBROVASCULAR DISEASE No family hx of      Glaucoma No family hx of      Macular Degeneration No family hx of          Current Outpatient Prescriptions   Medication Sig Dispense Refill     albuterol (2.5 MG/3ML) 0.083% neb solution Take 1 vial (2.5 mg) by nebulization every 6 hours as needed for shortness of breath / dyspnea 120 vial 3     amLODIPine (NORVASC) 5 MG tablet TAKE 1 TABLET BY MOUTH EVERY DAY FOR BLOOD PRESSURE 90 tablet 1     aspirin  MG tablet TAKE 1 TABLET BY MOUTH EVERY DAY 90 tablet 3     azithromycin (ZITHROMAX) 250 MG tablet Take 250 mg by mouth       carvedilol (COREG) 12.5 MG tablet TAKE 1 TABLET BY MOUTH TWICE DAILY WITH MEALS 180 tablet 1     cetirizine (ZYRTEC) 10 MG tablet Take 1 tablet (10 mg) by mouth every evening 90 tablet 3     DULoxetine (CYMBALTA) 60 MG EC capsule TAKE 1 CAPSULE BY MOUTH DAILY 90 capsule 1     DULoxetine (CYMBALTA) 60 MG EC capsule TAKE 1 CAPSULE BY MOUTH DAILY 90 capsule 0     DULoxetine (CYMBALTA) 60 MG EC capsule TAKE ONE CAPSULE BY MOUTH EVERY DAY 90 capsule 1     furosemide (LASIX) 40 MG tablet Take 1 tablet (40 mg) by mouth 2 times daily If weight increases or increased shortness of breath, then decrease to once a day when better. 135 tablet 2     furosemide (LASIX) 40 MG tablet TAKE 1 TABLET BY MOUTH 2 TIMES DAILY IF WEIGHT OR SHORTNESS OF BREATH INCREASES, THEN DECREASE TO ONCE A DAY WHEN BETTER 135 tablet 3     gabapentin (NEURONTIN) 300 MG capsule Take 1 tablet (300 mg) every night for 1 week, then 1 tablet twice daily for 1 week, then 1 tablet three times daily if tolerated. 90 capsule 3     gabapentin (NEURONTIN) 300 MG capsule Take 1 capsule (300 mg) by mouth 3 times daily 270 capsule 3     isosorbide mononitrate (ISMO/MONOKET) 20  MG tablet TAKE 1 TABLET BY MOUTH TWICE DAILY 180 tablet 3     KLOR-CON 20 MEQ CR tablet TAKE 1 TABLET(20 MEQ) BY MOUTH DAILY 90 tablet 0     lidocaine (LIDODERM) 5 % Patch 1 patch       lisinopril (PRINIVIL/ZESTRIL) 40 MG tablet TAKE 1/2 TABLET BY MOUTH EVERY DAY 45 tablet 3     loratadine (CLARITIN) 10 MG tablet Take 1 tablet (10 mg) by mouth daily 90 tablet 1     montelukast (SINGULAIR) 10 MG tablet TAKE 1 TABLET BY MOUTH AT BEDTIME 90 tablet 3     nitroGLYcerin (NITROSTAT) 0.4 MG sublingual tablet Place 1 tablet (0.4 mg) under the tongue every 5 minutes as needed for chest pain 0.4 mg by Sublingual route every 5 (five) minutes as needed. 25 tablet 11     oxyCODONE IR (ROXICODONE) 5 MG tablet Take 1 tablet (5 mg) by mouth 2 times daily as needed for moderate to severe pain (up to 2 a day) 60 tablet 0     potassium chloride SA (K-DUR/KLOR-CON M) 20 MEQ CR tablet TAKE 1 TABLET(20 MEQ) BY MOUTH DAILY 90 tablet 0     predniSONE (DELTASONE) 20 MG tablet Take 1 tablet (20 mg) by mouth 2 times daily 10 tablet 1     ranitidine (ZANTAC) 150 MG tablet TAKE 1 TABLET BY MOUTH TWICE DAILY 180 tablet 1     simvastatin (ZOCOR) 40 MG tablet Take 1 tablet (40 mg) by mouth At Bedtime 90 tablet 3     SYMBICORT 160-4.5 MCG/ACT Inhaler INHALE 2 PUFFS IN TO THE LUNGS TWICE DAILY 10.2 Inhaler 3     traMADol (ULTRAM) 50 MG tablet TAKE 1-2 TABLET BY MOUTH TWICE DAILY. NO MORE THAN 2 TABLETS IN 24 HOURS 60 tablet 3     traZODone (DESYREL) 100 MG tablet Take 2 tablets (200 mg) by mouth At Bedtime 180 tablet 1     [DISCONTINUED] nitroglycerin (NITROSTAT) 0.4 MG SL tablet Place 1 tablet (0.4 mg) under the tongue every 5 minutes as needed for chest pain 0.4 mg by Sublingual route every 5 (five) minutes as needed. 25 tablet 11     Allergies   Allergen Reactions     No Known Drug Allergies      Recent Labs   Lab Test  05/17/18   1045  01/09/18   1153  07/13/17   0854  04/28/17   0942   06/16/16   0836   A1C  5.8*  6.0  5.5  5.7   < >  5.6   LDL   "70   --    --   70   --   59   HDL  52   --    --   47   --   44   TRIG  118   --    --   92   --   95   ALT  25   --    --   25   --   49   CR  1.44*   --   1.43*  1.42*   < >  1.20   GFRESTIMATED  50*   --   50*  51*   < >  62   GFRESTBLACK  60*   --   61  61   < >  75   POTASSIUM  4.4   --   4.4  4.3   < >  4.0   TSH  2.04   --    --   0.74   --    --     < > = values in this interval not displayed.      BP Readings from Last 3 Encounters:   05/17/18 115/78   01/09/18 130/88   11/06/17 150/82    Wt Readings from Last 3 Encounters:   05/17/18 213 lb (96.6 kg)   01/09/18 209 lb 1.6 oz (94.8 kg)   11/06/17 216 lb (98 kg)                  Labs reviewed in EPIC    Reviewed and updated as needed this visit by clinical staff  Tobacco  Allergies  Meds  Med Hx  Surg Hx  Fam Hx  Soc Hx      Reviewed and updated as needed this visit by Provider         ROS:  Constitutional, HEENT, cardiovascular, pulmonary, gi and gu systems are negative, except as otherwise noted.    OBJECTIVE:     /78 (BP Location: Right arm, Patient Position: Chair, Cuff Size: Adult Regular)  Pulse 66  Temp 97.7  F (36.5  C) (Oral)  Resp 16  Ht 5' 5.7\" (1.669 m)  Wt 213 lb (96.6 kg)  SpO2 96%  BMI 34.69 kg/m2  Body mass index is 34.69 kg/(m^2).  GENERAL: healthy, alert, well nourished, well hydrated, no distress, obese  HENT: ear canals- normal; TMs- normal; Nose- normal; Mouth- no ulcers, no lesions, throat is clear with no erythema or exudate.   NECK: no tenderness, no adenopathy, no asymmetry, no masses, no stiffness; thyroid- normal to palpation  RESP: lungs clear to auscultation - no rales, no rhonchi, no wheezes  CV: regular rates and rhythm, normal S1 S2, no S3 or S4 and no murmur, no click or rub -  ABDOMEN: soft, no tenderness, no  hepatosplenomegaly, no masses, normal bowel sounds  MS: extremities- no gross deformities noted, no edema  SKIN: no suspicious lesions, no rashes  NEURO: strength and tone- normal, sensory exam- " grossly normal, mentation- intact, speech- normal, reflexes- symmetric  BACK: no CVA tenderness, no paralumbar tenderness  PSYCH: Alert and oriented times 3; speech- coherent , normal rate and volume; able to articulate logical thoughts, able to abstract reason, no tangential thoughts, no hallucinations or delusions, affect- normal     Diagnostic Test Results:  Results for orders placed or performed in visit on 05/17/18 (from the past 24 hour(s))   Spirometry, Breathing Capacity: Normal Order, Clinic Performed   Result Value Ref Range    FEV-1 50% pred     FVC 59% pred     FEV1/FVC 86% pred     FEF 25/75 40% pred     Impression    Moderately severe obstruction   Lipid panel reflex to direct LDL Fasting   Result Value Ref Range    Cholesterol 146 <200 mg/dL    Triglycerides 118 <150 mg/dL    HDL Cholesterol 52 >39 mg/dL    LDL Cholesterol Calculated 70 <100 mg/dL    Non HDL Cholesterol 94 <130 mg/dL   Albumin Random Urine Quantitative with Creat Ratio   Result Value Ref Range    Creatinine Urine 26 mg/dL    Albumin Urine mg/L 9 mg/L    Albumin Urine mg/g Cr 34.81 (H) 0 - 17 mg/g Cr   Comprehensive metabolic panel   Result Value Ref Range    Sodium 140 133 - 144 mmol/L    Potassium 4.4 3.4 - 5.3 mmol/L    Chloride 102 94 - 109 mmol/L    Carbon Dioxide 35 (H) 20 - 32 mmol/L    Anion Gap 3 3 - 14 mmol/L    Glucose 97 70 - 99 mg/dL    Urea Nitrogen 22 7 - 30 mg/dL    Creatinine 1.44 (H) 0.66 - 1.25 mg/dL    GFR Estimate 50 (L) >60 mL/min/1.7m2    GFR Estimate If Black 60 (L) >60 mL/min/1.7m2    Calcium 9.3 8.5 - 10.1 mg/dL    Bilirubin Total 0.5 0.2 - 1.3 mg/dL    Albumin 3.9 3.4 - 5.0 g/dL    Protein Total 7.8 6.8 - 8.8 g/dL    Alkaline Phosphatase 76 40 - 150 U/L    ALT 25 0 - 70 U/L    AST 22 0 - 45 U/L   Hemoglobin A1c   Result Value Ref Range    Hemoglobin A1C 5.8 (H) 0 - 5.6 %   TSH with free T4 reflex   Result Value Ref Range    TSH 2.04 0.40 - 4.00 mU/L   CBC with platelets   Result Value Ref Range    WBC 9.3 4.0  "- 11.0 10e9/L    RBC Count 5.16 4.4 - 5.9 10e12/L    Hemoglobin 15.8 13.3 - 17.7 g/dL    Hematocrit 47.7 40.0 - 53.0 %    MCV 92 78 - 100 fl    MCH 30.6 26.5 - 33.0 pg    MCHC 33.1 31.5 - 36.5 g/dL    RDW 13.8 10.0 - 15.0 %    Platelet Count 190 150 - 450 10e9/L   Uric acid   Result Value Ref Range    Uric Acid 10.4 (H) 3.5 - 7.2 mg/dL       ASSESSMENT/PLAN:         Tobacco Cessation:   reports that he quit smoking about 19 months ago. His smoking use included Cigarettes. He has a 3.75 pack-year smoking history. He has never used smokeless tobacco.      BMI:   Estimated body mass index is 34.69 kg/(m^2) as calculated from the following:    Height as of this encounter: 5' 5.7\" (1.669 m).    Weight as of this encounter: 213 lb (96.6 kg).   Weight management plan: Discussed healthy diet and exercise guidelines and patient will follow up in 3 months in clinic to re-evaluate.        ICD-10-CM    1. Hypertension goal BP (blood pressure) < 140/90- well controlled on medications  I10 CBC with platelets   2. Screening for diabetic retinopathy Z13.5 Goes to [facility] for his screening eye exams   3. Screening for diabetic peripheral neuropathy Z13.89 FOOT EXAM  NO CHARGE [83926.114]   4. Hyperlipidemia LDL goal <100 E78.5 Lipid panel reflex to direct LDL Fasting   5. Acute on chronic systolic congestive heart failure (H) I50.23 Stable with no edema or shortness of breath    6. Mild recurrent major depression (H) F33.0 Doing OK but has to find new housing. 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    7. Chronic hepatitis C without hepatic coma (H) B18.2 Comprehensive metabolic panel- has been in remission. Recheck liver function tests with renal function   8. Type 2 diabetes mellitus with stage 3 chronic kidney disease, with long-term current use of insulin (H) E11.22 Albumin Random Urine Quantitative with Creat Ratio    N18.3 Comprehensive metabolic panel    Z79.4 Hemoglobin A1c     TSH with free T4 reflex   9. Coronary artery disease " involving native coronary artery of native heart without angina pectoris I25.10 No recurrent chest pain. Follow up with cardiology   10. Chronic obstructive airway disease with asthma (H) J44.9 Spirometry, Breathing Capacity: Normal Order, Clinic Performed- moderate obstruction. Did not check post inhaler.   11. Vitamin D deficiency E55.9 Vitamin D Deficiency   12. Coronary artery disease of native artery of native heart with stable angina pectoris (H) I25.118 nitroGLYcerin (NITROSTAT) 0.4 MG sublingual tablet- refilled.    13. 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 Chronic seasonal allergic rhinitis due to pollen J30.1 loratadine (CLARITIN) 10 MG tablet- may use twice a day if needed.        CONSULTATION/REFERRAL to cardiology and optometry are priorities at this time.   FUTURE LABS:       - Schedule fasting labs in 3 months  FUTURE APPOINTMENTS:       - Follow-up visit in 3 months or sooner if any questions or concerns.   Work on weight loss  Regular exercise  See Patient Instructions    Tori Rizvi MD  [facility]

## 2018-05-18 LAB — DEPRECATED CALCIDIOL+CALCIFEROL SERPL-MC: 40 UG/L (ref 20–75)

## 2018-05-18 ASSESSMENT — ASTHMA QUESTIONNAIRES: ACT_TOTALSCORE: 13

## 2018-05-18 ASSESSMENT — PATIENT HEALTH QUESTIONNAIRE - PHQ9: SUM OF ALL RESPONSES TO PHQ QUESTIONS 1-9: 7

## 2018-05-18 ASSESSMENT — ANXIETY QUESTIONNAIRES: GAD7 TOTAL SCORE: 7

## 2018-05-19 DIAGNOSIS — M1A.3790 CHRONIC GOUT DUE TO RENAL IMPAIRMENT INVOLVING ANKLE WITHOUT TOPHUS, UNSPECIFIED LATERALITY: ICD-10-CM

## 2018-05-19 RX ORDER — ALLOPURINOL 100 MG/1
200 TABLET ORAL DAILY
Qty: 180 TABLET | Refills: 3 | Status: SHIPPED | OUTPATIENT
Start: 2018-05-19 | End: 2019-05-16

## 2018-05-19 NOTE — PROGRESS NOTES
Please call patient with results (If unable to reach patient, this may be sent as a letter instead):    Dear Santiago Hylton,     The test for gout is pretty high so I am sending in a prescription for Allopurinol 100 mg take 2 a day. We can recheck the level in 3 months to make sure it is coming down. This should help lower your risk of getting a gout attack.     The diabetes test looks great. The thyroid test is normal. The cholesterol looks good also. The kidney test is stable. We can recheck labs in 3 months. I will put in the future lab order but also see me if you are having any problems also. Let me know if you have any problems scheduling with the heart doctor or the eye clinic.       Tori Rizvi MD

## 2018-05-21 ENCOUNTER — TELEPHONE (OUTPATIENT)
Dept: FAMILY MEDICINE | Facility: CLINIC | Age: 63
End: 2018-05-21

## 2018-05-21 NOTE — TELEPHONE ENCOUNTER
Notes Recorded by Tori Rizvi MD on 5/19/2018 at 8:49 AM  Please call patient with results (If unable to reach patient, this may be sent as a letter instead):    Dear Santiago Concetta,     The test for gout is pretty high so I am sending in a prescription for Allopurinol 100 mg take 2 a day. We can recheck the level in 3 months to make sure it is coming down. This should help lower your risk of getting a gout attack.     The diabetes test looks great. The thyroid test is normal. The cholesterol looks good also. The kidney test is stable. We can recheck labs in 3 months. I will put in the future lab order but also see me if you are having any problems also. Let me know if you have any problems scheduling with the heart doctor or the eye clinic.       Tori Rizvi MD    This writer attempted to contact Santiago on 05/21/18      Reason for call abnormal results/new Rx above and left message.      If patient calls back:   Patient contacted by a Registered Nurse. Inform patient that someone from the RN group will contact them, document that pt called and route to P DYAD 3 RN POOL [[id number]]        Saskia Padron RN

## 2018-05-22 NOTE — TELEPHONE ENCOUNTER
Called and spoke with patient. Results notice per Dr. Rizvi read and reviewed with patient. Patient verblaized understanding and agreeable with plans. Patient stated he has already picked up the Allopurinol and has started this medication. RN agreed and advised to continue as instructed and will recheck blood level in 3 months, along with other orders labs. Patient advised to schedule lab only appt at that time, but to schedule with provider if need to or if has any questions or concerns at that time. Patient also advised to contact provider if having any difficultly scheduling with cardiology or eye clinic. Patient agreed. No questions or concerns at this time.    Samra Luna RN  [facility] Triage

## 2018-05-26 DIAGNOSIS — L50.1 CHRONIC IDIOPATHIC URTICARIA: ICD-10-CM

## 2018-05-26 DIAGNOSIS — E11.42 TYPE 2 DIABETES MELLITUS WITH DIABETIC POLYNEUROPATHY, UNSPECIFIED LONG TERM INSULIN USE STATUS: ICD-10-CM

## 2018-05-26 NOTE — TELEPHONE ENCOUNTER
"Requested Prescriptions   Pending Prescriptions Disp Refills     cetirizine (ZYRTEC) 10 MG tablet [Pharmacy Med Name: CETIRIZINE HCL 10 MG TABLET]    Last Written Prescription Date:  4/28/17  Last Fill Quantity: 90,  # refills: 3   Last Office Visit with [facility], [facility] or [facility] prescribing provider:  5/17/18   Future Office Visit:      90 tablet 1     Sig: TAKE 1 TABLET BY MOUTH DAILY AT BEDTIME    Antihistamines Protocol Passed    5/26/2018  4:24 PM       Passed - Patient is 3-64 years of age    Apply weight-based dosing for peds patients age 3 - 12 years of age.    Forward request to provider for patients under the age of 3 or over the age of 64.         Passed - Recent (12 mo) or future (30 days) visit within the authorizing provider's specialty    Patient had office visit in the last 12 months or has a visit in the next 30 days with authorizing provider or within the authorizing provider's specialty.  See \"Patient Info\" tab in inbasket, or \"Choose Columns\" in Meds & Orders section of the refill encounter.            traMADol (ULTRAM) 50 MG tablet [Pharmacy Med Name: TRAMADOL HCL 50 MG TABLET]    Last Written Prescription Date:  3/30/18  Last Fill Quantity: 60,  # refills: 3   Last Office Visit with [facility], [facility] or [facility] prescribing provider:  5/17/18   Future Office Visit:      60 tablet      Sig: TAKE 1-2 TABLET BY MOUTH TWICE DAILY. NO MORE THAN 2 TABLETS IN 24 HOURS    There is no refill protocol information for this order              Koby Faarax  [facility]  "

## 2018-05-29 DIAGNOSIS — E11.42 TYPE 2 DIABETES MELLITUS WITH DIABETIC POLYNEUROPATHY, UNSPECIFIED LONG TERM INSULIN USE STATUS: ICD-10-CM

## 2018-05-29 DIAGNOSIS — G89.4 CHRONIC PAIN SYNDROME: ICD-10-CM

## 2018-05-29 RX ORDER — OXYCODONE HYDROCHLORIDE 5 MG/1
5 TABLET ORAL 2 TIMES DAILY PRN
Qty: 60 TABLET | Refills: 0 | OUTPATIENT
Start: 2018-05-29

## 2018-05-29 RX ORDER — TRAMADOL HYDROCHLORIDE 50 MG/1
TABLET ORAL
Qty: 60 TABLET | Refills: 3 | Status: SHIPPED | OUTPATIENT
Start: 2018-05-29 | End: 2018-05-29

## 2018-05-29 RX ORDER — TRAMADOL HYDROCHLORIDE 50 MG/1
TABLET ORAL
Qty: 60 TABLET | Refills: 3 | Status: SHIPPED | OUTPATIENT
Start: 2018-05-29 | End: 2018-10-09

## 2018-05-29 RX ORDER — CETIRIZINE HYDROCHLORIDE 10 MG/1
TABLET ORAL
Qty: 90 TABLET | Refills: 3 | Status: SHIPPED | OUTPATIENT
Start: 2018-05-29 | End: 2018-10-16

## 2018-05-29 NOTE — TELEPHONE ENCOUNTER
Requested Prescriptions   Pending Prescriptions Disp Refills     traMADol (ULTRAM) 50 MG tablet 60 tablet 3     Sig: TAKE 1-2 TABLET BY MOUTH TWICE DAILY. NO MORE THAN 2 TABLETS IN 24 HOURS    There is no refill protocol information for this order        oxyCODONE IR (ROXICODONE) 5 MG tablet 60 tablet 0     Sig: Take 1 tablet (5 mg) by mouth 2 times daily as needed for moderate to severe pain (up to 2 a day)    There is no refill protocol information for this order        Routing refill request to provider for review/approval because:  Drug not on the [facility] refill protocol     Ibeth Deluna RN, BSN

## 2018-05-29 NOTE — TELEPHONE ENCOUNTER
Prescription approved per [facility] Refill Protocol. - Cetrizine   Routing refill request to provider for review/approval because:  Drug not on the [facility] refill protocol - Tramadol should have refills at pharmacy?   Pharmacy message in Rx for provider to review.   Jana Mendoza RN

## 2018-05-29 NOTE — TELEPHONE ENCOUNTER
Reason for Call:  Medication or medication refill:    Do you use a [facility]?  Name of the pharmacy and phone number for the current request:  WRITTEN PRESCRIPTION REQUESTED    Name of the medication requested: Pending Prescriptions:                       Disp   Refills    traMADol (ULTRAM) 50 MG tablet            60 tab*3          oxyCODONE IR (ROXICODONE) 5 MG tablet     60 tab*0            Sig: Take 1 tablet (5 mg) by mouth 2 times daily as           needed for moderate to severe pain (up to 2 a           day)    Other request: please call when approved for pickup, in a lot of pain so sending high priority message    Can we leave a detailed message on this number? YES    Phone number patient can be reached at: Cell number on file:    Telephone Information:   Mobile [phone]     Best Time: any    Call taken on 5/29/2018 at 1:22 PM by Meron Nelson

## 2018-05-30 NOTE — TELEPHONE ENCOUNTER
Called, spoke to patient, he would like the script to be faxed to his pharmacy.  Rene Alvarez,  [facility]    Written rx faxed to the pharmacy, Pharmacy will contact pt when medication is ready for pickup.  Rene Alvarez,  [facility]

## 2018-06-04 DIAGNOSIS — J45.30 MILD PERSISTENT ASTHMA WITHOUT COMPLICATION: ICD-10-CM

## 2018-06-04 NOTE — TELEPHONE ENCOUNTER
"Requested Prescriptions   Pending Prescriptions Disp Refills     SYMBICORT 160-4.5 MCG/ACT Inhaler [Pharmacy Med Name: SYMBICORT 160-4.5 MCG INHALER]  Last Written Prescription Date:  02/06/18  Last Fill Quantity: 10.2,  # refills: 3   Last Office Visit with G, P or [facility] prescribing provider:  05/17/18   Future Office Visit:    10.2 Inhaler 3     Sig: INHALE 2 PUFFS IN TO THE LUNGS TWICE DAILY    Inhaled Steroids Protocol Failed    6/4/2018  1:58 AM       Failed - Asthma control assessment score within normal limits in last 6 months    Please review ACT score.          Passed - Patient is age 12 or older       Passed - Recent (6 mo) or future (30 days) visit within the authorizing provider's specialty    Patient had office visit in the last 6 months or has a visit in the next 30 days with authorizing provider or within the authorizing provider's specialty.  See \"Patient Info\" tab in inbasket, or \"Choose Columns\" in Meds & Orders section of the refill encounter.              "

## 2018-06-05 ENCOUNTER — TELEPHONE (OUTPATIENT)
Dept: FAMILY MEDICINE | Facility: CLINIC | Age: 63
End: 2018-06-05

## 2018-06-05 DIAGNOSIS — G89.4 CHRONIC PAIN SYNDROME: ICD-10-CM

## 2018-06-05 RX ORDER — OXYCODONE HYDROCHLORIDE 5 MG/1
5 TABLET ORAL 2 TIMES DAILY PRN
Qty: 60 TABLET | Refills: 0 | Status: SHIPPED | OUTPATIENT
Start: 2018-06-05 | End: 2018-07-13

## 2018-06-05 RX ORDER — BUDESONIDE AND FORMOTEROL FUMARATE DIHYDRATE 160; 4.5 UG/1; UG/1
AEROSOL RESPIRATORY (INHALATION)
Qty: 10.2 INHALER | Refills: 3 | Status: SHIPPED | OUTPATIENT
Start: 2018-06-05 | End: 2019-05-07

## 2018-06-05 NOTE — TELEPHONE ENCOUNTER
Visit 5/17/18 and patient was instructed on when to follow up in clinic. Refilled     Justyna Tafoya RN, [facility]

## 2018-06-05 NOTE — TELEPHONE ENCOUNTER
Routing refill request to provider for review/approval because:  Drug not on the [facility] refill protocol   Bhumi Martínez RN

## 2018-06-05 NOTE — TELEPHONE ENCOUNTER
Reason for Call:  Medication or medication refill:    Do you use a [facility]?  Name of the pharmacy and phone number for the current request:  Pt will come to clinic to  hard copy of Rx at     Name of the medication requested: oxyCODONE IR (ROXICODONE) 5 MG tablet    Other request: Pt calling to notify that his Tramdaol Rx is not working and is therefore requesting a refill on Oxycodone.    Can we leave a detailed message on this number? YES    Phone number patient can be reached at: Home number on file [phone] (home)    Best Time: anytime    Call taken on 6/5/2018 at 2:28 PM by Satya Matos

## 2018-06-06 ENCOUNTER — TELEPHONE (OUTPATIENT)
Dept: FAMILY MEDICINE | Facility: CLINIC | Age: 63
End: 2018-06-06

## 2018-06-06 NOTE — TELEPHONE ENCOUNTER
Patient was notified regarding MSG below. Form: [employer]  Patient restated the msg, denied having any questions and stated clarification at the end of the conversation.  Copy was faxed, send to [employer] and mailed.  Roslyn Simeon

## 2018-06-06 NOTE — TELEPHONE ENCOUNTER
What type of form? [employer]  What day did you drop off your forms? Wednesday June 6th 2018  Is there a due date? ASAP  (7-10 business day to compete forms)   How would you like to receive these forms? Fax to [phone] and mail back in enclosed envelope   Which clinic was the form dropped off at? [facility]   What is the best number to contact you? Pt. Can be reached at [phone]  What time works best to contact you with in 4 hrs?   Is it okay to leave a message? YES     FORM IN TEAM HEART BOX     Kailash Shultz, Patient Rep  [facility]

## 2018-06-07 DIAGNOSIS — E78.5 HYPERLIPIDEMIA, UNSPECIFIED HYPERLIPIDEMIA TYPE: ICD-10-CM

## 2018-06-07 NOTE — TELEPHONE ENCOUNTER
"Requested Prescriptions   Pending Prescriptions Disp Refills     simvastatin (ZOCOR) 40 MG tablet [Pharmacy Med Name: SIMVASTATIN 40 MG TABLET]  Last Written Prescription Date:  05/04/17  Last Fill Quantity: 90,  # refills: 3   Last Office Visit with [facility], P or [facility] prescribing provider:  05/17/18   Future Office Visit:    90 tablet 0     Sig: TAKE 1 TABLET BY MOUTH AT BEDTIME    Statins Protocol Passed    6/7/2018 12:12 PM       Passed - LDL on file in past 12 months    Recent Labs   Lab Test  05/17/18   1045   LDL  70            Passed - No abnormal creatine kinase in past 12 months    No lab results found.            Passed - Recent (12 mo) or future (30 days) visit within the authorizing provider's specialty    Patient had office visit in the last 12 months or has a visit in the next 30 days with authorizing provider or within the authorizing provider's specialty.  See \"Patient Info\" tab in inbasket, or \"Choose Columns\" in Meds & Orders section of the refill encounter.           Passed - Patient is age 18 or older          "

## 2018-06-08 RX ORDER — SIMVASTATIN 40 MG
TABLET ORAL
Qty: 90 TABLET | Refills: 0 | OUTPATIENT
Start: 2018-06-08

## 2018-06-15 RX ORDER — SIMVASTATIN 40 MG
40 TABLET ORAL AT BEDTIME
Qty: 90 TABLET | Refills: 2 | Status: SHIPPED | OUTPATIENT
Start: 2018-06-15 | End: 2019-03-08

## 2018-06-15 NOTE — TELEPHONE ENCOUNTER
Additional fax came in from pharmacy. Requesting refill again on Simvastatin. Medication was last refilled on 5/4/2017. Is now due for refill. FLP last done 5/17/18. Request passed protocol below.   Prescription approved per [facility] Refill Protocol.    Samra Luna RN  [facility]

## 2018-07-04 DIAGNOSIS — E87.6 HYPOPOTASSEMIA: ICD-10-CM

## 2018-07-04 DIAGNOSIS — J45.30 MILD PERSISTENT ASTHMA, UNCOMPLICATED: ICD-10-CM

## 2018-07-04 NOTE — TELEPHONE ENCOUNTER
"Requested Prescriptions   Pending Prescriptions Disp Refills     albuterol (2.5 MG/3ML) 0.083% neb solution [Pharmacy Med Name: ALBUTEROL SUL 2.5 MG/3 ML SOLN]    Last Written Prescription Date:  7/3/17  Last Fill Quantity: 120 vial,  # refills: 3   Last Office Visit with [facility], P or [facility] prescribing provider:  5/17/18   Future Office Visit:      360 mL 1     Sig: INHALE 3 ML BY NEBULIZATION METHOD EVERY 6 HOURS AS NEEDED FOR SHORTNESS OF BREATH    Asthma Maintenance Inhalers - Anticholinergics Failed    7/4/2018 11:41 AM       Failed - Asthma control assessment score within normal limits in last 6 months    Please review ACT score.          Passed - Patient is age 12 years or older       Passed - Recent (6 mo) or future (30 days) visit within the authorizing provider's specialty    Patient had office visit in the last 6 months or has a visit in the next 30 days with authorizing provider or within the authorizing provider's specialty.  See \"Patient Info\" tab in inbasket, or \"Choose Columns\" in Meds & Orders section of the refill encounter.            KLOR-CON 20 MEQ CR tablet [Pharmacy Med Name: KLOR-CON M20 TABLET] 90 tablet 0     Sig: TAKE 1 TABLET(20 MEQ) BY MOUTH DAILY    Potassium Supplements Protocol Passed    7/4/2018 11:41 AM       Passed - Recent (12 mo) or future (30 days) visit within the authorizing provider's specialty    Patient had office visit in the last 12 months or has a visit in the next 30 days with authorizing provider or within the authorizing provider's specialty.  See \"Patient Info\" tab in inbasket, or \"Choose Columns\" in Meds & Orders section of the refill encounter.           Passed - Patient is age 18 or older       Passed - Normal serum potassium in past 12 months    Recent Labs   Lab Test  05/17/18   1045   POTASSIUM  4.4                          Koby Faarax  [facility]  "

## 2018-07-04 NOTE — TELEPHONE ENCOUNTER
"Requested Prescriptions   Pending Prescriptions Disp Refills     albuterol (2.5 MG/3ML) 0.083% neb solution [Pharmacy Med Name: ALBUTEROL SUL 2.5 MG/3 ML SOLN]    Last Written Prescription Date:  5/3/17  Last Fill Quantity: 120 vial,  # refills: 3   Last Office Visit with [facility], [facility] or [facility] prescribing provider:  5/17/18   Future Office Visit:      360 mL 1     Sig: INHALE 3 ML BY NEBULIZATION METHOD EVERY 6 HOURS AS NEEDED FOR SHORTNESS OF BREATH    Asthma Maintenance Inhalers - Anticholinergics Failed    7/4/2018 12:03 PM       Failed - Asthma control assessment score within normal limits in last 6 months    Please review ACT score.          Passed - Patient is age 12 years or older       Passed - Recent (6 mo) or future (30 days) visit within the authorizing provider's specialty    Patient had office visit in the last 6 months or has a visit in the next 30 days with authorizing provider or within the authorizing provider's specialty.  See \"Patient Info\" tab in inbasket, or \"Choose Columns\" in Meds & Orders section of the refill encounter.            KLOR-CON 20 MEQ CR tablet [Pharmacy Med Name: KLOR-CON M20 TABLET]    Last Written Prescription Date:  4/16/18  Last Fill Quantity: 90,  # refills: 0   Last Office Visit with [facility] or [facility] prescribing provider:  5/17/18   Future Office Visit:      90 tablet 0     Sig: TAKE 1 TABLET(20 MEQ) BY MOUTH DAILY    Potassium Supplements Protocol Passed    7/4/2018 12:03 PM       Passed - Recent (12 mo) or future (30 days) visit within the authorizing provider's specialty    Patient had office visit in the last 12 months or has a visit in the next 30 days with authorizing provider or within the authorizing provider's specialty.  See \"Patient Info\" tab in inbasket, or \"Choose Columns\" in Meds & Orders section of the refill encounter.           Passed - Patient is age 18 or older       Passed - Normal serum potassium in past 12 months    Recent Labs   Lab Test  " 05/17/18   1045   POTASSIUM  4.4                          [facility] Fa[facility]  [facility]

## 2018-07-05 RX ORDER — POTASSIUM CHLORIDE 1500 MG/1
TABLET, EXTENDED RELEASE ORAL
Qty: 90 TABLET | Refills: 0 | Status: SHIPPED | OUTPATIENT
Start: 2018-07-05 | End: 2018-10-24

## 2018-07-05 RX ORDER — ALBUTEROL SULFATE 0.83 MG/ML
SOLUTION RESPIRATORY (INHALATION)
Qty: 360 ML | Refills: 1 | Status: SHIPPED | OUTPATIENT
Start: 2018-07-05 | End: 2021-12-07

## 2018-07-05 NOTE — TELEPHONE ENCOUNTER
Routing refill request to provider for review/approval because:  Protocol failed  America Acosta RN

## 2018-07-13 DIAGNOSIS — G89.4 CHRONIC PAIN SYNDROME: ICD-10-CM

## 2018-07-13 RX ORDER — OXYCODONE HYDROCHLORIDE 5 MG/1
5 TABLET ORAL 2 TIMES DAILY PRN
Qty: 60 TABLET | Refills: 0 | Status: SHIPPED | OUTPATIENT
Start: 2018-07-13 | End: 2018-08-13

## 2018-07-13 NOTE — TELEPHONE ENCOUNTER
Requested Prescriptions   Pending Prescriptions Disp Refills     oxyCODONE IR (ROXICODONE) 5 MG tablet 60 tablet 0     Sig: Take 1 tablet (5 mg) by mouth 2 times daily as needed for moderate to severe pain (up to 2 a day)    There is no refill protocol information for this order        Routing refill request to provider for review/approval because:  Drug not on the [facility] refill protocol     Ibeth Deluna RN, BSN

## 2018-07-13 NOTE — TELEPHONE ENCOUNTER
Reason for Call:  Medication or medication refill:    Do you use a [facility]?  Name of the pharmacy and phone number for the current request: Pt will come to clinic to  hard copy of Rx at .    Name of the medication requested: oxyCODONE IR (ROXICODONE) 5 MG tablet    Can we leave a detailed message on this number? YES    Phone number patient can be reached at: Home number on file [phone] (home)    Best Time: anytime    Call taken on 7/13/2018 at 8:00 AM by Satya Matos     18

## 2018-07-13 NOTE — TELEPHONE ENCOUNTER
This writer attempted to contact Santiago on 07/13/18      Reason for call informed Rx ready for   and left detailed message.      If patient calls back:   Relay message, (read verbatim), document that pt called and close encounter        Adalberto Aguero MA

## 2018-07-23 DIAGNOSIS — F33.0 MAJOR DEPRESSIVE DISORDER, RECURRENT EPISODE, MILD (H): ICD-10-CM

## 2018-07-23 NOTE — TELEPHONE ENCOUNTER
"Requested Prescriptions   Pending Prescriptions Disp Refills     traZODone (DESYREL) 100 MG tablet [Pharmacy Med Name: TRAZODONE 100 MG TABLET] 180 tablet 1    Last Written Prescription Date:  5/23/17  Last Fill Quantity: 180,  # refills: 1   Last Office Visit with G, P or [facility] prescribing provider:  5/17/2018     Future Office Visit:      Sig: TAKE 2 TABLETS BY MOUTH AT BEDTIME    Serotonin Modulators Passed    7/23/2018  3:48 PM       Passed - Recent (12 mo) or future (30 days) visit within the authorizing provider's specialty    Patient had office visit in the last 12 months or has a visit in the next 30 days with authorizing provider or within the authorizing provider's specialty.  See \"Patient Info\" tab in inbasket, or \"Choose Columns\" in Meds & Orders section of the refill encounter.           Passed - Patient is age 18 or older          "

## 2018-07-25 RX ORDER — TRAZODONE HYDROCHLORIDE 100 MG/1
TABLET ORAL
Qty: 180 TABLET | Refills: 1 | Status: SHIPPED | OUTPATIENT
Start: 2018-07-25 | End: 2019-01-27

## 2018-07-25 NOTE — TELEPHONE ENCOUNTER
Routing refill request to provider for review/approval because:  Labs out of range:  PHQ-9 > 5 in the last 6 months.     Saskia Padron RN

## 2018-08-01 DIAGNOSIS — E78.5 HYPERLIPIDEMIA LDL GOAL <100: ICD-10-CM

## 2018-08-01 NOTE — TELEPHONE ENCOUNTER
"Requested Prescriptions   Pending Prescriptions Disp Refills     isosorbide mononitrate (ISMO/MONOKET) 20 MG tablet [Pharmacy Med Name: ISOSORBIDE MONONIT 20 MG TAB]  Last Written Prescription Date:  06/20/17  Last Fill Quantity: 90,  # refills: 3   Last Office Visit with [facility], JEAN CARLOS or [facility] prescribing provider:  05/17/18   Future Office Visit:    180 tablet 0     Sig: TAKE 1 TABLET BY MOUTH TWICE DAILY    Nitrates Passed    8/1/2018  8:24 AM       Passed - Blood pressure under 140/90 in past 12 months    BP Readings from Last 3 Encounters:   05/17/18 115/78   01/09/18 130/88   11/06/17 150/82                Passed - Pt is not on erectile dysfunction medications       Passed - Recent (12 mo) or future (30 days) visit within the authorizing provider's specialty    Patient had office visit in the last 12 months or has a visit in the next 30 days with authorizing provider or within the authorizing provider's specialty.  See \"Patient Info\" tab in inbasket, or \"Choose Columns\" in Meds & Orders section of the refill encounter.           Passed - Patient is age 18 or older          "

## 2018-08-06 RX ORDER — ISOSORBIDE MONONITRATE 20 MG/1
TABLET ORAL
Qty: 180 TABLET | Refills: 0 | Status: SHIPPED | OUTPATIENT
Start: 2018-08-06 | End: 2018-10-24

## 2018-08-06 NOTE — TELEPHONE ENCOUNTER
Prescription approved per [facility] Refill Protocol.    Samra Luna RN  [facility]

## 2018-08-12 DIAGNOSIS — G89.4 CHRONIC PAIN SYNDROME: ICD-10-CM

## 2018-08-12 DIAGNOSIS — I10 ESSENTIAL HYPERTENSION WITH GOAL BLOOD PRESSURE LESS THAN 140/90: ICD-10-CM

## 2018-08-12 NOTE — TELEPHONE ENCOUNTER
"Requested Prescriptions   Pending Prescriptions Disp Refills     furosemide (LASIX) 40 MG tablet [Pharmacy Med Name: FUROSEMIDE 40 MG TABLET] 135 tablet 1    Last Written Prescription Date:  12/20/17  Last Fill Quantity: 135,  # refills: 3   Last Office Visit with [facility], P or [facility] prescribing provider:  5/17/2018     Future Office Visit:      Sig: TAKE 1 TABLET BY MOUTH TIWCE DAILY IF WEIGHT INCREASES/SHORTNESS OF BREATH.TAKE 1 TABLET IF BETTER    Diuretics (Including Combos) Protocol Failed    8/12/2018  2:41 PM       Failed - Normal serum creatinine on file in past 12 months    Recent Labs   Lab Test  05/17/18   1045   CR  1.44*             Passed - Blood pressure under 140/90 in past 12 months    BP Readings from Last 3 Encounters:   05/17/18 115/78   01/09/18 130/88   11/06/17 150/82                Passed - Recent (12 mo) or future (30 days) visit within the authorizing provider's specialty    Patient had office visit in the last 12 months or has a visit in the next 30 days with authorizing provider or within the authorizing provider's specialty.  See \"Patient Info\" tab in inbasket, or \"Choose Columns\" in Meds & Orders section of the refill encounter.           Passed - Patient is age 18 or older       Passed - Normal serum potassium on file in past 12 months    Recent Labs   Lab Test  05/17/18   1045   POTASSIUM  4.4                   Passed - Normal serum sodium on file in past 12 months    Recent Labs   Lab Test  05/17/18   1045   NA  140                "

## 2018-08-13 RX ORDER — FUROSEMIDE 40 MG
TABLET ORAL
Qty: 135 TABLET | Refills: 1 | Status: SHIPPED | OUTPATIENT
Start: 2018-08-13 | End: 2019-01-26

## 2018-08-13 RX ORDER — OXYCODONE HYDROCHLORIDE 5 MG/1
5 TABLET ORAL 2 TIMES DAILY PRN
Qty: 60 TABLET | Refills: 0 | Status: SHIPPED | OUTPATIENT
Start: 2018-08-13 | End: 2018-09-11

## 2018-08-13 NOTE — TELEPHONE ENCOUNTER
Routing refill request to provider for review/approval because:  Drug not on the [facility] refill protocol-oxycodone   Labs out of range:  creatinine for Osman Deluna RN, BSN

## 2018-08-13 NOTE — TELEPHONE ENCOUNTER
Pending Prescriptions:                       Disp   Refills    furosemide (LASIX) 40 MG tablet [Pharmacy*135 ta*1            Sig: TAKE 1 TABLET BY MOUTH TIWCE DAILY IF WEIGHT           INCREASES/SHORTNESS OF BREATH.TAKE 1 TABLET IF           BETTER    oxyCODONE IR (ROXICODONE) 5 MG tablet     60 tab*0            Sig: Take 1 tablet (5 mg) by mouth 2 times daily as           needed for moderate to severe pain (up to 2 a           day)        Patient also needs his prescription for   oxyCODONE IR (ROXICODONE) 5 MG tablet    Brought to the PHARMACY at [facility]  Call when ready and ok to leave message

## 2018-09-06 DIAGNOSIS — J45.31 MILD PERSISTENT ASTHMA WITH ACUTE EXACERBATION: ICD-10-CM

## 2018-09-06 NOTE — TELEPHONE ENCOUNTER
"Requested Prescriptions   Pending Prescriptions Disp Refills     montelukast (SINGULAIR) 10 MG tablet [Pharmacy Med Name: MONTELUKAST SOD 10 MG TABLET]  Last Written Prescription Date:  06/19/18  Last Fill Quantity: 90,  # refills: 3   Last Office Visit with [facility], P or [facility] prescribing provider:  05/17/18Scott   Future Office Visit:    Next 5 appointments (look out 90 days)     Sep 11, 2018 11:20 AM CDT   Office Visit with Tori Rizvi MD   [facility]    [address] [place] [id number]   [phone]                90 tablet 0     Sig: TAKE 1 TABLET BY MOUTH DAILY AT BEDTIME    Leukotriene Inhibitors Protocol Failed    9/6/2018  8:06 AM       Failed - Asthma control assessment score within normal limits in last 6 months    Please review ACT score.          Passed - Patient is age 12 or older    If patient is under 16, ok to refill using age based dosing.          Passed - Recent (6 mo) or future (30 days) visit within the authorizing provider's specialty    Patient had office visit in the last 6 months or has a visit in the next 30 days with authorizing provider or within the authorizing provider's specialty.  See \"Patient Info\" tab in inbasket, or \"Choose Columns\" in Meds & Orders section of the refill encounter.              "

## 2018-09-11 ENCOUNTER — OFFICE VISIT (OUTPATIENT)
Dept: FAMILY MEDICINE | Facility: CLINIC | Age: 63
End: 2018-09-11
Payer: MEDICARE

## 2018-09-11 VITALS
OXYGEN SATURATION: 96 % | TEMPERATURE: 98.4 F | BODY MASS INDEX: 33.54 KG/M2 | DIASTOLIC BLOOD PRESSURE: 67 MMHG | HEIGHT: 66 IN | SYSTOLIC BLOOD PRESSURE: 104 MMHG | HEART RATE: 86 BPM | WEIGHT: 208.7 LBS | RESPIRATION RATE: 18 BRPM

## 2018-09-11 DIAGNOSIS — I10 HYPERTENSION GOAL BP (BLOOD PRESSURE) < 140/90: Primary | ICD-10-CM

## 2018-09-11 DIAGNOSIS — B18.2 CHRONIC HEPATITIS C WITHOUT HEPATIC COMA (H): ICD-10-CM

## 2018-09-11 DIAGNOSIS — E11.42 DIABETIC POLYNEUROPATHY ASSOCIATED WITH TYPE 2 DIABETES MELLITUS (H): ICD-10-CM

## 2018-09-11 DIAGNOSIS — I25.10 CORONARY ARTERY DISEASE INVOLVING NATIVE CORONARY ARTERY OF NATIVE HEART WITHOUT ANGINA PECTORIS: ICD-10-CM

## 2018-09-11 DIAGNOSIS — M1A.39X0 CHRONIC GOUT DUE TO RENAL IMPAIRMENT OF MULTIPLE SITES WITHOUT TOPHUS: ICD-10-CM

## 2018-09-11 DIAGNOSIS — I50.23 ACUTE ON CHRONIC SYSTOLIC CONGESTIVE HEART FAILURE (H): ICD-10-CM

## 2018-09-11 DIAGNOSIS — E78.5 HYPERLIPIDEMIA LDL GOAL <100: ICD-10-CM

## 2018-09-11 DIAGNOSIS — Z23 NEED FOR PROPHYLACTIC VACCINATION AND INOCULATION AGAINST INFLUENZA: ICD-10-CM

## 2018-09-11 DIAGNOSIS — J44.89 CHRONIC OBSTRUCTIVE AIRWAY DISEASE WITH ASTHMA (H): ICD-10-CM

## 2018-09-11 DIAGNOSIS — G89.4 CHRONIC PAIN SYNDROME: ICD-10-CM

## 2018-09-11 LAB
ALBUMIN SERPL-MCNC: 3.9 G/DL (ref 3.4–5)
ALP SERPL-CCNC: 86 U/L (ref 40–150)
ALT SERPL W P-5'-P-CCNC: 42 U/L (ref 0–70)
ANION GAP SERPL CALCULATED.3IONS-SCNC: 7 MMOL/L (ref 3–14)
AST SERPL W P-5'-P-CCNC: 29 U/L (ref 0–45)
BILIRUB SERPL-MCNC: 0.5 MG/DL (ref 0.2–1.3)
BUN SERPL-MCNC: 22 MG/DL (ref 7–30)
CALCIUM SERPL-MCNC: 9.4 MG/DL (ref 8.5–10.1)
CHLORIDE SERPL-SCNC: 101 MMOL/L (ref 94–109)
CHOLEST SERPL-MCNC: 136 MG/DL
CO2 SERPL-SCNC: 29 MMOL/L (ref 20–32)
CREAT SERPL-MCNC: 1.2 MG/DL (ref 0.66–1.25)
CREAT UR-MCNC: 19 MG/DL
ERYTHROCYTE [DISTWIDTH] IN BLOOD BY AUTOMATED COUNT: 14.2 % (ref 10–15)
GFR SERPL CREATININE-BSD FRML MDRD: 61 ML/MIN/1.7M2
GLUCOSE SERPL-MCNC: 105 MG/DL (ref 70–99)
HBA1C MFR BLD: 5.4 % (ref 0–5.6)
HCT VFR BLD AUTO: 46.4 % (ref 40–53)
HDLC SERPL-MCNC: 40 MG/DL
HGB BLD-MCNC: 15.7 G/DL (ref 13.3–17.7)
LDLC SERPL CALC-MCNC: 58 MG/DL
MCH RBC QN AUTO: 30.5 PG (ref 26.5–33)
MCHC RBC AUTO-ENTMCNC: 33.8 G/DL (ref 31.5–36.5)
MCV RBC AUTO: 90 FL (ref 78–100)
MICROALBUMIN UR-MCNC: 11 MG/L
MICROALBUMIN/CREAT UR: 56.84 MG/G CR (ref 0–17)
NONHDLC SERPL-MCNC: 96 MG/DL
PLATELET # BLD AUTO: 248 10E9/L (ref 150–450)
POTASSIUM SERPL-SCNC: 4.5 MMOL/L (ref 3.4–5.3)
PROT SERPL-MCNC: 8.3 G/DL (ref 6.8–8.8)
RBC # BLD AUTO: 5.15 10E12/L (ref 4.4–5.9)
SODIUM SERPL-SCNC: 137 MMOL/L (ref 133–144)
TRIGL SERPL-MCNC: 190 MG/DL
TSH SERPL DL<=0.005 MIU/L-ACNC: 0.79 MU/L (ref 0.4–4)
URATE SERPL-MCNC: 5.2 MG/DL (ref 3.5–7.2)
WBC # BLD AUTO: 8.6 10E9/L (ref 4–11)

## 2018-09-11 PROCEDURE — 80061 LIPID PANEL: CPT | Performed by: FAMILY MEDICINE

## 2018-09-11 PROCEDURE — 84550 ASSAY OF BLOOD/URIC ACID: CPT | Performed by: FAMILY MEDICINE

## 2018-09-11 PROCEDURE — 85027 COMPLETE CBC AUTOMATED: CPT | Performed by: FAMILY MEDICINE

## 2018-09-11 PROCEDURE — 90686 IIV4 VACC NO PRSV 0.5 ML IM: CPT | Performed by: FAMILY MEDICINE

## 2018-09-11 PROCEDURE — 83036 HEMOGLOBIN GLYCOSYLATED A1C: CPT | Performed by: FAMILY MEDICINE

## 2018-09-11 PROCEDURE — 84443 ASSAY THYROID STIM HORMONE: CPT | Performed by: FAMILY MEDICINE

## 2018-09-11 PROCEDURE — 36415 COLL VENOUS BLD VENIPUNCTURE: CPT | Performed by: FAMILY MEDICINE

## 2018-09-11 PROCEDURE — 80053 COMPREHEN METABOLIC PANEL: CPT | Performed by: FAMILY MEDICINE

## 2018-09-11 PROCEDURE — 99214 OFFICE O/P EST MOD 30 MIN: CPT | Mod: 25 | Performed by: FAMILY MEDICINE

## 2018-09-11 PROCEDURE — G0008 ADMIN INFLUENZA VIRUS VAC: HCPCS | Performed by: FAMILY MEDICINE

## 2018-09-11 PROCEDURE — 82043 UR ALBUMIN QUANTITATIVE: CPT | Performed by: FAMILY MEDICINE

## 2018-09-11 RX ORDER — MONTELUKAST SODIUM 10 MG/1
TABLET ORAL
Qty: 90 TABLET | Refills: 3 | Status: SHIPPED | OUTPATIENT
Start: 2018-09-11 | End: 2019-08-19

## 2018-09-11 RX ORDER — GABAPENTIN 300 MG/1
300 CAPSULE ORAL 4 TIMES DAILY
Qty: 360 CAPSULE | Refills: 3 | Status: SHIPPED | OUTPATIENT
Start: 2018-09-11 | End: 2019-05-07 | Stop reason: ALTCHOICE

## 2018-09-11 RX ORDER — OXYCODONE HYDROCHLORIDE 5 MG/1
5 TABLET ORAL 2 TIMES DAILY PRN
Qty: 60 TABLET | Refills: 0 | Status: SHIPPED | OUTPATIENT
Start: 2018-09-11 | End: 2018-10-08

## 2018-09-11 ASSESSMENT — PAIN SCALES - GENERAL: PAINLEVEL: SEVERE PAIN (6)

## 2018-09-11 NOTE — PROGRESS NOTES
SUBJECTIVE:   Santiago Hylton is a 63 year old male who presents to clinic today for the following health issues:      Joint Pain    Onset: about a couple of years    Description:   Location: body pain, finger pain, and feet pain  Character: Dull ache    Intensity: moderate    Progression of Symptoms: worse    Accompanying Signs & Symptoms:  Other symptoms: radiation of pain to toes,fingers, numbness, tingling and swelling    History:   Previous similar pain: no       Precipitating factors:   Trauma or overuse: no     Therapies Tried and outcome: OTC, no relief        Diabetes Follow-up      Patient is checking blood sugars: running under 130 in mornings    Diabetic concerns: None     Symptoms of hypoglycemia (low blood sugar): none     Paresthesias (numbness or burning in feet) or sores: No     Date of last diabetic eye exam: up to date     BP Readings from Last 2 Encounters:   09/11/18 104/67   05/17/18 115/78     Hemoglobin A1C (%)   Date Value   09/11/2018 5.4   05/17/2018 5.8 (H)     LDL Cholesterol Calculated (mg/dL)   Date Value   05/17/2018 70   04/28/2017 70       Diabetes Management Resources  Hyperlipidemia Follow-Up      Rate your low fat/cholesterol diet?: good    Taking statin?  Yes, no muscle aches from statin    Other lipid medications/supplements?:  none    Hypertension Follow-up      Outpatient blood pressures are not being checked.    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 [employer]    Pain in calves walking 1-2 blocks: No    Worsened or new symptoms since last visit: No    Nitroglycerin use: no    Daily aspirin use: Yes    Heart Failure Follow-up    Symptoms:    Shortness of breath: happens with exertion only - stable    Lower extremity edema: intermittent    Chest pain:  No    Using more pillows than normal: No    Cough at night: No    Weight:    Checking weight daily: No    Weight change: none    Cardiology visits, ER/UC, or hospital admissions since last visit: Cardiology Visit - May 2018 and due in November    Medication side effects: none    COPD Follow-Up    Symptoms are currently: stable    Current fatigue or dyspnea with ambulation: none    Shortness of breath: stable    Increased or change in Cough/Sputum: No    Fever(s): No    Baseline ambulation without stopping to rest:  1 blocks. Able to walk up 1 flights of stairs without stopping to rest.    Any ER/UC or hospital admissions since your last visit? No     History   Smoking Status     Former Smoker     Packs/day: 0.25     Years: 15.00     Types: Cigarettes     Quit date: 9/21/2016   Smokeless Tobacco     Never Used     Comment: 3-4 a day     Lab Results   Component Value Date    FEV1 50% pred 05/17/2018    FBN2WNY 86% pred 05/17/2018       Chronic Pain Follow-Up       Type / Location of Pain: mostly hands and feet  Analgesia/pain control:       Recent changes:  worse      Overall control: Tolerable with discomfort  Activity level/function:      Daily activities:  Able to do light housework, cooking    Work:  Able to work part time with limitations  Adverse effects:  No  Adherance    Taking medication as directed?  Yes    Participating in other treatments: yes  Risk Factors:    Sleep:  Fair    Mood/anxiety:  controlled    Recent family or social stressors:  none noted    Other aggravating factors: none  PHQ-9 SCORE 7/13/2017 11/6/2017 5/17/2018   Total Score - - -   Total Score 9 3 7     GENARO-7 SCORE 10/13/2016 12/22/2016 5/17/2018   Total Score 0 6 7     Encounter-Level CSA:     There are no encounter-level csa.          Problem list and histories reviewed & adjusted, as indicated.  Additional history: as documented    Patient Active Problem List   Diagnosis     Hypertension goal BP (blood pressure) < 140/90      Hyperlipidemia LDL goal <100     CHF (congestive heart failure) (H)     Mild recurrent major depression (H)     Gout     GERD (gastroesophageal reflux disease)     Chronic rhinitis     CKD (chronic kidney disease) stage 2, GFR 60-89 ml/min     History of colonic polyps     Advanced directives, counseling/discussion     Chronic hepatitis C (H)     Type 2 diabetes mellitus with diabetic chronic kidney disease (H)     Microalbuminuria due to type 2 diabetes mellitus (H)     Obesity (BMI 30-39.9)     Type 2 diabetes mellitus with diabetic polyneuropathy (H)     Coronary artery disease involving native coronary artery of native heart without angina pectoris     Diabetic polyneuropathy associated with type 2 diabetes mellitus (H)     Chronic obstructive airway disease with asthma (H)     Past Surgical History:   Procedure Laterality Date     CARDIAC SURGERY      stent     CATARACT IOL, RT/LT       COLONOSCOPY  9/18/2012    Procedure: COLONOSCOPY;  COLONOSCOPY, SCREEN;  Surgeon: Cl Johnson MD;  Location: [facility]     ORTHOPEDIC SURGERY      ankle     PHACOEMULSIFICATION WITH STANDARD INTRAOCULAR LENS IMPLANT Right 1/28/2016    Procedure: PHACOEMULSIFICATION WITH STANDARD INTRAOCULAR LENS IMPLANT;  Surgeon: Fly Merrill MD;  Location: [facility] OR     PHACOEMULSIFICATION WITH STANDARD INTRAOCULAR LENS IMPLANT Left 2/11/2016    Procedure: PHACOEMULSIFICATION WITH STANDARD INTRAOCULAR LENS IMPLANT;  Surgeon: Fly Merrill MD;  Location: [facility] OR       Social History   Substance Use Topics     Smoking status: Former Smoker     Packs/day: 0.25     Years: 15.00     Types: Cigarettes     Quit date: 9/21/2016     Smokeless tobacco: Never Used      Comment: 3-4 a day     Alcohol use Yes      Comment: weekend beer     Family History   Problem Relation Age of Onset     HEART DISEASE Maternal Grandmother      Hypertension Maternal Grandmother      Hypertension Father      Hypertension Mother      Hypertension  Sister      Thyroid Disease Sister      Cancer Brother      Diabetes Brother      Hypertension Brother      Hypertension Maternal Aunt      Cancer Maternal Uncle      Hypertension Maternal Uncle      Hypertension Paternal Aunt      Hypertension Paternal Uncle      Hypertension Maternal Grandfather      Hypertension Paternal Grandmother      Hypertension Paternal Grandfather      Cerebrovascular Disease No family hx of      Glaucoma No family hx of      Macular Degeneration No family hx of          Current Outpatient Prescriptions   Medication Sig Dispense Refill     albuterol (2.5 MG/3ML) 0.083% neb solution INHALE 3 ML BY NEBULIZATION METHOD EVERY 6 HOURS AS NEEDED FOR SHORTNESS OF BREATH 360 mL 1     allopurinol (ZYLOPRIM) 100 MG tablet Take 2 tablets (200 mg) by mouth daily 180 tablet 3     amLODIPine (NORVASC) 5 MG tablet TAKE 1 TABLET BY MOUTH EVERY DAY FOR BLOOD PRESSURE 90 tablet 1     aspirin  MG tablet TAKE 1 TABLET BY MOUTH EVERY DAY 90 tablet 3     azithromycin (ZITHROMAX) 250 MG tablet Take 250 mg by mouth       carvedilol (COREG) 12.5 MG tablet TAKE 1 TABLET BY MOUTH TWICE DAILY WITH MEALS 180 tablet 1     cetirizine (ZYRTEC) 10 MG tablet TAKE 1 TABLET BY MOUTH DAILY AT BEDTIME 90 tablet 3     DULoxetine (CYMBALTA) 60 MG EC capsule TAKE ONE CAPSULE BY MOUTH EVERY DAY 90 capsule 1     furosemide (LASIX) 40 MG tablet TAKE 1 TABLET BY MOUTH TIWCE DAILY IF WEIGHT INCREASES/SHORTNESS OF BREATH.TAKE 1 TABLET IF BETTER 135 tablet 1     gabapentin (NEURONTIN) 300 MG capsule Take 1 capsule (300 mg) by mouth 4 times daily 2 during the day and 2 at night 360 capsule 3     isosorbide mononitrate (ISMO/MONOKET) 20 MG tablet TAKE 1 TABLET BY MOUTH TWICE DAILY 180 tablet 0     KLOR-CON 20 MEQ CR tablet TAKE 1 TABLET(20 MEQ) BY MOUTH DAILY 90 tablet 0     lidocaine (LIDODERM) 5 % Patch 1 patch       lisinopril (PRINIVIL/ZESTRIL) 40 MG tablet TAKE 1/2 TABLET BY MOUTH EVERY DAY 45 tablet 3     loratadine (CLARITIN)  10 MG tablet Take 1 tablet (10 mg) by mouth daily 90 tablet 1     montelukast (SINGULAIR) 10 MG tablet TAKE 1 TABLET BY MOUTH AT BEDTIME 90 tablet 3     nitroGLYcerin (NITROSTAT) 0.4 MG sublingual tablet Place 1 tablet (0.4 mg) under the tongue every 5 minutes as needed for chest pain 0.4 mg by Sublingual route every 5 (five) minutes as needed. 25 tablet 11     oxyCODONE IR (ROXICODONE) 5 MG tablet Take 1 tablet (5 mg) by mouth 2 times daily as needed for moderate to severe pain (up to 2 a day) 60 tablet 0     potassium chloride SA (K-DUR/KLOR-CON M) 20 MEQ CR tablet TAKE 1 TABLET(20 MEQ) BY MOUTH DAILY 90 tablet 0     predniSONE (DELTASONE) 20 MG tablet Take 1 tablet (20 mg) by mouth 2 times daily 10 tablet 1     ranitidine (ZANTAC) 150 MG tablet TAKE 1 TABLET BY MOUTH TWICE DAILY 180 tablet 1     simvastatin (ZOCOR) 40 MG tablet Take 1 tablet (40 mg) by mouth At Bedtime 90 tablet 2     SYMBICORT 160-4.5 MCG/ACT Inhaler INHALE 2 PUFFS IN TO THE LUNGS TWICE DAILY 10.2 Inhaler 3     traMADol (ULTRAM) 50 MG tablet TAKE 1-2 TABLET BY MOUTH TWICE DAILY. NO MORE THAN 2 TABLETS IN 24 HOURS 60 tablet 3     traZODone (DESYREL) 100 MG tablet TAKE 2 TABLETS BY MOUTH AT BEDTIME 180 tablet 1     [DISCONTINUED] gabapentin (NEURONTIN) 300 MG capsule Take 1 capsule (300 mg) by mouth 3 times daily 270 capsule 3     Allergies   Allergen Reactions     No Known Drug Allergies      Recent Labs   Lab Test  09/11/18   1208  05/17/18   1045  01/09/18   1153  07/13/17   0854  04/28/17   0942   06/16/16   0836   A1C  5.4  5.8*  6.0  5.5  5.7   < >  5.6   LDL   --   70   --    --   70   --   59   HDL   --   52   --    --   47   --   44   TRIG   --   118   --    --   92   --   95   ALT   --   25   --    --   25   --   49   CR   --   1.44*   --   1.43*  1.42*   < >  1.20   GFRESTIMATED   --   50*   --   50*  51*   < >  62   GFRESTBLACK   --   60*   --   61  61   < >  75   POTASSIUM   --   4.4   --   4.4  4.3   < >  4.0   TSH   --   2.04   --    " --   0.74   --    --     < > = values in this interval not displayed.      BP Readings from Last 3 Encounters:   09/11/18 104/67   05/17/18 115/78   01/09/18 130/88    Wt Readings from Last 3 Encounters:   09/11/18 208 lb 11.2 oz (94.7 kg)   05/17/18 213 lb (96.6 kg)   01/09/18 209 lb 1.6 oz (94.8 kg)                  Labs reviewed in EPIC    Reviewed and updated as needed this visit by clinical staff  Tobacco  Allergies  Meds  Med Hx  Surg Hx  Fam Hx  Soc Hx      Reviewed and updated as needed this visit by Provider         ROS:  Constitutional, HEENT, cardiovascular, pulmonary, gi and gu systems are negative, except as otherwise noted.    OBJECTIVE:     /67 (BP Location: Right arm, Patient Position: Sitting, Cuff Size: Adult Large)  Pulse 86  Temp 98.4  F (36.9  C) (Oral)  Resp 18  Ht 5' 5.7\" (1.669 m)  Wt 208 lb 11.2 oz (94.7 kg)  SpO2 96%  BMI 33.99 kg/m2  Body mass index is 33.99 kg/(m^2).  GENERAL: healthy, alert, well nourished, well hydrated, no distress, obese  HENT: ear canals- normal; TMs- normal; Nose- normal; Mouth- no ulcers, no lesions, throat is clear with no erythema or exudate.   NECK: no tenderness, no adenopathy, no asymmetry, no masses, no stiffness; thyroid- normal to palpation  RESP: lungs clear to auscultation - no rales, no rhonchi, no wheezes  CV: regular rates and rhythm, normal S1 S2, no S3 or S4 and no murmur, no click or rub -  ABDOMEN: soft, no tenderness, no  hepatosplenomegaly, no masses, normal bowel sounds  MS: extremities- no gross deformities noted, no edema  SKIN: no suspicious lesions, no rashes  NEURO: strength and tone- normal, sensory exam- grossly normal, mentation- intact, speech- normal, reflexes- symmetric  BACK: no CVA tenderness, no paralumbar tenderness  PSYCH: Alert and oriented times 3; speech- coherent , normal rate and volume; able to articulate logical thoughts, able to abstract reason, no tangential thoughts, no hallucinations or delusions, " "affect- normal   Diabetic foot exam: decreased DP and PT pulses, with trophic changes, but no ulcerative lesions, normal calluses, no deformities, decreased sensory exam in forefoot and toes.     Diagnostic Test Results:  Results for orders placed or performed in visit on 09/11/18 (from the past 24 hour(s))   CBC with platelets   Result Value Ref Range    WBC 8.6 4.0 - 11.0 10e9/L    RBC Count 5.15 4.4 - 5.9 10e12/L    Hemoglobin 15.7 13.3 - 17.7 g/dL    Hematocrit 46.4 40.0 - 53.0 %    MCV 90 78 - 100 fl    MCH 30.5 26.5 - 33.0 pg    MCHC 33.8 31.5 - 36.5 g/dL    RDW 14.2 10.0 - 15.0 %    Platelet Count 248 150 - 450 10e9/L   Hemoglobin A1c   Result Value Ref Range    Hemoglobin A1C 5.4 0 - 5.6 %       ASSESSMENT/PLAN:         Tobacco Cessation:   reports that he quit smoking about 1 years ago. His smoking use included Cigarettes. He has a 3.75 pack-year smoking history. He has never used smokeless tobacco.      BMI:   Estimated body mass index is 33.99 kg/(m^2) as calculated from the following:    Height as of this encounter: 5' 5.7\" (1.669 m).    Weight as of this encounter: 208 lb 11.2 oz (94.7 kg).   Weight management plan: Discussed healthy diet and exercise guidelines and patient will follow up in 6 months in clinic to re-evaluate.        ICD-10-CM    1. Hypertension goal BP (blood pressure) < 140/90- well controlled on medications  I10 Comprehensive metabolic panel     CBC with platelets     Albumin Random Urine Quantitative with Creat Ratio   2. Chronic pain syndrome- uses chronic narcotics to help with sleeping G89.4 oxyCODONE IR (ROXICODONE) 5 MG tablet     COPD ACTION PLAN   3. Acute on chronic systolic congestive heart failure (H) I50.23 TSH with free T4 reflex- recheck thyroid   4. Chronic obstructive airway disease with asthma (H) J44.9 COPD symptoms are stable and he is doing well on his current inhaler   5. Diabetic polyneuropathy associated with type 2 diabetes mellitus (H) E11.42 gabapentin " (NEURONTIN) 300 MG capsule- increased to four times a day with 2 before bed time.      Hemoglobin A1c- well controlled on medications    6. Chronic hepatitis C without hepatic coma (H)- treated and should be cured.  B18.2 Comprehensive metabolic panel   7. Coronary artery disease involving native coronary artery of native heart without angina pectoris I25.10 No recurrent anginal symptoms    8. 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 Hyperlipidemia LDL goal <100 E78.5 Lipid panel reflex to direct LDL Fasting   10. Need for prophylactic vaccination and inoculation against influenza Z23 Vaccine Administration, Initial [79112]     ADMIN INFLUENZA (For MEDICARE Patients ONLY) []       CONSULTATION/REFERRAL to cardiology as scheduled.   FUTURE LABS:       - Schedule fasting labs in 6 months  FUTURE APPOINTMENTS:       - Follow-up visit in 6 months or sooner if any questions or concerns.   Work on weight loss  Regular exercise  See Patient Instructions    Tori Rizvi MD  [facility]    Injectable Influenza Immunization Documentation    1.  Is the person to be vaccinated sick today?   No    2. Does the person to be vaccinated have an allergy to a component   of the vaccine?   No  Egg Allergy Algorithm Link    3. Has the person to be vaccinated ever had a serious reaction   to influenza vaccine in the past?   No    4. Has the person to be vaccinated ever had Guillain-Barré syndrome?   No    Form completed by Manny Mills MA

## 2018-09-11 NOTE — LETTER
My Asthma Action Plan  Name: Santiago Hylton   YOB: 1955  Date: 9/11/2018   My doctor: Tori Rizvi MD   My clinic: [facility]        My Control Medicine: { :125806}  My Rescue Medicine: { :312328}  {AAP include Oral Steroid:781431} My Asthma Severity: { :276468}  Avoid your asthma triggers: { :620681}  dust mites  pollens     {Is patient a child or adult?:650698}       GREEN ZONE   Good Control    I feel good    No cough or wheeze    Can work, sleep and play without asthma symptoms       Take your asthma control medicine every day.     1. If exercise triggers your asthma, take your rescue medication    15 minutes before exercise or sports, and    During exercise if you have asthma symptoms  2. Spacer to use with inhaler: If you have a spacer, make sure to use it with your inhaler             YELLOW ZONE Getting Worse  I have ANY of these:    I do not feel good    Cough or wheeze    Chest feels tight    Wake up at night   1. Keep taking your Green Zone medications  2. Start taking your rescue medicine:    every 20 minutes for up to 1 hour. Then every 4 hours for 24-48 hours.  3. If you stay in the Yellow Zone for more than 12-24 hours, contact your doctor.  4. If you do not return to the Green Zone in 12-24 hours or you get worse, start taking your oral steroid medicine if prescribed by your provider.           RED ZONE Medical Alert - Get Help  I have ANY of these:    I feel awful    Medicine is not helping    Breathing getting harder    Trouble walking or talking    Nose opens wide to breathe       1. Take your rescue medicine NOW  2. If your provider has prescribed an oral steroid medicine, start taking it NOW  3. Call your doctor NOW  4. If you are still in the Red Zone after 20 minutes and you have not reached your doctor:    Take your rescue medicine again and    Call 911 or go to the emergency room right away    See your regular doctor within 2 weeks of an Emergency Room  or Urgent Care visit for follow-up treatment.          Annual Reminders:  Meet with Asthma Educator,  Flu Shot in the Fall, consider Pneumonia Vaccination for patients with asthma (aged 19 and older).    Pharmacy:    [facility] - [place], [place] - [address]  [facility] - [place], [place] - [address]  [id number] - [place], [place] - [address] AT [address] OF [facility] [address]  [id number] - [place], [place] - [address] [facility] [address]  WRITTEN PRESCRIPTION REQUESTED                      Asthma Triggers  How To Control Things That Make Your Asthma Worse    Triggers are things that make your asthma worse.  Look at the list below to help you find your triggers and what you can do about them.  You can help prevent asthma flare-ups by staying away from your triggers.      Trigger                                                          What you can do   Cigarette Smoke  Tobacco smoke can make asthma worse. Do not allow smoking in your home, car or around you.  Be sure no one smokes at a child s day care or school.  If you smoke, ask your health care provider for ways to help you quit.  Ask family members to quit too.  Ask your health care provider for a referral to Quit Plan to help you quit smoking, or call 6-406-419-PLAN.     Colds, Flu, Bronchitis  These are common triggers of asthma. Wash your hands often.  Don t touch your eyes, nose or mouth.  Get a flu shot every year.     Dust Mites  These are tiny bugs that live in cloth or carpet. They are too small to see. Wash sheets and blankets in hot water every week.   Encase pillows and mattress in dust mite proof covers.  Avoid having carpet if you can. If you have carpet, vacuum weekly.   Use a dust mask and HEPA vacuum.   Pollen and Outdoor Mold  Some people are allergic to trees, grass, or weed pollen, or molds. Try to keep your windows closed.  Limit time out  doors when pollen count is high.   Ask you health care provider about taking medicine during allergy season.     Animal Dander  Some people are allergic to skin flakes, urine or saliva from pets with fur or feathers. Keep pets with fur or feathers out of your home.    If you can t keep the pet outdoors, then keep the pet out of your bedroom.  Keep the bedroom door closed.  Keep pets off cloth furniture and away from stuffed toys.     Mice, Rats, and Cockroaches  Some people are allergic to the waste from these pests.   Cover food and garbage.  Clean up spills and food crumbs.  Store grease in the refrigerator.   Keep food out of the bedroom.   Indoor Mold  This can be a trigger if your home has high moisture. Fix leaking faucets, pipes, or other sources of water.   Clean moldy surfaces.  Dehumidify basement if it is damp and smelly.   Smoke, Strong Odors, and Sprays  These can reduce air quality. Stay away from strong odors and sprays, such as perfume, powder, hair spray, paints, smoke incense, paint, cleaning products, candles and new carpet.   Exercise or Sports  Some people with asthma have this trigger. Be active!  Ask your doctor about taking medicine before sports or exercise to prevent symptoms.    Warm up for 5-10 minutes before and after sports or exercise.     Other Triggers of Asthma  Cold air:  Cover your nose and mouth with a scarf.  Sometimes laughing or crying can be a trigger.  Some medicines and food can trigger asthma.

## 2018-09-11 NOTE — LETTER
September 12, 2018      [address] [address]  [facility] [address]        Dear Santiago Hylton    Your test results are attached. I am happy to let you know that they are stable and your medications can stay the same.     The diabetes test was normal. The cholesterol looks great. The uric acid test for gout is in good range and you can continue the allopurinol 2 a day. Overall everything is in good shape. We can recheck labs in 6 months. I will send these results to your cardiologist.     Please call me if you have any questions about these test results or about your care.      It was a pleasure to see you at your last appointment.      Sincerely,      Tori Rizvi MD, MPH /ALINA Chawla MA      Results for orders placed or performed in visit on 09/11/18   Comprehensive metabolic panel   Result Value Ref Range    Sodium 137 133 - 144 mmol/L    Potassium 4.5 3.4 - 5.3 mmol/L    Chloride 101 94 - 109 mmol/L    Carbon Dioxide 29 20 - 32 mmol/L    Anion Gap 7 3 - 14 mmol/L    Glucose 105 (H) 70 - 99 mg/dL    Urea Nitrogen 22 7 - 30 mg/dL    Creatinine 1.20 0.66 - 1.25 mg/dL    GFR Estimate 61 >60 mL/min/1.7m2    GFR Estimate If Black 74 >60 mL/min/1.7m2    Calcium 9.4 8.5 - 10.1 mg/dL    Bilirubin Total 0.5 0.2 - 1.3 mg/dL    Albumin 3.9 3.4 - 5.0 g/dL    Protein Total 8.3 6.8 - 8.8 g/dL    Alkaline Phosphatase 86 40 - 150 U/L    ALT 42 0 - 70 U/L    AST 29 0 - 45 U/L   CBC with platelets   Result Value Ref Range    WBC 8.6 4.0 - 11.0 10e9/L    RBC Count 5.15 4.4 - 5.9 10e12/L    Hemoglobin 15.7 13.3 - 17.7 g/dL    Hematocrit 46.4 40.0 - 53.0 %    MCV 90 78 - 100 fl    MCH 30.5 26.5 - 33.0 pg    MCHC 33.8 31.5 - 36.5 g/dL    RDW 14.2 10.0 - 15.0 %    Platelet Count 248 150 - 450 10e9/L   Albumin Random Urine Quantitative with Creat Ratio   Result Value Ref Range    Creatinine Urine 19 mg/dL    Albumin Urine mg/L 11 mg/L    Albumin Urine mg/g Cr 56.84 (H) 0 - 17 mg/g Cr   Hemoglobin A1c   Result Value Ref Range     Hemoglobin A1C 5.4 0 - 5.6 %   Lipid panel reflex to direct LDL Fasting   Result Value Ref Range    Cholesterol 136 <200 mg/dL    Triglycerides 190 (H) <150 mg/dL    HDL Cholesterol 40 >39 mg/dL    LDL Cholesterol Calculated 58 <100 mg/dL    Non HDL Cholesterol 96 <130 mg/dL   TSH with free T4 reflex   Result Value Ref Range    TSH 0.79 0.40 - 4.00 mU/L   Uric acid   Result Value Ref Range    Uric Acid 5.2 3.5 - 7.2 mg/dL

## 2018-09-11 NOTE — LETTER
My Depression Action Plan  Name: Santiago Hylton   Date of Birth 1955  Date: 9/11/2018    My doctor: Tori Rizvi   My clinic: [address] [id number] [phone]          GREEN    ZONE   Good Control    What it looks like:     Things are going generally well. You have normal up s and down s. You may even feel depressed from time to time, but bad moods usually last less than a day.   What you need to do:  1. Continue to care for yourself (see self care plan)  2. Check your depression survival kit and update it as needed  3. Follow your physician s recommendations including any medication.  4. Do not stop taking medication unless you consult with your physician first.           YELLOW         ZONE Getting Worse    What it looks like:     Depression is starting to interfere with your life.     It may be hard to get out of bed; you may be starting to isolate yourself from others.    Symptoms of depression are starting to last most all day and this has happened for several days.     You may have suicidal thoughts but they are not constant.   What you need to do:     1. Call your care team, your response to treatment will improve if you keep your care team informed of your progress. Yellow periods are signs an adjustment may need to be made.     2. Continue your self-care, even if you have to fake it!    3. Talk to someone in your support network    4. Open up your depression survival kit           RED    ZONE Medical Alert - Get Help    What it looks like:     Depression is seriously interfering with your life.     You may experience these or other symptoms: You can t get out of bed most days, can t work or engage in other necessary activities, you have trouble taking care of basic hygiene, or basic responsibilities, thoughts of suicide or death that will not go away, self-injurious behavior.     What you need to do:  1. Call your care  team and request a same-day appointment. If they are not available (weekends or after hours) call your local crisis line, emergency room or 911.            Depression Self Care Plan / Survival Kit    Self-Care for Depression  Here s the deal. Your body and mind are really not as separate as most people think.  What you do and think affects how you feel and how you feel influences what you do and think. This means if you do things that people who feel good do, it will help you feel better.  Sometimes this is all it takes.  There is also a place for medication and therapy depending on how severe your depression is, so be sure to consult with your medical provider and/ or Behavioral Health Consultant if your symptoms are worsening or not improving.     In order to better manage my stress, I will:    Exercise  Get some form of exercise, every day. This will help reduce pain and release endorphins, the  feel good  chemicals in your brain. This is almost as good as taking antidepressants!  This is not the same as joining a gym and then never going! (they count on that by the way ) It can be as simple as just going for a walk or doing some gardening, anything that will get you moving.      Hygiene   Maintain good hygiene (Get out of bed in the morning, Make your bed, Brush your teeth, Take a shower, and Get dressed like you were going to work, even if you are unemployed).  If your clothes don't fit try to get ones that do.    Diet  I will strive to eat foods that are good for me, drink plenty of water, and avoid excessive sugar, caffeine, alcohol, and other mood-altering substances.  Some foods that are helpful in depression are: complex carbohydrates, B vitamins, flaxseed, fish or fish oil, fresh fruits and vegetables.    Psychotherapy  I agree to participate in Individual Therapy (if recommended).    Medication  If prescribed medications, I agree to take them.  Missing doses can result in serious side effects.  I  understand that drinking alcohol, or other illicit drug use, may cause potential side effects.  I will not stop my medication abruptly without first discussing it with my provider.    Staying Connected With Others  I will stay in touch with my friends, family members, and my primary care provider/team.    Use your imagination  Be creative.  We all have a creative side; it doesn t matter if it s oil painting, sand castles, or mud pies! This will also kick up the endorphins.    Witness Beauty  (AKA stop and smell the roses) Take a look outside, even in mid-winter. Notice colors, textures. Watch the squirrels and birds.     Service to others  Be of service to others.  There is always someone else in need.  By helping others we can  get out of ourselves  and remember the really important things.  This also provides opportunities for practicing all the other parts of the program.    Humor  Laugh and be silly!  Adjust your TV habits for less news and crime-drama and more comedy.    Control your stress  Try breathing deep, massage therapy, biofeedback, and meditation. Find time to relax each day.     My support system    Clinic Contact:  Phone number:    Contact 1:  Phone number:    Contact 2:  Phone number:    Latter day/:  Phone number:    Therapist:  Phone number:    Local crisis center:    Phone number:    Other community support:  Phone number:

## 2018-09-11 NOTE — LETTER
My Asthma Action Plan  Name: Santiago Hylton   YOB: 1955  Date: 9/11/2018   My doctor: Tori Rizvi MD   My clinic: [facility]        My Control Medicine: Budesonide + formoterol (Symbicort) -  160/4.5 mcg twice a day  My Rescue Medicine: Albuterol (Proair/Ventolin/Proventil) inhaler as needed  My Oral Steroid Medicine: prednisone for exacerbations My Asthma Severity: moderate persistent  Avoid your asthma triggers: upper respiratory infections, humidity and cold air  dust mites  pollens            GREEN ZONE   Good Control    I feel good    No cough or wheeze    Can work, sleep and play without asthma symptoms       Take your asthma control medicine every day.     1. If exercise triggers your asthma, take your rescue medication    15 minutes before exercise or sports, and    During exercise if you have asthma symptoms  2. Spacer to use with inhaler: If you have a spacer, make sure to use it with your inhaler             YELLOW ZONE Getting Worse  I have ANY of these:    I do not feel good    Cough or wheeze    Chest feels tight    Wake up at night   1. Keep taking your Green Zone medications  2. Start taking your rescue medicine:    every 20 minutes for up to 1 hour. Then every 4 hours for 24-48 hours.  3. If you stay in the Yellow Zone for more than 12-24 hours, contact your doctor.  4. If you do not return to the Green Zone in 12-24 hours or you get worse, start taking your oral steroid medicine if prescribed by your provider.           RED ZONE Medical Alert - Get Help  I have ANY of these:    I feel awful    Medicine is not helping    Breathing getting harder    Trouble walking or talking    Nose opens wide to breathe       1. Take your rescue medicine NOW  2. If your provider has prescribed an oral steroid medicine, start taking it NOW  3. Call your doctor NOW  4. If you are still in the Red Zone after 20 minutes and you have not reached your doctor:    Take your rescue  medicine again and    Call 911 or go to the emergency room right away    See your regular doctor within 2 weeks of an Emergency Room or Urgent Care visit for follow-up treatment.          Annual Reminders:  Meet with Asthma Educator,  Flu Shot in the Fall, consider Pneumonia Vaccination for patients with asthma (aged 19 and older).    Pharmacy:    [facility] - [place], [place] - [address]  [facility] - [place], [place] - [address]  [id number] [place], [place] - [address] [facility] AT [facility]  [id number] - [place], [place] - [address] [facility] [address] OF [address]  WRITTEN PRESCRIPTION REQUESTED    Current Outpatient Prescriptions   Medication     albuterol (2.5 MG/3ML) 0.083% neb solution     allopurinol (ZYLOPRIM) 100 MG tablet     amLODIPine (NORVASC) 5 MG tablet     aspirin  MG tablet     azithromycin (ZITHROMAX) 250 MG tablet     carvedilol (COREG) 12.5 MG tablet     cetirizine (ZYRTEC) 10 MG tablet     DULoxetine (CYMBALTA) 60 MG EC capsule     furosemide (LASIX) 40 MG tablet     gabapentin (NEURONTIN) 300 MG capsule     isosorbide mononitrate (ISMO/MONOKET) 20 MG tablet     KLOR-CON 20 MEQ CR tablet     lidocaine (LIDODERM) 5 % Patch     lisinopril (PRINIVIL/ZESTRIL) 40 MG tablet     loratadine (CLARITIN) 10 MG tablet     montelukast (SINGULAIR) 10 MG tablet     nitroGLYcerin (NITROSTAT) 0.4 MG sublingual tablet     oxyCODONE IR (ROXICODONE) 5 MG tablet     potassium chloride SA (K-DUR/KLOR-CON M) 20 MEQ CR tablet     predniSONE (DELTASONE) 20 MG tablet     ranitidine (ZANTAC) 150 MG tablet     simvastatin (ZOCOR) 40 MG tablet     SYMBICORT 160-4.5 MCG/ACT Inhaler     traMADol (ULTRAM) 50 MG tablet     traZODone (DESYREL) 100 MG tablet     No current facility-administered medications for this visit.                       Asthma Triggers  How To Control Things That Make Your Asthma Worse    Triggers  are things that make your asthma worse.  Look at the list below to help you find your triggers and what you can do about them.  You can help prevent asthma flare-ups by staying away from your triggers.      Trigger                                                          What you can do   Cigarette Smoke  Tobacco smoke can make asthma worse. Do not allow smoking in your home, car or around you.  Be sure no one smokes at a child s day care or school.  If you smoke, ask your health care provider for ways to help you quit.  Ask family members to quit too.  Ask your health care provider for a referral to Quit Plan to help you quit smoking, or call 8-407-378-PLAN.     Colds, Flu, Bronchitis  These are common triggers of asthma. Wash your hands often.  Don t touch your eyes, nose or mouth.  Get a flu shot every year.     Dust Mites  These are tiny bugs that live in cloth or carpet. They are too small to see. Wash sheets and blankets in hot water every week.   Encase pillows and mattress in dust mite proof covers.  Avoid having carpet if you can. If you have carpet, vacuum weekly.   Use a dust mask and HEPA vacuum.   Pollen and Outdoor Mold  Some people are allergic to trees, grass, or weed pollen, or molds. Try to keep your windows closed.  Limit time out doors when pollen count is high.   Ask you health care provider about taking medicine during allergy season.     Animal Dander  Some people are allergic to skin flakes, urine or saliva from pets with fur or feathers. Keep pets with fur or feathers out of your home.    If you can t keep the pet outdoors, then keep the pet out of your bedroom.  Keep the bedroom door closed.  Keep pets off cloth furniture and away from stuffed toys.     Mice, Rats, and Cockroaches  Some people are allergic to the waste from these pests.   Cover food and garbage.  Clean up spills and food crumbs.  Store grease in the refrigerator.   Keep food out of the bedroom.   Indoor Mold  This can be a  trigger if your home has high moisture. Fix leaking faucets, pipes, or other sources of water.   Clean moldy surfaces.  Dehumidify basement if it is damp and smelly.   Smoke, Strong Odors, and Sprays  These can reduce air quality. Stay away from strong odors and sprays, such as perfume, powder, hair spray, paints, smoke incense, paint, cleaning products, candles and new carpet.   Exercise or Sports  Some people with asthma have this trigger. Be active!  Ask your doctor about taking medicine before sports or exercise to prevent symptoms.    Warm up for 5-10 minutes before and after sports or exercise.     Other Triggers of Asthma  Cold air:  Cover your nose and mouth with a scarf.  Sometimes laughing or crying can be a trigger.  Some medicines and food can trigger asthma.

## 2018-09-11 NOTE — LETTER
My COPD Action Plan     Name: Santiago Hylton    YOB: 1955   Date: 9/11/2018    My doctor: Tori Rizvi MD   My clinic: [address] [id number] [phone] Singulair, Symbicort FEV-1 (no units)   Date Value   05/17/2018 50% pred     FEV1/FVC (no units)   Date Value   05/17/2018 86% pred      My COPD Severity: Moderate = FeV1 < 79% -50%      Use of Oxygen: Oxygen Not Prescribed      Make sure you've had your pneumonia   vaccines.          GREEN ZONE       Doing well today      Usual level of activity and exercise    Usual amount of cough and mucus    No shortness of breath    Usual level of health (thinking clearly, sleeping well, feel like eating) Actions:      Take daily medicines    Use oxygen as prescribed    Follow regular exercise and diet plan    Avoid cigarette smoke and other irritants that harm the lungs           YELLOW ZONE          Having a bad day or flare up      Short of breath more than usual    A lot more sputum (mucus) than usual    Sputum looks yellow, green, tan, brown or bloody    More coughing or wheezing    Fever or chills    Less energy; trouble completing activities    Trouble thinking or focusing    Using quick relief inhaler or nebulizer more often    Poor sleep; symptoms wake me up    Do not feel like eating Actions:      Get plenty of rest    Take daily medicines    Use quick relief inhaler every 4 hours    If you use oxygen, call you doctor to see if you should adjust your oxygen    Do breathing exercises or other things to help you relax    Let a loved one, friend or neighbor know you are feeling worse    Call your care team if you have 2 or more symptoms.  Start taking steroids or antibiotics if directed by your care team           RED ZONE       Need medical care now      Severe shortness of breath (feel you can't breathe)    Fever, chills    Not enough breath to do any activity    Trouble coughing up  mucus, walking or talking    Blood in mucus    Frequent coughing   Rescue medicines are not working    Not able to sleep because of breathing    Feel confused or drowsy    Chest pain    Actions:      Call your health care team.  If you cannot reach your care team, call 911 or go to the emergency room.        Annual Reminders:  Meet with Care Team, Flu Shot every Fall  Pharmacy:    [facility] - [place], [place] - [address]  [facility] - [place], [place] - [address]  [id number] - [place], [place] - [address] [facility] AT [facility] OF [id number] - [place], [place] - [address] [facility] AT [address] OF [address]  WRITTEN PRESCRIPTION REQUESTED

## 2018-09-11 NOTE — MR AVS SNAPSHOT
After Visit Summary   9/11/2018    Santiago Hylton    MRN: [id number]           Patient Information     Date Of Birth          1955        Visit Information        Provider Department      9/11/2018 11:20 AM Tori Rizvi MD [facility]        Today's Diagnoses     Hypertension goal BP (blood pressure) < 140/90    -  1    Chronic pain syndrome        Acute on chronic systolic congestive heart failure (H)        Chronic obstructive airway disease with asthma (H)        Diabetic polyneuropathy associated with type 2 diabetes mellitus (H)        Chronic hepatitis C without hepatic coma (H)        Coronary artery disease involving native coronary artery of native heart without angina pectoris        Chronic gout due to renal impairment of multiple sites without tophus        Hyperlipidemia LDL goal <100        Need for prophylactic vaccination and inoculation against influenza           Follow-ups after your visit        Follow-up notes from your care team     Return in about 6 months (around 3/11/2019) for Lab Work, medication follow up, Weight check.      Who to contact     If you have questions or need follow up information about today's clinic visit or your schedule please contact [facility] directly at [phone].  Normal or non-critical lab and imaging results will be communicated to you by Venmohart, letter or phone within 4 business days after the clinic has received the results. If you do not hear from us within 7 days, please contact the clinic through [employer] or phone. If you have a critical or abnormal lab result, we will notify you by phone as soon as possible.  Submit refill requests through eco4cloud or call your pharmacy and they will forward the refill request to us. Please allow 3 business days for your refill to be completed.          Additional Information About Your Visit        Venmohart Information     eco4cloud lets you send messages to your  "doctor, view your test results, renew your prescriptions, schedule appointments and more. To sign up, go to [email or web address] . Click on \"Log in\" on the left side of the screen, which will take you to the Welcome page. Then click on \"Sign up Now\" on the right side of the page.     You will be asked to enter the access code listed below, as well as some personal information. Please follow the directions to create your username and password.     Your access code is: [email or web address]  Expires: 12/10/2018  1:40 PM     Your access code will  in 90 days. If you need help or a new code, please call your [facility] clinic or [phone].        Care EveryWhere ID     This is your Care EveryWhere ID. This could be used by other organizations to access your [facility] medical records  [id number]        Your Vitals Were     Pulse Temperature Respirations Height Pulse Oximetry BMI (Body Mass Index)    86 98.4  F (36.9  C) (Oral) 18 5' 5.7\" (1.669 m) 96% 33.99 kg/m2       Blood Pressure from Last 3 Encounters:   18 104/67   18 115/78   18 130/88    Weight from Last 3 Encounters:   18 208 lb 11.2 oz (94.7 kg)   18 213 lb (96.6 kg)   18 209 lb 1.6 oz (94.8 kg)              We Performed the Following     ADMIN INFLUENZA (For MEDICARE Patients ONLY) []     Albumin Random Urine Quantitative with Creat Ratio     Asthma Action Plan (AAP)     CBC with platelets     Comprehensive metabolic panel     COPD ACTION PLAN     FLU VACCINE, 3 YRS +, IM (FLUZONE)     Hemoglobin A1c     Lipid panel reflex to direct LDL Fasting     TSH with free T4 reflex     Uric acid          Today's Medication Changes          These changes are accurate as of 18  6:54 PM.  If you have any questions, ask your nurse or doctor.               These medicines have changed or have updated prescriptions.        Dose/Directions    gabapentin 300 MG capsule   Commonly known as:  NEURONTIN   This may have changed:    - " when to take this  - additional instructions   Used for:  Diabetic polyneuropathy associated with type 2 diabetes mellitus (H)   Changed by:  Tori Rizvi MD        Dose:  300 mg   Take 1 capsule (300 mg) by mouth 4 times daily 2 during the day and 2 at night   Quantity:  360 capsule   Refills:  3            Where to get your medicines      These medications were sent to [facility]/pharmacy #[id number] [address] AT CORNER OF [address] [place] [address]     Phone:  [phone]     gabapentin 300 MG capsule         Some of these will need a paper prescription and others can be bought over the counter.  Ask your nurse if you have questions.     Bring a paper prescription for each of these medications     oxyCODONE IR 5 MG tablet               Information about OPIOIDS     PRESCRIPTION OPIOIDS: WHAT YOU NEED TO KNOW   We gave you an opioid (narcotic) pain medicine. It is important to manage your pain, but opioids are not always the best choice. You should first try all the other options your care team gave you. Take this medicine for as short a time (and as few doses) as possible.    Some activities can increase your pain, such as bandage changes or therapy sessions. It may help to take your pain medicine 30 to 60 minutes before these activities. Reduce your stress by getting enough sleep, working on hobbies you enjoy and practicing relaxation or meditation. Talk to your care team about ways to manage your pain beyond prescription opioids.    These medicines have risks:    DO NOT drive when on new or higher doses of pain medicine. These medicines can affect your alertness and reaction times, and you could be arrested for driving under the influence (DUI). If you need to use opioids long-term, talk to your care team about driving.    DO NOT operate heavy machinery    DO NOT do any other dangerous activities while taking these medicines.    DO NOT drink any alcohol  while taking these medicines.     If the opioid prescribed includes acetaminophen, DO NOT take with any other medicines that contain acetaminophen. Read all labels carefully. Look for the word  acetaminophen  or  Tylenol.  Ask your pharmacist if you have questions or are unsure.    You can get addicted to pain medicines, especially if you have a history of addiction (chemical, alcohol or substance dependence). Talk to your care team about ways to reduce this risk.    All opioids tend to cause constipation. Drink plenty of water and eat foods that have a lot of fiber, such as fruits, vegetables, prune juice, apple juice and high-fiber cereal. Take a laxative (Miralax, milk of magnesia, Colace, Senna) if you don t move your bowels at least every other day. Other side effects include upset stomach, sleepiness, dizziness, throwing up, tolerance (needing more of the medicine to have the same effect), physical dependence and slowed breathing.    Store your pills in a secure place, locked if possible. We will not replace any lost or stolen medicine. If you don t finish your medicine, please throw away (dispose) as directed by your pharmacist. The [place] Pollution [employer] has more information about safe disposal: https://[email or web address].[email or web address].mn.us/living-green/managing-[email or web address]-medications         Primary Care Provider Office Phone # Fax #    Tori Tessa Rizvi [phone]       [address] [facility] [address]        Equal Access to Services     ADAN [facility] HospitalNESSA : Hadii javier sun hadasho Soomaali, waaxda luqadaha, qaybta kaalmada adeegyada, oren quintero . So [facility] [phone].    ATENCIÓN: Si habla español, tiene a paz disposición servicios gratuitos de asistencia lingüística. Llame al [phone].    We comply with applicable federal civil rights laws and [place] laws. We do not discriminate on the basis of race, color, national origin, age, disability, sex, sexual  orientation, or gender identity.            Thank you!     Thank you for choosing [facility]  for your care. Our goal is always to provide you with excellent care. Hearing back from our patients is one way we can continue to improve our services. Please take a few minutes to complete the written survey that you may receive in the mail after your visit with us. Thank you!             Your Updated Medication List - Protect others around you: Learn how to safely use, store and throw away your medicines at www.disposemymeds.org.          This list is accurate as of 9/11/18  6:54 PM.  Always use your most recent med list.                   Brand Name Dispense Instructions for use Diagnosis    albuterol (2.5 MG/3ML) 0.083% neb solution     360 mL    INHALE 3 ML BY NEBULIZATION METHOD EVERY 6 HOURS AS NEEDED FOR SHORTNESS OF BREATH    Mild persistent asthma, uncomplicated       allopurinol 100 MG tablet    ZYLOPRIM    180 tablet    Take 2 tablets (200 mg) by mouth daily    Chronic gout due to renal impairment involving ankle without tophus, unspecified laterality       amLODIPine 5 MG tablet    NORVASC    90 tablet    TAKE 1 TABLET BY MOUTH EVERY DAY FOR BLOOD PRESSURE    Essential hypertension with goal blood pressure less than 140/90       aspirin 325 MG EC tablet     90 tablet    TAKE 1 TABLET BY MOUTH EVERY DAY    Cardiovascular disease       azithromycin 250 MG tablet    ZITHROMAX     Take 250 mg by mouth        carvedilol 12.5 MG tablet    COREG    180 tablet    TAKE 1 TABLET BY MOUTH TWICE DAILY WITH MEALS    Essential hypertension with goal blood pressure less than 140/90       cetirizine 10 MG tablet    zyrTEC    90 tablet    TAKE 1 TABLET BY MOUTH DAILY AT BEDTIME    Chronic idiopathic urticaria       DULoxetine 60 MG EC capsule    CYMBALTA    90 capsule    TAKE ONE CAPSULE BY MOUTH EVERY DAY    Major depressive disorder, recurrent episode, mild (H)       furosemide 40 MG tablet    LASIX     135 tablet    TAKE 1 TABLET BY MOUTH TIWCE DAILY IF WEIGHT INCREASES/SHORTNESS OF BREATH.TAKE 1 TABLET IF BETTER    Essential hypertension with goal blood pressure less than 140/90       gabapentin 300 MG capsule    NEURONTIN    360 capsule    Take 1 capsule (300 mg) by mouth 4 times daily 2 during the day and 2 at night    Diabetic polyneuropathy associated with type 2 diabetes mellitus (H)       isosorbide mononitrate 20 MG tablet    ISMO/MONOKET    180 tablet    TAKE 1 TABLET BY MOUTH TWICE DAILY    Hyperlipidemia LDL goal <100       lidocaine 5 % Patch    LIDODERM     1 patch        lisinopril 40 MG tablet    PRINIVIL/ZESTRIL    45 tablet    TAKE 1/2 TABLET BY MOUTH EVERY DAY    Essential hypertension with goal blood pressure less than 140/90       loratadine 10 MG tablet    CLARITIN    90 tablet    Take 1 tablet (10 mg) by mouth daily    Chronic seasonal allergic rhinitis due to pollen       montelukast 10 MG tablet    SINGULAIR    90 tablet    TAKE 1 TABLET BY MOUTH DAILY AT BEDTIME    Mild persistent asthma with acute exacerbation       nitroGLYcerin 0.4 MG sublingual tablet    NITROSTAT    25 tablet    Place 1 tablet (0.4 mg) under the tongue every 5 minutes as needed for chest pain 0.4 mg by Sublingual route every 5 (five) minutes as needed.    Coronary artery disease of native artery of native heart with stable angina pectoris (H)       oxyCODONE IR 5 MG tablet    ROXICODONE    60 tablet    Take 1 tablet (5 mg) by mouth 2 times daily as needed for moderate to severe pain (up to 2 a day)    Chronic pain syndrome       * potassium chloride SA 20 MEQ CR tablet    K-DUR/KLOR-CON M    90 tablet    TAKE 1 TABLET(20 MEQ) BY MOUTH DAILY    Hypopotassemia       * KLOR-CON 20 MEQ CR tablet   Generic drug:  potassium chloride SA     90 tablet    TAKE 1 TABLET(20 MEQ) BY MOUTH DAILY    Hypopotassemia       predniSONE 20 MG tablet    DELTASONE    10 tablet    Take 1 tablet (20 mg) by mouth 2 times daily    Chronic  obstructive airway disease with asthma (H)       ranitidine 150 MG tablet    ZANTAC    180 tablet    TAKE 1 TABLET BY MOUTH TWICE DAILY    Gastroesophageal reflux disease, esophagitis presence not specified       simvastatin 40 MG tablet    ZOCOR    90 tablet    Take 1 tablet (40 mg) by mouth At Bedtime    Hyperlipidemia, unspecified hyperlipidemia type       SYMBICORT 160-4.5 MCG/ACT Inhaler   Generic drug:  budesonide-formoterol     10.2 Inhaler    INHALE 2 PUFFS IN TO THE LUNGS TWICE DAILY    Mild persistent asthma without complication       traMADol 50 MG tablet    ULTRAM    60 tablet    TAKE 1-2 TABLET BY MOUTH TWICE DAILY. NO MORE THAN 2 TABLETS IN 24 HOURS    Type 2 diabetes mellitus with diabetic polyneuropathy, unspecified long term insulin use status (H)       traZODone 100 MG tablet    DESYREL    180 tablet    TAKE 2 TABLETS BY MOUTH AT BEDTIME    Major depressive disorder, recurrent episode, mild (H)       * Notice:  This list has 2 medication(s) that are the same as other medications prescribed for you. Read the directions carefully, and ask your doctor or other care provider to review them with you.

## 2018-09-12 NOTE — PROGRESS NOTES
Dear Santiago Hylton,    Your test results are attached. I am happy to let you know that they are stable and your medications can stay the same.    The diabetes test was normal. The cholesterol looks great. The uric acid test for gout is in good range and you can continue the allopurinol 2 a day. Overall everything is in good shape. We can recheck labs in 6 months. I will send these results to your cardiologist.    Please call me if you have any questions about these test results or about your care.    Sincerely,    Tori Rizvi MD

## 2018-10-08 DIAGNOSIS — G89.4 CHRONIC PAIN SYNDROME: ICD-10-CM

## 2018-10-08 RX ORDER — OXYCODONE HYDROCHLORIDE 5 MG/1
5 TABLET ORAL 2 TIMES DAILY PRN
Qty: 60 TABLET | Refills: 0 | Status: SHIPPED | OUTPATIENT
Start: 2018-10-08 | End: 2018-11-06

## 2018-10-08 NOTE — TELEPHONE ENCOUNTER
Requested Prescriptions   Pending Prescriptions Disp Refills     oxyCODONE IR (ROXICODONE) 5 MG tablet 60 tablet 0     Sig: Take 1 tablet (5 mg) by mouth 2 times daily as needed for moderate to severe pain (up to 2 a day)    There is no refill protocol information for this order        Controlled Substance Refill Request for Oxycodone 5 mg  Problem List Complete:  No     PROVIDER TO CONSIDER COMPLETION OF PROBLEM LIST AND OVERVIEW/CONTROLLED SUBSTANCE AGREEMENT    Last Written Prescription Date:  9/11/18  Last Fill Quantity: 60 tablets   # refills: 0    Last Office Visit with [facility] care provider: 9/11/18    Future Office visit: None     Controlled substance agreement on file: No.     Processing:  Patient will  in clinic   checked in past 3 months?  Yes 10/8/2018    [place] reviewed:  reviewed- no concerns    [place]:  [email or web address]      Ibeth Deluna RN, BSN

## 2018-10-09 DIAGNOSIS — G62.9 NEUROPATHY: Primary | ICD-10-CM

## 2018-10-09 DIAGNOSIS — E11.42 TYPE 2 DIABETES MELLITUS WITH DIABETIC POLYNEUROPATHY (H): ICD-10-CM

## 2018-10-09 RX ORDER — TRAMADOL HYDROCHLORIDE 50 MG/1
TABLET ORAL
Qty: 60 TABLET | Refills: 1 | Status: SHIPPED | OUTPATIENT
Start: 2018-10-09 | End: 2018-12-10

## 2018-10-09 NOTE — TELEPHONE ENCOUNTER
Requested Prescriptions   Pending Prescriptions Disp Refills     traMADol (ULTRAM) 50 MG tablet [Pharmacy Med Name: TRAMADOL HCL 50 MG TABLET]        Last Written Prescription Date:  05/29/18  Last Fill Quantity: 60,   # refills: 3  Last Office Visit: 09/11/18  Future Office visit:       Routing refill request to provider for review/approval because:  Drug not on the [facility], P or [facility] refill protocol or controlled substance 60 tablet 1     Sig: TAKE 1-2 TABLETS BY MOUTH TWICE A DAY. MAX 2 TABS IN 24 HOURS    There is no refill protocol information for this order

## 2018-10-09 NOTE — TELEPHONE ENCOUNTER
Written rx will be deliver to the pharmacy this afternoon.  Our pharmacy will notify pt when ready for pickup.  Rene Alvarez,  [facility]

## 2018-10-09 NOTE — TELEPHONE ENCOUNTER
..Reason for Call:  Checking on  prescription    Detailed comments: none    Phone Number Patient can be reached at: Home number on file [phone] (home)    Best Time: n/a    Can we leave a detailed message on this number? Not Applicable    Call taken on 10/9/2018 at 12:26 PM by Pamela Ryan

## 2018-10-10 NOTE — TELEPHONE ENCOUNTER
Written rx is signed by Dr. Magana and Written rx faxed to the pharmacy, Pharmacy will contact pt when medication is ready for pickup.  Rene Alvarez,  [facility]

## 2018-10-16 DIAGNOSIS — L50.1 CHRONIC IDIOPATHIC URTICARIA: ICD-10-CM

## 2018-10-16 NOTE — TELEPHONE ENCOUNTER
"Requested Prescriptions   Pending Prescriptions Disp Refills     cetirizine (ZYRTEC) 10 MG tablet [Pharmacy Med Name: CETIRIZINE HCL 10 MG TABLET]  Last Written Prescription Date:  5/29/18  Last Fill Quantity: 90,  # refills: 3   Last office visit: 9/11/2018 with prescribing provider:  Belkys   Future Office Visit:     90 tablet 2     Sig: TAKE 1 TABLET BY MOUTH DAILY AT BEDTIME    Antihistamines Protocol Passed    10/16/2018  3:06 PM       Passed - Patient is 3-64 years of age    Apply weight-based dosing for peds patients age 3 - 12 years of age.    Forward request to provider for patients under the age of 3 or over the age of 64.         Passed - Recent (12 mo) or future (30 days) visit within the authorizing provider's specialty    Patient had office visit in the last 12 months or has a visit in the next 30 days with authorizing provider or within the authorizing provider's specialty.  See \"Patient Info\" tab in inbasket, or \"Choose Columns\" in Meds & Orders section of the refill encounter.              "

## 2018-10-18 RX ORDER — CETIRIZINE HYDROCHLORIDE 10 MG/1
TABLET ORAL
Qty: 90 TABLET | Refills: 10 | Status: SHIPPED | OUTPATIENT
Start: 2018-10-18 | End: 2019-11-02

## 2018-10-18 NOTE — TELEPHONE ENCOUNTER
Patient has medicare for insurance. Had #90 with 3 refills given back in May 2018, but medicare does not allow 90 days, only 30 day but keeps same # of refills so this prescription is actually out.   Refilled #90 with 10 refills, approved per [facility] refill protocol. Patient last seen Sept 2018, refilled till Sept 2019.    Samra Luna RN  [facility] Triage

## 2018-10-24 DIAGNOSIS — E78.5 HYPERLIPIDEMIA LDL GOAL <100: ICD-10-CM

## 2018-10-24 DIAGNOSIS — E87.6 HYPOPOTASSEMIA: ICD-10-CM

## 2018-10-24 DIAGNOSIS — K21.9 GASTROESOPHAGEAL REFLUX DISEASE, ESOPHAGITIS PRESENCE NOT SPECIFIED: ICD-10-CM

## 2018-10-24 DIAGNOSIS — I10 ESSENTIAL HYPERTENSION WITH GOAL BLOOD PRESSURE LESS THAN 140/90: ICD-10-CM

## 2018-10-24 NOTE — TELEPHONE ENCOUNTER
"Requested Prescriptions   Pending Prescriptions Disp Refills     ranitidine (ZANTAC) 150 MG tablet [Pharmacy Med Name: RANITIDINE 150 MG TABLET] 180 tablet 1     Sig: TAKE 1 TABLET BY MOUTH TWICE DAILY    H2 Blockers Protocol Passed    10/24/2018  3:21 PM       Passed - Patient is age 12 or older       Passed - Recent (12 mo) or future (30 days) visit within the authorizing provider's specialty    Patient had office visit in the last 12 months or has a visit in the next 30 days with authorizing provider or within the authorizing provider's specialty.  See \"Patient Info\" tab in inbasket, or \"Choose Columns\" in Meds & Orders section of the refill encounter.              KLOR-CON 20 MEQ CR tablet [Pharmacy Med Name: KLOR-CON M20 TABLET] 90 tablet 0     Sig: TAKE 1 TABLET(20 MEQ) BY MOUTH DAILY    Potassium Supplements Protocol Passed    10/24/2018  3:21 PM       Passed - Recent (12 mo) or future (30 days) visit within the authorizing provider's specialty    Patient had office visit in the last 12 months or has a visit in the next 30 days with authorizing provider or within the authorizing provider's specialty.  See \"Patient Info\" tab in inbasket, or \"Choose Columns\" in Meds & Orders section of the refill encounter.             Passed - Patient is age 18 or older       Passed - Normal serum potassium in past 12 months    Recent Labs   Lab Test  09/11/18   1208   POTASSIUM  4.5                    isosorbide mononitrate (ISMO/MONOKET) 20 MG tablet [Pharmacy Med Name: ISOSORBIDE MONONIT 20 MG TAB] 180 tablet 0     Sig: TAKE 1 TABLET BY MOUTH TWICE A DAY (DUE FOR OFFICE VISIT PLEASE SCHEDULE APT WITH PCP)    Nitrates Passed    10/24/2018  3:21 PM       Passed - Blood pressure under 140/90 in past 12 months    BP Readings from Last 3 Encounters:   09/11/18 104/67   05/17/18 115/78   01/09/18 130/88                Passed - Pt is not on erectile dysfunction medications       Passed - Recent (12 mo) or future (30 days) visit " "within the authorizing provider's specialty    Patient had office visit in the last 12 months or has a visit in the next 30 days with authorizing provider or within the authorizing provider's specialty.  See \"Patient Info\" tab in inbasket, or \"Choose Columns\" in Meds & Orders section of the refill encounter.             Passed - Patient is age 18 or older        carvedilol (COREG) 12.5 MG tablet [Pharmacy Med Name: CARVEDILOL 12.5 MG TABLET] 180 tablet 1     Sig: TAKE 1 TABLET BY MOUTH TWICE DAILY WITH MEALS    Beta-Blockers Protocol Passed    10/24/2018  3:21 PM       Passed - Blood pressure under 140/90 in past 12 months    BP Readings from Last 3 Encounters:   09/11/18 104/67   05/17/18 115/78   01/09/18 130/88                Passed - Patient is age 6 or older       Passed - Recent (12 mo) or future (30 days) visit within the authorizing provider's specialty    Patient had office visit in the last 12 months or has a visit in the next 30 days with authorizing provider or within the authorizing provider's specialty.  See \"Patient Info\" tab in inbasket, or \"Choose Columns\" in Meds & Orders section of the refill encounter.              Last Written Prescription Date:  3/28/18  Last Fill Quantity: 180,  # refills: 1   Last office visit: 9/11/2018 with prescribing provider:  SAMANTA   Future Office Visit:      "

## 2018-10-26 RX ORDER — CARVEDILOL 12.5 MG/1
TABLET ORAL
Qty: 180 TABLET | Refills: 2 | Status: SHIPPED | OUTPATIENT
Start: 2018-10-26 | End: 2020-08-27

## 2018-10-26 RX ORDER — POTASSIUM CHLORIDE 1500 MG/1
TABLET, EXTENDED RELEASE ORAL
Qty: 90 TABLET | Refills: 0 | Status: SHIPPED | OUTPATIENT
Start: 2018-10-26 | End: 2019-01-26

## 2018-10-26 RX ORDER — ISOSORBIDE MONONITRATE 20 MG/1
20 TABLET ORAL 2 TIMES DAILY
Qty: 180 TABLET | Refills: 2 | Status: SHIPPED | OUTPATIENT
Start: 2018-10-26 | End: 2019-08-19

## 2018-10-26 NOTE — TELEPHONE ENCOUNTER
Prescription approved per [facility] Refill Protocol.- Zantac; Isosorbide; Coreg  Routing refill request to provider for review/approval because:  A break in medication - Kristen Mendoza RN

## 2018-11-02 DIAGNOSIS — E11.42 DIABETIC POLYNEUROPATHY ASSOCIATED WITH TYPE 2 DIABETES MELLITUS (H): ICD-10-CM

## 2018-11-02 RX ORDER — GABAPENTIN 300 MG/1
CAPSULE ORAL
Qty: 270 CAPSULE | Refills: 3 | Status: SHIPPED | OUTPATIENT
Start: 2018-11-02 | End: 2019-05-07

## 2018-11-02 NOTE — TELEPHONE ENCOUNTER
gabapentin (NEURONTIN) 300 MG capsule      Last Written Prescription Date:  9/11/18  Last Fill Quantity: 360,   # refills: 3  Last Office Visit: Belkys  Future Office visit:       Routing refill request to provider for review/approval because:  Drug not on the [facility], [facility] or [facility] refill protocol or controlled substance

## 2018-11-06 DIAGNOSIS — G89.4 CHRONIC PAIN SYNDROME: ICD-10-CM

## 2018-11-06 RX ORDER — OXYCODONE HYDROCHLORIDE 5 MG/1
5 TABLET ORAL 2 TIMES DAILY PRN
Qty: 60 TABLET | Refills: 0 | Status: SHIPPED | OUTPATIENT
Start: 2018-11-06 | End: 2018-12-11

## 2018-11-06 NOTE — TELEPHONE ENCOUNTER
Requested Prescriptions   Pending Prescriptions Disp Refills     oxyCODONE IR (ROXICODONE) 5 MG tablet         Last Written Prescription Date:  10/8/18  Last Fill Quantity: 60,   # refills: 0  Last Office Visit: 9/11/18  Future Office visit:       Routing refill request to provider for review/approval because:  Drug not on the [facility], [facility] or [facility] refill protocol or controlled substance 60 tablet 0     Sig: Take 1 tablet (5 mg) by mouth 2 times daily as needed for moderate to severe pain (up to 2 a day)    There is no refill protocol information for this order            Koby Faarax  [facility]

## 2018-11-06 NOTE — TELEPHONE ENCOUNTER
Requested Prescriptions   Pending Prescriptions Disp Refills     oxyCODONE IR (ROXICODONE) 5 MG tablet 60 tablet 0       Sig: Take 1 tablet (5 mg) by mouth 2 times daily as needed for moderate to severe pain (up to 2 a day)     There is no refill protocol information for this order          Controlled Substance Refill Request for Oxycodone 5 mg  Problem List Complete:  No      PROVIDER TO CONSIDER COMPLETION OF PROBLEM LIST AND OVERVIEW/CONTROLLED SUBSTANCE AGREEMENT     Last Written Prescription Date:  10/8/18  Last Fill Quantity: 60 tablets   # refills: 0     Last Office Visit with [facility] primary care provider: 9/11/18     Future Office visit: None      Controlled substance agreement on file: No.      Processing:  Patient will  in clinic   checked in past 3 months?  Yes 10/8/2018     [place] reviewed:  not reviewed/not due - last done on 10/8/18    [place]:  [email or web address]/               Ibeth Deluna RN, BSN

## 2018-11-06 NOTE — TELEPHONE ENCOUNTER
..Reason for Call:  Medication or medication refill:    Do you use a [facility]?  Name of the pharmacy and phone number for the current request:  [facility] - [place] - [phone]    Name of the medication requested: oxycodone IR 5 mg    Other request: will  at the [facility] pharmacy    Can we leave a detailed message on this number?  yes    Phone number patient can be reached at: Home number on file [phone] (home)    Best Time: anytime    Call taken on 11/6/2018 at 9:56 AM by Pamela Ryan

## 2018-11-10 DIAGNOSIS — F33.0 MAJOR DEPRESSIVE DISORDER, RECURRENT EPISODE, MILD (H): ICD-10-CM

## 2018-11-10 NOTE — TELEPHONE ENCOUNTER
"Requested Prescriptions   Pending Prescriptions Disp Refills     DULoxetine (CYMBALTA) 60 MG EC capsule [Pharmacy Med Name: DULOXETINE HCL DR 60 MG CAP]    Last Written Prescription Date:  5/2/18  Last Fill Quantity: 90,  # refills: 1   Last Office Visit with [facility], [facility] or [facility] prescribing provider:  9/11/18   Future Office Visit:      90 capsule 1     Sig: TAKE ONE CAPSULE BY MOUTH EVERY DAY    Serotonin-Norepinephrine Reuptake Inhibitors  Failed    11/10/2018  1:43 AM       Failed - PHQ-9 score of less than 5 in past 6 months    Please review last PHQ-9 score.          Passed - Blood pressure under 140/90 in past 12 months    BP Readings from Last 3 Encounters:   09/11/18 104/67   05/17/18 115/78   01/09/18 130/88                Passed - Patient is age 18 or older       Passed - Recent (6 mo) or future (30 days) visit within the authorizing provider's specialty    Patient had office visit in the last 6 months or has a visit in the next 30 days with authorizing provider or within the authorizing provider's specialty.  See \"Patient Info\" tab in inbasket, or \"Choose Columns\" in Meds & Orders section of the refill encounter.                  Koby Faarax  [facility]  "

## 2018-11-13 RX ORDER — DULOXETIN HYDROCHLORIDE 60 MG/1
CAPSULE, DELAYED RELEASE ORAL
Qty: 90 CAPSULE | Refills: 1 | Status: SHIPPED | OUTPATIENT
Start: 2018-11-13 | End: 2019-05-16

## 2018-11-13 ASSESSMENT — PATIENT HEALTH QUESTIONNAIRE - PHQ9: SUM OF ALL RESPONSES TO PHQ QUESTIONS 1-9: 9

## 2018-11-13 NOTE — TELEPHONE ENCOUNTER
Team, please call patient to get an updated PHQ-9. If score is 4 or less then route to RN refill pool. If score is 5 or greater, route to provider to refill medication.  Thank you.   Jana Mendoza RN

## 2018-11-14 NOTE — TELEPHONE ENCOUNTER
Patient scored higher then a 5. Routing to provider to advise. Thank you!     Shaista Mclaughlin MA

## 2018-12-10 DIAGNOSIS — G89.4 CHRONIC PAIN SYNDROME: ICD-10-CM

## 2018-12-10 DIAGNOSIS — G62.9 NEUROPATHY: ICD-10-CM

## 2018-12-10 RX ORDER — TRAMADOL HYDROCHLORIDE 50 MG/1
TABLET ORAL
Qty: 60 TABLET | Refills: 1 | Status: SHIPPED | OUTPATIENT
Start: 2018-12-10 | End: 2018-12-14

## 2018-12-10 NOTE — TELEPHONE ENCOUNTER
Routing refill request to provider for review/approval because:  Drug not on the [facility], [facility] or [facility] refill protocol or controlled substance  Bhumi Martínez RN

## 2018-12-11 RX ORDER — OXYCODONE HYDROCHLORIDE 5 MG/1
5 TABLET ORAL 2 TIMES DAILY PRN
Qty: 60 TABLET | Refills: 0 | Status: SHIPPED | OUTPATIENT
Start: 2018-12-11 | End: 2019-01-22

## 2018-12-11 NOTE — TELEPHONE ENCOUNTER
Can you also send the hard copy of his Oxycodone to the [facility]    Thank you

## 2018-12-11 NOTE — TELEPHONE ENCOUNTER
Controlled Substance Refill Request for Oxycodone  Problem List Complete:  No     PROVIDER TO CONSIDER COMPLETION OF PROBLEM LIST AND OVERVIEW/CONTROLLED SUBSTANCE AGREEMENT    Last Written Prescription Date:  11/6/2018  Last Fill Quantity: 60 tablets   # refills: 0    Last Office Visit with [facility] primary care provider: 9/11/2018    Future Office visit: None    Controlled substance agreement on file: No.     Processing:  Staff will hand deliver Rx to on-site pharmacy   checked in past 3 months?  No-problem list incomplete so  not checked.         Ibeth Deluna RN, BSN

## 2018-12-12 NOTE — TELEPHONE ENCOUNTER
Bringing Rx for the Oxycodone to our pharmacy by 1:00 pm today.  Sandra Moss MA/  For Teams [employer] [facility]

## 2018-12-14 DIAGNOSIS — G62.9 NEUROPATHY: ICD-10-CM

## 2018-12-14 RX ORDER — TRAMADOL HYDROCHLORIDE 50 MG/1
TABLET ORAL
Qty: 60 TABLET | Refills: 1 | Status: SHIPPED | OUTPATIENT
Start: 2018-12-14 | End: 2019-03-15

## 2018-12-14 NOTE — TELEPHONE ENCOUNTER
Routing refill request to provider for review/approval because:  Drug not on the [facility], [facility] or [facility] refill protocol or controlled substance  Bhumi Martínez RN

## 2018-12-14 NOTE — TELEPHONE ENCOUNTER
Signed Prescriptions:                        Disp   Refills    traMADol (ULTRAM) 50 MG tablet             60 tab*1        Sig: TAKE 1 TO 2 TABLETS BY MOUTH TWICE DAILY. MAX 2           TABS/24 HRS  Authorizing Provider: [place]    Written rx faxed to the pharmacy, Pharmacy will contact pt when medication is ready for pickup.  Rene Alvarez,  For Teams [facility]

## 2019-01-14 ENCOUNTER — DOCUMENTATION ONLY (OUTPATIENT)
Dept: LAB | Facility: CLINIC | Age: 64
End: 2019-01-14

## 2019-01-14 DIAGNOSIS — E11.22 TYPE 2 DIABETES MELLITUS WITH STAGE 3 CHRONIC KIDNEY DISEASE, WITH LONG-TERM CURRENT USE OF INSULIN (H): Primary | ICD-10-CM

## 2019-01-14 DIAGNOSIS — N18.30 TYPE 2 DIABETES MELLITUS WITH STAGE 3 CHRONIC KIDNEY DISEASE, WITH LONG-TERM CURRENT USE OF INSULIN (H): Primary | ICD-10-CM

## 2019-01-14 DIAGNOSIS — Z79.4 TYPE 2 DIABETES MELLITUS WITH STAGE 3 CHRONIC KIDNEY DISEASE, WITH LONG-TERM CURRENT USE OF INSULIN (H): Primary | ICD-10-CM

## 2019-01-14 NOTE — PROGRESS NOTES
Patient has an upcoming lab appointment on 01.15.19 at 08:45 am . Per lab scrubbing protocol, patient isn't due for labs. Please review and place future orders. If no labs are needed, please contacted patient and cancelled lab appointment.     Thank you,  DYLAN Melvin

## 2019-01-15 DIAGNOSIS — Z79.4 TYPE 2 DIABETES MELLITUS WITH STAGE 3 CHRONIC KIDNEY DISEASE, WITH LONG-TERM CURRENT USE OF INSULIN (H): ICD-10-CM

## 2019-01-15 DIAGNOSIS — E11.22 TYPE 2 DIABETES MELLITUS WITH STAGE 3 CHRONIC KIDNEY DISEASE, WITH LONG-TERM CURRENT USE OF INSULIN (H): ICD-10-CM

## 2019-01-15 DIAGNOSIS — N18.30 TYPE 2 DIABETES MELLITUS WITH STAGE 3 CHRONIC KIDNEY DISEASE, WITH LONG-TERM CURRENT USE OF INSULIN (H): ICD-10-CM

## 2019-01-15 LAB
CREAT SERPL-MCNC: 1.44 MG/DL (ref 0.66–1.25)
GFR SERPL CREATININE-BSD FRML MDRD: 51 ML/MIN/{1.73_M2}
GLUCOSE SERPL-MCNC: 100 MG/DL (ref 70–99)
HBA1C MFR BLD: 5.8 % (ref 0–5.6)
HGB BLD-MCNC: 15.1 G/DL (ref 13.3–17.7)

## 2019-01-15 PROCEDURE — 83036 HEMOGLOBIN GLYCOSYLATED A1C: CPT | Performed by: FAMILY MEDICINE

## 2019-01-15 PROCEDURE — 36415 COLL VENOUS BLD VENIPUNCTURE: CPT | Performed by: FAMILY MEDICINE

## 2019-01-15 PROCEDURE — 85018 HEMOGLOBIN: CPT | Performed by: FAMILY MEDICINE

## 2019-01-15 PROCEDURE — 82565 ASSAY OF CREATININE: CPT | Performed by: FAMILY MEDICINE

## 2019-01-15 PROCEDURE — 82947 ASSAY GLUCOSE BLOOD QUANT: CPT | Performed by: FAMILY MEDICINE

## 2019-01-16 NOTE — RESULT ENCOUNTER NOTE
Dear Santiago Hylton,    Your test results are attached. I am happy to let you know that they are stable and your medications can stay the same.    The diabetes test continues to be in normal range. The hemoglobin is normal and you do not have anemia. The kidney test is stable also and looks good. We can recheck labs in 6 months.     Please call me if you have any questions about these test results or about your care.    Sincerely,    Tori Rizvi MD

## 2019-01-22 ENCOUNTER — OFFICE VISIT (OUTPATIENT)
Dept: FAMILY MEDICINE | Facility: CLINIC | Age: 64
End: 2019-01-22
Payer: MEDICARE

## 2019-01-22 VITALS
HEART RATE: 91 BPM | RESPIRATION RATE: 16 BRPM | OXYGEN SATURATION: 92 % | BODY MASS INDEX: 34.82 KG/M2 | SYSTOLIC BLOOD PRESSURE: 133 MMHG | TEMPERATURE: 98 F | HEIGHT: 65 IN | DIASTOLIC BLOOD PRESSURE: 88 MMHG | WEIGHT: 209 LBS

## 2019-01-22 DIAGNOSIS — G89.4 CHRONIC PAIN SYNDROME: ICD-10-CM

## 2019-01-22 DIAGNOSIS — E11.42 TYPE 2 DIABETES MELLITUS WITH DIABETIC POLYNEUROPATHY, UNSPECIFIED WHETHER LONG TERM INSULIN USE (H): ICD-10-CM

## 2019-01-22 DIAGNOSIS — F33.0 MILD RECURRENT MAJOR DEPRESSION (H): ICD-10-CM

## 2019-01-22 DIAGNOSIS — L50.1 CHRONIC IDIOPATHIC URTICARIA: Primary | ICD-10-CM

## 2019-01-22 DIAGNOSIS — N18.2 CKD (CHRONIC KIDNEY DISEASE) STAGE 2, GFR 60-89 ML/MIN: ICD-10-CM

## 2019-01-22 DIAGNOSIS — I25.10 CORONARY ARTERY DISEASE INVOLVING NATIVE CORONARY ARTERY OF NATIVE HEART WITHOUT ANGINA PECTORIS: ICD-10-CM

## 2019-01-22 DIAGNOSIS — I10 HYPERTENSION GOAL BP (BLOOD PRESSURE) < 140/90: ICD-10-CM

## 2019-01-22 DIAGNOSIS — E78.5 HYPERLIPIDEMIA LDL GOAL <100: ICD-10-CM

## 2019-01-22 DIAGNOSIS — B18.2 CHRONIC HEPATITIS C WITHOUT HEPATIC COMA (H): ICD-10-CM

## 2019-01-22 DIAGNOSIS — J44.89 CHRONIC OBSTRUCTIVE AIRWAY DISEASE WITH ASTHMA (H): ICD-10-CM

## 2019-01-22 DIAGNOSIS — I50.42 CHRONIC COMBINED SYSTOLIC AND DIASTOLIC CONGESTIVE HEART FAILURE (H): ICD-10-CM

## 2019-01-22 PROCEDURE — 99214 OFFICE O/P EST MOD 30 MIN: CPT | Mod: 25 | Performed by: FAMILY MEDICINE

## 2019-01-22 PROCEDURE — 99396 PREV VISIT EST AGE 40-64: CPT | Performed by: FAMILY MEDICINE

## 2019-01-22 RX ORDER — HYDROXYZINE HYDROCHLORIDE 25 MG/1
25-50 TABLET, FILM COATED ORAL EVERY 8 HOURS PRN
Qty: 60 TABLET | Refills: 3 | Status: SHIPPED | OUTPATIENT
Start: 2019-01-22 | End: 2021-06-20

## 2019-01-22 RX ORDER — OXYCODONE HYDROCHLORIDE 5 MG/1
5 TABLET ORAL 2 TIMES DAILY PRN
Qty: 60 TABLET | Refills: 0 | Status: SHIPPED | OUTPATIENT
Start: 2019-01-22 | End: 2019-02-19

## 2019-01-22 ASSESSMENT — ANXIETY QUESTIONNAIRES
GAD7 TOTAL SCORE: 7
IF YOU CHECKED OFF ANY PROBLEMS ON THIS QUESTIONNAIRE, HOW DIFFICULT HAVE THESE PROBLEMS MADE IT FOR YOU TO DO YOUR WORK, TAKE CARE OF THINGS AT HOME, OR GET ALONG WITH OTHER PEOPLE: SOMEWHAT DIFFICULT
7. FEELING AFRAID AS IF SOMETHING AWFUL MIGHT HAPPEN: SEVERAL DAYS
2. NOT BEING ABLE TO STOP OR CONTROL WORRYING: SEVERAL DAYS
1. FEELING NERVOUS, ANXIOUS, OR ON EDGE: SEVERAL DAYS
5. BEING SO RESTLESS THAT IT IS HARD TO SIT STILL: NOT AT ALL
3. WORRYING TOO MUCH ABOUT DIFFERENT THINGS: SEVERAL DAYS
6. BECOMING EASILY ANNOYED OR IRRITABLE: MORE THAN HALF THE DAYS

## 2019-01-22 ASSESSMENT — PATIENT HEALTH QUESTIONNAIRE - PHQ9
5. POOR APPETITE OR OVEREATING: SEVERAL DAYS
SUM OF ALL RESPONSES TO PHQ QUESTIONS 1-9: 10

## 2019-01-22 ASSESSMENT — MIFFLIN-ST. JEOR: SCORE: 1678.86

## 2019-01-22 ASSESSMENT — PAIN SCALES - GENERAL: PAINLEVEL: MODERATE PAIN (5)

## 2019-01-23 ASSESSMENT — ANXIETY QUESTIONNAIRES: GAD7 TOTAL SCORE: 7

## 2019-01-23 NOTE — PATIENT INSTRUCTIONS
At [facility], we strive to deliver an exceptional experience to you, every time we see you.  If you receive a survey in the mail, please send us back your thoughts. We really do value your feedback.    Based on your medical history, these are the current health maintenance/preventive care services that you are due for (some may have been done at this visit.)  Health Maintenance Due   Topic Date Due     URINE DRUG SCREEN Q1 YR  02/02/1971     ZOSTER IMMUNIZATION (1 of 2) 02/02/2006     EYE EXAM Q1 YEAR  12/09/2016     HF ACTION PLAN Q3 YR  02/12/2017     ASTHMA CONTROL TEST Q6 MOS  11/17/2018         Suggested websites for health information:  Www.[facility] : Up to date and easily searchable information on multiple topics.  Www.[employer].gov : medication info, interactive tutorials, watch real surgeries online  Www.familydoctor.org : good info from the Academy of Family Physicians  Www.cdc.gov : public health info, travel advisories, epidemics (H1N1)  Www.aap.org : children's health info, normal development, vaccinations  Www.health.[facility].[place].us : [place] dept of health, public health issues in [place], N1N1    Your care team:                            Family Medicine Internal Medicine   MD Robb Teague MD Shantel Branch-Fleming, MD Katya Georgiev PA-C Nam Ho, MD Pediatrics   MIGUEL Ramírez, MD Yoly Ling CNP, MD Deborah Mielke, MD Kim Thein, APRN CNP      Clinic hours: Monday - Thursday 7 am-7 pm; Fridays 7 am-5 pm.   Urgent care: Monday - Friday 11 am-9 pm; Saturday and Sunday 9 am-5 pm.  Pharmacy : Monday -Thursday 8 am-8 pm; Friday 8 am-6 pm; Saturday and Sunday 9 am-5 pm.     Clinic: [phone]   Pharmacy: [phone]    Preventive Health Recommendations  Male Ages 50 - 64    Yearly exam:             See your health care provider every year in order to  o   Review health changes.   o    Discuss preventive care.    o   Review your medicines if your doctor has prescribed any.     Have a cholesterol test every 5 years, or more frequently if you are at risk for high cholesterol/heart disease.     Have a diabetes test (fasting glucose) every three years. If you are at risk for diabetes, you should have this test more often.     Have a colonoscopy at age 50, or have a yearly FIT test (stool test). These exams will check for colon cancer.      Talk with your health care provider about whether or not a prostate cancer screening test (PSA) is right for you.    You should be tested each year for STDs (sexually transmitted diseases), if you re at risk.     Shots: Get a flu shot each year. Get a tetanus shot every 10 years.     Nutrition:    Eat at least 5 servings of fruits and vegetables daily.     Eat whole-grain bread, whole-wheat pasta and brown rice instead of white grains and rice.     Get adequate Calcium and Vitamin D.     Lifestyle    Exercise for at least 150 minutes a week (30 minutes a day, 5 days a week). This will help you control your weight and prevent disease.     Limit alcohol to one drink per day.     No smoking.     Wear sunscreen to prevent skin cancer.     See your dentist every six months for an exam and cleaning.     See your eye doctor every 1 to 2 years.    Patient Education     Hives (Adult)  Hives are pink or red bumps on the skin. These bumps are also known as wheals. The bumps can itch, burn, or sting. Hives can occur anywhere on the body. They vary in size and shape and can form in clusters. Individual hives can appear and go away quickly. New hives may develop as old ones fade. Hives are common and usually harmless. Occasionally hives are a sign of a serious allergy.  Hives are often caused by an allergic reaction. It may be an allergic reaction to foods such as fruit, shellfish, chocolate, nuts, or tomatoes. It may be a reaction to pollens, animal fur, or mold spores.  Medicines, chemicals, and insect bites can also cause hives. And hives can be caused by hot sun or cold air. The cause of hives can be difficult to find.  You may be given medicines to relieve swelling and itching. Follow all instructions when using these medicines. The hives will usually fade in a few days, but can last up to 2 weeks.  Home care  Follow these tips:    Try to find the cause of the hives and eliminate it. Discuss possible causes with your healthcare provider. Future reactions to the same allergen may be worse.    Don t scratch the hives. Scratching will delay healing. To reduce itching, apply cool, wet compresses to the skin.    Dress in soft, loose cotton clothing.    Don t bathe in hot water. This can make the itching worse.    Apply an ice pack or cool pack wrapped in a thin towel to your skin. This will help reduce redness and itching. But if your hives were caused by exposure to cold, then do not apply more cold to them.    You may use over-the counter antihistamines to reduce itching. Some older antihistamines, such as diphenhydramine and chlorpheniramine, are inexpensive. But they need to be taken often and may make you sleepy. They are best used at bedtime. Don t use diphenhydramine if you have glaucoma or have trouble urinating because of an enlarged prostate. Newer antihistamines, such as loratadine, cetirizine, and fexofenadine, are generally more expensive. But they tend to have fewer side effects, such as drowsiness. They can be taken less often.    Another type of antihistamine is used to treat heartburn. This type includes ranitidine, nizatidine, famotidine, and cimetidine. These are sometimes used along with the above antihistamines if a single medicine is not working.  Follow-up care  Follow up with your healthcare provider if your symptoms don't get better in 2 days. Ask your provider about allergy testing if you have had a severe reaction, or have had several episodes of hives. He  or she can use the allergy testing to find out what you are allergic to.  When to seek medical advice  Call your healthcare provider right away if any of these occur:    Fever of 100.4 F (38.0 C) or higher, or as directed by your healthcare provider    Redness, swelling, or pain    Foul-smelling fluid coming from the rash  Call 911  Call 911 if any of the following occur:    Swelling of the face, throat, or tongue    Trouble breathing or swallowing    Dizziness, weakness, or fainting  Date Last Reviewed: 9/1/2016 2000-2018 [employer]. [address] [place] [address]. All rights reserved. This information is not intended as a substitute for professional medical care. Always follow your healthcare professional's instructions.

## 2019-01-23 NOTE — PROGRESS NOTES
SUBJECTIVE:   CC: Santiago Hylton is an 63 year old male who presents for preventative health visit.     Physical   Annual:     Getting at least 3 servings of Calcium per day:  Yes    Bi-annual eye exam:  NO (appointment coming up)    Dental care twice a year:  NO (looking for new dentist)    Sleep apnea or symptoms of sleep apnea:  Sleep apnea    Diet:  Regular (no restrictions) (but tries to eat healthy)    Frequency of exercise:  2-3 days/week    Duration of exercise:  Greater than 60 minutes    Taking medications regularly:  Yes    Medication side effects:  None    Additional concerns today:  YES (form for disability and left hand finger pain and locking up)          Diabetes Follow-up      Patient is checking blood sugars: not at all    Diabetic concerns: None     Symptoms of hypoglycemia (low blood sugar): none     Paresthesias (numbness or burning in feet) or sores: No     Date of last diabetic eye exam: up to date     BP Readings from Last 2 Encounters:   01/22/19 133/88   09/11/18 104/67     Hemoglobin A1C (%)   Date Value   01/15/2019 5.8 (H)   09/11/2018 5.4     LDL Cholesterol Calculated (mg/dL)   Date Value   09/11/2018 58   05/17/2018 70       Diabetes Management Resources  Hyperlipidemia Follow-Up      Rate your low fat/cholesterol diet?: good    Taking statin?  Yes, no muscle aches from statin    Other lipid medications/supplements?:  none    Hypertension Follow-up      Outpatient blood pressures are not being checked.    Low Salt Diet: no added salt    Heart Failure Follow-up    Symptoms:    Shortness of breath: none    Lower extremity edema: none    Chest pain: No    Using more pillows than normal: No    Cough at night: No    Weight:    Checking weight daily: Yes    Weight change: none    Cardiology visits, ER/UC, or hospital admissions since last visit: None    Medication side effects: none    Depression and Anxiety Follow-Up    Status since last visit: No change    Other associated  symptoms:None    Complicating factors:     Significant life event: No     Current substance abuse: None    PHQ 2018   PHQ-9 Total Score 7 9 10   Q9: Suicide Ideation Not at all Not at all Not at all     GENARO-7 SCORE 2016   Total Score 6 7 7       PHQ-9  English  PHQ-9   Any Language  GENARO-7  Suicide Assessment Five-step Evaluation and Treatment (SAFE-T)  COPD Follow-Up    Symptoms are currently: stable    Current fatigue or dyspnea with ambulation: none    Shortness of breath: stable    Increased or change in Cough/Sputum: No    Fever(s): No    Baseline ambulation without stopping to rest:  1 block rooms. Able to walk up 1/2 flights of stairs without stopping to rest.    Any ER/UC or hospital admissions since your last visit? No     History   Smoking Status     Former Smoker     Packs/day: 0.25     Years: 15.00     Types: Cigarettes     Quit date: 2016   Smokeless Tobacco     Never Used     Comment: 3-4 a day     Lab Results   Component Value Date    FEV1 50% pred 2018    TGA9GQQ 86% pred 2018         Today's PHQ-2 Score:   PHQ-2 ( 1999 [employer]) 2019   Q1: Little interest or pleasure in doing things 1   Q2: Feeling down, depressed or hopeless 1   PHQ-2 Score 2       Abuse: Current or Past(Physical, Sexual or Emotional)- No  Do you feel safe in your environment? Yes    Social History     Tobacco Use     Smoking status: Former Smoker     Packs/day: 0.25     Years: 15.00     Pack years: 3.75     Types: Cigarettes     Last attempt to quit: 2016     Years since quittin.3     Smokeless tobacco: Never Used     Tobacco comment: 3-4 a day   Substance Use Topics     Alcohol use: Yes     Comment: weekend beer     No flowsheet data found.No flowsheet data found.    Last PSA: No results found for: PSA    Reviewed orders with patient. Reviewed health maintenance and updated orders accordingly - Yes  Labs reviewed in EPIC  BP Readings from Last 3  Encounters:   01/22/19 133/88   09/11/18 104/67   05/17/18 115/78    Wt Readings from Last 3 Encounters:   01/22/19 94.8 kg (209 lb)   09/11/18 94.7 kg (208 lb 11.2 oz)   05/17/18 96.6 kg (213 lb)                  Patient Active Problem List   Diagnosis     Hypertension goal BP (blood pressure) < 140/90     Hyperlipidemia LDL goal <100     CHF (congestive heart failure) (H)     Mild recurrent major depression (H)     Gout     GERD (gastroesophageal reflux disease)     Chronic rhinitis     CKD (chronic kidney disease) stage 2, GFR 60-89 ml/min     History of colonic polyps     Advanced directives, counseling/discussion     Chronic hepatitis C (H)     Type 2 diabetes mellitus with diabetic chronic kidney disease (H)     Microalbuminuria due to type 2 diabetes mellitus (H)     Obesity (BMI 30-39.9)     Type 2 diabetes mellitus with diabetic polyneuropathy (H)     Coronary artery disease involving native coronary artery of native heart without angina pectoris     Diabetic polyneuropathy associated with type 2 diabetes mellitus (H)     Chronic obstructive airway disease with asthma (H)     Past Surgical History:   Procedure Laterality Date     CARDIAC SURGERY      stent     CATARACT IOL, RT/LT       COLONOSCOPY  9/18/2012    Procedure: COLONOSCOPY;  COLONOSCOPY, SCREEN;  Surgeon: Cl Johnson MD;  Location:  OR     ORTHOPEDIC SURGERY      ankle     PHACOEMULSIFICATION WITH STANDARD INTRAOCULAR LENS IMPLANT Right 1/28/2016    Procedure: PHACOEMULSIFICATION WITH STANDARD INTRAOCULAR LENS IMPLANT;  Surgeon: Fly Merrill MD;  Location: [facility] OR     PHACOEMULSIFICATION WITH STANDARD INTRAOCULAR LENS IMPLANT Left 2/11/2016    Procedure: PHACOEMULSIFICATION WITH STANDARD INTRAOCULAR LENS IMPLANT;  Surgeon: Fly Merrill MD;  Location: [facility] OR       Social History     Tobacco Use     Smoking status: Former Smoker     Packs/day: 0.25     Years: 15.00     Pack years: 3.75     Types: Cigarettes     Last  attempt to quit: 2016     Years since quittin.3     Smokeless tobacco: Never Used     Tobacco comment: 3-4 a day   Substance Use Topics     Alcohol use: Yes     Comment: weekend beer     Family History   Problem Relation Age of Onset     Heart Disease Maternal Grandmother      Hypertension Maternal Grandmother      Hypertension Father      Hypertension Mother      Hypertension Sister      Thyroid Disease Sister      Cancer Brother      Diabetes Brother      Hypertension Brother      Hypertension Maternal Aunt      Cancer Maternal Uncle      Hypertension Maternal Uncle      Hypertension Paternal Aunt      Hypertension Paternal Uncle      Hypertension Maternal Grandfather      Hypertension Paternal Grandmother      Hypertension Paternal Grandfather      Cerebrovascular Disease No family hx of      Glaucoma No family hx of      Macular Degeneration No family hx of          Current Outpatient Medications   Medication Sig Dispense Refill     albuterol (2.5 MG/3ML) 0.083% neb solution INHALE 3 ML BY NEBULIZATION METHOD EVERY 6 HOURS AS NEEDED FOR SHORTNESS OF BREATH 360 mL 1     allopurinol (ZYLOPRIM) 100 MG tablet Take 2 tablets (200 mg) by mouth daily 180 tablet 3     amLODIPine (NORVASC) 5 MG tablet TAKE 1 TABLET BY MOUTH EVERY DAY FOR BLOOD PRESSURE 90 tablet 1     aspirin  MG tablet TAKE 1 TABLET BY MOUTH EVERY DAY 90 tablet 3     carvedilol (COREG) 12.5 MG tablet TAKE 1 TABLET BY MOUTH TWICE DAILY WITH MEALS 180 tablet 2     cetirizine (ZYRTEC) 10 MG tablet TAKE 1 TABLET BY MOUTH DAILY AT BEDTIME 90 tablet 10     DULoxetine (CYMBALTA) 60 MG EC capsule TAKE ONE CAPSULE BY MOUTH EVERY DAY 90 capsule 1     furosemide (LASIX) 40 MG tablet TAKE 1 TABLET BY MOUTH TIWCE DAILY IF WEIGHT INCREASES/SHORTNESS OF BREATH.TAKE 1 TABLET IF BETTER 135 tablet 1     gabapentin (NEURONTIN) 300 MG capsule TAKE 1 CAPSULE (300 MG) BY MOUTH 3 TIMES DAILY 270 capsule 3     gabapentin (NEURONTIN) 300 MG capsule Take 1 capsule  (300 mg) by mouth 4 times daily 2 during the day and 2 at night 360 capsule 3     hydrOXYzine (ATARAX) 25 MG tablet Take 1-2 tablets (25-50 mg) by mouth every 8 hours as needed for itching 60 tablet 3     isosorbide mononitrate (ISMO/MONOKET) 20 MG tablet Take 1 tablet (20 mg) by mouth 2 times daily 180 tablet 2     KLOR-CON 20 MEQ CR tablet TAKE 1 TABLET(20 MEQ) BY MOUTH DAILY 90 tablet 0     lidocaine (LIDODERM) 5 % Patch 1 patch       lisinopril (PRINIVIL/ZESTRIL) 40 MG tablet TAKE 1/2 TABLET BY MOUTH EVERY DAY 45 tablet 3     loratadine (CLARITIN) 10 MG tablet Take 1 tablet (10 mg) by mouth daily 90 tablet 1     montelukast (SINGULAIR) 10 MG tablet TAKE 1 TABLET BY MOUTH DAILY AT BEDTIME 90 tablet 3     nitroGLYcerin (NITROSTAT) 0.4 MG sublingual tablet Place 1 tablet (0.4 mg) under the tongue every 5 minutes as needed for chest pain 0.4 mg by Sublingual route every 5 (five) minutes as needed. 25 tablet 11     oxyCODONE (ROXICODONE) 5 MG tablet Take 1 tablet (5 mg) by mouth 2 times daily as needed for moderate to severe pain (up to 2 a day) 60 tablet 0     potassium chloride SA (K-DUR/KLOR-CON M) 20 MEQ CR tablet TAKE 1 TABLET(20 MEQ) BY MOUTH DAILY 90 tablet 0     predniSONE (DELTASONE) 20 MG tablet Take 1 tablet (20 mg) by mouth 2 times daily 10 tablet 1     ranitidine (ZANTAC) 150 MG tablet TAKE 1 TABLET BY MOUTH TWICE DAILY 180 tablet 3     simvastatin (ZOCOR) 40 MG tablet Take 1 tablet (40 mg) by mouth At Bedtime 90 tablet 2     SYMBICORT 160-4.5 MCG/ACT Inhaler INHALE 2 PUFFS IN TO THE LUNGS TWICE DAILY 10.2 Inhaler 3     traMADol (ULTRAM) 50 MG tablet TAKE 1 TO 2 TABLETS BY MOUTH TWICE DAILY. MAX 2 TABS/24 HRS 60 tablet 1     traZODone (DESYREL) 100 MG tablet TAKE 2 TABLETS BY MOUTH AT BEDTIME 180 tablet 1     Allergies   Allergen Reactions     No Known Drug Allergies      Recent Labs   Lab Test 01/15/19  0853 09/11/18  1208 05/17/18  1045  04/28/17  0942   A1C 5.8* 5.4 5.8*   < > 5.7   LDL  --  58 70  --  " 70   HDL  --  40 52  --  47   TRIG  --  190* 118  --  92   ALT  --  42 25  --  25   CR 1.44* 1.20 1.44*   < > 1.42*   GFRESTIMATED 51* 61 50*   < > 51*   GFRESTBLACK 59* 74 60*   < > 61   POTASSIUM  --  4.5 4.4   < > 4.3   TSH  --  0.79 2.04  --  0.74    < > = values in this interval not displayed.        Reviewed and updated as needed this visit by clinical staff  Tobacco  Allergies  Meds  Med Hx  Surg Hx  Fam Hx  Soc Hx        Reviewed and updated as needed this visit by Provider            Review of Systems  CONSTITUTIONAL: NEGATIVE for fever, chills, change in weight  INTEGUMENTARY/SKIN: NEGATIVE for worrisome rashes, moles or lesions  EYES: NEGATIVE for vision changes or irritation  ENT: NEGATIVE for ear, mouth and throat problems  RESP: NEGATIVE for significant cough or SOB  CV: NEGATIVE for chest pain, palpitations or peripheral edema  GI: NEGATIVE for nausea, abdominal pain, heartburn, or change in bowel habits   male: negative for dysuria, hematuria, decreased urinary stream, erectile dysfunction, urethral discharge  MUSCULOSKELETAL: NEGATIVE for significant arthralgias or myalgia  NEURO: NEGATIVE for weakness, dizziness or paresthesias  PSYCHIATRIC: NEGATIVE for changes in mood or affect    OBJECTIVE:   /88 (BP Location: Right arm, Patient Position: Chair, Cuff Size: Adult Large)   Pulse 91   Temp 98  F (36.7  C) (Oral)   Resp 16   Ht 1.657 m (5' 5.25\")   Wt 94.8 kg (209 lb)   SpO2 92%   BMI 34.51 kg/m      Physical Exam  GENERAL: healthy, alert, well nourished, well hydrated, no distress, obese  HENT: ear canals- normal; TMs- normal; Nose- normal; Mouth- no ulcers, no lesions, throat is clear with no erythema or exudate.   NECK: no tenderness, no adenopathy, no asymmetry, no masses, no stiffness; thyroid- normal to palpation  RESP: lungs clear to auscultation - no rales, no rhonchi, no wheezes  CV: regular rates and rhythm, normal S1 S2, no S3 or S4 and no murmur, no click or rub " -  ABDOMEN: soft, no tenderness, no  hepatosplenomegaly, no masses, normal bowel sounds  MS: extremities- no gross deformities noted, no edema  SKIN: no suspicious lesions, no rashes  NEURO: strength and tone- normal, sensory exam- grossly normal, mentation- intact, speech- normal, reflexes- symmetric  BACK: no CVA tenderness, no paralumbar tenderness  PSYCH: Alert and oriented times 3; speech- coherent , normal rate and volume; able to articulate logical thoughts, able to abstract reason, no tangential thoughts, no hallucinations or delusions, affect- normal     Diagnostic Test Results:  No results found for this or any previous visit (from the past 24 hour(s)).    ASSESSMENT/PLAN:       ICD-10-CM    1. Chronic idiopathic urticaria L50.1 ALLERGY/ASTHMA ADULT REFERRAL     hydrOXYzine (ATARAX) 25 MG tablet- 1-2 as needed for itching and rash. May cause sedation and do not drive after taking   2. Chronic combined systolic and diastolic congestive heart failure (H) I50.42 Symptoms stable and no signs of acute failure   3. Chronic hepatitis C without hepatic coma (H) B18.2 Liver function tests normal. Finished treatment   4. Chronic obstructive airway disease with asthma (H) J44.9 COPD symptoms stable and quit smoking   5. CKD (chronic kidney disease) stage 2, GFR 60-89 ml/min N18.2 GFR in 50's   6. Coronary artery disease involving native coronary artery of native heart without angina pectoris I25.10 No recurrent angina. Follow up with cardiology this summer. Taking Imdur daily.   7. Hyperlipidemia LDL goal <100 E78.5 On Statin   8. Hypertension goal BP (blood pressure) < 140/90 I10 Well controlled on medications    9. Mild recurrent major depression (H) F33.0 Depression is better but still has some problems with sleeping.    10. Type 2 diabetes mellitus with diabetic polyneuropathy, unspecified whether long term insulin use (H) E11.42 Well controlled on medications    11. Chronic pain syndrome G89.4 oxyCODONE  "(ROXICODONE) 5 MG tablet twice a day as needed for back pain.        COUNSELING:   Reviewed preventive health counseling, as reflected in patient instructions       Regular exercise       Healthy diet/nutrition       Prostate cancer screening    BP Readings from Last 1 Encounters:   01/22/19 133/88     Estimated body mass index is 34.51 kg/m  as calculated from the following:    Height as of this encounter: 1.657 m (5' 5.25\").    Weight as of this encounter: 94.8 kg (209 lb).      Weight management plan: Discussed healthy diet and exercise guidelines     reports that he quit smoking about 2 years ago. His smoking use included cigarettes. He has a 3.75 pack-year smoking history. he has never used smokeless tobacco.      Counseling Resources:  ATP IV Guidelines  Pooled Cohorts Equation Calculator  FRAX Risk Assessment  ICSI Preventive Guidelines  Dietary Guidelines for Americans, 2010  USDA's MyPlate  ASA Prophylaxis  Lung CA Screening    Tori Rizvi MD  [facility]  "

## 2019-01-24 ENCOUNTER — TELEPHONE (OUTPATIENT)
Dept: FAMILY MEDICINE | Facility: CLINIC | Age: 64
End: 2019-01-24

## 2019-01-24 NOTE — TELEPHONE ENCOUNTER
Reason for Call:  Other Forms    Detailed comments: Pt calling for he had previously dropped off forms on 01/22/19 and is calling to follow up to see if Dr. Rizvi had filled out forms and if they are available for  at the .  He would like a call back as soon as possible for an update.    Phone Number Patient can be reached at: Home number on file [phone] (home)    Best Time: anytime    Can we leave a detailed message on this number? YES    Call taken on 1/24/2019 at 11:25 AM by Satya Matos

## 2019-01-26 DIAGNOSIS — E87.6 HYPOPOTASSEMIA: ICD-10-CM

## 2019-01-26 DIAGNOSIS — I10 ESSENTIAL HYPERTENSION WITH GOAL BLOOD PRESSURE LESS THAN 140/90: ICD-10-CM

## 2019-01-26 NOTE — TELEPHONE ENCOUNTER
"Requested Prescriptions   Pending Prescriptions Disp Refills     furosemide (LASIX) 40 MG tablet [Pharmacy Med Name: FUROSEMIDE 40 MG TABLET] 135 tablet 1    Last Written Prescription Date:  8/13/18  Last Fill Quantity: 135,  # refills: 1   Last Office Visit with [facility] or [facility] prescribing provider:  1/22/19   Future Office Visit:      Sig: TAKE 1 TABLET BY MOUTH TWICE DAILY IF WEIGHT INCREASES/SHORTNESS OF BREATH.TAKE 1 TABLET IF BETTER    Diuretics (Including Combos) Protocol Failed - 1/26/2019  3:26 PM       Failed - Normal serum creatinine on file in past 12 months    Recent Labs   Lab Test 01/15/19  0853   CR 1.44*             Passed - Blood pressure under 140/90 in past 12 months    BP Readings from Last 3 Encounters:   01/22/19 133/88   09/11/18 104/67   05/17/18 115/78                Passed - Recent (12 mo) or future (30 days) visit within the authorizing provider's specialty    Patient had office visit in the last 12 months or has a visit in the next 30 days with authorizing provider or within the authorizing provider's specialty.  See \"Patient Info\" tab in inbasket, or \"Choose Columns\" in Meds & Orders section of the refill encounter.             Passed - Medication is active on med list       Passed - Patient is age 18 or older       Passed - Normal serum potassium on file in past 12 months    Recent Labs   Lab Test 09/11/18  1208   POTASSIUM 4.5                   Passed - Normal serum sodium on file in past 12 months    Recent Labs   Lab Test 09/11/18  1208                 KLOR-CON 20 MEQ CR tablet [Pharmacy Med Name: KLOR-CON M20 TABLET] 90 tablet 0    Last Written Prescription Date:  10/26/18  Last Fill Quantity: 90,  # refills: 0   Last Office Visit with [facility] or [facility] prescribing provider:  1/22/19   Future Office Visit:      Sig: TAKE 1 TABLET(20 MEQ) BY MOUTH DAILY    Potassium Supplements Protocol Passed - 1/26/2019  3:26 PM       Passed - Recent (12 mo) or future (30 days) visit " "within the authorizing provider's specialty    Patient had office visit in the last 12 months or has a visit in the next 30 days with authorizing provider or within the authorizing provider's specialty.  See \"Patient Info\" tab in inbasket, or \"Choose Columns\" in Meds & Orders section of the refill encounter.             Passed - Medication is active on med list       Passed - Patient is age 18 or older       Passed - Normal serum potassium in past 12 months    Recent Labs   Lab Test 09/11/18  1208   POTASSIUM 4.5                      "

## 2019-01-27 DIAGNOSIS — F33.0 MAJOR DEPRESSIVE DISORDER, RECURRENT EPISODE, MILD (H): ICD-10-CM

## 2019-01-27 NOTE — TELEPHONE ENCOUNTER
"Requested Prescriptions   Pending Prescriptions Disp Refills     traZODone (DESYREL) 100 MG tablet [Pharmacy Med Name: TRAZODONE 100 MG TABLET] 180 tablet 1    Last Written Prescription Date:  7/25/18  Last Fill Quantity: 180,  # refills: 1   Last Office Visit with [facility] or [facility] prescribing provider:  1/22/19   Future Office Visit:      Sig: TAKE 2 TABLETS BY MOUTH AT BEDTIME    Serotonin Modulators Passed - 1/27/2019  9:08 AM       Passed - Recent (12 mo) or future (30 days) visit within the authorizing provider's specialty    Patient had office visit in the last 12 months or has a visit in the next 30 days with authorizing provider or within the authorizing provider's specialty.  See \"Patient Info\" tab in inbasket, or \"Choose Columns\" in Meds & Orders section of the refill encounter.             Passed - Medication is active on med list       Passed - Patient is age 18 or older          "

## 2019-01-29 RX ORDER — TRAZODONE HYDROCHLORIDE 100 MG/1
TABLET ORAL
Qty: 180 TABLET | Refills: 2 | Status: SHIPPED | OUTPATIENT
Start: 2019-01-29 | End: 2020-03-26

## 2019-01-29 RX ORDER — FUROSEMIDE 40 MG
TABLET ORAL
Qty: 135 TABLET | Refills: 1 | Status: SHIPPED | OUTPATIENT
Start: 2019-01-29 | End: 2019-05-07

## 2019-01-29 RX ORDER — POTASSIUM CHLORIDE 1500 MG/1
TABLET, EXTENDED RELEASE ORAL
Qty: 90 TABLET | Refills: 2 | Status: SHIPPED | OUTPATIENT
Start: 2019-01-29 | End: 2019-07-09

## 2019-01-29 NOTE — TELEPHONE ENCOUNTER
For furosemide:  Routing refill request to provider for review/approval because:  Labs out of range:  Creatinine    For Klor-Con:  Prescription approved per [facility] Refill Protocol.        Ibeth Deluna RN, BSN

## 2019-01-29 NOTE — TELEPHONE ENCOUNTER
Prescription approved per [facility] Refill Protocol.      Ibeth Deluna RN, BSN

## 2019-01-29 NOTE — TELEPHONE ENCOUNTER
Patient here in [facility] right now for . Form handed to patient.  Brandon Mills CMA

## 2019-02-19 ENCOUNTER — ANCILLARY PROCEDURE (OUTPATIENT)
Dept: GENERAL RADIOLOGY | Facility: CLINIC | Age: 64
End: 2019-02-19
Attending: FAMILY MEDICINE
Payer: OTHER MISCELLANEOUS

## 2019-02-19 ENCOUNTER — OFFICE VISIT (OUTPATIENT)
Dept: FAMILY MEDICINE | Facility: CLINIC | Age: 64
End: 2019-02-19
Payer: OTHER MISCELLANEOUS

## 2019-02-19 VITALS
RESPIRATION RATE: 18 BRPM | WEIGHT: 211.7 LBS | BODY MASS INDEX: 38.96 KG/M2 | HEIGHT: 62 IN | DIASTOLIC BLOOD PRESSURE: 84 MMHG | HEART RATE: 78 BPM | TEMPERATURE: 97.8 F | OXYGEN SATURATION: 98 % | SYSTOLIC BLOOD PRESSURE: 139 MMHG

## 2019-02-19 DIAGNOSIS — M25.552 HIP PAIN, LEFT: ICD-10-CM

## 2019-02-19 DIAGNOSIS — S16.1XXA NECK STRAIN, INITIAL ENCOUNTER: ICD-10-CM
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
## 2019-02-19 DIAGNOSIS — M54.9 ACUTE UPPER BACK PAIN: ICD-10-CM

## 2019-02-19 PROBLEM — E66.01 MORBID OBESITY (H): Status: ACTIVE | Noted: 2019-02-19

## 2019-02-19 PROCEDURE — 99214 OFFICE O/P EST MOD 30 MIN: CPT | Performed by: FAMILY MEDICINE

## 2019-02-19 PROCEDURE — 72040 X-RAY EXAM NECK SPINE 2-3 VW: CPT

## 2019-02-19 PROCEDURE — 73502 X-RAY EXAM HIP UNI 2-3 VIEWS: CPT

## 2019-02-19 RX ORDER — OXYCODONE HYDROCHLORIDE 5 MG/1
5 TABLET ORAL 2 TIMES DAILY PRN
Qty: 60 TABLET | Refills: 0 | Status: SHIPPED | OUTPATIENT
Start: 2019-02-19 | End: 2019-03-14

## 2019-02-19 ASSESSMENT — PAIN SCALES - GENERAL: PAINLEVEL: EXTREME PAIN (8)

## 2019-02-19 ASSESSMENT — MIFFLIN-ST. JEOR: SCORE: 1638.48

## 2019-02-19 ASSESSMENT — PATIENT HEALTH QUESTIONNAIRE - PHQ9: SUM OF ALL RESPONSES TO PHQ QUESTIONS 1-9: 12

## 2019-02-19 NOTE — LETTER
February 20, 2019      Santiago Hylton  [address]  [place] [address]        Dear ,    We are writing to inform you of your test results. Your test results are attached. I am happy to let you know that they are stable and your medications can stay the same.    Your hip and neck x rays do not show any acute changes from the fall. You have degenerative arthritis and this may have been aggravated from falling as hard as you did. Please follow up with the orthopedics provider or physical therapy as needed.     Please call me if you have any questions about these test results or about your care.    Sincerely,    Tori Rizvi MD/    Resulted Orders   XR Pelvis and Hip Left 1 View    Narrative    XR PELVIS AND HIP LEFT 1 VIEW 2/19/2019 2:38 PM    HISTORY: Left hip pain.    COMPARISON: None.    FINDINGS: Minimal symmetric narrowing of hip joint space. Mild  symmetric acetabular sclerosis. No significant marginal osteophyte  formation. No fracture or malalignment.      Impression    IMPRESSION: Minimal degenerative change at the hips. No acute  abnormality.    ODELL MALHOTRA MD

## 2019-02-19 NOTE — PROGRESS NOTES
SUBJECTIVE:   Santiago Hylton is a 64 year old male who presents to clinic today for the following health issues:    Back Pain       Duration: 02/04/2019        Specific cause: work comp/ Fall    Description:   Location of pain: low back bilateral  Character of pain: Throbbing and shooting   Pain radiation:radiates into the left leg  New numbness or weakness in legs, not attributed to pain:  no   Patient states that he wakes up in the middle of night from sweating due to pain     Intensity: Currently 8/10 and its worst a 10/10    History:   Pain interferes with job: YES  History of back problems: no prior back problems  Any previous MRI or X-rays: None  Sees a specialist for back pain:  No  Therapies tried without relief: heat    Alleviating factors:   Improved by: none      Precipitating factors:  Worsened by: Lifting, Bending, Standing, Sitting, Lying Flat and Walking      Accompanying Signs & Symptoms:  Risk of Fracture:  Recent history of trauma or blunt force  Risk of Cauda Equina:  None  Risk of Infection:  None  Risk of Cancer:  None  Risk of Ankylosing Spondylitis:  Onset at age <35, male, AND morning back stiffness. no                Neck Pain  Onset: 02/04/2019- fell on ice landing on back and head bounced on the steps    Description:   Location: Both sides  Radiation: Right side of head. Feels like someone is hitting me. Radiation pain to left arm and shoulder, chest    Intensity: severe    Progression of Symptoms:  worsening    Accompanying Signs & Symptoms:  Burning, prickly sensation (paresthesias) in arm(s): YES  Numbness in arm(s): no   Weakness in arm(s):  no   Fever: no   Headache: YES  Nausea and/or vomiting: no     History:   Trauma: no   Previous neck pain: no   Previous surgery or injections: no   Previous Imaging (MRI,X ray): no     Precipitating factors:   Does movement increase the pain:  YES    Therapies Tried and outcome:  None        Problem list and histories reviewed & adjusted, as  indicated.  Additional history: as documented    Patient Active Problem List   Diagnosis     Hypertension goal BP (blood pressure) < 140/90     Hyperlipidemia LDL goal <100     CHF (congestive heart failure) (H)     Mild recurrent major depression (H)     Gout     GERD (gastroesophageal reflux disease)     Chronic rhinitis     CKD (chronic kidney disease) stage 2, GFR 60-89 ml/min     History of colonic polyps     Advanced directives, counseling/discussion     Chronic hepatitis C (H)     Type 2 diabetes mellitus with diabetic chronic kidney disease (H)     Microalbuminuria due to type 2 diabetes mellitus (H)     Obesity (BMI 30-39.9)     Type 2 diabetes mellitus with diabetic polyneuropathy (H)     Coronary artery disease involving native coronary artery of native heart without angina pectoris     Diabetic polyneuropathy associated with type 2 diabetes mellitus (H)     Chronic obstructive airway disease with asthma (H)     Obesity (BMI 35.0-39.9) with comorbidity (H)     Past Surgical History:   Procedure Laterality Date     CARDIAC SURGERY      stent     CATARACT IOL, RT/LT       COLONOSCOPY  2012    Procedure: COLONOSCOPY;  COLONOSCOPY, SCREEN;  Surgeon: Cl Johnson MD;  Location:  OR     ORTHOPEDIC SURGERY      ankle     PHACOEMULSIFICATION WITH STANDARD INTRAOCULAR LENS IMPLANT Right 2016    Procedure: PHACOEMULSIFICATION WITH STANDARD INTRAOCULAR LENS IMPLANT;  Surgeon: Fly Merrill MD;  Location: [facility] OR     PHACOEMULSIFICATION WITH STANDARD INTRAOCULAR LENS IMPLANT Left 2016    Procedure: PHACOEMULSIFICATION WITH STANDARD INTRAOCULAR LENS IMPLANT;  Surgeon: Fly Merrill MD;  Location: [facility] OR       Social History     Tobacco Use     Smoking status: Former Smoker     Packs/day: 0.25     Years: 15.00     Pack years: 3.75     Types: Cigarettes     Last attempt to quit: 2016     Years since quittin.4     Smokeless tobacco: Never Used     Tobacco comment: 3-4  a day   Substance Use Topics     Alcohol use: Yes     Comment: weekend beer     Family History   Problem Relation Age of Onset     Heart Disease Maternal Grandmother      Hypertension Maternal Grandmother      Hypertension Father      Hypertension Mother      Hypertension Sister      Thyroid Disease Sister      Cancer Brother      Diabetes Brother      Hypertension Brother      Hypertension Maternal Aunt      Cancer Maternal Uncle      Hypertension Maternal Uncle      Hypertension Paternal Aunt      Hypertension Paternal Uncle      Hypertension Maternal Grandfather      Hypertension Paternal Grandmother      Hypertension Paternal Grandfather      Cerebrovascular Disease No family hx of      Glaucoma No family hx of      Macular Degeneration No family hx of          Current Outpatient Medications   Medication Sig Dispense Refill     albuterol (2.5 MG/3ML) 0.083% neb solution INHALE 3 ML BY NEBULIZATION METHOD EVERY 6 HOURS AS NEEDED FOR SHORTNESS OF BREATH 360 mL 1     allopurinol (ZYLOPRIM) 100 MG tablet Take 2 tablets (200 mg) by mouth daily 180 tablet 3     amLODIPine (NORVASC) 5 MG tablet TAKE 1 TABLET BY MOUTH EVERY DAY FOR BLOOD PRESSURE 90 tablet 1     aspirin  MG tablet TAKE 1 TABLET BY MOUTH EVERY DAY 90 tablet 3     carvedilol (COREG) 12.5 MG tablet TAKE 1 TABLET BY MOUTH TWICE DAILY WITH MEALS 180 tablet 2     cetirizine (ZYRTEC) 10 MG tablet TAKE 1 TABLET BY MOUTH DAILY AT BEDTIME 90 tablet 10     DULoxetine (CYMBALTA) 60 MG EC capsule TAKE ONE CAPSULE BY MOUTH EVERY DAY 90 capsule 1     furosemide (LASIX) 40 MG tablet TAKE 1 TABLET BY MOUTH TWICE DAILY IF WEIGHT INCREASES/SHORTNESS OF BREATH.TAKE 1 TABLET IF BETTER 135 tablet 1     gabapentin (NEURONTIN) 300 MG capsule TAKE 1 CAPSULE (300 MG) BY MOUTH 3 TIMES DAILY 270 capsule 3     gabapentin (NEURONTIN) 300 MG capsule Take 1 capsule (300 mg) by mouth 4 times daily 2 during the day and 2 at night 360 capsule 3     hydrOXYzine (ATARAX) 25 MG tablet  Take 1-2 tablets (25-50 mg) by mouth every 8 hours as needed for itching 60 tablet 3     isosorbide mononitrate (ISMO/MONOKET) 20 MG tablet Take 1 tablet (20 mg) by mouth 2 times daily 180 tablet 2     KLOR-CON 20 MEQ CR tablet TAKE 1 TABLET(20 MEQ) BY MOUTH DAILY 90 tablet 2     lidocaine (LIDODERM) 5 % Patch 1 patch       lisinopril (PRINIVIL/ZESTRIL) 40 MG tablet TAKE 1/2 TABLET BY MOUTH EVERY DAY 45 tablet 3     loratadine (CLARITIN) 10 MG tablet Take 1 tablet (10 mg) by mouth daily 90 tablet 1     montelukast (SINGULAIR) 10 MG tablet TAKE 1 TABLET BY MOUTH DAILY AT BEDTIME 90 tablet 3     nitroGLYcerin (NITROSTAT) 0.4 MG sublingual tablet Place 1 tablet (0.4 mg) under the tongue every 5 minutes as needed for chest pain 0.4 mg by Sublingual route every 5 (five) minutes as needed. 25 tablet 11     oxyCODONE (ROXICODONE) 5 MG tablet Take 1 tablet (5 mg) by mouth 2 times daily as needed for moderate to severe pain (up to 2 a day) 60 tablet 0     potassium chloride SA (K-DUR/KLOR-CON M) 20 MEQ CR tablet TAKE 1 TABLET(20 MEQ) BY MOUTH DAILY 90 tablet 0     predniSONE (DELTASONE) 20 MG tablet Take 1 tablet (20 mg) by mouth 2 times daily 10 tablet 1     ranitidine (ZANTAC) 150 MG tablet TAKE 1 TABLET BY MOUTH TWICE DAILY 180 tablet 3     simvastatin (ZOCOR) 40 MG tablet Take 1 tablet (40 mg) by mouth At Bedtime 90 tablet 2     SYMBICORT 160-4.5 MCG/ACT Inhaler INHALE 2 PUFFS IN TO THE LUNGS TWICE DAILY 10.2 Inhaler 3     traMADol (ULTRAM) 50 MG tablet TAKE 1 TO 2 TABLETS BY MOUTH TWICE DAILY. MAX 2 TABS/24 HRS 60 tablet 1     traZODone (DESYREL) 100 MG tablet TAKE 2 TABLETS BY MOUTH AT BEDTIME 180 tablet 2     Allergies   Allergen Reactions     No Known Drug Allergies      Recent Labs   Lab Test 01/15/19  0853 09/11/18  1208 05/17/18  1045  04/28/17  0942   A1C 5.8* 5.4 5.8*   < > 5.7   LDL  --  58 70  --  70   HDL  --  40 52  --  47   TRIG  --  190* 118  --  92   ALT  --  42 25  --  25   CR 1.44* 1.20 1.44*   < >  "1.42*   GFRESTIMATED 51* 61 50*   < > 51*   GFRESTBLACK 59* 74 60*   < > 61   POTASSIUM  --  4.5 4.4   < > 4.3   TSH  --  0.79 2.04  --  0.74    < > = values in this interval not displayed.      BP Readings from Last 3 Encounters:   02/19/19 139/84   01/22/19 133/88   09/11/18 104/67    Wt Readings from Last 3 Encounters:   02/19/19 96 kg (211 lb 11.2 oz)   01/22/19 94.8 kg (209 lb)   09/11/18 94.7 kg (208 lb 11.2 oz)                  Labs reviewed in EPIC    Reviewed and updated as needed this visit by clinical staff       Reviewed and updated as needed this visit by Provider         ROS:  Constitutional, HEENT, cardiovascular, pulmonary, gi and gu systems are negative, except as otherwise noted.    OBJECTIVE:     /84 (BP Location: Right arm, Patient Position: Sitting, Cuff Size: Adult Large)   Pulse 78   Temp 97.8  F (36.6  C) (Oral)   Resp 18   Ht 1.581 m (5' 2.25\")   Wt 96 kg (211 lb 11.2 oz)   SpO2 98%   BMI 38.41 kg/m    Body mass index is 38.41 kg/m .  GENERAL: healthy, alert, well nourished, well hydrated, no distress, obese  HENT: ear canals- normal; TMs- normal; Nose- normal; Mouth- no ulcers, no lesions, throat is clear with no erythema or exudate.   NECK:  Tenderness over paraspinal muscles, no adenopathy, no asymmetry, no masses, no stiffness; thyroid- normal to palpation  RESP: lungs clear to auscultation - no rales, no rhonchi, no wheezes  CV: regular rates and rhythm, normal S1 S2, no S3 or S4 and no murmur, no click or rub -  ABDOMEN: soft, no tenderness, no  hepatosplenomegaly, no masses, normal bowel sounds  MS: extremities- no gross deformities noted, no edema, left shoulder intact with normal ROM.   SKIN: no suspicious lesions, no rashes  NEURO: strength and tone- normal, sensory exam- grossly normal, mentation- intact, speech- normal, reflexes- symmetric  BACK: no CVA tenderness, no paralumbar tenderness  PSYCH: Alert and oriented times 3; speech- coherent , normal rate and volume; " able to articulate logical thoughts, able to abstract reason, no tangential thoughts, no hallucinations or delusions, affect- normal     Diagnostic Test Results:  No results found for this or any previous visit (from the past 24 hour(s)).    ASSESSMENT/PLAN:               ICD-10-CM    1. Fall due to slipping on ice or snow, initial encounter W00.9XXA Injury to neck and back, left hip without fracture. Will get x ray to make sure that injuries are not significant. May need physical therapy. Pain medication recommended.    2. Neck strain, initial encounter S16.1XXA XR Cervical Spine 2/3 Views     ORTHO  REFERRAL   3. Hip pain, left M25.552 ORTHO  REFERRAL     XR Pelvis and Hip Left 1 View     order for DME   4. Acute upper back pain M54.9 No spinal tenderness.        CONSULTATION/REFERRAL to orthopedics for further evaluation and treatment   FUTURE LABS:       - Schedule fasting labs in 3 months  FUTURE APPOINTMENTS:       - Follow-up visit in 1-2 months or sooner if any questions or concerns.   See Patient Instructions    Tori Rizvi MD  [facility]

## 2019-02-19 NOTE — PATIENT INSTRUCTIONS
Patient Education     Understanding Cervical Strain    There are 7 bones (vertebrae) in the neck that are part of the spine. These are called the cervical spine. Cervical strain is a medical term for neck pain. The neck has several layers of muscles. These are connected with tendons to the cervical spine and other bones. Neck pain is often the result of injury to these muscles and tendons.  Causes of cervical strain  Different types of stress on the neck can damage muscles and tendons (soft tissues) and cause cervical strain. Cervical tissues can be damaged by:    The neck being forced past its normal range of motion, such as in a car accident or sports injury    Constant, low-level stress, such as from poor posture or a poorly set-up workspace  Symptoms of cervical strain  These may include:    Neck pain or stiffness    Pain in the shoulders or upper back    Muscle spasms    Headache, often starting at the base of the neck    Irritability, difficulty concentrating, or sleeplessness  Treatment for cervical strain  This problem often gets better on its own. Treatments aim to reduce pain and inflammation and increase the range of motion of the neck. Possible treatments include:    Over-the-counter or prescription pain medicine. These help relieve pain and inflammation.    Stretching exercises to decrease neck stiffness.    Massage to decrease neck stiffness.    Cold or heat pack. These help reduce pain and swelling.  Call 911  Call 911 right away if you have any of these:    Face drooping or numbness    Numbness or weakness, especially in the arms or on one side    Slurred speech or difficulty speaking    Blurred vision   When to call your healthcare provider  Call your healthcare provider right away if you have any of these:    Fever of 100.4 F (38 C) or higher, or as directed    Pain or stiffness that gets worse    Symptoms that don t get better, or get worse    Numbness, tingling, weakness or shooting pains into  the arms or legs    New symptoms  Date Last Reviewed: 3/10/2016    7917-6060 [employer] [address], [place] [address]. All rights reserved. This information is not intended as a substitute for professional medical care. Always follow your healthcare professional's instructions.           Patient Education     Treating Strains and Sprains  Strains and sprains happen when muscles or other soft tissues near your bones stretch or tear. These injuries can cause bruising, swelling, and pain. To ease your discomfort and speed the healing of your strain or sprain, follow the tips below. Remember, a strain or sprain can take 6 to 8 weeks to heal.     Important Note: Do not give aspirin to children or teens without discussing it with your healthcare provider first.        Ice first, heat later    Use ice for the first 24 to 48 hours after injury. Ice helps prevent swelling and reduce pain. Ice the injury for no more than 20 minutes at a time and allow at least 20 minutes between icing sessions.    Apply heat after the first 72 hours, once the swelling has gone down. Heat relaxes muscles and increases blood flow. Soak the injured area in warm water or use a heating pad set on low for no more than 15 minutes at a time.  Wrap and elevate    Wrap an injured limb firmly with an elastic bandage. This provides support and helps prevent swelling. Don t wear an elastic bandage overnight. Watch for tingling, numbness, or increased pain. Remove the bandage immediately if any of these occurs.    Elevate the injured area to help reduce swelling and throbbing. It s best to raise an injured limb above the level of your heart.     Medicines    Over-the-counter medicines such as acetaminophen or ibuprofen can help reduce pain. Some also help reduce swelling.    Take medicine only as directed.    Rest the area even if medicines are controlling the pain.  Rest    Rest the injured area by not using it for 24 hours.     When you re ready, return slowly to your normal activities. Rest the injured area often.    Don t use or walk on an injured limb if it hurts.  Date Last Reviewed: 1/1/2018 2000-2018 [employer] [address] [place] [address], [place] [address]. All rights reserved. This information is not intended as a substitute for professional medical care. Always follow your healthcare professional's instructions.

## 2019-02-19 NOTE — LETTER
2019      Santiago Concetta  [address]  [facility] [address]              Dear Santiago Hylton      Enclosed is a copy of the results.  It was a pleasure to see you at your last appointment.    If you have any questions or concerns, please call myself or my nurse at [phone].      Sincerely,      Tori Rizvi MD, MPH /[facility]  [facility]      Results for orders placed or performed in visit on 19   XR Cervical Spine 2/3 Views    Narrative    XR CERVICAL SPINE 2/3 VWS 2019 2:37 PM    HISTORY: Neck strain.    COMPARISON: None.    FINDINGS: Grade 1 anterolisthesis of C4 relative to C5. Loss of disc  space at C5-C6-C7 associated with prominent marginal osteophytes.  Prevertebral soft tissues appear normal. Moderate multilevel facet  joint sclerosis on the frontal view.      Impression    IMPRESSION: Moderate low cervical degenerative change. No acute  abnormality.    ODELL MALHOTRA MD                 RE: Santiago Concetta   MRN: [id number]  : 1955  ENC DATE: 2019

## 2019-02-20 NOTE — RESULT ENCOUNTER NOTE
Dear Santiago Hylton,    Your test results are attached. I am happy to let you know that they are stable and your medications can stay the same.    Your hip and neck x rays do not show any acute changes from the fall. You have degenerative arthritis and this may have been aggravated from falling as hard as you did. Please follow up with the orthopedics provider or physical therapy as needed.     Please call me if you have any questions about these test results or about your care.    Sincerely,    Tori Rizvi MD

## 2019-02-22 ENCOUNTER — ANCILLARY PROCEDURE (OUTPATIENT)
Dept: GENERAL RADIOLOGY | Facility: CLINIC | Age: 64
End: 2019-02-22
Attending: FAMILY MEDICINE
Payer: OTHER MISCELLANEOUS

## 2019-02-22 ENCOUNTER — OFFICE VISIT (OUTPATIENT)
Dept: ORTHOPEDICS | Facility: CLINIC | Age: 64
End: 2019-02-22
Payer: OTHER MISCELLANEOUS

## 2019-02-22 VITALS — BODY MASS INDEX: 37.39 KG/M2 | WEIGHT: 211 LBS | HEIGHT: 63 IN

## 2019-02-22 DIAGNOSIS — M79.605 LEFT LEG PAIN: ICD-10-CM

## 2019-02-22 DIAGNOSIS — M54.42 ACUTE BILATERAL LOW BACK PAIN WITH LEFT-SIDED SCIATICA: Primary | ICD-10-CM

## 2019-02-22 PROCEDURE — 72100 X-RAY EXAM L-S SPINE 2/3 VWS: CPT | Performed by: RADIOLOGY

## 2019-02-22 PROCEDURE — 99214 OFFICE O/P EST MOD 30 MIN: CPT | Performed by: FAMILY MEDICINE

## 2019-02-22 RX ORDER — TIZANIDINE 2 MG/1
2-4 TABLET ORAL 3 TIMES DAILY
Qty: 30 TABLET | Refills: 0 | Status: SHIPPED | OUTPATIENT
Start: 2019-02-22 | End: 2020-08-27

## 2019-02-22 RX ORDER — PREDNISONE 20 MG/1
40 TABLET ORAL DAILY
Qty: 10 TABLET | Refills: 0 | Status: SHIPPED | OUTPATIENT
Start: 2019-02-22 | End: 2019-07-09

## 2019-02-22 ASSESSMENT — MIFFLIN-ST. JEOR: SCORE: 1639.28

## 2019-02-22 ASSESSMENT — PAIN SCALES - GENERAL: PAINLEVEL: EXTREME PAIN (8)

## 2019-02-22 NOTE — NURSING NOTE
"Santiago Hylton's chief complaint for this visit includes:  Chief Complaint   Patient presents with     Consult     bilateral hip pain     PCP: Tori Rizvi    Referring Provider:  No referring provider defined for this encounter.    Ht 1.588 m (5' 2.5\")   Wt 95.7 kg (211 lb)   BMI 37.98 kg/m    Extreme Pain (8)     Do you need any medication refills at today's visit? no    "

## 2019-02-22 NOTE — PATIENT INSTRUCTIONS
Take prednisone in the morning with food for 5 days  Do not take ibuprofen, naproxen while taking prednisone  Take tizanidine up to three times daily for spasm  Perform home exercises as tolerated  Follow up with physical therapy  Follow up in clinic if worsening symptoms such as numbness, weakness, or if there is no improvement after 6 weeks.       Thanks for coming today.  [facility]tho[facility] Medicine Clinic  [address] [place] [address]    To schedule future appointments in Ortho Clinic, you may call [phone].    To schedule ordered imaging by your provider:   Call [facility] [phone]    To schedule an injection ordered by your provider:  Call [facility] Injection scheduling line: [phone]  MyChart available online at:  [email or web address]    Please call if any further questions or concerns ([phone]).  Clinic hours 8 am to 5 pm.    Return to clinic (call) if symptoms worsen or fail to improve.

## 2019-02-22 NOTE — LETTER
"    2/22/2019         RE: Santiago Hylton  [address]  [facility] [address]        Dear Colleague,    Thank you for referring your patient, Santiago Hylton, to the [facility]. Please see a copy of my visit note below.    [facility]      Patient is a 64 year old male who presents to the office today for: left leg pain   Fell on 2/4/19 while taking out garbage at work slipped on ice and landed on back and rear end. Works as  at [employer].   Seen by PCP on 2/19/19. c-spine and left hip xray done.   Referred here because of left hip and leg pain. Has shooting pain down left leg down lateral leg to anterior ankle. Has constant ache in left low back. Has some giving out in left leg. No specific weakness. No paresthesia. No bowel or bladder sx.   Has been taking oxycodone and tramadol with some relief. Makes him sleepy. Tried aleve helped a little. No prior back injury.       Past Medical History, Current Medications, and Allergies are reviewed in the electronic medical record as appropriate.     ROS: Pertinent items are noted in HPI.  Constitutional: negative for fevers, chills and malaise  Cardiovascular: negative for dyspnea, fatigue, lower extremity edema  Integument/breast: negative for rash, skin lesion(s) and skin color change  Neurological: see hpi      EXAM:Ht 1.588 m (5' 2.5\")   Wt 95.7 kg (211 lb)   BMI 37.98 kg/m       General: alert, pleasant, no distress. sitting comfortably in chair  Back/Spine: no tenderness to palpation of spinous processes, or paraspinous musculature of lumbar spine. full ROM with flexion, extension, twisting, and side bending without pain. Straight leg raise positive on left. Has pain in left groin with passive IR of hip. No  pain in back with LOU testing bilaterally  Neuro: SILT on bilateral lower extremities, can stand on toes and heel walk without difficulty, patellar and achilles reflexes 2+ and symmetric, babinski downgoing " bilaterally       Imaging: xrays of lumbar spine performed and reviewed independently demonstrating loss of lordosis in the lumbar spine.  Slight retrolisthesis of L5 on S1.  Likely facet arthropathy in the lower lumbar spine.  No acute fracture.  See EMR for formal radiology report.     Also reviewed previous x-ray of pelvis and left hip performed on 2/19/19 demonstrating mild bilateral degenerative disease, left worse than right.  No acute abnormality per radiology report.      Assessment: Patient is a 64 year old male with low back pain and left leg symptoms suspected to be radicular in nature after a fall on 2/4/2019    Recommendations:   Reviewed imaging and assessment with patient in detail  Recommended course of prednisone, 40 mg daily for 5 days  May also use tizanidine up to 3 times daily as needed for muscle pain and spasm  Given referral to physical therapy  Discussed that is okay to continue to use oxycodone or tramadol as needed for severe breakthrough pain, but recommended sparing use.  Follow-up if no improvement in 2 weeks.  Follow-up sooner if worsening pain or radicular symptoms develop.    Kahlil Reina MD                            Again, thank you for allowing me to participate in the care of your patient.        Sincerely,        Kahlil Reina MD

## 2019-02-22 NOTE — PROGRESS NOTES
"[facility] Sports Medicine      Patient is a 64 year old male who presents to the office today for: left leg pain   Fell on 2/4/19 while taking out garbage at work slipped on ice and landed on back and rear end. Works as  at [employer].   Seen by PCP on 2/19/19. c-spine and left hip xray done.   Referred here because of left hip and leg pain. Has shooting pain down left leg down lateral leg to anterior ankle. Has constant ache in left low back. Has some giving out in left leg. No specific weakness. No paresthesia. No bowel or bladder sx.   Has been taking oxycodone and tramadol with some relief. Makes him sleepy. Tried aleve helped a little. No prior back injury.       Past Medical History, Current Medications, and Allergies are reviewed in the electronic medical record as appropriate.     ROS: Pertinent items are noted in HPI.  Constitutional: negative for fevers, chills and malaise  Cardiovascular: negative for dyspnea, fatigue, lower extremity edema  Integument/breast: negative for rash, skin lesion(s) and skin color change  Neurological: see hpi      EXAM:Ht 1.588 m (5' 2.5\")   Wt 95.7 kg (211 lb)   BMI 37.98 kg/m      General: alert, pleasant, no distress. sitting comfortably in chair  Back/Spine: no tenderness to palpation of spinous processes, or paraspinous musculature of lumbar spine. full ROM with flexion, extension, twisting, and side bending without pain. Straight leg raise positive on left. Has pain in left groin with passive IR of hip. No  pain in back with LOU testing bilaterally  Neuro: SILT on bilateral lower extremities, can stand on toes and heel walk without difficulty, patellar and achilles reflexes 2+ and symmetric, babinski downgoing bilaterally       Imaging: xrays of lumbar spine performed and reviewed independently demonstrating loss of lordosis in the lumbar spine.  Slight retrolisthesis of L5 on S1.  Likely facet arthropathy in the lower lumbar spine.  No acute " fracture.  See EMR for formal radiology report.     Also reviewed previous x-ray of pelvis and left hip performed on 2/19/19 demonstrating mild bilateral degenerative disease, left worse than right.  No acute abnormality per radiology report.      Assessment: Patient is a 64 year old male with low back pain and left leg symptoms suspected to be radicular in nature after a fall on 2/4/2019    Recommendations:   Reviewed imaging and assessment with patient in detail  Recommended course of prednisone, 40 mg daily for 5 days  May also use tizanidine up to 3 times daily as needed for muscle pain and spasm  Given referral to physical therapy  Discussed that is okay to continue to use oxycodone or tramadol as needed for severe breakthrough pain, but recommended sparing use.  Follow-up if no improvement in 2 weeks.  Follow-up sooner if worsening pain or radicular symptoms develop.    Kahlil Reina MD

## 2019-03-05 ENCOUNTER — THERAPY VISIT (OUTPATIENT)
Dept: PHYSICAL THERAPY | Facility: CLINIC | Age: 64
End: 2019-03-05
Payer: OTHER MISCELLANEOUS

## 2019-03-05 DIAGNOSIS — M54.42 ACUTE BILATERAL LOW BACK PAIN WITH LEFT-SIDED SCIATICA: ICD-10-CM

## 2019-03-05 DIAGNOSIS — M79.605 LEFT LEG PAIN: ICD-10-CM

## 2019-03-05 PROCEDURE — 97161 PT EVAL LOW COMPLEX 20 MIN: CPT | Mod: GP | Performed by: PHYSICAL THERAPIST

## 2019-03-05 PROCEDURE — 97112 NEUROMUSCULAR REEDUCATION: CPT | Mod: GP | Performed by: PHYSICAL THERAPIST

## 2019-03-05 PROCEDURE — 97110 THERAPEUTIC EXERCISES: CPT | Mod: GP | Performed by: PHYSICAL THERAPIST

## 2019-03-05 PROCEDURE — 97140 MANUAL THERAPY 1/> REGIONS: CPT | Mod: GP | Performed by: PHYSICAL THERAPIST

## 2019-03-05 NOTE — LETTER
DEPARTMENT OF HEALTH AND HUMAN SERVICES  CENTERS FOR MEDICARE & MEDICAID SERVICES    PLAN/UPDATED PLAN OF PROGRESS FOR OUTPATIENT REHABILITATION    PATIENTS NAME:  Santiago Hylton     : 1955    PROVIDER NUMBER:    [id number]    HICN:  [id number]    PROVIDER NAME: [facility] PEYTON GARCIA    MEDICAL RECORD NUMBER: [id number]     START OF CARE DATE:  SOC Date: 19   TYPE:  PT    PRIMARY/TREATMENT DIAGNOSIS: (Pertinent Medical Diagnosis)     Left leg pain  Acute bilateral low back pain with left-sided sciatica    VISITS FROM START OF CARE:  Rxs Used: 1     [place] [facility] Initial Evaluation  Subjective:  The history is provided by the patient.   Santiago Hylton is a 64 year old male with a lumbar condition.  Condition occurred with:  A fall/slip.  Condition occurred: at work.  This is a new condition  On 19 while taking the garbage out at work (at the [employer]), he slipped on the ice, falling backwards hitting his head. He has been feeling pain in his lower back and into the L leg, sometimes feels like the L leg is going to buckle. He did consult w/both his PCP and an orthopedist, last seen by the orthopedist on 19 and was referred to PT..    Patient reports pain:  Lower lumbar spine, lumbar spine left and lumbar spine right.  Radiates to:  Gluteals left, gluteals right, lower leg left, thigh left and knee left.  Pain is described as shooting, sharp and aching and is constant and reported as 8/10 (9/10 at worst, 0/10 at best but only w/medicine).  Associated symptoms:  Loss of strength, loss of motion/stiffness, numbness and tingling (buckling of L leg). Pain is worse during the day and worse in the P.M..  Symptoms are exacerbated by sitting, standing, walking, lying down and bending (pain wakes him at 2-3am every night) and relieved by muscle relaxants, analgesics, heat and rest.  Since onset symptoms are gradually worsening.  Special tests:  X-ray.      General health  as reported by patient is fair.  Pertinent medical history includes:  Asthma, concussions/dizziness, depression, diabetes, heart problems, high blood pressure, osteoarthritis, overweight and numbness/tingling.  Medical allergies: no.  Other surgeries include:  Heart surgery.  Current medications:  Anti-depressants, high blood pressure medication, muscle relaxants and pain medication.  Current occupation is Part-time longterm work at the [employer].  Patient is working in normal job without restrictions.  Primary job tasks include:  Repetitive tasks and prolonged standing (only light lifting at work).    Barriers include:  None as reported by the patient.    Red flags:  None as reported by the patient.    Oswestry Score: 72 %                 Objective:    Gait:    Gait Type:  Antalgic   Assistive Devices:  None    Lumbar/SI Evaluation  ROM:    AROM Lumbar:   Flexion:          50% of NL w/pain  Ext:                    33% w/pain++   Side Bend:        Left:  75%, pain on R    Right:  75%  Rotation:           Left:     Right:   Side Glide:        Left:     Right:         Strength: weak abdominals w/leg lowering test (4-/5) and L leg weakness observed w/SLR    Neural Tension/Mobility:    Left side:  SLR positive.     Right side:   SLR  negative.   Lumbar Palpation:    Tenderness present at Left:    Piriformis; PSIS; Gluteus Medius and Vertebral  Tenderness not present at Left:    Quadratus Lumborum or Erector Spinae  Tenderness present at Right: Vertebral  Tenderness not present at Right:  Quadratus Lumborum; Erector Spinae; Piriformis; PSIS or Gluteus Medius    Spinal Segmental Conclusions: Signif. Restrictions w/mobility testing at L3 through S1 levels, most painful at L4/5 and L5/S1 levels. Moderately restricted at L1/2 and L2/3 levels, but no pain.    SI joint/Sacrum:    Negative findings with testing of SIJ in standing, supine, and prone positions.    [place] Lumbar Evaluation    Posture:  Sitting: poor  Standing:  fair  Lordosis: Reduced  Lateral Shift: no  Correction of Posture: no effect    Static Tests:    Lying Prone in Extension: after 10 min, no back or L leg pain                  ROS    Assessment/Plan:    Patient is a 64 year old male with lumbar complaints.    Patient has the following significant findings with corresponding treatment plan.                Diagnosis 1:  LBP with L radiculopathy  Pain -  hot/cold therapy, electric stimulation, mechanical traction, self management, education, directional preference exercise and home program  Decreased ROM/flexibility - manual therapy and therapeutic exercise  Decreased joint mobility - manual therapy and therapeutic exercise  Decreased strength - therapeutic exercise and therapeutic activities  Impaired gait - therapeutic activities  Impaired muscle performance - neuro re-education  Decreased function - therapeutic activities  Impaired posture - neuro re-education and therapeutic activities    Therapy Evaluation Codes:   1) History comprised of:   Personal factors that impact the plan of care:      Past/current experiences and Time since onset of symptoms.    Comorbidity factors that impact the plan of care are:      Asthma, Concussion, Diabetes, Depression, Dizziness, Heart problems, High blood pressure and Osteoarthritis.     Medications impacting care: Anti-depressant, High blood pressure, Muscle relaxant and Pain.  2) Examination of Body Systems comprised of:   Body structures and functions that impact the plan of care:      Lumbar spine.   Activity limitations that impact the plan of care are:      Bending, Driving, Dressing, Lifting, Sitting, Standing, Walking and Sleeping.  3) Clinical presentation characteristics are:   Stable/Uncomplicated.  4) Decision-Making    Low complexity using standardized patient assessment instrument and/or measureable assessment of functional outcome.  Cumulative Therapy Evaluation is: Low complexity.    Previous and current  "functional limitations:  (See Goal Flow Sheet for this information)    Short term and Long term goals: (See Goal Flow Sheet for this information)     Communication ability:  Patient appears to be able to clearly communicate and understand verbal and written communication and follow directions correctly.  Treatment Explanation - The following has been discussed with the patient:   RX ordered/plan of care  Anticipated outcomes  Possible risks and side effects  This patient would benefit from PT intervention to resume normal activities.   Rehab potential is good.    Frequency:  1 X week, once daily  Duration:  for 6 weeks  Discharge Plan:  Achieve all LTG.  Independent in home treatment program.  Reach maximal therapeutic benefit.    Please refer to the daily flowsheet for treatment today, total treatment time and time spent performing 1:1 timed codes.         Caregiver Signature/Credentials _____________________________ Date ________       Treating Provider: TRACI Tucker     I have reviewed and certified the need for these services and plan of treatment while under my care.        PHYSICIAN'S SIGNATURE:       _________________________________________Date___________     Kahlil Reina MD    Certification period:  Beginning of Cert date period: 03/05/19 to  End of Cert period date: 04/30/19     Functional Level Progress Report: Please see attached \"Goal Flow sheet for Functional level.\"    ____X____ Continue Services or       ________ DC Services                Service dates: From  SOC Date: 03/05/19 date to present                         "

## 2019-03-05 NOTE — PROGRESS NOTES
[place] [facility] Initial Evaluation  Subjective:  The history is provided by the patient.   Santiago Hylton is a 64 year old male with a lumbar condition.  Condition occurred with:  A fall/slip.  Condition occurred: at work.  This is a new condition  On 2/4/19 while taking the garbage out at work (at the [employer]), he slipped on the ice, falling backwards hitting his head. He has been feeling pain in his lower back and into the L leg, sometimes feels like the L leg is going to buckle. He did consult w/both his PCP and an orthopedist, last seen by the orthopedist on 2/22/19 and was referred to PT..    Patient reports pain:  Lower lumbar spine, lumbar spine left and lumbar spine right.  Radiates to:  Gluteals left, gluteals right, lower leg left, thigh left and knee left.  Pain is described as shooting, sharp and aching and is constant and reported as 8/10 (9/10 at worst, 0/10 at best but only w/medicine).  Associated symptoms:  Loss of strength, loss of motion/stiffness, numbness and tingling (buckling of L leg). Pain is worse during the day and worse in the P.M..  Symptoms are exacerbated by sitting, standing, walking, lying down and bending (pain wakes him at 2-3am every night) and relieved by muscle relaxants, analgesics, heat and rest.  Since onset symptoms are gradually worsening.  Special tests:  X-ray.      General health as reported by patient is fair.  Pertinent medical history includes:  Asthma, concussions/dizziness, depression, diabetes, heart problems, high blood pressure, osteoarthritis, overweight and numbness/tingling.  Medical allergies: no.  Other surgeries include:  Heart surgery.  Current medications:  Anti-depressants, high blood pressure medication, muscle relaxants and pain medication.  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  Patient is working in normal job without restrictions.  Primary job tasks include:  Repetitive tasks and prolonged standing (only light  lifting at work).    Barriers include:  None as reported by the patient.    Red flags:  None as reported by the patient.      Oswestry Score: 72 %                 Objective:    Gait:    Gait Type:  Antalgic   Assistive Devices:  None                 Lumbar/SI Evaluation  ROM:    AROM Lumbar:   Flexion:          50% of NL w/pain  Ext:                    33% w/pain++   Side Bend:        Left:  75%, pain on R    Right:  75%  Rotation:           Left:     Right:   Side Glide:        Left:     Right:         Strength: weak abdominals w/leg lowering test (4-/5) and L leg weakness observed w/SLR          Neural Tension/Mobility:    Left side:  SLR positive.     Right side:   SLR  negative.   Lumbar Palpation:    Tenderness present at Left:    Piriformis; PSIS; Gluteus Medius and Vertebral  Tenderness not present at Left:    Quadratus Lumborum or Erector Spinae  Tenderness present at Right: Vertebral  Tenderness not present at Right:  Quadratus Lumborum; Erector Spinae; Piriformis; PSIS or Gluteus Medius      Spinal Segmental Conclusions: Signif. Restrictions w/mobility testing at L3 through S1 levels, most painful at L4/5 and L5/S1 levels. Moderately restricted at L1/2 and L2/3 levels, but no pain.          SI joint/Sacrum:    Negative findings with testing of SIJ in standing, supine, and prone positions.                                                         [place] Lumbar Evaluation    Posture:  Sitting: poor  Standing: fair  Lordosis: Reduced  Lateral Shift: no  Correction of Posture: no effect        Static Tests:          Lying Prone in Extension: after 10 min, no back or L leg pain                                             ROS    Assessment/Plan:    Patient is a 64 year old male with lumbar complaints.    Patient has the following significant findings with corresponding treatment plan.                Diagnosis 1:  LBP with L radiculopathy  Pain -  hot/cold therapy, electric stimulation, mechanical traction, self  management, education, directional preference exercise and home program  Decreased ROM/flexibility - manual therapy and therapeutic exercise  Decreased joint mobility - manual therapy and therapeutic exercise  Decreased strength - therapeutic exercise and therapeutic activities  Impaired gait - therapeutic activities  Impaired muscle performance - neuro re-education  Decreased function - therapeutic activities  Impaired posture - neuro re-education and therapeutic activities    Therapy Evaluation Codes:   1) History comprised of:   Personal factors that impact the plan of care:      Past/current experiences and Time since onset of symptoms.    Comorbidity factors that impact the plan of care are:      Asthma, Concussion, Diabetes, Depression, Dizziness, Heart problems, High blood pressure and Osteoarthritis.     Medications impacting care: Anti-depressant, High blood pressure, Muscle relaxant and Pain.  2) Examination of Body Systems comprised of:   Body structures and functions that impact the plan of care:      Lumbar spine.   Activity limitations that impact the plan of care are:      Bending, Driving, Dressing, Lifting, Sitting, Standing, Walking and Sleeping.  3) Clinical presentation characteristics are:   Stable/Uncomplicated.  4) Decision-Making    Low complexity using standardized patient assessment instrument and/or measureable assessment of functional outcome.  Cumulative Therapy Evaluation is: Low complexity.    Previous and current functional limitations:  (See Goal Flow Sheet for this information)    Short term and Long term goals: (See Goal Flow Sheet for this information)     Communication ability:  Patient appears to be able to clearly communicate and understand verbal and written communication and follow directions correctly.  Treatment Explanation - The following has been discussed with the patient:   RX ordered/plan of care  Anticipated outcomes  Possible risks and side effects  This patient  would benefit from PT intervention to resume normal activities.   Rehab potential is good.    Frequency:  1 X week, once daily  Duration:  for 6 weeks  Discharge Plan:  Achieve all LTG.  Independent in home treatment program.  Reach maximal therapeutic benefit.    Please refer to the daily flowsheet for treatment today, total treatment time and time spent performing 1:1 timed codes.

## 2019-03-08 DIAGNOSIS — E78.5 HYPERLIPIDEMIA, UNSPECIFIED HYPERLIPIDEMIA TYPE: ICD-10-CM

## 2019-03-08 NOTE — TELEPHONE ENCOUNTER
"Requested Prescriptions   Pending Prescriptions Disp Refills     simvastatin (ZOCOR) 40 MG tablet [Pharmacy Med Name: SIMVASTATIN 40 MG TABLET] 90 tablet 2      Last Written Prescription Date:  06/15/18  Last Fill Quantity: 90,  # refills: 2   Last Office Visit with [facility], [facility] or [facility] prescribing provider:  02/19/19-[facility]   Future Office Visit:    Sig: TAKE 1 TABLET (40 MG) BY MOUTH AT BEDTIME    [facility] Protocol Passed - 3/8/2019  2:03 AM       Passed - LDL on file in past 12 months    Recent Labs   Lab Test 09/11/18  1208   LDL 58            Passed - No abnormal creatine kinase in past 12 months    No lab results found.            Passed - Recent (12 mo) or future (30 days) visit within the authorizing provider's specialty    Patient had office visit in the last 12 months or has a visit in the next 30 days with authorizing provider or within the authorizing provider's specialty.  See \"Patient Info\" tab in inbasket, or \"Choose Columns\" in Meds & Orders section of the refill encounter.             Passed - Medication is active on med list       Passed - Patient is age 18 or older          "

## 2019-03-12 RX ORDER — SIMVASTATIN 40 MG
40 TABLET ORAL AT BEDTIME
Qty: 90 TABLET | Refills: 1 | Status: SHIPPED | OUTPATIENT
Start: 2019-03-12 | End: 2019-08-24

## 2019-03-14 ENCOUNTER — THERAPY VISIT (OUTPATIENT)
Dept: PHYSICAL THERAPY | Facility: CLINIC | Age: 64
End: 2019-03-14
Payer: OTHER MISCELLANEOUS

## 2019-03-14 ENCOUNTER — TELEPHONE (OUTPATIENT)
Dept: FAMILY MEDICINE | Facility: CLINIC | Age: 64
End: 2019-03-14

## 2019-03-14 DIAGNOSIS — M54.42 ACUTE BILATERAL LOW BACK PAIN WITH LEFT-SIDED SCIATICA: Primary | ICD-10-CM

## 2019-03-14 DIAGNOSIS — G89.4 CHRONIC PAIN SYNDROME: ICD-10-CM

## 2019-03-14 PROCEDURE — 97110 THERAPEUTIC EXERCISES: CPT | Mod: GP | Performed by: PHYSICAL THERAPIST

## 2019-03-14 PROCEDURE — 97140 MANUAL THERAPY 1/> REGIONS: CPT | Mod: GP | Performed by: PHYSICAL THERAPIST

## 2019-03-14 RX ORDER — OXYCODONE HYDROCHLORIDE 5 MG/1
5 TABLET ORAL 2 TIMES DAILY PRN
Qty: 60 TABLET | Refills: 0 | Status: SHIPPED | OUTPATIENT
Start: 2019-03-14 | End: 2019-04-12

## 2019-03-14 NOTE — TELEPHONE ENCOUNTER
Controlled Substance Refill Request for Oxycodone 5 mg  Problem List Complete:  No     PROVIDER TO CONSIDER COMPLETION OF PROBLEM LIST AND OVERVIEW/CONTROLLED SUBSTANCE AGREEMENT    Last Written Prescription Date:  2/19/19  Last Fill Quantity: 60,   # refills: 0      Last Office Visit with [facility] [place] provider: 2/19/19    Future Office visit:     Controlled substance agreement:   Encounter-Level CSA:    There are no encounter-level csa.     Patient-Level CSA:    There are no patient-level csa.         Last Urine Drug Screen: No results found for: CDAUT, No results found for: COMDAT, No results found for: THC13, PCP13, COC13, MAMP13, OPI13, AMP13, BZO13, TCA13, MTD13, BAR13, OXY13, PPX13, BUP13     Processing:  Staff will hand deliver Rx to on-site pharmacy     [email or web address]       checked?  Yes, 3/14/19. No concerns.    Routing refill request to provider for review/approval because:  Drug not on the [facility] refill protocol     Samra Luna RN

## 2019-03-14 NOTE — TELEPHONE ENCOUNTER
Please have patient complete a Controlled Substance Agreement when he picks up his prescription. Prescription signed and put in box/printer. Overview completed and problem list updated. Dr.Deborah Rizvi

## 2019-03-14 NOTE — LETTER
[facility]  03/15/19    Patient: Santiago Hylton  YOB: 1955  Medical Record Number: [id number]                                                                  Opioid / Opioid Plus Controlled Substance Agreement    I understand that my care provider has prescribed an opioid (narcotic) controlled substance to help manage my condition(s). I am taking this medicine to help me function or work. I know this is strong medicine, and that it can cause serious side effects. Opioid medicine can be sedating, addicting and may cause a dependency on the drug. They can affect my ability to drive or think, and cause depression. They need to be taken exactly as prescribed. Combining opioids with certain medicines or chemicals (such as cocaine, sedatives and tranquilizers, sleeping pills, meth) can be dangerous or even fatal. Also, if I stop opioids suddenly, I may have severe withdrawal symptoms. Last, I understand that opioids do not work for all types of pain nor for all patients. If not helpful, I may be asked to stop them.        The risks, benefits, and side effects of these medicine(s) were explained to me. I agree that:    1. I will take part in other treatments as advised by my care team. This may be psychiatry or counseling, physical therapy, behavioral therapy, group treatment or a referral to a pain clinic. I will reduce or stop my medicine when my care team tells me to do so.  2. I will take my medicines as prescribed. I will not change the dose or schedule unless my care team tells me to. There will be no refills if I  run out early.   I may be contactedwithout warning and asked to complete a urine drug test or pill count at any time.   3. I will keep all my appointments, and understand this is part of the monitoring of opioids. My care team may require an office visit for EVERY opioid/controlled substance refill. If I miss appointments or don t follow instructions, my care team may stop my  medicine.  4. I will not ask other providers to prescribe controlled substances, and I will not accept controlled substances from other people. If I need another prescribed controlled substance for a new reason, I will tell my care team within 1 business day.  5. I will use one pharmacy to fill all of my controlled substance prescriptions, and it is up to me to make sure that I do not run out of my medicines on weekends or holidays. If my care team is willing to refill my opioid prescription without a visit, I must request refills only during office hours, refills may take up to 3 days to process, and it may take up to 5 to 7 days for my medicine to be mailed and ready at my pharmacy. Prescriptions will not be mailed anywhere except my pharmacy.        [id number]  Rev 12/18         Registration to scan to EHR                             Page 1 of 2               Controlled Substance Agreement Opioid        [facility]  03/15/19  Patient: Santiago Hylton  YOB: 1955  Medical Record Number: [id number]                                                                  6. I am responsible for my prescriptions. If the medicine/prescription is lost or stolen, it will not be replaced. I also agree not to share controlled substance medicines with anyone.  7. I agree to not use ANY illegal or recreational drugs. This includes marijuana, cocaine, bath salts or other drugs. I agree not to use alcohol unless my care team says I may.          I agree to give urine samples whenever asked. If I don t give a urine sample, the care team may stop my medicine.    8. If I enroll in the [place] Medical Marijuana program, I will tell my care team. I will also sign an agreement to share my medical records with my care team.   9. I will bring in my list of medicines (or my medicine bottles) each time I come to the clinic.   10. I will tell my care team right away if I become pregnant or have a new medical problem  treated outside of my regular clinic.  11. I understand that this medicine can affect my thinking and judgment. It may be unsafe for me to drive, use machinery and do dangerous tasks. I will not do any of these things until I know how the medicine affects me. If my dose changes, I will wait to see how it affects me. I will contact my care team if I have concerns about medicine side effects.    I understand that if I do not follow any of the conditions above, my prescriptions or treatment may be stopped.      I agree that my provider, clinic care team, and pharmacy may work with any city, state or federal law enforcement agency that investigates the misuse, sale, or other diversion of my controlled medicine. I will allow my provider to discuss my care with or share a copy of this agreement with any other treating provider, pharmacy or emergency room where I receive care. I agree to give up (waive) any right of privacy or confidentiality with respect to these consents.     I have read this agreement and have asked questions about anything I did not understand.      ________________________________________________________________________  Patient signature - Date/Time -  Santiago Hylton                                      ________________________________________________________________________  Witness signature                                                            ________________________________________________________________________  Provider signature - Tori Rizvi MD      [id number]  Rev 12/18         Registration to scan to EHR                         Page 2 of 2                   Controlled Substance Agreement Opioid           Page 1 of 2  Opioid Pain Medicines (also known as Narcotics)  What You Need to Know    What are opioids?   Opioids are pain medicines that must be prescribed by a doctor.  They are also known as narcotics.    Examples are:     morphine (MS Contin, Traci)    oxycodone  (Oxycontin)    oxycodone and acetaminophen (Percocet)    hydrocodone and acetaminophen (Vicodin, Norco)     fentanyl patch (Duragesic)     hydromorphone (Dilaudid)     methadone     What do opioids do well?   Opioids are best for short-term pain after a surgery or injury. They also work well for cancer pain. Unlike other pain medicines, they do not cause liver or kidney failure or ulcers. They may help some people with long-lasting (chronic) pain.     What do opioids NOT do well?   Opioids never get rid of pain entirely, and they do not work well for most patients with chronic pain. Opioids do not reduce swelling, one of the causes of pain. They also don t work well for nerve pain.                           For informational purposes only.  Not to replace the advice of your care provider.  Copyright [facility]. All right reserved. Check I'm Here 535385-Esh 02/18.      Page 2 of 2    Risks and side effects   Talk to your doctor before you start or decide to keep taking one of these medicines. Side effects include:    Lowering your breathing rate enough to cause death    Overdose, including death, especially if taking higher than prescribed doses    Long-term opioid use    Worse depression symptoms; less pleasure in things you usually enjoy    Feeling tired or sluggish    Slower thoughts or cloudy thinking    Being more sensitive to pain over time; pain is harder to control    Trouble sleeping or restless sleep    Changes in hormone levels (for example, less testosterone)    Changes in sex drive or ability to have sex    Constipation    Unsafe driving    Itching and sweating    Feeling dizzy    Nausea, vomiting and dry mouth    What else should I know about opioids?  When someone takes opioids for too long or too often, they become dependent. This means that if you stop or reduce the medicine too quickly, you will have withdrawal symptoms.    Dependence is not the same as addiction. Addiction is when  people keep using a substance that harms their body, their mind or their relations with others. If you have a history of drug or alcohol abuse, taking opioids can cause a relapse.    Over time, opioids don t work as well. Most people will need higher and higher doses. The higher the dose, the more serious the side effects. We don t know the long-term effects of opioids.      Prescribed opioids aren't the best way to manage chronic pain    Other ways to manage pain include:      Ibuprofen or acetaminophen.  You should always try this first.      Treat health problems that may be causing pain.      acupuncture or massage, deep breathing, meditation, visual imagery, aromatherapy.      Use heat or ice at the pain site      Physical therapy and exercise      Stop smoking      See a counselor or therapist                                                  People who have used opioids for a long time may have a lower quality of life, worse depression, higher levels of pain and more visits to doctors.    Never share your opioids with others. Be sure to store opioids in a secure place, locked if possible.Young children can easily swallow them and overdose.     You can overdose on opioids.  Signs of overdose include decrease or loss of consciousness, slowed breathing, trouble waking and blue lips.  If someone is worried about overdose, they should call 911.    If you are at risk for overdose, you may get naloxone (Narcan, a medicine that reverses the effects of opioids.  If you overdose, a friend or family member can give you Narcan while waiting for the ambulance.  They need to know the signs of overdose and how to give Narcan.    While you're taking opioids:    Don't use alcohol or street drugs. Taking them together can cause death.    Don't take any of these medicines unless your doctor says its okay.  Taking these with opioids can cause death.    Benzodiazepines (such as lorazepam         or diazepam)    Muscle relaxers  (such as cyclobenzaprine)    sleeping pills    other opioids    Safe disposal of opioids  Find your area drug take-back program, your pharmacy mail-back program, buy a special disposal bag (such as Deterra) from your pharmacy or flush them down the toilet.  Use the guidelines at:  www.fda.gov/drugs/resourcesforyou

## 2019-03-14 NOTE — TELEPHONE ENCOUNTER
Reason for Call:  Other prescription    Detailed comments: Asking to  Oxycodone at the [facility] on 3/19/2019.    Phone Number Patient can be reached at: Home number on file [phone] (home)    Best Time: any    Can we leave a detailed message on this number? YES    Call taken on 3/14/2019 at 3:11 PM by Staci Garnica

## 2019-03-15 DIAGNOSIS — G62.9 NEUROPATHY: ICD-10-CM

## 2019-03-15 RX ORDER — TRAMADOL HYDROCHLORIDE 50 MG/1
TABLET ORAL
Qty: 60 TABLET | Refills: 1 | Status: SHIPPED | OUTPATIENT
Start: 2019-03-15 | End: 2019-04-11

## 2019-03-15 NOTE — TELEPHONE ENCOUNTER
Patient was notified regarding MSG below.  Patient restated the msg, denied having any questions and stated clarification at the end of the conversation. Patient is to bring ID. Please have the patient sign pain contact and return it to the team  Roslyn Simeon

## 2019-03-15 NOTE — TELEPHONE ENCOUNTER
Requested Prescriptions   Pending Prescriptions Disp Refills     traMADol (ULTRAM) 50 MG tablet [Pharmacy Med Name: TRAMADOL HCL 50 MG TABLET]        Last Written Prescription Date:  12/14/18  Last Fill Quantity: 60,   # refills: 1  Last Office Visit: 02/19/19-[facility]  Future Office visit:       Routing refill request to provider for review/approval because:  Drug not on the [facility], [facility] or [facility] refill protocol or controlled substance 60 tablet 1     Sig: TAKE 1 TO 2 TABLETS BY MOUTH TWICE DAILY. MAXIMUM OF 2 TABLETS PER DAY    There is no refill protocol information for this order

## 2019-03-19 ENCOUNTER — THERAPY VISIT (OUTPATIENT)
Dept: PHYSICAL THERAPY | Facility: CLINIC | Age: 64
End: 2019-03-19
Payer: OTHER MISCELLANEOUS

## 2019-03-19 DIAGNOSIS — M54.42 ACUTE BILATERAL LOW BACK PAIN WITH LEFT-SIDED SCIATICA: Primary | ICD-10-CM

## 2019-03-19 PROCEDURE — 97140 MANUAL THERAPY 1/> REGIONS: CPT | Mod: GP | Performed by: PHYSICAL THERAPIST

## 2019-03-19 PROCEDURE — 97110 THERAPEUTIC EXERCISES: CPT | Mod: GP | Performed by: PHYSICAL THERAPIST

## 2019-03-27 ENCOUNTER — THERAPY VISIT (OUTPATIENT)
Dept: PHYSICAL THERAPY | Facility: CLINIC | Age: 64
End: 2019-03-27
Payer: OTHER MISCELLANEOUS

## 2019-03-27 DIAGNOSIS — M54.42 ACUTE BILATERAL LOW BACK PAIN WITH LEFT-SIDED SCIATICA: ICD-10-CM

## 2019-03-27 PROCEDURE — 97530 THERAPEUTIC ACTIVITIES: CPT | Mod: GP

## 2019-03-27 PROCEDURE — 97110 THERAPEUTIC EXERCISES: CPT | Mod: GP

## 2019-03-27 PROCEDURE — 97140 MANUAL THERAPY 1/> REGIONS: CPT | Mod: GP

## 2019-04-02 ENCOUNTER — THERAPY VISIT (OUTPATIENT)
Dept: PHYSICAL THERAPY | Facility: CLINIC | Age: 64
End: 2019-04-02
Payer: OTHER MISCELLANEOUS

## 2019-04-02 DIAGNOSIS — M54.42 ACUTE BILATERAL LOW BACK PAIN WITH LEFT-SIDED SCIATICA: ICD-10-CM

## 2019-04-02 PROCEDURE — 97110 THERAPEUTIC EXERCISES: CPT | Mod: GP | Performed by: PHYSICAL THERAPIST

## 2019-04-02 PROCEDURE — 97140 MANUAL THERAPY 1/> REGIONS: CPT | Mod: GP | Performed by: PHYSICAL THERAPIST

## 2019-04-09 ENCOUNTER — THERAPY VISIT (OUTPATIENT)
Dept: PHYSICAL THERAPY | Facility: CLINIC | Age: 64
End: 2019-04-09
Payer: OTHER MISCELLANEOUS

## 2019-04-09 DIAGNOSIS — M54.42 ACUTE BILATERAL LOW BACK PAIN WITH LEFT-SIDED SCIATICA: ICD-10-CM

## 2019-04-09 PROCEDURE — 97140 MANUAL THERAPY 1/> REGIONS: CPT | Mod: GP | Performed by: PHYSICAL THERAPIST

## 2019-04-09 PROCEDURE — 97110 THERAPEUTIC EXERCISES: CPT | Mod: GP | Performed by: PHYSICAL THERAPIST

## 2019-04-09 PROCEDURE — 97112 NEUROMUSCULAR REEDUCATION: CPT | Mod: GP | Performed by: PHYSICAL THERAPIST

## 2019-04-10 NOTE — PROGRESS NOTES
Subjective:  HPI                    Objective:  System    Physical Exam    General     ROS    Assessment/Plan:    PROGRESS  REPORT    Progress reporting period is from 3/5/19 to 4/9/19.       SUBJECTIVE  Subjective changes noted by patient:  Patient has been seen 6 times for treatment of LBP. Patient stated he has noticed 50% improvement in his back pain.  Patient reports difficulty with heavy lifting (>15#), sitting> 30-40 min, and standing > 40 min.  Patient stated at time he is unable to bend over to put shoes and socks on as well.    Current pain level is: 0/10.     Previous pain level was: 9/10(at worst).   Changes in function:  Yes (See Goal flowsheet attached for changes in current functional level)  Adverse reaction to treatment or activity: activity - increase back pain    OBJECTIVE  Changes noted in objective findings:  Yes, patient has been given illustrated HEP to follow.    Posture:  Stands with slightly flexed forward posture and rounded shoulders  Gait:    Normal    Trunk ROM: Flexion = Lower leg; pain/tightness in LB region    Extension= Min loss    Lateral SB= WNL    Palpation: Hypomobile lumbar spine;  Tenderness noted with PA glides.  Soft tissue tightness Lumbar paraspinals.    LE strength:    Good      ASSESSMENT/PLAN  Updated problem list and treatment plan: Diagnosis 1:  LBP  Pain -  hot/cold therapy, US and manual therapy  Decreased ROM/flexibility - manual therapy and therapeutic exercise  Decreased joint mobility - manual therapy and therapeutic exercise  Impaired muscle performance - neuro re-education  Decreased function - therapeutic activities  Impaired posture - neuro re-education  STG/LTGs have been met or progress has been made towards goals:  Yes (See Goal flow sheet completed today.)  Assessment of Progress: The patient's condition is improving.  The patient's condition has potential to improve.  Self Management Plans:  Patient has been instructed in a home treatment program.  I  have re-evaluated this patient and find that the nature, scope, duration and intensity of the therapy is appropriate for the medical condition of the patient.  Santiago continues to require the following intervention to meet STG and LTG's:  PT    Recommendations:  Patient would like to talk to his MD to discuss his POC at this time.  If additional PT is needed MD will need to place another PT order and visits will need to approved by WC prior to anymore visits.    Please refer to the daily flowsheet for treatment today, total treatment time and time spent performing 1:1 timed codes.

## 2019-04-10 NOTE — PATIENT INSTRUCTIONS
Dr Rizvi-    An updated PT progress note is ready for you to view.  Patient stated he was going to be following up with you on 4/16/19 regarding his LBP.  Patient would like to discuss with you the ongoing POC and would like you to determine if he should have more PT.  At this time I would recommend up to 4 more PT visits so that we can address some ongoing core stabilization and body mechanics.  Additional orders will need to be placed so that we can contact  to get authorization of PT.    Please message or call me [phone] if you have any questions.  Anna Mistry, MPT  [facility]

## 2019-04-11 DIAGNOSIS — G89.4 CHRONIC PAIN SYNDROME: ICD-10-CM

## 2019-04-11 DIAGNOSIS — G62.9 NEUROPATHY: ICD-10-CM

## 2019-04-11 NOTE — TELEPHONE ENCOUNTER
Reason for Call:  Medication or medication refill:    Do you use a [facility]?  Name of the pharmacy and phone number for the current request:  [facility] - [place], [place] - [address]     Name of the medication requested: Pending Prescriptions:                       Disp   Refills    oxyCODONE (ROXICODONE) 5 MG tablet        60 tab*0            Sig: Take 1 tablet (5 mg) by mouth 2 times daily as           needed for moderate to severe pain (up to 2 a           day)    traMADol (ULTRAM) 50 MG tablet            60 tab*1          Other request: Please call when approved but send to get filled at the [facility] pharmacy.    Can we leave a detailed message on this number? YES    Phone number patient can be reached at: Home number on file [phone] (home)    Best Time: any    Call taken on 4/11/2019 at 11:51 AM by Meron Nelson

## 2019-04-11 NOTE — TELEPHONE ENCOUNTER
Requested Prescriptions   Pending Prescriptions Disp Refills     oxyCODONE (ROXICODONE) 5 MG tablet        Last Written Prescription Date:  03/14/19  Last Fill Quantity: 60,   # refills: 0  Last Office Visit: 02/19/19-[facility]  Future Office visit:    Next 5 appointments (look out 90 days)    Apr 16, 2019  2:20 PM CDT  [facility] [address] [place] [id number]  [phone]           Routing refill request to provider for review/approval because:  {RX Non-Protocol:[id number] 60 tablet 0     Sig: Take 1 tablet (5 mg) by mouth 2 times daily as needed for moderate to severe pain (up to 2 a day)       There is no refill protocol information for this order        traMADol (ULTRAM) 50 MG tablet        Last Written Prescription Date:  03/15/19  Last Fill Quantity: 60,   # refills: 01  Last Office Visit: 02/19/19-[facility]  Future Office visit:    Next 5 appointments (look out 90 days)    Apr 16, 2019  2:20 PM CDT  [facility] [address] [place] [phone]  [phone]           Routing refill request to provider for review/approval because:  Drug not on the [facility], [facility] or  Health refill protocol or controlled substance 60 tablet 1       There is no refill protocol information for this order        Last [facility] website verification:  done on 3-   [email or web address]

## 2019-04-12 RX ORDER — OXYCODONE HYDROCHLORIDE 5 MG/1
5 TABLET ORAL 2 TIMES DAILY PRN
Qty: 60 TABLET | Refills: 0 | Status: SHIPPED | OUTPATIENT
Start: 2019-04-12 | End: 2019-05-07

## 2019-04-12 RX ORDER — TRAMADOL HYDROCHLORIDE 50 MG/1
50 TABLET ORAL EVERY 8 HOURS PRN
Qty: 60 TABLET | Refills: 1 | Status: SHIPPED | OUTPATIENT
Start: 2019-04-12 | End: 2019-06-06

## 2019-04-12 NOTE — TELEPHONE ENCOUNTER
Controlled Substance Refill Request for Oxycodone 5 mg/ Tramadol 50 mg  Problem List Complete:  Yes   checked in past 3 months?  Yes 3/14/19   [place] reviewed[place]  not reviewed/not due - last done on 3/14/19    [place]:  [email or web address]        Routing refill request to provider for review/approval because:  Drugs not on the [facility] refill protocol       Samra Luna RN

## 2019-05-07 ENCOUNTER — OFFICE VISIT (OUTPATIENT)
Dept: FAMILY MEDICINE | Facility: CLINIC | Age: 64
End: 2019-05-07
Payer: MEDICARE

## 2019-05-07 DIAGNOSIS — E11.42 DIABETIC POLYNEUROPATHY ASSOCIATED WITH TYPE 2 DIABETES MELLITUS (H): ICD-10-CM

## 2019-05-07 DIAGNOSIS — I50.43 ACUTE ON CHRONIC COMBINED SYSTOLIC AND DIASTOLIC CONGESTIVE HEART FAILURE (H): Primary | ICD-10-CM

## 2019-05-07 DIAGNOSIS — J45.30 MILD PERSISTENT ASTHMA WITHOUT COMPLICATION: ICD-10-CM

## 2019-05-07 DIAGNOSIS — I10 ESSENTIAL HYPERTENSION WITH GOAL BLOOD PRESSURE LESS THAN 140/90: ICD-10-CM

## 2019-05-07 DIAGNOSIS — G89.4 CHRONIC PAIN SYNDROME: ICD-10-CM

## 2019-05-07 PROCEDURE — 99214 OFFICE O/P EST MOD 30 MIN: CPT | Performed by: FAMILY MEDICINE

## 2019-05-07 RX ORDER — BUDESONIDE AND FORMOTEROL FUMARATE DIHYDRATE 160; 4.5 UG/1; UG/1
2 AEROSOL RESPIRATORY (INHALATION)
Qty: 10.2 INHALER | Refills: 3 | Status: SHIPPED | OUTPATIENT
Start: 2019-05-07 | End: 2020-06-25

## 2019-05-07 RX ORDER — GABAPENTIN 300 MG/1
CAPSULE ORAL
Qty: 270 CAPSULE | Refills: 3 | Status: SHIPPED | OUTPATIENT
Start: 2019-05-07 | End: 2019-10-03

## 2019-05-07 RX ORDER — FUROSEMIDE 40 MG
40 TABLET ORAL 2 TIMES DAILY
Qty: 180 TABLET | Refills: 1 | Status: SHIPPED | OUTPATIENT
Start: 2019-05-07 | End: 2019-07-09

## 2019-05-07 RX ORDER — OXYCODONE HYDROCHLORIDE 5 MG/1
5 TABLET ORAL 2 TIMES DAILY PRN
Qty: 60 TABLET | Refills: 0 | Status: SHIPPED | OUTPATIENT
Start: 2019-05-07 | End: 2019-06-05

## 2019-05-07 ASSESSMENT — PAIN SCALES - GENERAL: PAINLEVEL: MODERATE PAIN (5)

## 2019-05-07 ASSESSMENT — MIFFLIN-ST. JEOR: SCORE: 1607.53

## 2019-05-07 NOTE — PROGRESS NOTES
"  SUBJECTIVE:   Santiago Hylton is a 64 year old male who presents to clinic today for the following health issues:    HPI   Chronic Pain Follow-Up       Type / Location of Pain: Neck, head, generalized body ache  Analgesia/pain control:       Recent changes:  Improved, pain intermittent but starting to get more swelling      Overall control: Comfortably manageable  Activity level/function:      Daily activities:  Able to do all daily activities    Work:  Able to work full time-doesn't do heavy lifting/work  Adverse effects:  No  Adherance    Taking medication as directed?  Yes    Participating in other treatments: completed physical therapy  Risk Factors:    Sleep:  Poor-waking up tired, \"coughing up fluid\"    Mood/anxiety:  improved    Recent family or social stressors:  none noted    Other aggravating factors: none-pains comes by itself  PHQ-9 SCORE 11/13/2018 1/22/2019 2/19/2019   PHQ-9 Total Score - - -   PHQ-9 Total Score 9 10 12     GENARO-7 SCORE 12/22/2016 5/17/2018 1/22/2019   Total Score 6 7 7     Encounter-Level CSA:    There are no encounter-level csa.     Patient-Level CSA:    Controlled Substance Agreement - Opioid - Scan on 3/27/2019  2:34 PM (below)         Back Pain Follow Up  Patient wants to know next plan after completion of physical therapy.      Description:   Location of pain:  Bilateral, lower back  Character of pain: dull ache  Pain radiation: radiates down into legs  Since last visit, pain is:  Improved, intermittent  New numbness or weakness in legs, not attributed to pain:  no     Intensity: Currently 4-5/10    History:   Pain interferes with job: YES  Therapies tried without relief: none new  Therapies tried with relief: physical therapy completed, pain Rx           Accompanying Signs & Symptoms:  Risk of Fracture:  None  Risk of Cauda Equina:  None  Risk of Infection:  None  Risk of Cancer:  None      Additional history: as documented    Reviewed and updated as needed this visit by clinical " staff  Tobacco  Allergies  Meds  Med Hx  Surg Hx  Fam Hx  Soc Hx        Reviewed and updated as needed this visit by Provider  Tobacco         Diabetes Follow-up      Patient is checking blood sugars: not at all    Diabetic concerns: None     Symptoms of hypoglycemia (low blood sugar): none     Paresthesias (numbness or burning in feet) or sores: No     Date of last diabetic eye exam: up to date     BP Readings from Last 2 Encounters:   05/07/19 126/88   02/19/19 139/84     Hemoglobin A1C (%)   Date Value   01/15/2019 5.8 (H)   09/11/2018 5.4     LDL Cholesterol Calculated (mg/dL)   Date Value   09/11/2018 58   05/17/2018 70       Diabetes Management Resources  Hyperlipidemia Follow-Up      Rate your low fat/cholesterol diet?: good    Taking statin?  Yes, no muscle aches from statin    Other lipid medications/supplements?:  none    Hypertension Follow-up      Outpatient blood pressures are not being checked.    Low Salt Diet: no added salt    Heart Failure Follow-up    Symptoms:    Shortness of breath: happens with exertion only - slightly worsened happens at night or lying flat - slightly worsened    Lower extremity edema: stable     Chest pain: No    Using more pillows than normal: No    Cough at night: Yes-      Weight:    Checking weight daily: Yes    Weight change: none    Cardiology visits, ER/UC, or hospital admissions since last visit: Cardiology Visit - due this month    Medication side effects: none       Patient Active Problem List   Diagnosis     Hypertension goal BP (blood pressure) < 140/90     Hyperlipidemia LDL goal <100     CHF (congestive heart failure) (H)     Mild recurrent major depression (H)     Gout     GERD (gastroesophageal reflux disease)     Chronic rhinitis     CKD (chronic kidney disease) stage 2, GFR 60-89 ml/min     History of colonic polyps     Advanced directives, counseling/discussion     Chronic hepatitis C (H)     Type 2 diabetes mellitus with diabetic chronic kidney  disease (H)     Microalbuminuria due to type 2 diabetes mellitus (H)     Obesity (BMI 30-39.9)     Type 2 diabetes mellitus with diabetic polyneuropathy (H)     Coronary artery disease involving native coronary artery of native heart without angina pectoris     Diabetic polyneuropathy associated with type 2 diabetes mellitus (H)     Chronic obstructive airway disease with asthma (H)     Obesity (BMI 35.0-39.9) with comorbidity (H)     Acute bilateral low back pain with left-sided sciatica     Chronic pain syndrome     Past Surgical History:   Procedure Laterality Date     CARDIAC SURGERY      stent     CATARACT IOL, RT/LT       COLONOSCOPY  2012    Procedure: COLONOSCOPY;  COLONOSCOPY, SCREEN;  Surgeon: Cl Johnson MD;  Location: [facility] OR     ORTHOPEDIC SURGERY      ankle     PHACOEMULSIFICATION WITH STANDARD INTRAOCULAR LENS IMPLANT Right 2016    Procedure: PHACOEMULSIFICATION WITH STANDARD INTRAOCULAR LENS IMPLANT;  Surgeon: Fly Merrill MD;  Location: [facility] OR     PHACOEMULSIFICATION WITH STANDARD INTRAOCULAR LENS IMPLANT Left 2016    Procedure: PHACOEMULSIFICATION WITH STANDARD INTRAOCULAR LENS IMPLANT;  Surgeon: Fly Merrill MD;  Location: [facility] OR       Social History     Tobacco Use     Smoking status: Former Smoker     Packs/day: 0.25     Years: 15.00     Pack years: 3.75     Types: Cigarettes     Last attempt to quit: 2016     Years since quittin.6     Smokeless tobacco: Never Used     Tobacco comment: 3-4 a day   Substance Use Topics     Alcohol use: Yes     Comment: weekend beer     Family History   Problem Relation Age of Onset     Heart Disease Maternal Grandmother      Hypertension Maternal Grandmother      Hypertension Father      Hypertension Mother      Hypertension Sister      Thyroid Disease Sister      Cancer Brother      Diabetes Brother      Hypertension Brother      Hypertension Maternal Aunt      Cancer Maternal Uncle      Hypertension Maternal  Uncle      Hypertension Paternal Aunt      Hypertension Paternal Uncle      Hypertension Maternal Grandfather      Hypertension Paternal Grandmother      Hypertension Paternal Grandfather      Cerebrovascular Disease No family hx of      Glaucoma No family hx of      Macular Degeneration No family hx of          Current Outpatient Medications   Medication Sig Dispense Refill     albuterol (2.5 MG/3ML) 0.083% neb solution INHALE 3 ML BY NEBULIZATION METHOD EVERY 6 HOURS AS NEEDED FOR SHORTNESS OF BREATH 360 mL 1     allopurinol (ZYLOPRIM) 100 MG tablet Take 2 tablets (200 mg) by mouth daily 180 tablet 3     amLODIPine (NORVASC) 5 MG tablet TAKE 1 TABLET BY MOUTH EVERY DAY FOR BLOOD PRESSURE 90 tablet 1     aspirin  MG tablet TAKE 1 TABLET BY MOUTH EVERY DAY 90 tablet 3     budesonide-formoterol (SYMBICORT) 160-4.5 MCG/ACT Inhaler Inhale 2 puffs into the lungs 2 times daily 10.2 Inhaler 3     carvedilol (COREG) 12.5 MG tablet TAKE 1 TABLET BY MOUTH TWICE DAILY WITH MEALS 180 tablet 2     cetirizine (ZYRTEC) 10 MG tablet TAKE 1 TABLET BY MOUTH DAILY AT BEDTIME 90 tablet 10     DULoxetine (CYMBALTA) 60 MG EC capsule TAKE ONE CAPSULE BY MOUTH EVERY DAY 90 capsule 1     furosemide (LASIX) 40 MG tablet Take 1 tablet (40 mg) by mouth 2 times daily 180 tablet 1     gabapentin (NEURONTIN) 300 MG capsule TAKE 1 CAPSULE (300 MG) BY MOUTH 3 TIMES DAILY 270 capsule 3     hydrOXYzine (ATARAX) 25 MG tablet Take 1-2 tablets (25-50 mg) by mouth every 8 hours as needed for itching 60 tablet 3     isosorbide mononitrate (ISMO/MONOKET) 20 MG tablet Take 1 tablet (20 mg) by mouth 2 times daily 180 tablet 2     KLOR-CON 20 MEQ CR tablet TAKE 1 TABLET(20 MEQ) BY MOUTH DAILY 90 tablet 2     lidocaine (LIDODERM) 5 % Patch 1 patch       lisinopril (PRINIVIL/ZESTRIL) 40 MG tablet TAKE 1/2 TABLET BY MOUTH EVERY DAY 45 tablet 3     loratadine (CLARITIN) 10 MG tablet Take 1 tablet (10 mg) by mouth daily 90 tablet 1     montelukast  (SINGULAIR) 10 MG tablet TAKE 1 TABLET BY MOUTH DAILY AT BEDTIME 90 tablet 3     nitroGLYcerin (NITROSTAT) 0.4 MG sublingual tablet Place 1 tablet (0.4 mg) under the tongue every 5 minutes as needed for chest pain 0.4 mg by Sublingual route every 5 (five) minutes as needed. 25 tablet 11     order for DME Equipment being ordered: Single prong cane 1 Device 0     oxyCODONE (ROXICODONE) 5 MG tablet Take 1 tablet (5 mg) by mouth 2 times daily as needed for moderate to severe pain (up to 2 a day) 60 tablet 0     predniSONE (DELTASONE) 20 MG tablet Take 1 tablet (20 mg) by mouth 2 times daily 10 tablet 1     ranitidine (ZANTAC) 150 MG tablet TAKE 1 TABLET BY MOUTH TWICE DAILY 180 tablet 3     simvastatin (ZOCOR) 40 MG tablet TAKE 1 TABLET (40 MG) BY MOUTH AT BEDTIME 90 tablet 1     tiZANidine (ZANAFLEX) 2 MG tablet Take 1-2 tablets (2-4 mg) by mouth 3 times daily 30 tablet 0     traMADol (ULTRAM) 50 MG tablet Take 1 tablet (50 mg) by mouth every 8 hours as needed for moderate pain or severe pain 60 tablet 1     traZODone (DESYREL) 100 MG tablet TAKE 2 TABLETS BY MOUTH AT BEDTIME 180 tablet 2     Allergies   Allergen Reactions     No Known Drug Allergies      Recent Labs   Lab Test 01/15/19  0853 09/11/18  1208 05/17/18  1045  04/28/17  0942   A1C 5.8* 5.4 5.8*   < > 5.7   LDL  --  58 70  --  70   HDL  --  40 52  --  47   TRIG  --  190* 118  --  92   ALT  --  42 25  --  25   CR 1.44* 1.20 1.44*   < > 1.42*   GFRESTIMATED 51* 61 50*   < > 51*   GFRESTBLACK 59* 74 60*   < > 61   POTASSIUM  --  4.5 4.4   < > 4.3   TSH  --  0.79 2.04  --  0.74    < > = values in this interval not displayed.      BP Readings from Last 3 Encounters:   05/07/19 126/88   02/19/19 139/84   01/22/19 133/88    Wt Readings from Last 3 Encounters:   05/07/19 92.5 kg (204 lb)   02/22/19 95.7 kg (211 lb)   02/19/19 96 kg (211 lb 11.2 oz)                  Labs reviewed in EPIC    ROS:  Constitutional, HEENT, cardiovascular, pulmonary, gi and gu systems  "are negative, except as otherwise noted.    OBJECTIVE:     /88 (BP Location: Right arm, Patient Position: Chair, Cuff Size: Adult Large)   Pulse 81   Temp 98.3  F (36.8  C) (Oral)   Resp 16   Ht 1.588 m (5' 2.5\")   Wt 92.5 kg (204 lb)   SpO2 96%   BMI 36.72 kg/m    Body mass index is 36.72 kg/m .  GENERAL: healthy, alert, well nourished, well hydrated, no distress, obese  HENT: ear canals- normal; TMs- normal; Nose- normal; Mouth- no ulcers, no lesions, throat is clear with no erythema or exudate.   NECK: no tenderness, no adenopathy, no asymmetry, no masses, no stiffness; thyroid- normal to palpation  RESP: lungs clear to auscultation - no rales, no rhonchi, no wheezes  CV: regular rates and rhythm, normal S1 S2, no S3 or S4 and no murmur, no click or rub -  ABDOMEN: soft, no tenderness, no  hepatosplenomegaly, no masses, normal bowel sounds  MS: extremities- no gross deformities noted, no edema  SKIN: no suspicious lesions, no rashes  NEURO: strength and tone- normal, sensory exam- grossly normal, mentation- intact, speech- normal, reflexes- symmetric  BACK: no CVA tenderness, no paralumbar tenderness  PSYCH: Alert and oriented times 3; speech- coherent , normal rate and volume; able to articulate logical thoughts, able to abstract reason, no tangential thoughts, no hallucinations or delusions, affect- normal     Diagnostic Test Results:  Results for orders placed or performed in visit on 02/22/19   XR Lumbar Spine 2/3 Views    Narrative    Exam: XR LUMBAR SPINE 2-3 VIEWS, 2/22/2019 3:07 PM    Indication: fell on 2/4/19, has radicular pain down left leg; Left leg  pain    Comparison: None available    Findings:   Frontal, lateral, and L5-S1 spot views of the lumbosacral spine. 5  lumbar type vertebral bodies are assumed for the purposes of this  dictation. Normal spinal alignment. No significant vertebral body  height loss. Multilevel degenerative endplate change, greatest at L3-4  and L5-S1 with " "intervertebral disc space narrowing and endplate  spurring. Lower lumbar predominant facet arthropathy.    Nonobstructive bowel gas pattern.      Impression    Impression:   1. No acute osseous abnormality.  2. Mild-to-moderate degenerative change in the lumbar spine.    ELICEO JORDAN, DO       ASSESSMENT/PLAN:         Tobacco Cessation:   reports that he quit smoking about 2 years ago. His smoking use included cigarettes. He has a 3.75 pack-year smoking history. He has never used smokeless tobacco.      BMI:   Estimated body mass index is 36.72 kg/m  as calculated from the following:    Height as of this encounter: 1.588 m (5' 2.5\").    Weight as of this encounter: 92.5 kg (204 lb).   Weight management plan: Discussed healthy diet and exercise guidelines        ICD-10-CM    1. Acute on chronic combined systolic and diastolic congestive heart failure (H) I50.43 BNP-N terminal pro and patient will schedule follow up with cardiology. Increase lasix to twice a day as recommended last time he was having more problems with breathing at night.         2. Diabetic polyneuropathy associated with type 2 diabetes mellitus (H) E11.42 gabapentin (NEURONTIN) 300 MG capsule three times a day as needed. refilled     Hemoglobin A1c        3. Chronic pain syndrome G89.4 oxyCODONE (ROXICODONE) 5 MG tablet twice a day as needed. Controlled Substance Agreement signed and well managed on 60 per month.    4. Essential hypertension with goal blood pressure less than 140/90 I10 furosemide (LASIX) 40 MG tablet twice a day until breathing is better and edema down.      Renal panel (Alb, BUN, Ca, Cl, CO2, Creat, Gluc, Phos, K, Na)     **CBC with platelets FUTURE              5. Mild persistent asthma without complication J45.30 budesonide-formoterol (SYMBICORT) 160-4.5 MCG/ACT Inhaler- refilled and instructed to use daily        CONSULTATION/REFERRAL to cardiology for follow up exam and management   FUTURE LABS:       - Schedule a fasting " blood draw, patient left without getting blood work done.  FUTURE APPOINTMENTS:       - Follow-up visit in 1-2 weeks  Work on weight loss  Regular exercise  See Patient Instructions    Tori Rizvi MD  [facility]

## 2019-05-08 VITALS
BODY MASS INDEX: 36.14 KG/M2 | HEART RATE: 81 BPM | DIASTOLIC BLOOD PRESSURE: 88 MMHG | TEMPERATURE: 98.3 F | RESPIRATION RATE: 16 BRPM | OXYGEN SATURATION: 96 % | HEIGHT: 63 IN | WEIGHT: 204 LBS | SYSTOLIC BLOOD PRESSURE: 126 MMHG

## 2019-05-08 NOTE — PATIENT INSTRUCTIONS
Patient Education     Left- or Right- Side Congestive Heart Failure (CHF)    The heart is a large muscle that acts as a pump to circulate blood throughout the body. Blood carries oxygen to all of the organs, including the brain, muscles, and skin. After your body takes the oxygen out of the blood, the blood returns to the heart. The right side of the heart collects the blood from the body and pumps it to the lungs. In the lungs, it gets fresh oxygen and gives up carbon dioxide. The oxygen-rich blood from the lungs then returns to the left side of the heart, where it is pumped back out to the rest of your body, starting the process all over.  Congestive heart failure (CHF) occurs when the heart muscle does not function normally, leading to fluid retention or reduces blood flow. This can be caused by heart muscle weakness or stiffness, or a heart valve problem. Heart failure can affect the right side of the heart or the left side. But heart failure may affect not only the right side of the heart or only the left side. Although it may have started on one side, it can and often eventually does affect both sides.  Right-side heart failure  When the right side of the heart is failing, it can t handle the blood it is getting from the rest of the body. This blood returns to the heart through veins. When too much pressure builds up in the veins, fluid leaks out into the tissues. Gravity then causes that fluid to move to those parts of the body that are the lowest. So one of the first symptoms of right-side CHF can include swelling in the feet and ankles. If the condition gets worse, the swelling can even go up past the knees. Sometimes it gets so severe, the liver and intestines can get congested as well.  Left-side heart failure  When the left side of the heart is failing, it can t handle the blood it gets from the lungs. Pressure then builds up in the veins of the lungs, causing fluid to leak into the lung tissues. This  may cause CHF and pulmonary edema. This causes you to feel short of breath, weak, or dizzy. These symptoms are often worse with exertion, such as when climbing stairs or walking up hills. Lying with your head flat is uncomfortable and can make your breathing worse. This may make sleeping difficult. You may need to use extra pillows to elevate your upper body to sleep well. The same is true when just resting during the daytime. You may also feel weak or tired and have less energy during exertion.  There are many causes of heart failure including:    Coronary artery disease    Past heart attack (also known as acute myocardial infarction, or AMI)    High blood pressure    Damaged heart valve    Diabetes    Obesity    Cigarette smoking    Alcohol abuse  Heart failure is usually a chronic condition. The purpose of medical treatment is to improve the pumping action of the heart and to remove excess water from the body. A number of medicines can help reach this goal, improve symptoms, and prevent the heart from becoming weaker. Sometimes, heart failure can become so severe that a device is placed in the heart to help with pumping. Another major goal is to better treat the causes of heart failure, such as diabetes and high blood pressure, by making changes in your lifestyle and maximizing medical control when needed.  Home care  Follow these guidelines when caring for yourself at home:    Check your weight every day. This is very important because a sudden increase in weight gain could mean worsening heart failure. Keep these things in mind:  ? Use the same scale every day.  ? Weigh yourself at the same time every day.  ? Make sure the scale is on a hard floor surface, not on a rug or carpet.  ? Keep a record of your weight every day so your healthcare provider can see it. If you are not given a log sheet for this, keep a separate journal for this purpose.     Cut back on the amount of salt (sodium) you eat. Follow your  healthcare provider's recommendation on how much salt or sodium you should have each day.  ? Limit high-salt foods. These include olives, pickles, smoked meats, salted potato chips, and most prepared foods.  ? Don't add salt to your food at the table. Use only small amounts of salt when cooking.  ? Read the labels carefully on food packages to learn how much salt or sodium is in each serving in the package. Remember, a can or package of food may contain more than 1 serving. So if you eat all the food in the package, you may be getting more salt than you think.    Follow your healthcare provider's recommendations about how much fluid you should have. Be aware that some foods, such as soup, pudding, and juicy fruits like oranges or melons, contain liquid. You'll need to count the liquid in those foods as part of your daily fluid intake. Your provider can help you with this.    Stop smoking.    Cut back on how much alcohol you drink.    Lose weight if you are overweight. The excess weight adds a lot of stress on the workload of the heart.    Stay active. Talk with your provider about an exercise program that is safe for your heart.    Keep your feet elevated to reduce swelling. Ask your provider about support hose as a preventive treatment for daytime leg swelling.  Besides taking your medicine as instructed, an important part of treatment is lifestyle changes. These include diet, physical activity, stopping smoking, and weight control.  Improve your diet by including more fresh foods, cutting back on how much sugar and saturated fat you eat, and eating fewer processed foods and less salt.  Follow-up care  Follow up with your healthcare provider, or as advised.  Make sure to keep any appointments that were made for you. These can help better control your congestive heart failure. You will need to follow up with your provider on a routine basis to make sure your heart failure is well managed.  If an X-ray,  electrocardiogram (ECG), or other tests were done, you will be told of any new findings that may affect your care.  Call 911  Call 911 if you:    Become severely short of breath    Feel lightheaded, or feel like you might pass out or faint    Have chest pain or discomfort that is different than usual, the medicines your doctor told you to use for this don't help, or the pain lasts longer than 10 to 15 minutes    You suddenly develop a rapid heart rate  When to seek medical advice  The following may be signs that your heart failure is getting worse. Call your healthcare provider right away if any of these happen:    Sudden weight gain. This means 3 or more pounds in one day, or 5 or more pounds in 1 week    Trouble breathing not related to being active    New or increased swelling of your legs or ankles    Swelling or pain in your abdomen    Breathing trouble at night. This means waking up short of breath or needing more pillows to breathe.    Frequent coughing that doesn t go away    Feeling much more tired than usual  Date Last Reviewed: 5/1/2018 2000-2018 [employer]. [address] [place] [address], [place] [address]. All rights reserved. This information is not intended as a substitute for professional medical care. Always follow your healthcare professional's instructions.

## 2019-05-15 DIAGNOSIS — M1A.3790 CHRONIC GOUT DUE TO RENAL IMPAIRMENT INVOLVING ANKLE WITHOUT TOPHUS, UNSPECIFIED LATERALITY: ICD-10-CM

## 2019-05-15 DIAGNOSIS — F33.0 MAJOR DEPRESSIVE DISORDER, RECURRENT EPISODE, MILD (H): ICD-10-CM

## 2019-05-15 NOTE — TELEPHONE ENCOUNTER
"Requested Prescriptions   Pending Prescriptions Disp Refills     allopurinol (ZYLOPRIM) 100 MG tablet  Last Written Prescription Date:  05/19/18  Last Fill Quantity: 180,  # refills: 3   Last Office Visit with [facility], [facility] or [facility] prescribing provider:  05/07/19-Belkys   Future Office Visit:    180 tablet 3     Sig: Take 2 tablets (200 mg) by mouth daily       Gout Agents Protocol Failed - 5/15/2019  9:07 AM        Failed - Normal serum creatinine on file in the past 12 months     Recent Labs   Lab Test 01/15/19  0853   CR 1.44*             Passed - CBC on file in past 12 months     Recent Labs   Lab Test 01/15/19  0853 09/11/18  1208   WBC  --  8.6   RBC  --  5.15   HGB 15.1 15.7   HCT  --  46.4   PLT  --  248                 Passed - ALT on file in past 12 months     Recent Labs   Lab Test 09/11/18  1208   ALT 42             Passed - Has Uric Acid on file in past 12 months and value is less than 6     Recent Labs   Lab Test 09/11/18  1208   URIC 5.2     If level is 6mg/dL or greater, ok to refill one time and refer to provider.           Passed - Recent (12 mo) or future (30 days) visit within the authorizing provider's specialty     Patient had office visit in the last 12 months or has a visit in the next 30 days with authorizing provider or within the authorizing provider's specialty.  See \"Patient Info\" tab in inbasket, or \"Choose Columns\" in Meds & Orders section of the refill encounter.              Passed - Medication is active on med list        Passed - Patient is age 18 or older        DULoxetine (CYMBALTA) 60 MG capsule  Last Written Prescription Date:  11/13/18  Last Fill Quantity: 90,  # refills: 1   Last Office Visit with [facility], [facility] or [facility] prescribing provider:  05/07/19-Belkys   Future Office Visit:    90 capsule 1     Sig: Take 1 capsule (60 mg) by mouth daily       Serotonin-Norepinephrine Reuptake Inhibitors  Failed - 5/15/2019  9:07 AM        Failed - PHQ-9 score of less than 5 in past 6 " "months     Please review last PHQ-9 score.           Passed - Blood pressure under 140/90 in past 12 months     BP Readings from Last 3 Encounters:   05/07/19 126/88   02/19/19 139/84   01/22/19 133/88                 Passed - Medication is active on med list        Passed - Patient is age 18 or older        Passed - Recent (6 mo) or future (30 days) visit within the authorizing provider's specialty     Patient had office visit in the last 6 months or has a visit in the next 30 days with authorizing provider or within the authorizing provider's specialty.  See \"Patient Info\" tab in inbasket, or \"Choose Columns\" in Meds & Orders section of the refill encounter.              "

## 2019-05-16 ENCOUNTER — TELEPHONE (OUTPATIENT)
Dept: FAMILY MEDICINE | Facility: CLINIC | Age: 64
End: 2019-05-16

## 2019-05-16 DIAGNOSIS — F33.0 MAJOR DEPRESSIVE DISORDER, RECURRENT EPISODE, MILD (H): ICD-10-CM

## 2019-05-16 DIAGNOSIS — J30.1 CHRONIC SEASONAL ALLERGIC RHINITIS DUE TO POLLEN: ICD-10-CM

## 2019-05-16 RX ORDER — DULOXETIN HYDROCHLORIDE 60 MG/1
60 CAPSULE, DELAYED RELEASE ORAL DAILY
Qty: 90 CAPSULE | Refills: 1 | Status: CANCELLED | OUTPATIENT
Start: 2019-05-16

## 2019-05-16 RX ORDER — ALLOPURINOL 100 MG/1
200 TABLET ORAL DAILY
Qty: 180 TABLET | Refills: 3 | Status: SHIPPED | OUTPATIENT
Start: 2019-05-16 | End: 2019-10-24

## 2019-05-16 RX ORDER — DULOXETIN HYDROCHLORIDE 60 MG/1
60 CAPSULE, DELAYED RELEASE ORAL DAILY
Qty: 90 CAPSULE | Refills: 1 | Status: SHIPPED | OUTPATIENT
Start: 2019-05-16 | End: 2019-07-09

## 2019-05-16 NOTE — TELEPHONE ENCOUNTER
For allopurinol:  Routing refill request to provider for review/approval because:  Labs out of range:  Creatinine      Routing refill request to provider for review/approval because:  PHQ-9 is 5 or more. Per protocol, RN cannot refill if PHQ-9 is over 4.    PHQ-9 SCORE 11/13/2018 1/22/2019 2/19/2019   PHQ-9 Total Score - - -   PHQ-9 Total Score 9 10 12               Ibeth Deluna RN, BSN, PHN

## 2019-05-16 NOTE — TELEPHONE ENCOUNTER
"Requested Prescriptions   Pending Prescriptions Disp Refills     DULoxetine (CYMBALTA) 60 MG capsule      Last Written Prescription Date:  5/16/19  Last Fill Quantity: 90,  # refills: 1   [place] [facility] [place] [facility] or [facility] prescribing provider:  5/7/19   Future Office Visit:      90 capsule 1     Sig: Take 1 capsule (60 mg) by mouth daily       Serotonin-Norepinephrine Reuptake Inhibitors  Failed - 5/16/2019 10:12 AM        Failed - PHQ-9 score of less than 5 in past 6 months     Please review last PHQ-9 score.           Passed - Blood pressure under 140/90 in past 12 months     BP Readings from Last 3 Encounters:   05/07/19 126/88   02/19/19 139/84   01/22/19 133/88                 Passed - Medication is active on med list        Passed - Patient is age 18 or older        Passed - Recent (6 mo) or future (30 days) visit within the authorizing provider's specialty     Patient had office visit in the last 6 months or has a visit in the next 30 days with authorizing provider or within the authorizing provider's specialty.  See \"Patient Info\" tab in inbasket, or \"Choose Columns\" in Meds & Orders section of the refill encounter.                  Koby Faarax  [facility]  "

## 2019-05-16 NOTE — TELEPHONE ENCOUNTER
Reason for Call:  Medication or medication refill:    Do you use a [facility]?  Name of the pharmacy and phone number for the current request:  [id number] - [facility], [place] - [address].     Name of the medication requested: DULoxetine (CYMBALTA) 60 MG capsule    Can we leave a detailed message on this number? YES    Phone number patient can be reached at: Home number on file [phone] (home)    Best Time: anytime    Call taken on 5/16/2019 at 10:09 AM by Satya Matos

## 2019-05-16 NOTE — TELEPHONE ENCOUNTER
Reason for Call:  Other Fax  Detailed comments: Pt calling for he would like for his current medication list to be faxed to the [facility]  so that they can update his medications.     Phone Number Patient can be reached at: Home number on file [phone] (home)    Best Time: anytime    Can we leave a detailed message on this number? YES    Call taken on 5/16/2019 at 10:12 AM by Satya Matos

## 2019-05-30 DIAGNOSIS — I10 ESSENTIAL HYPERTENSION WITH GOAL BLOOD PRESSURE LESS THAN 140/90: ICD-10-CM

## 2019-05-30 NOTE — TELEPHONE ENCOUNTER
"Requested Prescriptions   Pending Prescriptions Disp Refills     lisinopril (PRINIVIL/ZESTRIL) 40 MG tablet      Last Written Prescription Date:  5/10/17  Last Fill Quantity: 45,  # refills: 3   Last Office Visit with [facility] or [facility] prescribing provider:  5/7/19   Future Office Visit:      45 tablet 3     Sig: Take 0.5 tablets (20 mg) by mouth daily       ACE Inhibitors (Including Combos) Protocol Failed - 5/30/2019 11:16 AM        Failed - Normal serum creatinine on file in past 12 months     Recent Labs   Lab Test 01/15/19  0853   CR 1.44*             Passed - Blood pressure under 140/90 in past 12 months     BP Readings from Last 3 Encounters:   05/07/19 126/88   02/19/19 139/84   01/22/19 133/88                 Passed - Recent (12 mo) or future (30 days) visit within the authorizing provider's specialty     Patient had office visit in the last 12 months or has a visit in the next 30 days with authorizing provider or within the authorizing provider's specialty.  See \"Patient Info\" tab in inbasket, or \"Choose Columns\" in Meds & Orders section of the refill encounter.              Passed - Medication is active on med list        Passed - Patient is age 18 or older        Passed - Normal serum potassium on file in past 12 months     Recent Labs   Lab Test 09/11/18  1208   POTASSIUM 4.5                   Koby Faarax  [facility]  "

## 2019-06-04 RX ORDER — LISINOPRIL 40 MG/1
20 TABLET ORAL DAILY
Qty: 45 TABLET | Refills: 3 | Status: SHIPPED | OUTPATIENT
Start: 2019-06-04 | End: 2019-07-09

## 2019-06-04 NOTE — TELEPHONE ENCOUNTER
Routing refill request to provider for review/approval because:  A break in medication - last Rx 5/10/17  Jana Mendoza RN

## 2019-06-05 ENCOUNTER — TELEPHONE (OUTPATIENT)
Dept: FAMILY MEDICINE | Facility: CLINIC | Age: 64
End: 2019-06-05

## 2019-06-05 DIAGNOSIS — G89.4 CHRONIC PAIN SYNDROME: ICD-10-CM

## 2019-06-05 RX ORDER — OXYCODONE HYDROCHLORIDE 5 MG/1
5 TABLET ORAL 2 TIMES DAILY PRN
Qty: 60 TABLET | Refills: 0 | Status: SHIPPED | OUTPATIENT
Start: 2019-06-05 | End: 2019-07-09

## 2019-06-05 NOTE — TELEPHONE ENCOUNTER
Requested Prescriptions   Pending Prescriptions Disp Refills     oxyCODONE (ROXICODONE) 5 MG tablet 60 tablet 0     Sig: Take 1 tablet (5 mg) by mouth 2 times daily as needed for moderate to severe pain (up to 2 a day)       There is no refill protocol information for this order      Last Written Prescription Date:  5/7/19  Last Fill Quantity: 60,  # refills: 0   Last office visit: 5/7/2019 with prescribing provider:     Future Office Visit:        Controlled Substance Refill Request for Oxycodone 5 mg  Problem List Complete:  Yes   checked in past 3 months?  Yes : 3/14/19     [place] reviewed:  not reviewed/not due - last done on 3/14/19    [place]  [email or web address]    Routing refill request to provider for review/approval because:  Drug not on the [facility] refill protocol       Samra Luna RN

## 2019-06-05 NOTE — TELEPHONE ENCOUNTER
Reason for Call:  Medication or medication refill:    Do you use a [facility]?  Name of the pharmacy and phone number for the current request:  WRITTEN PRESCRIPTION REQUESTED     Name of the medication requested: Pending Prescriptions:                       Disp   Refills    oxyCODONE (ROXICODONE) 5 MG tablet        60 tab*0            Sig: Take 1 tablet (5 mg) by mouth 2 times daily as           needed for moderate to severe pain (up to 2 a           day)    Other request: Please call when ready going out of town needs asap.    Can we leave a detailed message on this number? YES    Phone number patient can be reached at: Cell number on file:    Telephone Information:   Mobile [phone]     Best Time: any    Call taken on 6/5/2019 at 8:51 AM by Meron Nelson

## 2019-06-06 ENCOUNTER — TELEPHONE (OUTPATIENT)
Dept: FAMILY MEDICINE | Facility: CLINIC | Age: 64
End: 2019-06-06

## 2019-06-06 DIAGNOSIS — G62.9 NEUROPATHY: ICD-10-CM

## 2019-06-06 NOTE — TELEPHONE ENCOUNTER
Called patient is notified of this writer's note below.  Rene Alvarez,  [facility]

## 2019-06-06 NOTE — TELEPHONE ENCOUNTER
Written rx is signed and will be deliver to the  this afternoon for pickup after 3p.  Rene Alvarez,  [facility]    Please notify patient  to let them know the above info.  And remind the person who is picking up to bring their photo ID.  Rene Alvarez,  [facility]     NOTE: If patient and or guardians calls the clinic before the care teams gets a chance to call them, please notify the caller the above information regarding pickup times, remind them to bring a photo ID, document and close the encounter.  Rene Alvarez,  [facility]    FYI:  Anything completed after 2:00p will not be delivered until the next business day after 3p.   Rene Alvarez,  [facility].

## 2019-06-06 NOTE — TELEPHONE ENCOUNTER
Requested Prescriptions   Pending Prescriptions Disp Refills     traMADol (ULTRAM) 50 MG tablet [Pharmacy Med Name: TRAMADOL HCL 50 MG TABLET]        Last Written Prescription Date:  04/12/19  Last Fill Quantity: 60,   # refills: 1  Last Office Visit: 05/07/19-[facility]  Future Office visit:       Routing refill request to provider for review/approval because:  Drug not on the [facility], [facility] or [facility] refill protocol or controlled substance 60 tablet 0     Sig: TAKE 1 TO 2 TABLETS BY MOUTH TWICE DAILY. MAXIMUM 2 TABLETS PER DAY       There is no refill protocol information for this order

## 2019-06-07 RX ORDER — TRAMADOL HYDROCHLORIDE 50 MG/1
TABLET ORAL
Qty: 60 TABLET | Refills: 3 | Status: SHIPPED | OUTPATIENT
Start: 2019-06-07 | End: 2020-01-07

## 2019-06-10 NOTE — TELEPHONE ENCOUNTER
Script is not signed and is on provider's desk for signature tomorrow.  Rene Alvarez,  [facility]

## 2019-06-11 NOTE — TELEPHONE ENCOUNTER
Written rx is signed and faxed to the pharmacy, Pharmacy will contact pt when medication is ready for pickup.  Rene Alvarez,  [facility]

## 2019-06-21 NOTE — TELEPHONE ENCOUNTER
Team please check to see if fax was succussfully sent for Rx. Please see below messages.    Bhumi Martínez RN

## 2019-06-21 NOTE — TELEPHONE ENCOUNTER
Reason for Call:  Other prescription    Detailed comments: No RX at Pharmacy please resend.    Phone Number Patient can be reached at: Cell number on file:    Telephone Information:   Mobile [phone]     Best Time: any    Can we leave a detailed message on this number? YES    Call taken on 6/21/2019 at 12:51 PM by Meron Nelson

## 2019-06-21 NOTE — TELEPHONE ENCOUNTER
Written rx is retrieved and refax to [facility] pharmacy to fax # [phone].  Rene Alvarez,  [facility]

## 2019-07-09 ENCOUNTER — OFFICE VISIT (OUTPATIENT)
Dept: FAMILY MEDICINE | Facility: CLINIC | Age: 64
End: 2019-07-09
Payer: MEDICARE

## 2019-07-09 VITALS
RESPIRATION RATE: 16 BRPM | OXYGEN SATURATION: 97 % | SYSTOLIC BLOOD PRESSURE: 125 MMHG | BODY MASS INDEX: 36.86 KG/M2 | HEIGHT: 63 IN | HEART RATE: 78 BPM | DIASTOLIC BLOOD PRESSURE: 81 MMHG | TEMPERATURE: 98 F | WEIGHT: 208 LBS

## 2019-07-09 DIAGNOSIS — G89.4 CHRONIC PAIN SYNDROME: ICD-10-CM

## 2019-07-09 DIAGNOSIS — I10 ESSENTIAL HYPERTENSION WITH GOAL BLOOD PRESSURE LESS THAN 140/90: ICD-10-CM

## 2019-07-09 DIAGNOSIS — F33.0 MAJOR DEPRESSIVE DISORDER, RECURRENT EPISODE, MILD (H): ICD-10-CM

## 2019-07-09 DIAGNOSIS — M54.42 ACUTE BILATERAL LOW BACK PAIN WITH LEFT-SIDED SCIATICA: Primary | ICD-10-CM

## 2019-07-09 DIAGNOSIS — N18.30 CKD (CHRONIC KIDNEY DISEASE) STAGE 3, GFR 30-59 ML/MIN (H): ICD-10-CM

## 2019-07-09 PROCEDURE — 99214 OFFICE O/P EST MOD 30 MIN: CPT | Performed by: FAMILY MEDICINE

## 2019-07-09 RX ORDER — VENLAFAXINE HYDROCHLORIDE 37.5 MG/1
37.5 TABLET, EXTENDED RELEASE ORAL DAILY
Qty: 60 EACH | Refills: 3 | Status: SHIPPED | OUTPATIENT
Start: 2019-07-09 | End: 2020-08-27

## 2019-07-09 RX ORDER — FUROSEMIDE 40 MG
40 TABLET ORAL DAILY
Qty: 90 TABLET | Refills: 1 | Status: SHIPPED | OUTPATIENT
Start: 2019-07-09 | End: 2020-02-13

## 2019-07-09 RX ORDER — OXYCODONE HYDROCHLORIDE 5 MG/1
5 TABLET ORAL 2 TIMES DAILY PRN
Qty: 60 TABLET | Refills: 0 | Status: SHIPPED | OUTPATIENT
Start: 2019-07-09 | End: 2019-08-09

## 2019-07-09 RX ORDER — DULOXETIN HYDROCHLORIDE 30 MG/1
30 CAPSULE, DELAYED RELEASE ORAL DAILY
Qty: 14 CAPSULE | Refills: 0 | Status: SHIPPED | OUTPATIENT
Start: 2019-07-09 | End: 2020-08-27

## 2019-07-09 RX ORDER — CEPHALEXIN 500 MG/1
CAPSULE ORAL
COMMUNITY
Start: 2019-07-02 | End: 2019-07-09

## 2019-07-09 ASSESSMENT — PATIENT HEALTH QUESTIONNAIRE - PHQ9: SUM OF ALL RESPONSES TO PHQ QUESTIONS 1-9: 6

## 2019-07-09 ASSESSMENT — PAIN SCALES - GENERAL: PAINLEVEL: EXTREME PAIN (9)

## 2019-07-09 ASSESSMENT — MIFFLIN-ST. JEOR: SCORE: 1625.67

## 2019-07-09 NOTE — PATIENT INSTRUCTIONS
At [facility], we strive to deliver an exceptional experience to you, every time we see you.  If you receive a survey in the mail, please send us back your thoughts. We really do value your feedback.    Based on your medical history, these are the current health maintenance/preventive care services that you are due for (some may have been done at this visit.)  Health Maintenance Due   Topic Date Due     URINE DRUG SCREEN  1955     ZOSTER IMMUNIZATION (1 of 2) 02/02/2006     EYE EXAM  12/09/2016     HF ACTION PLAN  02/12/2017     ASTHMA CONTROL TEST  11/17/2018     BMP  03/11/2019     ADVANCE CARE PLANNING  03/12/2019     DIABETIC FOOT EXAM  05/17/2019     A1C  07/15/2019         Suggested websites for health information:  Www.W[employer]amaker.Localisto : Up to date and easily searchable information on multiple topics.  Www.Tapulous.gov : medication info, interactive tutorials, watch real surgeries online  Www.familydoctor.org : good info from the Academy of Family Physicians  Www.cdc.gov : public health info, travel advisories, epidemics (H1N1)  Www.aap.org : children's health info, normal development, vaccinations  Www.health.[facility].[place].us : [place] dept of health, public health issues in [place], N1N1    Your care team:                            Family Medicine Internal Medicine   MD Robb Teague MD Shantel Branch-Fleming, MD Katya Georgiev PA-C Nam Ho, MD Pediatrics   MIGUEL Ramírez, DERIC STALLWORTH CNP   MD Yoly Thomas MD Deborah Mielke, MD Kim Thein, APRN CNP      Clinic hours: Monday - Thursday 7 am-7 pm; Fridays 7 am-5 pm.   Urgent care: Monday - Friday 11 am-9 pm; Saturday and Sunday 9 am-5 pm.  Pharmacy : Monday -Thursday 8 am-8 pm; Friday 8 am-6 pm; Saturday and Sunday 9 am-5 pm.     Clinic: [phone]   Pharmacy: [phone]      Patient Education     General Neck and Back Pain    Both neck and back pain are  usually caused by injury to the muscles or ligaments of the spine. Sometimes the disks that separate each bone of the spine may cause pain by pressing on a nearby nerve. Back and neck pain may appear after a sudden twisting or bending force (such as in a car accident), or sometimes after a simple awkward movement. In either case, muscle spasm is often present and adds to the pain.  Acute neck and back pain usually gets better in 1 to 2 weeks. Pain related to disk disease, arthritis in the spinal joints or spinal stenosis (narrowing of the spinal canal) can become chronic and last for months or years.  Back and neck pain are common problems. Most people feel better in 1 or 2 weeks, and most of the rest in 1 to 2 months. Most people can remain active.  People have and describe pain differently.    Pain can be sharp, stabbing, shooting, aching, cramping, or burning    Movement, standing, bending, lifting, sitting, or walking may worsen the pain    Pain can be localized to one spot or area, or it can be more generalized    Pain can spread or radiate upwards, downwards, to the front, or go down your arms    Muscle spasm may occur.  Most of the time mechanical problems with the muscles or spine cause the pain. it is usually caused by an injury, whether known or not, to the muscles or ligaments. While illnesses can cause back pain, it is usually not caused by a serious illness. Pain is usually related to physical activity, whether sports, exercise, work, or normal activity. Sometimes it can occur without an identifiable cause. This can happen simply by stretching or moving wrong, without noting pain at the time. Other causes include:    Overexertion, lifting, pushing, pulling incorrectly or too aggressively.    Sudden twisting, bending or stretching from an accident (car or fall), or accidental movement.    Poor posture    Poor conditioning, lack of regular exercise    Spinal disc disease or  arthritis    Stress    Pregnancy, or illness like appendicitis, bladder or kidney infection, pelvic infections   Home care    For neck pain: Use a comfortable pillow that supports the head and keeps the spine in a neutral position. The position of the head should not be tilted forward or backward.    When in bed, try to find a position of comfort. A firm mattress is best. Try lying flat on your back with pillows under your knees. You can also try lying on your side with your knees bent up towards your chest and a pillow between your knees.    At first, do not try to stretch out the sore spots. If there is a strain, it is not like the good soreness you get after exercising without an injury. In this case, stretching may make it worse.    Don't sit for long periods, as in long car rides or other travel. This puts more stress on the lower back than standing or walking.    During the first 24 to 72 hours after an injury, apply an ice pack to the painful area for 20 minutes and then remove it for 20 minutes over a period of 60 to 90 minutes or several times a day.     You can alternate ice and heat therapies. Talk with your healthcare provider about the best treatment for your back or neck pain. As a safety precaution, do not use a heating pad at bedtime. Sleeping with a heating pad can lead to skin burns or tissue damage.    Therapeutic massage can help relax the back and neck muscles without stretching them.    Be aware of safe lifting methods and do not lift anything over 15 pounds until all the pain is gone.  Medicines  Talk to your healthcare provider before using medicine, especially if you have other medical problems or are taking other medicines.    You may use over-the-counter medicine to control pain, unless another pain medicine was prescribed. If you have chronic conditions like diabetes, liver or kidney disease, stomach ulcers,  gastrointestinal bleeding, or are taking blood thinner medicines.    Be careful  if you are given pain medicines, narcotics, or medicine for muscle spasm. They can cause drowsiness, and can affect your coordination, reflexes, and judgment. Do not drive or operate heavy machinery.  Follow-up care  Follow up with your healthcare provider, or as advised. Physical therapy or further tests may be needed.  If X-rays were taken, you will be notified of any new findings that may affect your care.  Call 911  Call 911 if any of the following occur:    Trouble breathing    Confusion    Very drowsy or trouble awakening    Fainting or loss of consciousness    Rapid or very slow heart rate    Loss of bowel or bladder control  When to seek medical advice  Call your healthcare provider right away if any of these occur:    Pain becomes worse or spreads into your arms or legs    Weakness, numbness or pain in one or both arms or legs    Numbness in the groin area    Difficulty walking    Fever of 100.4 F (38 C) or higher, or as directed by your healthcare provider  Date Last Reviewed: 7/1/2016 2000-2018 [employer]. [address] [place] [address]. All rights reserved. This information is not intended as a substitute for professional medical care. Always follow your healthcare professional's instructions.           Patient Education     Possible Causes of Low Back or Leg Pain    BIG: The symptoms in your back or leg may be due to pressure on a nerve. This pressure may be caused by a damaged disk or by abnormal bone growth. Either way, you may feel pain, burning, tingling, or numbness. If you have pressure on a nerve that connects to the sciatic nerve, pain may shoot down your leg.    Pressure from the disk  Constant wear and tear can weaken a disk over time and cause back pain. The disk can then be damaged by a sudden movement or injury. If its soft center starts to bulge, the disk may press on a nerve. Or the outside of the disk may tear, and the soft center may squeeze through and  pinch a nerve.    Pressure from bone  As a disk wears out, the vertebrae right above and below the disk start to touch. This can put pressure on a nerve. Often, abnormal bone (called bone spurs) grows where the vertebrae rub against each other. This can cause the foramen or the spinal canal to narrow (called stenosis) and press against a nerve.  Date Last Reviewed: 3/1/2018    4360-0097 [employer]. [address] [place] [address], [place] [address]. All rights reserved. This information is not intended as a substitute for professional medical care. Always follow your healthcare professional's instructions.

## 2019-07-09 NOTE — PROGRESS NOTES
Subjective     Santiago Hylton is a 64 year old male who presents to clinic today for the following health issues:    HPI     ED/UC Followup:  Patient requesting alternative pain mediation other than Tramadol and Oxycodone.    Facility:  [facility]  Date of visit: 7/2/19  Reason for visit: Cellulitis, right leg  Current Status: Patient complains of dull ache right leg pain and some throbbing, numbness right sided shin, swelling in leg, symptoms worsens with standing/walking.       Hospital Follow-up Visit:    Hospital/Nursing Home/IP Rehab Facility: [facility]  Date of Admission: 6/26/19  Date of Discharge: 6/29/19  Reason(s) for Admission: High potassium causing kidney function problem            Problems taking medications regularly:  None       Medication changes since discharge: yes - discharged from Lisinopril and potassium       Problems adhering to non-medication therapy:  None    Summary of hospitalization:  CareEverywhere information obtained and reviewed  Diagnostic Tests/Treatments reviewed.  Follow up needed: spine specialist  Other Healthcare Providers Involved in Patient s Care:         Physical Therapy  Update since discharge: fluctuating course.     Post Discharge Medication Reconciliation: discharge medications reconciled and changed, per note/orders (see AVS).  Plan of care communicated with patient     Coding guidelines for this visit:  Type of Medical   Decision Making Face-to-Face Visit       within 7 Days of discharge Face-to-Face Visit        within 14 days of discharge   Moderate Complexity [id number]   High Complexity [id number]            Hypertension Follow-up      Do you check your blood pressure regularly outside of the clinic? Yes     Are you following a low salt diet? Yes    Are your blood pressures ever more than 140 on the top number (systolic) OR more   than 90 on the bottom number (diastolic), for example 140/90? No  Depression and Anxiety  Follow-Up    How are you doing with your depression since your last visit? No change    How are you doing with your anxiety since your last visit?  No change    Are you having other symptoms that might be associated with depression or anxiety? No    Have you had a significant life event? No     Do you have any concerns with your use of alcohol or other drugs? No    Social History     Tobacco Use     Smoking status: Former Smoker     Packs/day: 0.25     Years: 15.00     Pack years: 3.75     Types: Cigarettes     Last attempt to quit: 2016     Years since quittin.8     Smokeless tobacco: Never Used     Tobacco comment: 3-4 a day   Substance Use Topics     Alcohol use: Yes     Comment: weekend beer     Drug use: No     PHQ 2019   PHQ-9 Total Score 10 12 6   Q9: Thoughts of better off dead/self-harm past 2 weeks Not at all Not at all Not at all     GENARO-7 SCORE 2016   Total Score 6 7 7           Suicide Assessment Five-step Evaluation and Treatment (SAFE-T)  Chronic Kidney Disease Follow-up      Current NSAID use?  No    Chronic/Recurring Back Pain Follow Up      Where is your back pain located? (Select all that apply) low back bilateral    How would you describe your back pain?  dull ache and gnawing    Where does your back pain spread? the right  thigh and the right  knee    Since your last clinic visit for back pain, how has your pain changed? always present, but gets better and worse    Does your back pain interfere with your job? YES    Since your last visit, have you tried any new treatment? No      Patient Active Problem List   Diagnosis     Hypertension goal BP (blood pressure) < 140/90     Hyperlipidemia LDL goal <100     CHF (congestive heart failure) (H)     Mild recurrent major depression (H)     Gout     GERD (gastroesophageal reflux disease)     Chronic rhinitis     CKD (chronic kidney disease) stage 2, GFR 60-89 ml/min     History of colonic polyps      Advanced directives, counseling/discussion     Chronic hepatitis C (H)     Type 2 diabetes mellitus with diabetic chronic kidney disease (H)     Microalbuminuria due to type 2 diabetes mellitus (H)     Obesity (BMI 30-39.9)     Type 2 diabetes mellitus with diabetic polyneuropathy (H)     Coronary artery disease involving native coronary artery of native heart without angina pectoris     Diabetic polyneuropathy associated with type 2 diabetes mellitus (H)     Chronic obstructive airway disease with asthma (H)     Obesity (BMI 35.0-39.9) with comorbidity (H)     Acute bilateral low back pain with left-sided sciatica     Chronic pain syndrome     CKD (chronic kidney disease) stage 3, GFR 30-59 ml/min (H)     Past Surgical History:   Procedure Laterality Date     CARDIAC SURGERY      stent     CATARACT IOL, RT/LT       COLONOSCOPY  2012    Procedure: COLONOSCOPY;  COLONOSCOPY, SCREEN;  Surgeon: Cl Johnson MD;  Location: [facility] OR     ORTHOPEDIC SURGERY      ankle     PHACOEMULSIFICATION WITH STANDARD INTRAOCULAR LENS IMPLANT Right 2016    Procedure: PHACOEMULSIFICATION WITH STANDARD INTRAOCULAR LENS IMPLANT;  Surgeon: Fly Merrill MD;  Location: [facility] OR     PHACOEMULSIFICATION WITH STANDARD INTRAOCULAR LENS IMPLANT Left 2016    Procedure: PHACOEMULSIFICATION WITH STANDARD INTRAOCULAR LENS IMPLANT;  Surgeon: Fly Merrill MD;  Location: [facility] OR       Social History     Tobacco Use     Smoking status: Former Smoker     Packs/day: 0.25     Years: 15.00     Pack years: 3.75     Types: Cigarettes     Last attempt to quit: 2016     Years since quittin.8     Smokeless tobacco: Never Used     Tobacco comment: 3-4 a day   Substance Use Topics     Alcohol use: Yes     Comment: weekend beer     Family History   Problem Relation Age of Onset     Heart Disease Maternal Grandmother      Hypertension Maternal Grandmother      Hypertension Father      Hypertension Mother       Hypertension Sister      Thyroid Disease Sister      Cancer Brother      Diabetes Brother      Hypertension Brother      Hypertension Maternal Aunt      Cancer Maternal Uncle      Hypertension Maternal Uncle      Hypertension Paternal Aunt      Hypertension Paternal Uncle      Hypertension Maternal Grandfather      Hypertension Paternal Grandmother      Hypertension Paternal Grandfather      Cerebrovascular Disease No family hx of      Glaucoma No family hx of      Macular Degeneration No family hx of          Current Outpatient Medications   Medication Sig Dispense Refill     albuterol (2.5 MG/3ML) 0.083% neb solution INHALE 3 ML BY NEBULIZATION METHOD EVERY 6 HOURS AS NEEDED FOR SHORTNESS OF BREATH 360 mL 1     allopurinol (ZYLOPRIM) 100 MG tablet Take 2 tablets (200 mg) by mouth daily 180 tablet 3     amLODIPine (NORVASC) 5 MG tablet TAKE 1 TABLET BY MOUTH EVERY DAY FOR BLOOD PRESSURE 90 tablet 1     aspirin  MG tablet TAKE 1 TABLET BY MOUTH EVERY DAY 90 tablet 3     budesonide-formoterol (SYMBICORT) 160-4.5 MCG/ACT Inhaler Inhale 2 puffs into the lungs 2 times daily 10.2 Inhaler 3     carvedilol (COREG) 12.5 MG tablet TAKE 1 TABLET BY MOUTH TWICE DAILY WITH MEALS 180 tablet 2     cetirizine (ZYRTEC) 10 MG tablet TAKE 1 TABLET BY MOUTH DAILY AT BEDTIME 90 tablet 10     DULoxetine (CYMBALTA) 30 MG capsule Take 1 capsule (30 mg) by mouth daily Take lower dose for 2 weeks then stop 14 capsule 0     furosemide (LASIX) 40 MG tablet Take 1 tablet (40 mg) by mouth daily 90 tablet 1     gabapentin (NEURONTIN) 300 MG capsule TAKE 1 CAPSULE (300 MG) BY MOUTH 3 TIMES DAILY 270 capsule 3     hydrOXYzine (ATARAX) 25 MG tablet Take 1-2 tablets (25-50 mg) by mouth every 8 hours as needed for itching 60 tablet 3     isosorbide mononitrate (ISMO/MONOKET) 20 MG tablet Take 1 tablet (20 mg) by mouth 2 times daily 180 tablet 2     lidocaine (LIDODERM) 5 % Patch 1 patch       loratadine (CLARITIN) 10 MG tablet Take 1 tablet (10  mg) by mouth daily 90 tablet 1     montelukast (SINGULAIR) 10 MG tablet TAKE 1 TABLET BY MOUTH DAILY AT BEDTIME 90 tablet 3     nitroGLYcerin (NITROSTAT) 0.4 MG sublingual tablet Place 1 tablet (0.4 mg) under the tongue every 5 minutes as needed for chest pain 0.4 mg by Sublingual route every 5 (five) minutes as needed. 25 tablet 11     order for DME Equipment being ordered: Single prong cane 1 Device 0     oxyCODONE (ROXICODONE) 5 MG tablet Take 1 tablet (5 mg) by mouth 2 times daily as needed for moderate to severe pain (up to 2 a day) 60 tablet 0     ranitidine (ZANTAC) 150 MG tablet TAKE 1 TABLET BY MOUTH TWICE DAILY 180 tablet 3     simvastatin (ZOCOR) 40 MG tablet TAKE 1 TABLET (40 MG) BY MOUTH AT BEDTIME 90 tablet 1     tiZANidine (ZANAFLEX) 2 MG tablet Take 1-2 tablets (2-4 mg) by mouth 3 times daily 30 tablet 0     traMADol (ULTRAM) 50 MG tablet TAKE 1 TO 2 TABLETS BY MOUTH TWICE DAILY. MAXIMUM 2 TABLETS PER DAY 60 tablet 3     traZODone (DESYREL) 100 MG tablet TAKE 2 TABLETS BY MOUTH AT BEDTIME 180 tablet 2     venlafaxine (EFFEXOR-ER) 37.5 MG 24 hr tablet Take 1 tablet (37.5 mg) by mouth daily Take one a day for 2 weeks then increase to 2 a day. 60 each 3     Allergies   Allergen Reactions     No Known Drug Allergies      Recent Labs   Lab Test 01/15/19  0853 09/11/18  1208 05/17/18  1045  04/28/17  0942   A1C 5.8* 5.4 5.8*   < > 5.7   LDL  --  58 70  --  70   HDL  --  40 52  --  47   TRIG  --  190* 118  --  92   ALT  --  42 25  --  25   CR 1.44* 1.20 1.44*   < > 1.42*   GFRESTIMATED 51* 61 50*   < > 51*   GFRESTBLACK 59* 74 60*   < > 61   POTASSIUM  --  4.5 4.4   < > 4.3   TSH  --  0.79 2.04  --  0.74    < > = values in this interval not displayed.      BP Readings from Last 3 Encounters:   07/09/19 125/81   05/07/19 126/88   02/19/19 139/84    Wt Readings from Last 3 Encounters:   07/09/19 94.3 kg (208 lb)   05/07/19 92.5 kg (204 lb)   02/22/19 95.7 kg (211 lb)                    Reviewed and updated as  "needed this visit by Provider         Review of Systems   ROS COMP: Constitutional, HEENT, cardiovascular, pulmonary, gi and gu systems are negative, except as otherwise noted.      Objective    /81 (BP Location: Right arm, Patient Position: Chair, Cuff Size: Adult Large)   Pulse 78   Temp 98  F (36.7  C) (Oral)   Resp 16   Ht 1.588 m (5' 2.5\")   Wt 94.3 kg (208 lb)   SpO2 97%   BMI 37.44 kg/m    Body mass index is 37.44 kg/m .  Physical Exam   GENERAL: healthy, alert and is not in distress with antalgic gait.  EYES: Eyes grossly normal to inspection, PERRL and conjunctivae and sclerae normal  NECK: no adenopathy, no asymmetry, masses, or scars and thyroid normal to palpation  RESP: lungs clear to auscultation - no rales, rhonchi or wheezes  CV: regular rate and rhythm, normal S1 S2, no S3 or S4, no murmur, click or rub, no peripheral edema and peripheral pulses strong  ABDOMEN: soft, nontender, no hepatosplenomegaly, no masses and bowel sounds normal  MS: no gross musculoskeletal defects noted, no edema  SKIN: no suspicious lesions or rashes  NEURO: decreased strength and tone, mentation intact and speech normal  Comprehensive back pain exam:  Tenderness of paralumbar region with some muscle spasm, Pain does limit the following motions: extension, flexion and twisting, Lower extremity strength is not functional and equal on both sides, Lower extremity reflexes within normal limits bilaterally, Lower extremity sensation normal and equal on both sides and straight leg raise is negative left and positive on right.  PSYCH: affect is normal.     Diagnostic Test Results:  Labs reviewed in Epic  Results for orders placed or performed in visit on 02/22/19   XR Lumbar Spine 2/3 Views    Narrative    Exam: XR LUMBAR SPINE 2-3 VIEWS, 2/22/2019 3:07 PM    Indication: fell on 2/4/19, has radicular pain down left leg; Left leg  pain    Comparison: None available    Findings:   Frontal, lateral, and L5-S1 spot views " "of the lumbosacral spine. 5  lumbar type vertebral bodies are assumed for the purposes of this  dictation. Normal spinal alignment. No significant vertebral body  height loss. Multilevel degenerative endplate change, greatest at L3-4  and L5-S1 with intervertebral disc space narrowing and endplate  spurring. Lower lumbar predominant facet arthropathy.    Nonobstructive bowel gas pattern.      Impression    Impression:   1. No acute osseous abnormality.  2. Mild-to-moderate degenerative change in the lumbar spine.    ELICEO JORDAN, DO           Assessment & Plan       ICD-10-CM    1. Acute bilateral low back pain with left-sided sciatica M54.42 PAIN MANAGEMENT REFERRAL- not interested in procedure but would like better pain control.    2. CKD (chronic kidney disease) stage 3, GFR 30-59 ml/min (H) N18.3    3. Major depressive disorder, recurrent episode, mild (H) F33.0 DULoxetine (CYMBALTA) 30 MG capsule- cut back and discontinue. Change to venlafaxine due to withdrawal symptoms when missing only one day's dose.     venlafaxine (EFFEXOR-ER) 37.5 MG 24 hr tablet once a day and increase to 2 a day in 2 weeks when duloxetine stops.    4. Chronic pain syndrome G89.4 oxyCODONE (ROXICODONE) 5 MG tablet twice a day as needed.    5. Essential hypertension with goal blood pressure less than 140/90 I10 furosemide (LASIX) 40 MG tablet daily        BMI:   Estimated body mass index is 37.44 kg/m  as calculated from the following:    Height as of this encounter: 1.588 m (5' 2.5\").    Weight as of this encounter: 94.3 kg (208 lb).   Weight management plan: Discussed healthy diet and exercise guidelines        CONSULTATION/REFERRAL to spine clinic, pain clinic  FUTURE LABS:       - Schedule fasting labs in 3 months  FUTURE APPOINTMENTS:       - Follow-up visit in 3 months or sooner if any questions or concerns.   Work on weight loss  Regular exercise  See Patient Instructions    Return in about 3 months (around 10/9/2019) for " medication follow up, Lab Work.    Tori Rizvi MD  [facility]

## 2019-07-10 ENCOUNTER — TELEPHONE (OUTPATIENT)
Dept: FAMILY MEDICINE | Facility: CLINIC | Age: 64
End: 2019-07-10

## 2019-07-10 NOTE — TELEPHONE ENCOUNTER
Plan does not cover venlafaxine (EFFEXOR-ER) 37.5 MG 24 hr tablet.        Additional Information: no alternative given    Krystal Xie

## 2019-07-12 NOTE — TELEPHONE ENCOUNTER
Spoke with pharmacy tablets are not covered. Insurance will cover capsules. Huddled with Dr. Belkys ortiz to change to capsules.    NKECHI Aguero MA

## 2019-07-22 ENCOUNTER — TELEPHONE (OUTPATIENT)
Dept: FAMILY MEDICINE | Facility: CLINIC | Age: 64
End: 2019-07-22

## 2019-07-22 NOTE — TELEPHONE ENCOUNTER
Patient would like a call back regarding his new medication. Patient is requesting the call be anytime tomorrow, 7/23.    [phone] - ok to leave a message.

## 2019-07-23 NOTE — TELEPHONE ENCOUNTER
Returned call to patient.   He is asking for clarification on Venlafaxine and what this medication was for. Was thinking was for pain. Was confused between Cymbalta and Venlafaxine.   Writer clarified that Venlafaxine will be taking the place of Cymbalta, once weaned off the Cymbalta. Venlafaxine is for mood.  Patient understanding, does remember discussing this with provider at time of appointment. Writer reviewed tapering plan to come off of Cymbalta. Has been taking Cymbalta 30 mg daily, as ordered. Patient understanding of this. Will continue transition to Venlafaxine, as ordered.   Patient asked for refill on Tramadol. Writer noted that pharmacy should have refills on file. Patient confirmed is taking 1 tablet 2 x daily. #60 with 3 refills was given on 6/7/19 so should have enough till October. Patient agreed, will check with pharmacy.  No further questions at this time.    Samra Luna RN

## 2019-08-09 DIAGNOSIS — G89.4 CHRONIC PAIN SYNDROME: ICD-10-CM

## 2019-08-09 RX ORDER — OXYCODONE HYDROCHLORIDE 5 MG/1
5 TABLET ORAL 2 TIMES DAILY PRN
Qty: 60 TABLET | Refills: 0 | Status: SHIPPED | OUTPATIENT
Start: 2019-08-09 | End: 2019-09-06

## 2019-08-09 NOTE — TELEPHONE ENCOUNTER
Requested Prescriptions   Pending Prescriptions Disp Refills     oxyCODONE (ROXICODONE) 5 MG tablet 60 tablet 0     Sig: Take 1 tablet (5 mg) by mouth 2 times daily as needed for moderate to severe pain (up to 2 a day)       There is no refill protocol information for this order        Last Written Prescription Date:  7/9/19  Last Fill Quantity: 60,  # refills: 0   Last office visit: 7/9/2019 with prescribing provider:     Future Office Visit:      Controlled Substance Refill Request for Oxycodone  Problem List Complete:  Yes   checked in past 3 months?  Yes : 8/9/19   Overview Addendum 8/9/2019  3:26 PM by Samra Luna RN   Patient is followed by Tori Rizvi MD for ongoing prescription of pain medication.  All refills should only be approved by this provider, or covering partner.     Medication(s): oxycodone 5 mg twice a day.   Maximum quantity per month: 60  Clinic visit frequency required: Q 3 months      Controlled substance agreement:  Encounter-Level CSA:    There are no encounter-level csa.      Patient-Level CSA:    Controlled Substance Agreement - Opioid - Scan on 3/27/2019  2:34 PM (below)            Pain Clinic evaluation in the past: No     DIRE Total Score(s):  No flowsheet data found.     Last [facility] website verification: 8/9/2019   [email or web address]     Routing refill request to provider for review/approval because:  Drug not on the [facility] refill protocol     Samra Luna RN

## 2019-08-09 NOTE — TELEPHONE ENCOUNTER
Reason for Call:  Medication or medication refill:    Do you use a [facility]?  Name of the pharmacy and phone number for the current request:  Pt will come to clinic to  hard copy of Rx at     Name of the medication requested: oxyCODONE (ROXICODONE) 5 MG tablet    Can we leave a detailed message on this number? YES    Phone number patient can be reached at: Home number on file [phone] (home)    Best Time: anytime    Call taken on 8/9/2019 at 9:55 AM by Satya Matos

## 2019-08-19 DIAGNOSIS — J45.31 MILD PERSISTENT ASTHMA WITH ACUTE EXACERBATION: ICD-10-CM

## 2019-08-19 DIAGNOSIS — E78.5 HYPERLIPIDEMIA LDL GOAL <100: ICD-10-CM

## 2019-08-19 NOTE — TELEPHONE ENCOUNTER
"Requested Prescriptions   Pending Prescriptions Disp Refills     montelukast (SINGULAIR) 10 MG tablet [Pharmacy Med Name: MONTELUKAST SOD 10 MG TABLET]  Last Written Prescription Date:  09/11/18  Last Fill Quantity: 90,  # refills: 3   Last Office Visit with [facility], [facility] or [facility] prescribing provider:  07/09/19-Belkys   Future Office Visit:    90 tablet 0     Sig: TAKE 1 TABLET BY MOUTH DAILY AT BEDTIME       Leukotriene Inhibitors Protocol Failed - 8/19/2019 10:06 AM        Failed - Asthma control assessment score within normal limits in last 6 months     Please review ACT score.           Passed - Patient is age 12 or older     If patient is under 16, ok to refill using age based dosing.           Passed - Medication is active on med list        Passed - Recent (6 mo) or future (30 days) visit within the authorizing provider's specialty     Patient had office visit in the last 6 months or has a visit in the next 30 days with authorizing provider or within the authorizing provider's specialty.  See \"Patient Info\" tab in inbasket, or \"Choose Columns\" in Meds & Orders section of the refill encounter.            isosorbide mononitrate (ISMO/MONOKET) 20 MG tablet [Pharmacy Med Name: ISOSORBIDE MONONIT 20 MG TAB]  Last Written Prescription Date:  10/26/18  Last Fill Quantity: 180,  # refills: 2   Last Office Visit with [facility], [facility] or [facility] prescribing provider:  07/09/19-Belkys   Future Office Visit:    180 tablet 0     Sig: TAKE 1 TABLET (20 MG) BY MOUTH 2 TIMES DAILY       Nitrates Passed - 8/19/2019 10:06 AM        Passed - Blood pressure under 140/90 in past 12 months     BP Readings from Last 3 Encounters:   07/09/19 125/81   05/07/19 126/88   02/19/19 139/84                 Passed - Pt is not on erectile dysfunction medications        Passed - Recent (12 mo) or future (30 days) visit within the authorizing provider's specialty     Patient had office visit in the last 12 months or has a visit in the next 30 days " "with authorizing provider or within the authorizing provider's specialty.  See \"Patient Info\" tab in inbasket, or \"Choose Columns\" in Meds & Orders section of the refill encounter.              Passed - Medication is active on med list        Passed - Patient is age 18 or older          "

## 2019-08-20 RX ORDER — MONTELUKAST SODIUM 10 MG/1
TABLET ORAL
Qty: 30 TABLET | Refills: 0 | Status: SHIPPED | OUTPATIENT
Start: 2019-08-20 | End: 2019-09-13

## 2019-08-20 RX ORDER — ISOSORBIDE MONONITRATE 20 MG/1
20 TABLET ORAL 2 TIMES DAILY
Qty: 180 TABLET | Refills: 2 | Status: SHIPPED | OUTPATIENT
Start: 2019-08-20 | End: 2020-05-15

## 2019-08-20 NOTE — TELEPHONE ENCOUNTER
Routing refill request to provider for review/approval because:  Out of range:  ACT <20  Jana Mendoza RN

## 2019-08-24 DIAGNOSIS — E78.5 HYPERLIPIDEMIA, UNSPECIFIED HYPERLIPIDEMIA TYPE: ICD-10-CM

## 2019-08-24 NOTE — TELEPHONE ENCOUNTER
"Requested Prescriptions   Pending Prescriptions Disp Refills     simvastatin (ZOCOR) 40 MG tablet [Pharmacy Med Name: SIMVASTATIN 40 MG TABLET]  Last Written Prescription Date:  3/12/19  Last Fill Quantity: 90,  # refills: 1   Last Office Visit with [facility], [facility] or [facility] prescribing provider:  7/9/19   Future Office Visit:      90 tablet 0     Sig: TAKE 1 TABLET (40 MG) BY MOUTH AT BEDTIME       Statins Protocol Passed - 8/24/2019  8:32 AM        Passed - LDL on file in past 12 months     Recent Labs   Lab Test 09/11/18  1208   LDL 58             Passed - No abnormal creatine kinase in past 12 months     No lab results found.             Passed - Recent (12 mo) or future (30 days) visit within the authorizing provider's specialty     Patient had office visit in the last 12 months or has a visit in the next 30 days with authorizing provider or within the authorizing provider's specialty.  See \"Patient Info\" tab in inbasket, or \"Choose Columns\" in Meds & Orders section of the refill encounter.              Passed - Medication is active on med list        Passed - Patient is age 18 or older          "

## 2019-08-26 RX ORDER — SIMVASTATIN 40 MG
40 TABLET ORAL AT BEDTIME
Qty: 90 TABLET | Refills: 0 | Status: SHIPPED | OUTPATIENT
Start: 2019-08-26 | End: 2019-11-22

## 2019-08-26 NOTE — TELEPHONE ENCOUNTER
Prescription approved per [facility] Refill Protocol.      Bhumi Martínez RN

## 2019-08-28 ENCOUNTER — TELEPHONE (OUTPATIENT)
Dept: FAMILY MEDICINE | Facility: CLINIC | Age: 64
End: 2019-08-28

## 2019-08-28 NOTE — TELEPHONE ENCOUNTER
Noted docuementation from [place] below, will wait for patient's form.  Rene Alvarez,  [facility]

## 2019-08-28 NOTE — TELEPHONE ENCOUNTER
.Reason for Call:  forms    Detailed comments: [employer] form is being faxed over - and needs this completed, elvis this is an emergency  *aware Dr Rizvi is out of office  Phone Number Patient can be reached at: Home number on file [phone] (home)    Best Time: anytime    Can we leave a detailed message on this number? YES    Call taken on 8/28/2019 at 10:49 AM by Pamela Ryan

## 2019-08-28 NOTE — TELEPHONE ENCOUNTER
Form is received and forward to Lurdes Quinteros NP to help address for Dr. Rizvi's absence.  Rene Alvarez,  [facility]

## 2019-09-06 DIAGNOSIS — G89.4 CHRONIC PAIN SYNDROME: ICD-10-CM

## 2019-09-06 RX ORDER — OXYCODONE HYDROCHLORIDE 5 MG/1
5 TABLET ORAL 2 TIMES DAILY PRN
Qty: 60 TABLET | Refills: 0 | Status: SHIPPED | OUTPATIENT
Start: 2019-09-06 | End: 2019-10-03

## 2019-09-06 NOTE — TELEPHONE ENCOUNTER
Controlled Substance Refill Request for oxyCODONE (ROXICODONE) 5 MG tablet  Problem List Complete:  Yes  Overview Addendum 8/9/2019  3:26 PM by Samra Luna RN   Patient is followed by Tori Rizvi MD for ongoing prescription of pain medication.  All refills should only be approved by this provider, or covering partner.     Medication(s): oxycodone 5 mg twice a day.   Maximum quantity per month: 60  Clinic visit frequency required: Q 3 months      Controlled substance agreement:  Encounter-Level CSA:    There are no encounter-level csa.      Patient-Level CSA:    Controlled Substance Agreement - Opioid - Scan on 3/27/2019  2:34 PM (below)            Pain Clinic evaluation in the past: No     DIRE Total Score(s):  No flowsheet data found.     Last [facility] website verification: 8/9/2019   [email or web address]        checked in past 3 months?  Yes 8/9/19    Routing refill request to provider for review/approval because:  Drug not on the [facility] refill protocol   Jana Mendoza RN

## 2019-09-06 NOTE — TELEPHONE ENCOUNTER
Rx handed to [facility] pharmacy. Pharmacy will let patient know when ready for .    Brandon Mills CMA

## 2019-09-06 NOTE — TELEPHONE ENCOUNTER
Requested Prescriptions   Pending Prescriptions Disp Refills     oxyCODONE (ROXICODONE) 5 MG tablet        Last Written Prescription Date:  08/09/19  Last Fill Quantity: 60,   # refills: 0  Last Office Visit: 07/09/19-Belkys  Future Office visit:       Routing refill request to provider for review/approval because:  Drug not on the [facility], [facility] or [facility] refill protocol or controlled substance 60 tablet 0     Sig: Take 1 tablet (5 mg) by mouth 2 times daily as needed for moderate to severe pain (up to 2 a day)       There is no refill protocol information for this order        Last [facility] website verification: 8/9/2019   [email or web address]

## 2019-09-06 NOTE — TELEPHONE ENCOUNTER
.Reason for Call:  Medication or medication refill:    Do you use a [facility]?  Name of the pharmacy and phone number for the current request:  [facility] - [place] - [phone]    Name of the medication requested: oxycodone(roxicodone) 5 mg tab    Other request: will  at the [facility] pharmacy     Can we leave a detailed message on this number? YES    Phone number patient can be reached at: Cell number on file:    Telephone Information:   Mobile [phone]       Best Time: anytime    Call taken on 9/6/2019 at 9:37 AM by Pamela Ryan

## 2019-09-20 ENCOUNTER — TELEPHONE (OUTPATIENT)
Dept: FAMILY MEDICINE | Facility: CLINIC | Age: 64
End: 2019-09-20

## 2019-09-20 NOTE — TELEPHONE ENCOUNTER
Reason for Call:  DME/form    Detailed comments: sent via fax - form for back brace.    Phone Number Patient can be reached at: phone number:  [phone]    Best Time: any    Can we leave a detailed message on this number? Not Applicable    Call taken on 9/20/2019 at 12:44 PM by Pamela Ryan

## 2019-09-26 NOTE — TELEPHONE ENCOUNTER
Faxed Dr. Rizvi note on their fax that she did not order for patient.  The fax # is [phone].  Rene Alvarez,  [facility]

## 2019-09-28 DIAGNOSIS — E11.42 DIABETIC POLYNEUROPATHY ASSOCIATED WITH TYPE 2 DIABETES MELLITUS (H): ICD-10-CM

## 2019-09-28 NOTE — TELEPHONE ENCOUNTER
Requested Prescriptions   Pending Prescriptions Disp Refills     gabapentin (NEURONTIN) 300 MG capsule [Pharmacy Med Name: GABAPENTIN 300 MG CAPSULE] 360 capsule 1     Sig: TAKE 1 CAPSULE (300 MG) BY MOUTH 4 TIMES DAILY. TAKE 2 CAPS DURING THE DAY AND 2 CAPS AT NIGHT       There is no refill protocol information for this order        Last Written Prescription Date:  5/7/19  Last Fill Quantity: 270,  # refills: 3   Last Office Visit with [facility], P or [facility] prescribing provider:  7/9/19   Future Office Visit:           [facility]

## 2019-10-01 RX ORDER — GABAPENTIN 300 MG/1
CAPSULE ORAL
Qty: 360 CAPSULE | Refills: 1 | OUTPATIENT
Start: 2019-10-01

## 2019-10-03 ENCOUNTER — TELEPHONE (OUTPATIENT)
Dept: FAMILY MEDICINE | Facility: CLINIC | Age: 64
End: 2019-10-03

## 2019-10-03 DIAGNOSIS — G89.4 CHRONIC PAIN SYNDROME: ICD-10-CM

## 2019-10-03 DIAGNOSIS — E11.42 DIABETIC POLYNEUROPATHY ASSOCIATED WITH TYPE 2 DIABETES MELLITUS (H): ICD-10-CM

## 2019-10-03 RX ORDER — OXYCODONE HYDROCHLORIDE 5 MG/1
5 TABLET ORAL 2 TIMES DAILY PRN
Qty: 60 TABLET | Refills: 0 | Status: SHIPPED | OUTPATIENT
Start: 2019-10-03 | End: 2019-11-06

## 2019-10-03 RX ORDER — GABAPENTIN 300 MG/1
CAPSULE ORAL
Qty: 270 CAPSULE | Refills: 3 | Status: SHIPPED | OUTPATIENT
Start: 2019-10-03 | End: 2020-06-12

## 2019-10-03 NOTE — TELEPHONE ENCOUNTER
Reason for Call:  Medication or medication refill:    Do you use a [facility]?  Name of the pharmacy and phone number for the current request:  [facility]    Name of the medication requested: Pending Prescriptions:                       Disp   Refills    oxyCODONE (ROXICODONE) 5 MG tablet        60 tab*0            Sig: Take 1 tablet (5 mg) by mouth 2 times daily as           needed for moderate to severe pain (up to 2 a           day)    gabapentin (NEURONTIN) 300 MG capsule     270 ca*3            Sig: TAKE 1 CAPSULE (300 MG) BY MOUTH 3 TIMES DAILY    Other request: Please call when approved.    Can we leave a detailed message on this number? YES    Phone number patient can be reached at: [phone]    Best Time: any    Call taken on 10/3/2019 at 2:32 PM by Meron Nelson

## 2019-10-03 NOTE — TELEPHONE ENCOUNTER
Requested Prescriptions   Pending Prescriptions Disp Refills     oxyCODONE (ROXICODONE) 5 MG tablet        Last Written Prescription Date:  09/06/19  Last Fill Quantity: 60,   # refills: 0  Last Office Visit: 07/09/19-[facility]  Future Office visit:    Next 5 appointments (look out 90 days)    Oct 24, 2019 10:00 AM CDT  Office Visit with [facility]i [facility] MD  [facility] [address] [place] [address] [id number] [phone]           Routing refill request to provider for review/approval because:  Drug not on the [facility], UMP or  Health refill protocol or controlled substance 60 tablet 0     Sig: Take 1 tablet (5 mg) by mouth 2 times daily as needed for moderate to severe pain (up to 2 a day)       There is no refill protocol information for this order        Last [facility] website verification: 8/9/2019   [email or web address]

## 2019-10-12 DIAGNOSIS — J45.31 MILD PERSISTENT ASTHMA WITH ACUTE EXACERBATION: ICD-10-CM

## 2019-10-12 NOTE — TELEPHONE ENCOUNTER
"Requested Prescriptions   Pending Prescriptions Disp Refills     montelukast (SINGULAIR) 10 MG tablet [Pharmacy Med Name: MONTELUKAST SOD 10 MG TABLET] 30 tablet 0     Sig: TAKE 1 TABLET BY MOUTH EVERYDAY AT BEDTIME       Last Written Prescription Date:  9/17/19  Last Fill Quantity: 30,  # refills: 0   Last Office Visit with G, P or [facility] prescribing provider:  7/9/19   Future Office Visit:    Next 5 appointments (look out 90 days)    Oct 24, 2019 10:00 AM CDT  Office Visit with Toir Rizvi MD  [facility] [address] [place] [phone] [phone]             Leukotriene Inhibitors Protocol Failed - 10/12/2019 11:33 AM        Failed - Asthma control assessment score within normal limits in last 6 months     Please review ACT score.           Passed - Patient is age 12 or older     If patient is under 16, ok to refill using age based dosing.           Passed - Medication is active on med list        Passed - Recent (6 mo) or future (30 days) visit within the authorizing provider's specialty     Patient had office visit in the last 6 months or has a visit in the next 30 days with authorizing provider or within the authorizing provider's specialty.  See \"Patient Info\" tab in inbasket, or \"Choose Columns\" in Meds & Orders section of the refill encounter.                  [facility]oby Fa[facility]ax  [facility]  "

## 2019-10-15 NOTE — TELEPHONE ENCOUNTER
Routing refill request to provider for review/approval because:  Gena given x2 and patient needs meds to last until appointment on 10/24/19, please advise  Jana Mendoza RN

## 2019-10-16 RX ORDER — MONTELUKAST SODIUM 10 MG/1
TABLET ORAL
Qty: 15 TABLET | Refills: 0 | Status: SHIPPED | OUTPATIENT
Start: 2019-10-16 | End: 2019-11-01

## 2019-10-22 DIAGNOSIS — F33.0 MILD RECURRENT MAJOR DEPRESSION (H): Primary | ICD-10-CM

## 2019-10-22 NOTE — TELEPHONE ENCOUNTER
"Requested Prescriptions   Pending Prescriptions Disp Refills     venlafaxine (EFFEXOR-XR) 37.5 MG 24 hr capsule [Pharmacy Med Name: VENLAFAXINE HCL ER 37.5 MG CAP]  Last Written Prescription Date:  07/09/19  Last Fill Quantity: 60,  # refills: 3   Last Office Visit with [facility], [facility] or [facility] prescribing provider:  07/09/19-[facility]   Future Office Visit:    Next 5 appointments (look out 90 days)    Oct 24, 2019 10:00 AM CDT  Office Visit with Tori Rizvi MD  [facility] ([facility]) [address] [place] [phone]        180 capsule 1     Sig: TAKE 1 CAPSULE BY MOUTH DAILY FOR 2 WKS, THEN 2 CAPSULES DAILY       Serotonin-Norepinephrine Reuptake Inhibitors  Failed - 10/22/2019  9:33 AM        Failed - Normal serum creatinine on file in past 12 months     Recent Labs   Lab Test 01/15/19  0853   CR 1.44*             Passed - Blood pressure under 140/90 in past 12 months     BP Readings from Last 3 Encounters:   07/09/19 125/81   05/07/19 126/88   02/19/19 139/84                 Passed - Recent (12 mo) or future (30 days) visit within the authorizing provider's specialty     Patient has had an office visit with the authorizing provider or a provider within the authorizing providers department within the previous 12 mos or has a future within next 30 days. See \"Patient Info\" tab in inbasket, or \"Choose Columns\" in Meds & Orders section of the refill encounter.              Passed - Medication is active on med list        Passed - Patient is age 18 or older          "

## 2019-10-24 ENCOUNTER — OFFICE VISIT (OUTPATIENT)
Dept: FAMILY MEDICINE | Facility: CLINIC | Age: 64
End: 2019-10-24
Payer: MEDICARE

## 2019-10-24 VITALS
RESPIRATION RATE: 18 BRPM | BODY MASS INDEX: 36.5 KG/M2 | OXYGEN SATURATION: 94 % | SYSTOLIC BLOOD PRESSURE: 138 MMHG | DIASTOLIC BLOOD PRESSURE: 88 MMHG | HEIGHT: 63 IN | TEMPERATURE: 97.9 F | HEART RATE: 74 BPM | WEIGHT: 206 LBS

## 2019-10-24 DIAGNOSIS — R35.0 URINARY FREQUENCY: ICD-10-CM

## 2019-10-24 DIAGNOSIS — Z87.891 PERSONAL HISTORY OF TOBACCO USE: ICD-10-CM

## 2019-10-24 DIAGNOSIS — Z12.5 SCREENING FOR PROSTATE CANCER: ICD-10-CM

## 2019-10-24 DIAGNOSIS — Z12.11 SPECIAL SCREENING FOR MALIGNANT NEOPLASMS, COLON: ICD-10-CM

## 2019-10-24 DIAGNOSIS — E11.42 TYPE 2 DIABETES MELLITUS WITH DIABETIC POLYNEUROPATHY, UNSPECIFIED WHETHER LONG TERM INSULIN USE (H): Primary | ICD-10-CM

## 2019-10-24 DIAGNOSIS — M1A.3790 CHRONIC GOUT DUE TO RENAL IMPAIRMENT INVOLVING ANKLE WITHOUT TOPHUS, UNSPECIFIED LATERALITY: ICD-10-CM

## 2019-10-24 DIAGNOSIS — R35.1 NOCTURIA: ICD-10-CM

## 2019-10-24 DIAGNOSIS — Z23 NEED FOR PROPHYLACTIC VACCINATION AND INOCULATION AGAINST INFLUENZA: ICD-10-CM

## 2019-10-24 DIAGNOSIS — E11.42 DIABETIC POLYNEUROPATHY ASSOCIATED WITH TYPE 2 DIABETES MELLITUS (H): ICD-10-CM

## 2019-10-24 LAB
ALT SERPL W P-5'-P-CCNC: 42 U/L (ref 0–70)
ANION GAP SERPL CALCULATED.3IONS-SCNC: 7 MMOL/L (ref 3–14)
BUN SERPL-MCNC: 20 MG/DL (ref 7–30)
CALCIUM SERPL-MCNC: 9.2 MG/DL (ref 8.5–10.1)
CHLORIDE SERPL-SCNC: 104 MMOL/L (ref 94–109)
CHOLEST SERPL-MCNC: 148 MG/DL
CO2 SERPL-SCNC: 27 MMOL/L (ref 20–32)
CREAT SERPL-MCNC: 1.12 MG/DL (ref 0.66–1.25)
CREAT UR-MCNC: 29 MG/DL
ERYTHROCYTE [DISTWIDTH] IN BLOOD BY AUTOMATED COUNT: 13 % (ref 10–15)
GFR SERPL CREATININE-BSD FRML MDRD: 69 ML/MIN/{1.73_M2}
GLUCOSE SERPL-MCNC: 103 MG/DL (ref 70–99)
HBA1C MFR BLD: 5.3 % (ref 0–5.6)
HCT VFR BLD AUTO: 49.4 % (ref 40–53)
HDLC SERPL-MCNC: 48 MG/DL
HGB BLD-MCNC: 16.7 G/DL (ref 13.3–17.7)
LDLC SERPL CALC-MCNC: 84 MG/DL
MCH RBC QN AUTO: 30.5 PG (ref 26.5–33)
MCHC RBC AUTO-ENTMCNC: 33.8 G/DL (ref 31.5–36.5)
MCV RBC AUTO: 90 FL (ref 78–100)
MICROALBUMIN UR-MCNC: 26 MG/L
MICROALBUMIN/CREAT UR: 91.03 MG/G CR (ref 0–17)
NONHDLC SERPL-MCNC: 100 MG/DL
PLATELET # BLD AUTO: 194 10E9/L (ref 150–450)
POTASSIUM SERPL-SCNC: 4.2 MMOL/L (ref 3.4–5.3)
PSA SERPL-ACNC: 0.19 UG/L (ref 0–4)
RBC # BLD AUTO: 5.48 10E12/L (ref 4.4–5.9)
SODIUM SERPL-SCNC: 138 MMOL/L (ref 133–144)
TRIGL SERPL-MCNC: 78 MG/DL
URATE SERPL-MCNC: 5.2 MG/DL (ref 3.5–7.2)
WBC # BLD AUTO: 9.4 10E9/L (ref 4–11)

## 2019-10-24 PROCEDURE — 84550 ASSAY OF BLOOD/URIC ACID: CPT | Performed by: FAMILY MEDICINE

## 2019-10-24 PROCEDURE — 85027 COMPLETE CBC AUTOMATED: CPT | Performed by: FAMILY MEDICINE

## 2019-10-24 PROCEDURE — G0103 PSA SCREENING: HCPCS | Performed by: FAMILY MEDICINE

## 2019-10-24 PROCEDURE — 99214 OFFICE O/P EST MOD 30 MIN: CPT | Mod: 25 | Performed by: FAMILY MEDICINE

## 2019-10-24 PROCEDURE — 83036 HEMOGLOBIN GLYCOSYLATED A1C: CPT | Performed by: FAMILY MEDICINE

## 2019-10-24 PROCEDURE — 82043 UR ALBUMIN QUANTITATIVE: CPT | Performed by: FAMILY MEDICINE

## 2019-10-24 PROCEDURE — 80061 LIPID PANEL: CPT | Performed by: FAMILY MEDICINE

## 2019-10-24 PROCEDURE — 84460 ALANINE AMINO (ALT) (SGPT): CPT | Performed by: FAMILY MEDICINE

## 2019-10-24 PROCEDURE — 36415 COLL VENOUS BLD VENIPUNCTURE: CPT | Performed by: FAMILY MEDICINE

## 2019-10-24 PROCEDURE — 90682 RIV4 VACC RECOMBINANT DNA IM: CPT | Performed by: FAMILY MEDICINE

## 2019-10-24 PROCEDURE — G0008 ADMIN INFLUENZA VIRUS VAC: HCPCS | Performed by: FAMILY MEDICINE

## 2019-10-24 PROCEDURE — 80048 BASIC METABOLIC PNL TOTAL CA: CPT | Performed by: FAMILY MEDICINE

## 2019-10-24 RX ORDER — TAMSULOSIN HYDROCHLORIDE 0.4 MG/1
0.4 CAPSULE ORAL DAILY
Qty: 90 CAPSULE | Refills: 1 | Status: SHIPPED | OUTPATIENT
Start: 2019-10-24 | End: 2020-04-10

## 2019-10-24 RX ORDER — ALLOPURINOL 100 MG/1
200 TABLET ORAL DAILY
Qty: 180 TABLET | Refills: 3 | Status: SHIPPED | OUTPATIENT
Start: 2019-10-24 | End: 2020-10-05

## 2019-10-24 ASSESSMENT — MIFFLIN-ST. JEOR: SCORE: 1616.6

## 2019-10-24 ASSESSMENT — PAIN SCALES - GENERAL: PAINLEVEL: NO PAIN (0)

## 2019-10-24 NOTE — PATIENT INSTRUCTIONS
At [facility], we strive to deliver an exceptional experience to you, every time we see you.  If you receive a survey in the mail, please send us back your thoughts. We really do value your feedback.    Based on your medical history, these are the current health maintenance/preventive care services that you are due for (some may have been done at this visit.)  Health Maintenance Due   Topic Date Due     URINE DRUG SCREEN  1955     MEDICARE ANNUAL WELLNESS VISIT  02/02/1974     ZOSTER IMMUNIZATION (1 of 2) 02/02/2006     EYE EXAM  12/09/2016     HF ACTION PLAN  02/12/2017     ASTHMA CONTROL TEST  11/17/2018     BMP  03/11/2019     ADVANCE CARE PLANNING  03/12/2019     DIABETIC FOOT EXAM  05/17/2019     A1C  07/15/2019     INFLUENZA VACCINE (1) 09/01/2019     ALT  09/11/2019     LIPID  09/11/2019     MICROALBUMIN  09/11/2019     ASTHMA ACTION PLAN  09/11/2019     CBC  09/11/2019     DTAP/TDAP/TD IMMUNIZATION (2 - Td) 10/09/2019         Suggested websites for health information:  Www.[facility].org : Up to date and easily searchable information on multiple topics.  Www.medlineplus.gov : medication info, interactive tutorials, watch real surgeries online  Www.familydoctor.org : good info from the Academy of Family Physicians  Www.cdc.gov : public health info, travel advisories, epidemics (H1N1)  Www.aap.org : children's health info, normal development, vaccinations  Www.health.[facility].[place].us : [place] dept of health, public health issues in [place], N1N1    Your care team:                            Family Medicine Internal Medicine   MD Robb Teague MD Shantel Branch-Fleming, MD Katya Georgiev PA-C Nam Ho, MD Pediatrics   MIGUEL Ramírez, MD Yoly Ling CNP, MD Deborah Mielke, MD Kim Thein, APRN CNP      Clinic hours: Monday - Thursday 7 am-7 pm; Fridays 7 am-5 pm.   Urgent care: Monday - Friday 11 am-9 pm;  Saturday and Sunday 9 am-5 pm.  Pharmacy : Monday -Thursday 8 am-8 pm; Friday 8 am-6 pm; Saturday and Sunday 9 am-5 pm.     Clinic: [phone]   Pharmacy: [phone]    Patient Education     BPH (Enlarged Prostate)  The prostate is a gland at the base of the bladder. As some men get older, the prostate may get bigger in size. This problem is called benign prostatic hyperplasia (BPH). BPH puts pressure on the urethra. This is the tube that carries urine from the bladder to the penis. It may interfere with the flow of urine. It may also keep the bladder from emptying fully.    Symptoms of BPH include trouble starting urination and feeling as though the bladder isn t emptying all the way. It also includes a weak urine stream, dribbling and leaking of urine, and frequent and urgent urination (especially at night). BPH can increase the risk of urinary infections. It can also block off urine flow completely. If this occurs, a thin tube (catheter) may be passed into the bladder to help drain urine.  If symptoms are mild, no treatment may be needed right now. If symptoms are more severe, treatment is likely needed. The goal of treatment is to improve urine flow and reduce symptoms. Treatments can include medicine and procedures. Your healthcare provider will discuss treatment options with you as needed.  Home care  The following guidelines will help you care for yourself at home:    Urinate as soon as you feel the urge. Don't try to hold your urine.    Don't limit your fluid intake during the day. Drink 6 to 8 glasses of water or liquids a day. This prevents bacteria from building up in the bladder.    Avoid drinking fluids after dinner to help reduce urination during the night.    Avoid medicines that can worsen your symptoms. These include certain cold and allergy medicines and antidepressants. Diuretics used for high blood pressure can also worsen symptoms. Talk to your doctor about the medicines you take. Other  choices may work better for you.  Prostate cancer screening  BPH does not increase the risk of prostate cancer. But because prostate cancer is a common cancer in men, screening is sometimes recommended. This may help detect the cancer in its early stages when treatment is most effective. Factors that can increase the risk of prostate cancer include being -American or having a father or brother who had prostate cancer. A high-fat diet may also increase the risk of prostate cancer. Talk to your healthcare provider to see whether you should be screened for prostate cancer.  Follow-up care  Follow up with your healthcare provider, or as advised  To learn more, go to:    National Kidney & Urologic Diseases Information Clearinghouse  kidney.niddk.nih.gov, 828.152.8239  When to seek medical advice  Call your healthcare provider right away if any of these occur:    Fever of 100.4 F (38.0 C) or higher, or as advised    Unable to pass urine for 8 hours    Increasing pressure or pain in your bladder (lower abdomen)    Blood in the urine    Increasing low back pain, not related to injury    Symptoms of urinary infection (increased urge to urinate, burning when passing urine, foul-smelling urine)  Date Last Reviewed: 7/1/2016 2000-2018 [employer]. [address] [place] [address], [place] [address]. All rights reserved. This information is not intended as a substitute for professional medical care. Always follow your healthcare professional's instructions.

## 2019-10-24 NOTE — PROGRESS NOTES
Subjective     Santiago Hylton is a 64 year old male who presents to clinic today for the following health issues:    HPI   Diabetes Follow-up- may have been incorrectly diagnosed. Normal A1C without any medications for several years. Diagnosed in the distant past but never in diabetic range at our clinic.       How often are you checking your blood sugar? Not at all    What time of day are you checking your blood sugars (select all that apply)?  n/a    Have you had any blood sugars above 200?  No    Have you had any blood sugars below 70?  No    What symptoms do you notice when your blood sugar is low?  Weak and Blurred vision    What concerns do you have today about your diabetes? None     Do you have any of these symptoms? (Select all that apply)  Numbness in feet and Burning in feet     Have you had a diabetic eye exam in the last 12 months? Yes- Date of last eye exam: March 15 2019, normal exam    BP Readings from Last 2 Encounters:   10/24/19 138/88   07/09/19 125/81     Hemoglobin A1C (%)   Date Value   10/24/2019 5.3   01/15/2019 5.8 (H)     LDL Cholesterol Calculated (mg/dL)   Date Value   09/11/2018 58   05/17/2018 70       Diabetes Management Resources      How many servings of fruits and vegetables do you eat daily?  0-1    On average, how many sweetened beverages do you drink each day (soda, juice, sweet tea, etc)?   1    How many days per week do you miss taking your medication? 0      Concern - Prostate problem       Description:   Patient states that he would like to discuss his prostate with PCP  Up 2-3 times at night. Urinary hesitancy.     -patient would like to discuss alternate medication to Zantac, patient stopped taking the medication for the past 2 months         Patient Active Problem List   Diagnosis     Hypertension goal BP (blood pressure) < 140/90     Hyperlipidemia LDL goal <100     CHF (congestive heart failure) (H)     Mild recurrent major depression (H)     Gout     GERD  (gastroesophageal reflux disease)     Chronic rhinitis     CKD (chronic kidney disease) stage 2, GFR 60-89 ml/min     History of colonic polyps     Advanced directives, counseling/discussion     Chronic hepatitis C (H)     Type 2 diabetes mellitus with diabetic chronic kidney disease (H)     Microalbuminuria due to type 2 diabetes mellitus (H)     Obesity (BMI 30-39.9)     Type 2 diabetes mellitus with diabetic polyneuropathy (H)     Coronary artery disease involving native coronary artery of native heart without angina pectoris     Diabetic polyneuropathy associated with type 2 diabetes mellitus (H)     Chronic obstructive airway disease with asthma (H)     Obesity (BMI 35.0-39.9) with comorbidity (H)     Acute bilateral low back pain with left-sided sciatica     Chronic pain syndrome     CKD (chronic kidney disease) stage 3, GFR 30-59 ml/min (H)     Past Surgical History:   Procedure Laterality Date     CARDIAC SURGERY      stent     CATARACT IOL, RT/LT       COLONOSCOPY  9/18/2012    Procedure: COLONOSCOPY;  COLONOSCOPY, SCREEN;  Surgeon: Cl Johnson MD;  Location: [facility]     ORTHOPEDIC SURGERY      ankle     PHACOEMULSIFICATION WITH STANDARD INTRAOCULAR LENS IMPLANT Right 1/28/2016    Procedure: PHACOEMULSIFICATION WITH STANDARD INTRAOCULAR LENS IMPLANT;  Surgeon: Fly Merrill MD;  Location: [facility]     PHACOEMULSIFICATION WITH STANDARD INTRAOCULAR LENS IMPLANT Left 2/11/2016    Procedure: PHACOEMULSIFICATION WITH STANDARD INTRAOCULAR LENS IMPLANT;  Surgeon: Fly Merrill MD;  Location: [facility]       Social History     Tobacco Use     Smoking status: Former Smoker     Packs/day: 0.25     Years: 15.00     Pack years: 3.75     Types: Cigarettes     Last attempt to quit: 9/21/2016     Years since quitting: 3.0     Smokeless tobacco: Never Used     Tobacco comment: 3-4 a day   Substance Use Topics     Alcohol use: Yes     Comment: weekend beer     Family History   Problem Relation Age of  Onset     Heart Disease Maternal Grandmother      Hypertension Maternal Grandmother      Hypertension Father      Hypertension Mother      Hypertension Sister      Thyroid Disease Sister      Cancer Brother      Diabetes Brother      Hypertension Brother      Hypertension Maternal Aunt      Cancer Maternal Uncle      Hypertension Maternal Uncle      Hypertension Paternal Aunt      Hypertension Paternal Uncle      Hypertension Maternal Grandfather      Hypertension Paternal Grandmother      Hypertension Paternal Grandfather      Cerebrovascular Disease No family hx of      Glaucoma No family hx of      Macular Degeneration No family hx of          Current Outpatient Medications   Medication Sig Dispense Refill     albuterol (2.5 MG/3ML) 0.083% neb solution INHALE 3 ML BY NEBULIZATION METHOD EVERY 6 HOURS AS NEEDED FOR SHORTNESS OF BREATH 360 mL 1     allopurinol (ZYLOPRIM) 100 MG tablet Take 2 tablets (200 mg) by mouth daily 180 tablet 3     amLODIPine (NORVASC) 5 MG tablet TAKE 1 TABLET BY MOUTH EVERY DAY FOR BLOOD PRESSURE 90 tablet 1     aspirin  MG tablet TAKE 1 TABLET BY MOUTH EVERY DAY 90 tablet 3     budesonide-formoterol (SYMBICORT) 160-4.5 MCG/ACT Inhaler Inhale 2 puffs into the lungs 2 times daily 10.2 Inhaler 3     carvedilol (COREG) 12.5 MG tablet TAKE 1 TABLET BY MOUTH TWICE DAILY WITH MEALS 180 tablet 2     cetirizine (ZYRTEC) 10 MG tablet TAKE 1 TABLET BY MOUTH DAILY AT BEDTIME 90 tablet 10     DULoxetine (CYMBALTA) 30 MG capsule Take 1 capsule (30 mg) by mouth daily Take lower dose for 2 weeks then stop 14 capsule 0     furosemide (LASIX) 40 MG tablet Take 1 tablet (40 mg) by mouth daily 90 tablet 1     gabapentin (NEURONTIN) 300 MG capsule TAKE 1 CAPSULE (300 MG) BY MOUTH 3 TIMES DAILY 270 capsule 3     hydrOXYzine (ATARAX) 25 MG tablet Take 1-2 tablets (25-50 mg) by mouth every 8 hours as needed for itching 60 tablet 3     isosorbide mononitrate (ISMO/MONOKET) 20 MG tablet TAKE 1 TABLET (20  MG) BY MOUTH 2 TIMES DAILY 180 tablet 2     lidocaine (LIDODERM) 5 % Patch 1 patch       loratadine (CLARITIN) 10 MG tablet Take 1 tablet (10 mg) by mouth daily 90 tablet 1     montelukast (SINGULAIR) 10 MG tablet TAKE 1 TABLET BY MOUTH EVERYDAY AT BEDTIME 15 tablet 0     nitroGLYcerin (NITROSTAT) 0.4 MG sublingual tablet Place 1 tablet (0.4 mg) under the tongue every 5 minutes as needed for chest pain 0.4 mg by Sublingual route every 5 (five) minutes as needed. 25 tablet 11     order for DME Equipment being ordered: Single prong cane 1 Device 0     oxyCODONE (ROXICODONE) 5 MG tablet Take 1 tablet (5 mg) by mouth 2 times daily as needed for moderate to severe pain (up to 2 a day) 60 tablet 0     simvastatin (ZOCOR) 40 MG tablet TAKE 1 TABLET (40 MG) BY MOUTH AT BEDTIME 90 tablet 0     tamsulosin (FLOMAX) 0.4 MG capsule Take 1 capsule (0.4 mg) by mouth daily 90 capsule 1     tiZANidine (ZANAFLEX) 2 MG tablet Take 1-2 tablets (2-4 mg) by mouth 3 times daily 30 tablet 0     traMADol (ULTRAM) 50 MG tablet TAKE 1 TO 2 TABLETS BY MOUTH TWICE DAILY. MAXIMUM 2 TABLETS PER DAY 60 tablet 3     traZODone (DESYREL) 100 MG tablet TAKE 2 TABLETS BY MOUTH AT BEDTIME 180 tablet 2     venlafaxine (EFFEXOR-ER) 37.5 MG 24 hr tablet Take 1 tablet (37.5 mg) by mouth daily Take one a day for 2 weeks then increase to 2 a day. 60 each 3     ranitidine (ZANTAC) 150 MG tablet TAKE 1 TABLET BY MOUTH TWICE DAILY (Patient not taking: Reported on 10/24/2019) 180 tablet 3     Allergies   Allergen Reactions     No Known Drug Allergies      Recent Labs   Lab Test 10/24/19  1001 01/15/19  0853 09/11/18  1208 05/17/18  1045  04/28/17  0942   A1C 5.3 5.8* 5.4 5.8*   < > 5.7   LDL  --   --  58 70  --  70   HDL  --   --  40 52  --  47   TRIG  --   --  190* 118  --  92   ALT  --   --  42 25  --  25   CR  --  1.44* 1.20 1.44*   < > 1.42*   GFRESTIMATED  --  51* 61 50*   < > 51*   GFRESTBLACK  --  59* 74 60*   < > 61   POTASSIUM  --   --  4.5 4.4   < > 4.3  "  TSH  --   --  0.79 2.04  --  0.74    < > = values in this interval not displayed.      BP Readings from Last 3 Encounters:   10/24/19 138/88   07/09/19 125/81   05/07/19 126/88    Wt Readings from Last 3 Encounters:   10/24/19 93.4 kg (206 lb)   07/09/19 94.3 kg (208 lb)   05/07/19 92.5 kg (204 lb)                      Reviewed and updated as needed this visit by Provider  Problems         Review of Systems   ROS COMP: Constitutional, HEENT, cardiovascular, pulmonary, gi and gu systems are negative, except as otherwise noted.      Objective    /88 (BP Location: Left arm, Patient Position: Chair, Cuff Size: Adult Large)   Pulse 74   Temp 97.9  F (36.6  C) (Oral)   Resp 18   Ht 1.588 m (5' 2.5\")   Wt 93.4 kg (206 lb)   SpO2 94%   BMI 37.08 kg/m    Body mass index is 37.08 kg/m .  Physical Exam   GENERAL: healthy, alert and no distress, is obese  EYES: Eyes grossly normal to inspection, PERRL and conjunctivae and sclerae normal  HENT: ear canals and TM's normal, nose and mouth without ulcers or lesions  NECK: no adenopathy, no asymmetry, masses, or scars and thyroid normal to palpation  RESP: lungs clear to auscultation - no rales, rhonchi or wheezes  CV: regular rate and rhythm, normal S1 S2, no S3 or S4, no murmur, click or rub, no peripheral edema and peripheral pulses strong  ABDOMEN: soft, nontender, no hepatosplenomegaly, no masses and bowel sounds normal  MS: no gross musculoskeletal defects noted, no edema  SKIN: no suspicious lesions or rashes  NEURO: Normal strength and tone, mentation intact and speech normal  BACK: no CVA tenderness, no paralumbar tenderness  PSYCH: mentation appears normal, affect normal/bright  Diabetic foot exam: normal DP and PT pulses, no trophic changes or ulcerative lesions, normal calluses, normal sensory exam and decreased monofilament exam     Diagnostic Test Results:  Labs reviewed in Epic  Results for orders placed or performed in visit on 10/24/19   CBC with " "Platelets     Result Value Ref Range    WBC 9.4 4.0 - 11.0 10e9/L    RBC Count 5.48 4.4 - 5.9 10e12/L    Hemoglobin 16.7 13.3 - 17.7 g/dL    Hematocrit 49.4 40.0 - 53.0 %    MCV 90 78 - 100 fl    MCH 30.5 26.5 - 33.0 pg    MCHC 33.8 31.5 - 36.5 g/dL    RDW 13.0 10.0 - 15.0 %    Platelet Count 194 150 - 450 10e9/L   Hemoglobin A1c   Result Value Ref Range    Hemoglobin A1C 5.3 0 - 5.6 %           Assessment & Plan       ICD-10-CM    1. Type 2 diabetes mellitus with diabetic polyneuropathy, unspecified whether long term insulin use (H)- doing well with diet control and no medications  E11.42 BASIC METABOLIC PANEL     ALT     Lipid panel reflex to direct LDL Fasting     Albumin Random Urine Quantitative with Creat Ratio     CBC with Platelets     2. Diabetic polyneuropathy associated with type 2 diabetes mellitus (H) E11.42 Hemoglobin A1c- stable    3. 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 Special screening for malignant neoplasms, colon Z12.11 GASTROENTEROLOGY ADULT REF PROCEDURE ONLY None- due for 5 year follow up colonoscopy   5. Personal history of tobacco use Z87.891 Prof Fee: Shared Decision Making Visit for Lung Cancer Screening- does not qualify   6. Urinary frequency and hesitancy for past few months.  R35.0 tamsulosin (FLOMAX) 0.4 MG capsule     UROLOGY ADULT REFERRAL   7. Nocturia- 3 times.  R35.1 tamsulosin (FLOMAX) 0.4 MG capsule     UROLOGY ADULT REFERRAL   8. Screening for prostate cancer Z12.5 Prostate spec antigen screen   9. Need for prophylactic vaccination and inoculation against influenza Z23 INFLUENZA QUAD, RECOMBINANT, P-FREE (RIV4) (FLUBLOCK) [37021]     ADMIN INFLUENZA (For MEDICARE Patients ONLY) []        BMI:   Estimated body mass index is 37.08 kg/m  as calculated from the following:    Height as of this encounter: 1.588 m (5' 2.5\").    Weight as of " this encounter: 93.4 kg (206 lb).   Weight management plan: Discussed healthy diet and exercise guidelines        CONSULTATION/REFERRAL to podiatry due to symptoms of neuropathy  FUTURE LABS:       - Schedule fasting labs in 3 months  FUTURE APPOINTMENTS:       - Follow-up visit in 3 months or sooner if any questions or concerns.   Work on weight loss  Regular exercise  See Patient Instructions    Return in about 3 months (around 1/24/2020) for medication follow up, Lab Work, BP Recheck.    Tori Rizvi MD  [facility]        Lung Cancer Screening Shared Decision Making Visit     Santiago Hylton is not eligible for lung cancer screening on the basis of the information provided in my signed lung cancer screening order. Santiago's smoking history is below the threshold and so it is not recommended.    Patient is not currently a smoker and so we did not discuss that the only way to prevent lung cancer is to not smoke. Smoking cessation assistance was not offered.    ShouldIScreen

## 2019-10-24 NOTE — LETTER
October 28, 2019      Santiago Hylton  [address]  [place] [address]        Dear Santiago,    Your test results are attached. I am happy to let you know that they are stable and your medications can stay the same.    The blood sugar is normal and you do not have diabetes by the test results. The kidneys are healthy. The test for prostate cancer was normal and shows low risk. We can recheck labs in 6 months.     Please call me if you have any questions about these test results or about your care.    Sincerely,      Tori Rizvi MD/myles      Resulted Orders   BASIC METABOLIC PANEL   Result Value Ref Range    Sodium 138 133 - 144 mmol/L    Potassium 4.2 3.4 - 5.3 mmol/L    Chloride 104 94 - 109 mmol/L    Carbon Dioxide 27 20 - 32 mmol/L    Anion Gap 7 3 - 14 mmol/L    Glucose 103 (H) 70 - 99 mg/dL      Comment:      Fasting specimen    Urea Nitrogen 20 7 - 30 mg/dL    Creatinine 1.12 0.66 - 1.25 mg/dL    GFR Estimate 69 >60 mL/min/[1.73_m2]      Comment:      Non  GFR Calc  Starting 12/18/2018, serum creatinine based estimated GFR (eGFR) will be   calculated using the Chronic Kidney Disease Epidemiology Collaboration   (CKD-EPI) equation.      GFR Estimate If Black 80 >60 mL/min/[1.73_m2]      Comment:       GFR Calc  Starting 12/18/2018, serum creatinine based estimated GFR (eGFR) will be   calculated using the Chronic Kidney Disease Epidemiology Collaboration   (CKD-EPI) equation.      Calcium 9.2 8.5 - 10.1 mg/dL   ALT   Result Value Ref Range    ALT 42 0 - 70 U/L   Lipid panel reflex to direct LDL Fasting   Result Value Ref Range    Cholesterol 148 <200 mg/dL    Triglycerides 78 <150 mg/dL      Comment:      Fasting specimen    HDL Cholesterol 48 >39 mg/dL    LDL Cholesterol Calculated 84 <100 mg/dL      Comment:      Desirable:       <100 mg/dl    Non HDL Cholesterol 100 <130 mg/dL   Albumin Random Urine Quantitative with Creat Ratio   Result Value Ref Range    Creatinine Urine  29 mg/dL    Albumin Urine mg/L 26 mg/L    Albumin Urine mg/g Cr 91.03 (H) 0 - 17 mg/g Cr   CBC with Platelets     Result Value Ref Range    WBC 9.4 4.0 - 11.0 10e9/L    RBC Count 5.48 4.4 - 5.9 10e12/L    Hemoglobin 16.7 13.3 - 17.7 g/dL    Hematocrit 49.4 40.0 - 53.0 %    MCV 90 78 - 100 fl    MCH 30.5 26.5 - 33.0 pg    MCHC 33.8 31.5 - 36.5 g/dL    RDW 13.0 10.0 - 15.0 %    Platelet Count 194 150 - 450 10e9/L   Hemoglobin A1c   Result Value Ref Range    Hemoglobin A1C 5.3 0 - 5.6 %      Comment:      Normal <5.7% Prediabetes 5.7-6.4%  Diabetes 6.5% or higher - adopted from ADA   consensus guidelines.     Uric acid   Result Value Ref Range    Uric Acid 5.2 3.5 - 7.2 mg/dL   Prostate spec antigen screen   Result Value Ref Range    PSA 0.19 0 - 4 ug/L      Comment:      Assay Method:  Chemiluminescence using Siemens Vista analyzer

## 2019-10-24 NOTE — TELEPHONE ENCOUNTER
Routing refill request to provider for review/approval because:  Patient is due for a creatinine to be drawn and new sig for Effexor needs to be rewritten as the last script was for titration.     Anabell Vaughan RN

## 2019-10-24 NOTE — LETTER
My Asthma Action Plan    Name: Santiago Hylton   YOB: 1955  Date: 10/24/2019   My doctor: Tori Rizvi MD   My clinic: [facility]        My Rescue Medicine:   Albuterol inhaler (Proair/Ventolin/Proventil HFA)  2-4 puffs EVERY 4 HOURS as needed. Use a spacer if recommended by your provider.   My Asthma Severity:   Intermittent / Exercise Induced  Know your asthma triggers: upper respiratory infections  dust mites  pollens          GREEN ZONE   Good Control    I feel good    No cough or wheeze    Can work, sleep and play without asthma symptoms       Take your asthma control medicine every day.     1. If exercise triggers your asthma, take your rescue medication    15 minutes before exercise or sports, and    During exercise if you have asthma symptoms  2. Spacer to use with inhaler: If you have a spacer, make sure to use it with your inhaler             YELLOW ZONE Getting Worse  I have ANY of these:    I do not feel good    Cough or wheeze    Chest feels tight    Wake up at night   1. Keep taking your Green Zone medications  2. Start taking your rescue medicine:    every 20 minutes for up to 1 hour. Then every 4 hours for 24-48 hours.  3. If you stay in the Yellow Zone for more than 12-24 hours, contact your doctor.  4. If you do not return to the Green Zone in 12-24 hours or you get worse, start taking your oral steroid medicine if prescribed by your provider.           RED ZONE Medical Alert - Get Help  I have ANY of these:    I feel awful    Medicine is not helping    Breathing getting harder    Trouble walking or talking    Nose opens wide to breathe       1. Take your rescue medicine NOW  2. If your provider has prescribed an oral steroid medicine, start taking it NOW  3. Call your doctor NOW  4. If you are still in the Red Zone after 20 minutes and you have not reached your doctor:    Take your rescue medicine again and    Call 911 or go to the emergency room right  away    See your regular doctor within 2 weeks of an Emergency Room or Urgent Care visit for follow-up treatment.          Annual Reminders:  Meet with Asthma Educator,  Flu Shot in the Fall, consider Pneumonia Vaccination for patients with asthma (aged 19 and older).    Pharmacy:    [facility] - [place], [place] - [address]  [facility] - [place], [place] - [address]  [id number] - [place] - [address] [facility]  [id number] - [place], [place] - [address] [place] [address]  WRITTEN PRESCRIPTION REQUESTED  [id number] - [facility] [address] [facility] [address].                        Asthma Triggers  How To Control Things That Make Your Asthma Worse    Triggers are things that make your asthma worse.  Look at the list below to help you find your triggers and   what you can do about them. You can help prevent asthma flare-ups by staying away from your triggers.      Trigger                                                          What you can do   Cigarette Smoke  Tobacco smoke can make asthma worse. Do not allow smoking in your home, car or around you.  Be sure no one smokes at a child s day care or school.  If you smoke, ask your health care provider for ways to help you quit.  Ask family members to quit too.  Ask your health care provider for a referral to Quit Plan to help you quit smoking, or call 4-649-442-PLAN.     Colds, Flu, Bronchitis  These are common triggers of asthma. Wash your hands often.  Don t touch your eyes, nose or mouth.  Get a flu shot every year.     Dust Mites  These are tiny bugs that live in cloth or carpet. They are too small to see. Wash sheets and blankets in hot water every week.   Encase pillows and mattress in dust mite proof covers.  Avoid having carpet if you can. If you have carpet, vacuum weekly.   Use a dust mask and HEPA vacuum.   Pollen and  Outdoor Mold  Some people are allergic to trees, grass, or weed pollen, or molds. Try to keep your windows closed.  Limit time out doors when pollen count is high.   Ask you health care provider about taking medicine during allergy season.     Animal Dander  Some people are allergic to skin flakes, urine or saliva from pets with fur or feathers. Keep pets with fur or feathers out of your home.    If you can t keep the pet outdoors, then keep the pet out of your bedroom.  Keep the bedroom door closed.  Keep pets off cloth furniture and away from stuffed toys.     Mice, Rats, and Cockroaches  Some people are allergic to the waste from these pests.   Cover food and garbage.  Clean up spills and food crumbs.  Store grease in the refrigerator.   Keep food out of the bedroom.   Indoor Mold  This can be a trigger if your home has high moisture. Fix leaking faucets, pipes, or other sources of water.   Clean moldy surfaces.  Dehumidify basement if it is damp and smelly.   Smoke, Strong Odors, and Sprays  These can reduce air quality. Stay away from strong odors and sprays, such as perfume, powder, hair spray, paints, smoke incense, paint, cleaning products, candles and new carpet.   Exercise or Sports  Some people with asthma have this trigger. Be active!  Ask your doctor about taking medicine before sports or exercise to prevent symptoms.    Warm up for 5-10 minutes before and after sports or exercise.     Other Triggers of Asthma  Cold air:  Cover your nose and mouth with a scarf.  Sometimes laughing or crying can be a trigger.  Some medicines and food can trigger asthma.

## 2019-10-25 RX ORDER — VENLAFAXINE HYDROCHLORIDE 37.5 MG/1
75 CAPSULE, EXTENDED RELEASE ORAL DAILY
Qty: 180 CAPSULE | Refills: 1 | Status: SHIPPED | OUTPATIENT
Start: 2019-10-25 | End: 2020-04-10

## 2019-10-27 NOTE — RESULT ENCOUNTER NOTE
Dear Santiago Hylton,    Your test results are attached. I am happy to let you know that they are stable and your medications can stay the same.    The blood sugar is normal and you do not have diabetes by the test results. The kidneys are healthy. The test for prostate cancer was normal and shows low risk. We can recheck labs in 6 months.     Please call me if you have any questions about these test results or about your care.    Sincerely,    Tori Rizvi MD

## 2019-10-28 ENCOUNTER — TELEPHONE (OUTPATIENT)
Dept: FAMILY MEDICINE | Facility: CLINIC | Age: 64
End: 2019-10-28

## 2019-10-28 DIAGNOSIS — K21.9 GASTROESOPHAGEAL REFLUX DISEASE, ESOPHAGITIS PRESENCE NOT SPECIFIED: ICD-10-CM

## 2019-10-28 NOTE — TELEPHONE ENCOUNTER
Routing refill request to provider for review/approval because:  Noted on 10/24/19 that the patient is not taking.     Justyna Tafoya RN, [facility]

## 2019-10-28 NOTE — TELEPHONE ENCOUNTER
I sent a prescription for Nexium to his pharmacy to see if this works well for him.  Tori Rizvi MD

## 2019-10-28 NOTE — TELEPHONE ENCOUNTER
Reason for Call:  Other prescription    Detailed comments: Pt calling for he was expecting an alternative medication for Zantec to be sent to the [facility] but has not heard back. He would like a call back to confirm Rx has been sent.    Phone Number Patient can be reached at: Home number on file [phone] (home)    Best Time: anytime    Can we leave a detailed message on this number? YES    Call taken on 10/28/2019 at 11:15 AM by Satya Matos          .

## 2019-10-29 ENCOUNTER — TELEPHONE (OUTPATIENT)
Dept: FAMILY MEDICINE | Facility: CLINIC | Age: 64
End: 2019-10-29

## 2019-10-29 DIAGNOSIS — G89.4 CHRONIC PAIN SYNDROME: ICD-10-CM

## 2019-10-29 DIAGNOSIS — F33.0 MILD RECURRENT MAJOR DEPRESSION (H): Primary | ICD-10-CM

## 2019-10-29 NOTE — TELEPHONE ENCOUNTER
Patient was seen with in the last 7 days. Sent to provider to address.    Justyna Tafoya RN, [facility]

## 2019-10-29 NOTE — TELEPHONE ENCOUNTER
"..Reason for call:  Other   Patient called regarding (reason for call): prescription  Additional comments: patient was given Zaneflexine 35mg in October. Patient says medication is not working and patient is \"getting mood changes and it is getting outrageous.\" Patient would like opinion/alternative about this medication.     Phone number to reach patient:  Home number on file [phone] (home)    Best Time:  anytime    Can we leave a detailed message on this number?  YES    "

## 2019-10-30 NOTE — TELEPHONE ENCOUNTER
Child/Adolescent or Adult Adult    Which Service? Psychiatry and Medication Management    Psychiatry and Medication Management Psychiatry    Adult Psychiatry Locations [facility]: [facility] [phone].  Medication management & future refills will be returned to G PCP upon completion of evaluation    Note:  8/17/18-updated [facility] phone number   Scheduling Note: We will contact you to schedule the appointment or please call with any questions         This writer attempted to contact Kendy on 10/30/19      Reason for call, Per Dr. Rizvi, she would like for Santiago to set up an appointment with our psychiatrist for a consultation. She will review medication options with him and start him on an alternative medication, then send him back to me for follow up when he is stable.      They will call to set up the appointment, but please give Santiago and or Kendy the number above so that he can call to set this up.  and unable to leave message. Kendy's phone is not accepting calls at this time, we will need to try later.      If Kendy calls back:   Relay message above, (read verbatim), document that Kendy called and close encounter        Rene Alvarez

## 2019-10-30 NOTE — TELEPHONE ENCOUNTER
I would like Santiago to set up an appointment with our psychiatrist for a consultation. She will review medication options with him and start him on an alternative medication, then send him back to me for follow up when he is stable.     They will call to set up the appointment, but please give Santiago the number so that he can call to set this up.    Tori Rizvi MD

## 2019-11-01 DIAGNOSIS — J45.31 MILD PERSISTENT ASTHMA WITH ACUTE EXACERBATION: ICD-10-CM

## 2019-11-01 PROBLEM — M54.42 ACUTE BILATERAL LOW BACK PAIN WITH LEFT-SIDED SCIATICA: Status: RESOLVED | Noted: 2019-03-05 | Resolved: 2019-11-01

## 2019-11-01 NOTE — TELEPHONE ENCOUNTER
"Requested Prescriptions   Pending Prescriptions Disp Refills     montelukast (SINGULAIR) 10 MG tablet [Pharmacy Med Name: MONTELUKAST SOD 10 MG TABLET]  Last Written Prescription Date:  10/16/19  Last Fill Quantity: 15,  # refills: 0   Last Office Visit with [facility], [facility] or [facility] provider:  10/24/19-Belkys   Future Office Visit:    15 tablet 0     Sig: TAKE 1 TABLET BY MOUTH EVERYDAY AT BEDTIME       Leukotriene Inhibitors Protocol Failed - 11/1/2019 12:30 PM        Failed - Asthma control assessment score within normal limits in last 6 months     Please review ACT score.           Passed - Patient is age 12 or older     If patient is under 16, ok to refill using age based dosing.           Passed - Medication is active on med list        Passed - Recent (6 mo) or future (30 days) visit within the authorizing provider's specialty     Patient had office visit in the last 6 months or has a visit in the next 30 days with authorizing provider or within the authorizing provider's specialty.  See \"Patient Info\" tab in inbasket, or \"Choose Columns\" in Meds & Orders section of the refill encounter.              "

## 2019-11-01 NOTE — PROGRESS NOTES
Patient did not return for further treatment and no additional progress was noted.  Please refer to the progress note and goal flowsheet completed on 04/09/19 for discharge information.

## 2019-11-02 DIAGNOSIS — L50.1 CHRONIC IDIOPATHIC URTICARIA: ICD-10-CM

## 2019-11-02 NOTE — TELEPHONE ENCOUNTER
"Requested Prescriptions   Pending Prescriptions Disp Refills     cetirizine (ZYRTEC) 10 MG tablet [Pharmacy Med Name: CETIRIZINE HCL 10 MG TABLET]  Last Written Prescription Date:  10/18/18  Last Fill Quantity: 90,  # refills: 10   Last Office Visit with [facility], P or [facility] prescribing provider:  10/24/19   Future Office Visit:      30 tablet 89     Sig: TAKE 1 TABLET BY MOUTH DAILY AT BEDTIME       Antihistamines Protocol Passed - 11/2/2019 10:45 AM        Passed - Patient is 3-64 years of age     Apply weight-based dosing for peds patients age 3 - 12 years of age.    Forward request to provider for patients under the age of 3 or over the age of 64.          Passed - Recent (12 mo) or future (30 days) visit within the authorizing provider's specialty     Patient has had an office visit with the authorizing provider or a provider within the authorizing providers department within the previous 12 mos or has a future within next 30 days. See \"Patient Info\" tab in inbasket, or \"Choose Columns\" in Meds & Orders section of the refill encounter.              Passed - Medication is active on med list          "

## 2019-11-04 NOTE — TELEPHONE ENCOUNTER
Patient has been scheduled for an appointment on 11/20/19.  Rene Alvarez,  For 1st Floor Primary Care ([facility])

## 2019-11-05 RX ORDER — CETIRIZINE HYDROCHLORIDE 10 MG/1
TABLET ORAL
Qty: 30 TABLET | Refills: 11 | Status: SHIPPED | OUTPATIENT
Start: 2019-11-05 | End: 2021-01-13

## 2019-11-05 RX ORDER — MONTELUKAST SODIUM 10 MG/1
TABLET ORAL
Qty: 90 TABLET | Refills: 3 | Status: SHIPPED | OUTPATIENT
Start: 2019-11-05 | End: 2020-10-29

## 2019-11-05 NOTE — TELEPHONE ENCOUNTER
Routing refill request to provider for review/approval because:  Labs not current:  ACT  Alta Choi RN

## 2019-11-06 ENCOUNTER — TELEPHONE (OUTPATIENT)
Dept: FAMILY MEDICINE | Facility: CLINIC | Age: 64
End: 2019-11-06

## 2019-11-06 DIAGNOSIS — G89.4 CHRONIC PAIN SYNDROME: ICD-10-CM

## 2019-11-06 RX ORDER — OXYCODONE HYDROCHLORIDE 5 MG/1
5 TABLET ORAL 2 TIMES DAILY PRN
Qty: 60 TABLET | Refills: 0 | Status: SHIPPED | OUTPATIENT
Start: 2019-11-06 | End: 2019-12-11

## 2019-11-06 NOTE — TELEPHONE ENCOUNTER
Requested Prescriptions   Pending Prescriptions Disp Refills     oxyCODONE (ROXICODONE) 5 MG tablet 60 tablet 0     Sig: Take 1 tablet (5 mg) by mouth 2 times daily as needed for moderate to severe pain (up to 2 a day)       There is no refill protocol information for this order              Last Written Prescription Date:  10/3/19  Last Fill Quantity: 60,   # refills: 0  Last Office Visit: 10/24/19  Future Office visit:       Routing refill request to provider for review/approval because:  Drug not on the [facility], P or [facility] refill protocol or controlled substance      Last [facility] website verification: 8/9/2019      Koby Faarax  [facility]

## 2019-11-22 DIAGNOSIS — E78.5 HYPERLIPIDEMIA, UNSPECIFIED HYPERLIPIDEMIA TYPE: ICD-10-CM

## 2019-11-22 NOTE — TELEPHONE ENCOUNTER
"Requested Prescriptions   Pending Prescriptions Disp Refills     simvastatin (ZOCOR) 40 MG tablet [Pharmacy Med Name: SIMVASTATIN 40 MG TABLET]  Last Written Prescription Date:  08/26/19  Last Fill Quantity: 90,  # refills: 0   Last Office Visit with [facility], JEAN CARLOS or [facility] prescribing provider:  10/24/19-Belkys   Future Office Visit:    90 tablet 0     Sig: TAKE 1 TABLET (40 MG) BY MOUTH AT BEDTIME       Statins Protocol Passed - 11/22/2019  1:15 PM        Passed - LDL on file in past 12 months     Recent Labs   Lab Test 10/24/19  1001   LDL 84             Passed - No abnormal creatine kinase in past 12 months     No lab results found.             Passed - Recent (12 mo) or future (30 days) visit within the authorizing provider's specialty     Patient has had an office visit with the authorizing provider or a provider within the authorizing providers department within the previous 12 mos or has a future within next 30 days. See \"Patient Info\" tab in inbasket, or \"Choose Columns\" in Meds & Orders section of the refill encounter.              Passed - Medication is active on med list        Passed - Patient is age 18 or older          "

## 2019-11-24 DIAGNOSIS — K21.9 GASTROESOPHAGEAL REFLUX DISEASE, ESOPHAGITIS PRESENCE NOT SPECIFIED: ICD-10-CM

## 2019-11-24 NOTE — TELEPHONE ENCOUNTER
"Requested Prescriptions   Pending Prescriptions Disp Refills     esomeprazole (NEXIUM) 20 MG DR capsule [Pharmacy Med Name: ESOMEPRAZOLE MAG DR 20 MG CAP] 90 capsule 1     Sig: TAKE 1 CAPSULE (20 MG) BY MOUTH EVERY MORNING (BEFORE BREAKFAST) TAKE 30-60 MINUTES BEFORE EATING.           Last Written Prescription Date:  10/28/19  Last Fill Quantity: 30,  # refills: 3   [facility] Office Visit with [facility] or [facility] prescribing provider:  10/24/19   Future Office Visit:         PPI Protocol Passed - 11/24/2019  2:00 PM        Passed - Not on Clopidogrel (unless Pantoprazole ordered)        Passed - No diagnosis of osteoporosis on record        Passed - Recent (12 mo) or future (30 days) visit within the authorizing provider's specialty     Patient has had an office visit with the authorizing provider or a provider within the authorizing providers department within the previous 12 mos or has a future within next 30 days. See \"Patient Info\" tab in inbasket, or \"Choose Columns\" in Meds & Orders section of the refill encounter.              Passed - Medication is active on med list        Passed - Patient is age 18 or older            Koby Faarax  [facility]    "

## 2019-11-26 RX ORDER — SIMVASTATIN 40 MG
40 TABLET ORAL AT BEDTIME
Qty: 90 TABLET | Refills: 2 | Status: SHIPPED | OUTPATIENT
Start: 2019-11-26 | End: 2020-08-12

## 2019-11-26 NOTE — TELEPHONE ENCOUNTER
Prescription approved per [facility] Refill Protocol.  Jana Mendoza RN  [facility]

## 2019-12-10 DIAGNOSIS — G89.4 CHRONIC PAIN SYNDROME: ICD-10-CM

## 2019-12-10 NOTE — TELEPHONE ENCOUNTER
Requested Prescriptions   Pending Prescriptions Disp Refills     oxyCODONE (ROXICODONE) 5 MG tablet        Last Written Prescription Date:  11/06/19  Last Fill Quantity: 60,   # refills: 0  Last Office Visit: 10/24/19-Belkys  Future Office visit:       Routing refill request to provider for review/approval because:  Drug not on the [facility], P or [facility] refill protocol or controlled substance 60 tablet 0     Sig: Take 1 tablet (5 mg) by mouth 2 times daily as needed for moderate to severe pain (up to 2 a day)       There is no refill protocol information for this order        Last [facility] website verification: 8/9/2019   [email or web address]

## 2019-12-10 NOTE — TELEPHONE ENCOUNTER
Reason for Call:  Medication or medication refill:    Do you use a [facility]?  Name of the pharmacy and phone number for the current request:  [facility]/pharmacy #[id number] - [facility], [place] - [address].     Name of the medication requested: oxyCODONE (ROXICODONE) 5 MG tablet    Other request:     Can we leave a detailed message on this number? YES    Phone number patient can be reached at: Home number on file [phone] (home)    Best Time:     Call taken on 12/10/2019 at 9:00 AM by Anayeli Ardon

## 2019-12-11 ENCOUNTER — TELEPHONE (OUTPATIENT)
Dept: FAMILY MEDICINE | Facility: CLINIC | Age: 64
End: 2019-12-11

## 2019-12-11 RX ORDER — OXYCODONE HYDROCHLORIDE 5 MG/1
5 TABLET ORAL 2 TIMES DAILY PRN
Qty: 60 TABLET | Refills: 0 | Status: SHIPPED | OUTPATIENT
Start: 2019-12-11 | End: 2020-01-22

## 2019-12-11 NOTE — TELEPHONE ENCOUNTER
Controlled Substance Refill Request for oxyCODONE (ROXICODONE) 5 MG tablet  Problem List Complete:  Yes  Patient is followed by Tori Rizvi MD for ongoing prescription of pain medication.  All refills should only be approved by this provider, or covering partner.     Medication(s): oxycodone 5 mg twice a day.   Maximum quantity per month: 60  Clinic visit frequency required: Q 3 months      Controlled substance agreement:  Encounter-Level CSA:    There are no encounter-level csa.      Patient-Level CSA:    Controlled Substance Agreement - Opioid - Scan on 3/27/2019  2:34 PM (below)            Pain Clinic evaluation in the past: No     DIRE Total Score(s):  No flowsheet data found.     Last [facility] website verification: 8/9/2019     checked in past 3 months? No  [place] reviewed:  reviewed, TODAY- no concerns    [place]:  [email or web address]    Routing refill request to provider for review/approval because:  Drug not on the [facility] refill protocol   Jana Mendoza RN  [facility]

## 2019-12-11 NOTE — TELEPHONE ENCOUNTER
Reason for call:  Form   Our goal is to have forms completed within 72 hours, however some forms may require a visit or additional information.     Who is the form from? Patient  Where did the form come from? Patient or family brought in     What clinic location was the form placed at? [facility]  Where was the form placed? 1ST FLOOR BOX AT   Box/Folder  What number is listed as a contact on the form? [phone]    Phone call message - patient request for a letter, form or note:     Date needed: as soon as possible  Please fax to [phone]  Has the patient signed a consent form for release of information? NO    Additional comments:     Type of letter, form or note: medical    Phone number to reach patient:  Home number on file [phone] (home)    Best Time:  ANY    Can we leave a detailed message on this number?  YES

## 2019-12-11 NOTE — TELEPHONE ENCOUNTER
Form is received from the  and forward to Dr. Rizvi to address in clinic.  Rene Alvarez,  For 1st Floor Primary Care ([facility])

## 2019-12-13 NOTE — TELEPHONE ENCOUNTER
Form is faxed to [employer] at fax # [phone].  Rene Alvarez,  For 1st Floor Primary Care ([facility])

## 2020-01-06 DIAGNOSIS — G62.9 NEUROPATHY: ICD-10-CM

## 2020-01-06 NOTE — TELEPHONE ENCOUNTER
Requested Prescriptions   Pending Prescriptions Disp Refills     traMADol (ULTRAM) 50 MG tablet [Pharmacy Med Name: TRAMADOL HCL 50 MG TABLET]        Last Written Prescription Date:  06/07/19  Last Fill Quantity: 60,   # refills: 3  Last Office Visit: 10/24/19-Belkys  Future Office visit:       Routing refill request to provider for review/approval because:  Drug not on the [facility], P or  Health refill protocol or controlled substance 60 tablet      Sig: TAKE 1 TO 2 TABLETS BY MOUTH TWICE A DAY MAX OF 2 TABS PER DAY       There is no refill protocol information for this order

## 2020-01-07 RX ORDER — TRAMADOL HYDROCHLORIDE 50 MG/1
TABLET ORAL
Qty: 60 TABLET | Refills: 0 | Status: SHIPPED | OUTPATIENT
Start: 2020-01-07 | End: 2020-02-07

## 2020-01-07 NOTE — TELEPHONE ENCOUNTER
Routing refill request to provider for review/approval because:  Drug not on the [facility] refill protocol     Ibeth Deluan RN, BSN, PHN

## 2020-01-22 DIAGNOSIS — G89.4 CHRONIC PAIN SYNDROME: ICD-10-CM

## 2020-01-22 RX ORDER — OXYCODONE HYDROCHLORIDE 5 MG/1
5 TABLET ORAL 2 TIMES DAILY PRN
Qty: 60 TABLET | Refills: 0 | Status: SHIPPED | OUTPATIENT
Start: 2020-01-22 | End: 2020-03-02

## 2020-01-22 NOTE — TELEPHONE ENCOUNTER
oxyCODONE (ROXICODONE) 5 MG tablet    [facility]/pharmacy #[id number] - [facility], [place] - [address].  [phone]

## 2020-01-22 NOTE — TELEPHONE ENCOUNTER
Reason for Call:  Other prescription    Detailed comments: Pt would like to request the following medication for refill. Thank you.    oxyCODONE (ROXICODONE) 5 MG tablet     [facility]/pharmacy #[id number] - [facility] [address].  [phone]      Phone Number Patient can be reached at: Home number on file [phone] (home)    Best Time: Any    Can we leave a detailed message on this number? YES    Call taken on 1/22/2020 at 1:09 PM by Chani Alva

## 2020-01-22 NOTE — TELEPHONE ENCOUNTER
Requested Prescriptions   Pending Prescriptions Disp Refills     oxyCODONE (ROXICODONE) 5 MG tablet 60 tablet 0     Sig: Take 1 tablet (5 mg) by mouth 2 times daily as needed for moderate to severe pain (up to 2 a day)       There is no refill protocol information for this order                Last Written Prescription Date:  12/11/19  Last Fill Quantity: 60,   # refills: 0  Last Office Visit: 10/24/19  Future Office visit:       Routing refill request to provider for review/approval because:  Drug not on the [facility], [facility] or [facility] refill protocol or controlled substance      Last [facility] website verification: 12/10/2019      Koby Faarax  [facility]

## 2020-02-06 DIAGNOSIS — G62.9 NEUROPATHY: ICD-10-CM

## 2020-02-06 NOTE — TELEPHONE ENCOUNTER
Requested Prescriptions   Pending Prescriptions Disp Refills     traMADol (ULTRAM) 50 MG tablet [Pharmacy Med Name: TRAMADOL HCL 50 MG TABLET]        Last Written Prescription Date:  001/07/2020  Last Fill Quantity: 60,   # refills: 0  Last Office Visit: 10/24/19-Belkys  Future Office visit:       Routing refill request to provider for review/approval because:  Drug not on the [facility], P or  Health refill protocol or controlled substance 60 tablet 0     Sig: TAKE 1 TO 2 TABLETS BY MOUTH TWICE A DAY MAX OF 2 TABS PER DAY       There is no refill protocol information for this order

## 2020-02-07 RX ORDER — TRAMADOL HYDROCHLORIDE 50 MG/1
TABLET ORAL
Qty: 60 TABLET | Refills: 0 | Status: SHIPPED | OUTPATIENT
Start: 2020-02-07 | End: 2020-03-04

## 2020-02-07 NOTE — TELEPHONE ENCOUNTER
Routing refill request to provider for review/approval because:  Drug not on the [facility] refill protocol       Ibeth Deluna RN, BSN, PHN

## 2020-02-13 DIAGNOSIS — I10 ESSENTIAL HYPERTENSION WITH GOAL BLOOD PRESSURE LESS THAN 140/90: ICD-10-CM

## 2020-02-13 RX ORDER — FUROSEMIDE 40 MG
TABLET ORAL
Qty: 90 TABLET | Refills: 1 | Status: SHIPPED | OUTPATIENT
Start: 2020-02-13 | End: 2020-08-12

## 2020-02-13 NOTE — TELEPHONE ENCOUNTER
Prescription approved per [facility] Refill Protocol.    Anabell Moreno RN  [facility]

## 2020-02-13 NOTE — TELEPHONE ENCOUNTER
"Requested Prescriptions   Pending Prescriptions Disp Refills     furosemide (LASIX) 40 MG tablet [Pharmacy Med Name: FUROSEMIDE 40 MG TABLET]  Last Written Prescription Date:  07/09/19  Last Fill Quantity: 90,  # refills: 1   Last Office Visit with EDDIE, EMILY or [facility] prescribing provider:  10/24/19Scott   Future Office Visit:    90 tablet 1     Sig: TAKE 1 TABLET BY MOUTH EVERY DAY       Diuretics (Including Combos) Protocol Passed - 2/13/2020  1:53 AM        Passed - Blood pressure under 140/90 in past 12 months     BP Readings from Last 3 Encounters:   10/24/19 138/88   07/09/19 125/81   05/07/19 126/88                 Passed - Recent (12 mo) or future (30 days) visit within the authorizing provider's specialty     Patient has had an office visit with the authorizing provider or a provider within the authorizing providers department within the previous 12 mos or has a future within next 30 days. See \"Patient Info\" tab in inbasket, or \"Choose Columns\" in Meds & Orders section of the refill encounter.              Passed - Medication is active on med list        Passed - Patient is age 18 or older        Passed - Normal serum creatinine on file in past 12 months     Recent Labs   Lab Test 10/24/19  1001   CR 1.12              Passed - Normal serum potassium on file in past 12 months     Recent Labs   Lab Test 10/24/19  1001   POTASSIUM 4.2                    Passed - Normal serum sodium on file in past 12 months     Recent Labs   Lab Test 10/24/19  1001                   "

## 2020-03-02 ENCOUNTER — TELEPHONE (OUTPATIENT)
Dept: FAMILY MEDICINE | Facility: CLINIC | Age: 65
End: 2020-03-02

## 2020-03-02 DIAGNOSIS — G89.4 CHRONIC PAIN SYNDROME: ICD-10-CM

## 2020-03-02 RX ORDER — OXYCODONE HYDROCHLORIDE 5 MG/1
5 TABLET ORAL 2 TIMES DAILY PRN
Qty: 60 TABLET | Refills: 0 | Status: SHIPPED | OUTPATIENT
Start: 2020-03-02 | End: 2020-04-02

## 2020-03-02 NOTE — TELEPHONE ENCOUNTER
Requested Prescriptions   Pending Prescriptions Disp Refills     oxyCODONE (ROXICODONE) 5 MG tablet        Last Written Prescription Date:  01/22/2020  Last Fill Quantity: 60,   # refills: 0  Last Office Visit: 10/24/19-Belkys  Future Office visit:       Routing refill request to provider for review/approval because:  Drug not on the [facility], P or [facility] refill protocol or controlled substance 60 tablet 0     Sig: Take 1 tablet (5 mg) by mouth 2 times daily as needed for moderate to severe pain (up to 2 a day)       There is no refill protocol information for this order        Last  12/10/19

## 2020-03-02 NOTE — TELEPHONE ENCOUNTER
Reason for Call:  Medication or medication refill:    Do you use a [facility]?  Name of the pharmacy and phone number for the current request:  [facility]/pharm[facility] #[id number] - [facility], [place] - [address].     Name of the medication requested: oxyCODONE (ROXICODONE) 5 MG     Other request:     Can we leave a detailed message on this number? Yes    Phone number patient can be reached at: Home number on file [phone] (home)    Best Time:     Call taken on 3/2/2020 at 9:36 AM by Anayeli Ardon

## 2020-03-02 NOTE — TELEPHONE ENCOUNTER
Reason for Call:  Other call back    Detailed comments: Santiago has some prostate concerns and would like to speak with someone regarding that. Did not specify anything else just that he had prostate concerns. Please call to advise. Thank you     Phone Number Patient can be reached at: Home number on file [phone] (home)    Best Time: Any    Can we leave a detailed message on this number? YES    Call taken on 3/2/2020 at 9:38 AM by Anayeli Ardon

## 2020-03-02 NOTE — TELEPHONE ENCOUNTER
This writer attempted to contact patient on 03/02/20      Reason for call return patient call and left message.      If patient calls back:   Registered Nurse called. Follow Triage Call workflow        Anabell Moreno RN

## 2020-03-03 NOTE — TELEPHONE ENCOUNTER
Called and spoke to Santiago. Pain in his lower back and the left side.     Denies painful urination, no fever. States that he does have blood in his bowels.     Has had blood in his stools x 1 week. Patient states it just started. No abdominal pain. No dizziness and is having off and on fatigue.     Patient is now scheduled to be seen tomorrow with Dr. Magana as he is not able to come in today.     We discussed signs and symptoms to be aware of to head in to the ED.     Patient has agreed to this plan and has no further questions at this time.     Anabell Moreno RN  [facility]

## 2020-03-04 ENCOUNTER — TELEPHONE (OUTPATIENT)
Dept: FAMILY MEDICINE | Facility: CLINIC | Age: 65
End: 2020-03-04

## 2020-03-04 ENCOUNTER — ANCILLARY PROCEDURE (OUTPATIENT)
Dept: GENERAL RADIOLOGY | Facility: CLINIC | Age: 65
End: 2020-03-04
Attending: INTERNAL MEDICINE
Payer: MEDICAID

## 2020-03-04 ENCOUNTER — OFFICE VISIT (OUTPATIENT)
Dept: FAMILY MEDICINE | Facility: CLINIC | Age: 65
End: 2020-03-04
Payer: MEDICAID

## 2020-03-04 VITALS
TEMPERATURE: 98.1 F | WEIGHT: 211 LBS | HEART RATE: 118 BPM | OXYGEN SATURATION: 98 % | RESPIRATION RATE: 18 BRPM | DIASTOLIC BLOOD PRESSURE: 86 MMHG | SYSTOLIC BLOOD PRESSURE: 126 MMHG | BODY MASS INDEX: 37.39 KG/M2 | HEIGHT: 63 IN

## 2020-03-04 DIAGNOSIS — Z12.11 SCREEN FOR COLON CANCER: ICD-10-CM

## 2020-03-04 DIAGNOSIS — K64.8 INTERNAL HEMORRHOIDS: Primary | ICD-10-CM

## 2020-03-04 DIAGNOSIS — K21.9 GASTROESOPHAGEAL REFLUX DISEASE, ESOPHAGITIS PRESENCE NOT SPECIFIED: ICD-10-CM

## 2020-03-04 DIAGNOSIS — K64.9 HEMORRHOIDS, UNSPECIFIED HEMORRHOID TYPE: Primary | ICD-10-CM

## 2020-03-04 DIAGNOSIS — G89.4 CHRONIC PAIN SYNDROME: ICD-10-CM

## 2020-03-04 DIAGNOSIS — R10.9 LEFT FLANK PAIN: ICD-10-CM

## 2020-03-04 LAB
ALBUMIN UR-MCNC: ABNORMAL MG/DL
APPEARANCE UR: CLEAR
BILIRUB UR QL STRIP: NEGATIVE
COLOR UR AUTO: YELLOW
GLUCOSE UR STRIP-MCNC: NEGATIVE MG/DL
HGB UR QL STRIP: NEGATIVE
KETONES UR STRIP-MCNC: NEGATIVE MG/DL
LEUKOCYTE ESTERASE UR QL STRIP: NEGATIVE
NITRATE UR QL: NEGATIVE
NON-SQ EPI CELLS #/AREA URNS LPF: NORMAL /LPF
PH UR STRIP: 5.5 PH (ref 5–7)
RBC #/AREA URNS AUTO: NORMAL /HPF
SOURCE: ABNORMAL
SP GR UR STRIP: 1.01 (ref 1–1.03)
UROBILINOGEN UR STRIP-ACNC: 0.2 EU/DL (ref 0.2–1)
WBC #/AREA URNS AUTO: NORMAL /HPF

## 2020-03-04 PROCEDURE — 99214 OFFICE O/P EST MOD 30 MIN: CPT | Performed by: INTERNAL MEDICINE

## 2020-03-04 PROCEDURE — 81001 URINALYSIS AUTO W/SCOPE: CPT | Performed by: INTERNAL MEDICINE

## 2020-03-04 PROCEDURE — 74019 RADEX ABDOMEN 2 VIEWS: CPT

## 2020-03-04 RX ORDER — HYDROCORTISONE ACETATE 25 MG/1
25 SUPPOSITORY RECTAL 2 TIMES DAILY
Qty: 12 SUPPOSITORY | Refills: 2 | Status: SHIPPED | OUTPATIENT
Start: 2020-03-04 | End: 2021-12-13

## 2020-03-04 RX ORDER — OXYCODONE HYDROCHLORIDE 5 MG/1
5 TABLET ORAL 2 TIMES DAILY PRN
Qty: 60 TABLET | Refills: 0 | Status: CANCELLED | OUTPATIENT
Start: 2020-03-04

## 2020-03-04 RX ORDER — TRAMADOL HYDROCHLORIDE 50 MG/1
TABLET ORAL
Qty: 60 TABLET | Refills: 0 | Status: CANCELLED | OUTPATIENT
Start: 2020-03-04

## 2020-03-04 RX ORDER — TRAMADOL HYDROCHLORIDE 50 MG/1
TABLET ORAL
Qty: 60 TABLET | Refills: 0 | Status: SHIPPED | OUTPATIENT
Start: 2020-03-04 | End: 2020-08-27

## 2020-03-04 ASSESSMENT — PAIN SCALES - GENERAL: PAINLEVEL: MILD PAIN (3)

## 2020-03-04 ASSESSMENT — PATIENT HEALTH QUESTIONNAIRE - PHQ9
5. POOR APPETITE OR OVEREATING: NOT AT ALL
SUM OF ALL RESPONSES TO PHQ QUESTIONS 1-9: 5

## 2020-03-04 ASSESSMENT — ANXIETY QUESTIONNAIRES
IF YOU CHECKED OFF ANY PROBLEMS ON THIS QUESTIONNAIRE, HOW DIFFICULT HAVE THESE PROBLEMS MADE IT FOR YOU TO DO YOUR WORK, TAKE CARE OF THINGS AT HOME, OR GET ALONG WITH OTHER PEOPLE: NOT DIFFICULT AT ALL
2. NOT BEING ABLE TO STOP OR CONTROL WORRYING: NOT AT ALL
7. FEELING AFRAID AS IF SOMETHING AWFUL MIGHT HAPPEN: SEVERAL DAYS
GAD7 TOTAL SCORE: 2
5. BEING SO RESTLESS THAT IT IS HARD TO SIT STILL: NOT AT ALL
3. WORRYING TOO MUCH ABOUT DIFFERENT THINGS: NOT AT ALL
6. BECOMING EASILY ANNOYED OR IRRITABLE: NOT AT ALL
1. FEELING NERVOUS, ANXIOUS, OR ON EDGE: SEVERAL DAYS

## 2020-03-04 ASSESSMENT — MIFFLIN-ST. JEOR: SCORE: 1634.28

## 2020-03-04 NOTE — TELEPHONE ENCOUNTER
"Requested Prescriptions   Pending Prescriptions Disp Refills     esomeprazole (NEXIUM) 20 MG DR capsule [Pharmacy Med Name: ESOMEPRAZOLE MAG DR 20 MG CAP]  Last Written Prescription Date:  11/27/19  Last Fill Quantity: 30,  # refills: 1   Last Office Visit with [facility], [facility] or [facility] prescribing provider:  10/24/19Scott   Future Office Visit:    Next 5 appointments (look out 90 days)    Mar 04, 2020 11:00 AM CST  [facility] [address] [id number]  [phone]   Mar 09, 2020  5:00 PM CDT  Office Visit [facility] [address] [address]  [phone]        30 capsule 1     Sig: TAKE 1 CAPSULE (20 MG) BY MOUTH EVERY MORNING (BEFORE BREAKFAST) TAKE 30-60 MINUTES BEFORE EATING.       PPI Protocol Passed - 3/4/2020  7:36 AM        Passed - Not on Clopidogrel (unless Pantoprazole ordered)        Passed - No diagnosis of osteoporosis on record        Passed - Recent (12 mo) or future (30 days) visit within the authorizing provider's specialty     Patient has had an office visit with the authorizing provider or a provider within the authorizing providers department within the previous 12 mos or has a future within next 30 days. See \"Patient Info\" tab in inbasket, or \"Choose Columns\" in Meds & Orders section of the refill encounter.              Passed - Medication is active on med list        Passed - Patient is age 18 or older          "

## 2020-03-04 NOTE — PATIENT INSTRUCTIONS
At [facility], we strive to deliver an exceptional experience to you, every time we see you.  If you receive a survey in the mail, please send us back your thoughts. We really do value your feedback.    Based on your medical history, these are the current health maintenance/preventive care services that you are due for (some may have been done at this visit.)  Health Maintenance Due   Topic Date Due     URINE DRUG SCREEN  1955     MEDICARE ANNUAL WELLNESS VISIT  02/02/1974     ZOSTER IMMUNIZATION (1 of 2) 02/02/2006     EYE EXAM  12/09/2016     HF ACTION PLAN  02/12/2017     COLONOSCOPY  09/18/2017     ADVANCE CARE PLANNING  03/12/2019     DIABETIC FOOT EXAM  05/17/2019     DTAP/TDAP/TD IMMUNIZATION (2 - Td) 10/09/2019     PHQ-9  01/09/2020         Suggested websites for health information:  Www.CloudPartner : Up to date and easily searchable information on multiple topics.  Www.Bag Borrow or Steal.gov : medication info, interactive tutorials, watch real surgeries online  Www.familydoctor.org : good info from the Academy of Family Physicians  Www.cdc.gov : public health info, travel advisories, epidemics (H1N1)  Www.aap.org : children's health info, normal development, vaccinations  Www.health.[facility].[place].us : [place] dept of health, public health issues in [place], N1N1    Your care team:                            Family Medicine Internal Medicine   MD Robb Teague MD Shantel Branch-Fleming, MD Katya Georgiev PA-C Nam Ho, MD Pediatrics   MIGUEL Ramírez, MD Yoly Ling CNP, MD Deborah Mielke, MD Kim Thein, APRN CNP      Clinic hours: Monday - Thursday 7 am-7 pm; Fridays 7 am-5 pm.   Urgent care: Monday - Friday 11 am-9 pm; Saturday and Sunday 9 am-5 pm.  Pharmacy : Monday -Thursday 8 am-8 pm; Friday 8 am-6 pm; Saturday and Sunday 9 am-5 pm.     Clinic: [phone]   Pharmacy: [phone]

## 2020-03-04 NOTE — TELEPHONE ENCOUNTER
Please see below message. Patient requesting alternative drug -drug that is  Covered.    Bhumi Martínez RN

## 2020-03-04 NOTE — PROGRESS NOTES
Subjective     Santiago Hylton is a 65 year old male who presents to clinic today for the following health issues:    HPI   Concern - Blood in stool   Onset: 1 week     Description:   Patient states that he noticed some blood in his stools every time he would have a bowel movement. Patient states it started a bit after running out of his pain medication and decided to take some Advil, after taking advil he would have some pain in his abdomen. Patient states that is has since stopped after making his appointment for today. Patient also complains of pain in his lower back and a cramp like feeling on the left side of his flank.     Intensity: 3/10    Progression of Symptoms:  intermittent    Accompanying Signs & Symptoms:  Pain in lower back and cramp in left flank. Reduced appetite. Denies any nausea or vomiting.    Previous history of similar problem:   None     Precipitating factors:   Worsened by: advil (2 tablets per dose).    Alleviating factors:  Improved by: none   Therapies Tried and outcome: advil with no relief       Patient Active Problem List   Diagnosis     Hypertension goal BP (blood pressure) < 140/90     Hyperlipidemia LDL goal <100     CHF (congestive heart failure) (H)     Mild recurrent major depression (H)     Gout     GERD (gastroesophageal reflux disease)     Chronic rhinitis     CKD (chronic kidney disease) stage 2, GFR 60-89 ml/min     History of colonic polyps     Advanced directives, counseling/discussion     Chronic hepatitis C (H)     Type 2 diabetes mellitus with diabetic chronic kidney disease (H)     Microalbuminuria due to type 2 diabetes mellitus (H)     Obesity (BMI 30-39.9)     Type 2 diabetes mellitus with diabetic polyneuropathy (H)     Coronary artery disease involving native coronary artery of native heart without angina pectoris     Diabetic polyneuropathy associated with type 2 diabetes mellitus (H)     Chronic obstructive airway disease with asthma (H)     Obesity (BMI 35.0-39.9)  with comorbidity (H)     Chronic pain syndrome     CKD (chronic kidney disease) stage 3, GFR 30-59 ml/min (H)     Past Surgical History:   Procedure Laterality Date     CARDIAC SURGERY      stent     CATARACT IOL, RT/LT       COLONOSCOPY  9/18/2012    Procedure: COLONOSCOPY;  COLONOSCOPY, SCREEN;  Surgeon: Cl Johnson MD;  Location: [facility]     ORTHOPEDIC SURGERY      ankle     PHACOEMULSIFICATION WITH STANDARD INTRAOCULAR LENS IMPLANT Right 1/28/2016    Procedure: PHACOEMULSIFICATION WITH STANDARD INTRAOCULAR LENS IMPLANT;  Surgeon: Fly Merrill MD;  Location: [facility]     PHACOEMULSIFICATION WITH STANDARD INTRAOCULAR LENS IMPLANT Left 2/11/2016    Procedure: PHACOEMULSIFICATION WITH STANDARD INTRAOCULAR LENS IMPLANT;  Surgeon: Fly Merrill MD;  Location: [facility] OR       Social History     Tobacco Use     Smoking status: Former Smoker     Packs/day: 0.25     Years: 15.00     Pack years: 3.75     Types: Cigarettes     Last attempt to quit: 9/21/2016     Years since quitting: 3.4     Smokeless tobacco: Never Used     Tobacco comment: 3-4 a day   Substance Use Topics     Alcohol use: Yes     Comment: weekend beer     Family History   Problem Relation Age of Onset     Heart Disease Maternal Grandmother      Hypertension Maternal Grandmother      Hypertension Father      Hypertension Mother      Hypertension Sister      Thyroid Disease Sister      Cancer Brother      Diabetes Brother      Hypertension Brother      Hypertension Maternal Aunt      Cancer Maternal Uncle      Hypertension Maternal Uncle      Hypertension Paternal Aunt      Hypertension Paternal Uncle      Hypertension Maternal Grandfather      Hypertension Paternal Grandmother      Hypertension Paternal Grandfather      Cerebrovascular Disease No family hx of      Glaucoma No family hx of      Macular Degeneration No family hx of          Allergies   Allergen Reactions     No Known Drug Allergies      Recent Labs   Lab Test  "10/24/19  1001 01/15/19  0853 09/11/18  1208 05/17/18  1045   A1C 5.3 5.8* 5.4 5.8*   LDL 84  --  58 70   HDL 48  --  40 52   TRIG 78  --  190* 118   ALT 42  --  42 25   CR 1.12 1.44* 1.20 1.44*   GFRESTIMATED 69 51* 61 50*   GFRESTBLACK 80 59* 74 60*   POTASSIUM 4.2  --  4.5 4.4   TSH  --   --  0.79 2.04      BP Readings from Last 3 Encounters:   03/04/20 126/86   10/24/19 138/88   07/09/19 125/81    Wt Readings from Last 3 Encounters:   03/04/20 95.7 kg (211 lb)   10/24/19 93.4 kg (206 lb)   07/09/19 94.3 kg (208 lb)                      Reviewed and updated as needed this visit by Provider         Review of Systems   ROS COMP: CONSTITUTIONAL: NEGATIVE for fever, chills, change in weight  INTEGUMENTARY/SKIN: NEGATIVE for worrisome rashes, moles or lesions  EYES: NEGATIVE for vision changes or irritation  ENT/MOUTH: NEGATIVE for ear, mouth and throat problems  RESP: NEGATIVE for significant cough or SOB  CV: NEGATIVE for chest pain, palpitations or peripheral edema  GI: NEGATIVE for nausea, abdominal pain, heartburn, or change in bowel habits  : NEGATIVE for frequency, dysuria, or hematuria  MUSCULOSKELETAL: NEGATIVE for significant arthralgias or myalgia  NEURO: NEGATIVE for weakness, dizziness or paresthesias  ENDOCRINE: NEGATIVE for temperature intolerance, skin/hair changes  HEME: NEGATIVE for bleeding problems  PSYCHIATRIC: NEGATIVE for changes in mood or affect      Objective    /86 (BP Location: Right arm, Patient Position: Chair, Cuff Size: Adult Large)   Pulse 118   Temp 98.1  F (36.7  C) (Oral)   Resp 18   Ht 1.588 m (5' 2.5\")   Wt 95.7 kg (211 lb)   SpO2 98%   BMI 37.98 kg/m     Physical Exam   GENERAL: healthy, alert and no distress  EYES: Eyes grossly normal to inspection, PERRL and conjunctivae and sclerae normal  HENT: ear canals and TM's normal, nose and mouth without ulcers or lesions  NECK: no adenopathy, no asymmetry, masses, or scars and thyroid normal to palpation  RESP: lungs " clear to auscultation - no rales, rhonchi or wheezes  CV: regular rate and rhythm, normal S1 S2, no S3 or S4, no murmur, click or rub, no peripheral edema and peripheral pulses strong  ABDOMEN: soft, nontender, no hepatosplenomegaly, no masses and bowel sounds normal  MS: no gross musculoskeletal defects noted, no edema  SKIN: no suspicious lesions or rashes  NEURO: Normal strength and tone, mentation intact and speech normal  PSYCH: mentation appears normal, affect normal/bright    Diagnostic Test Results:  Results for orders placed or performed in visit on 03/04/20   XR Abdomen 2 Views     Status: None    Narrative    ABDOMEN TWO-THREE VIEW  3/4/2020 11:49 AM     HISTORY: Left flank pain    COMPARISON: Abdominal ultrasound 11/3/2014      Impression    IMPRESSION: Mild gaseous distention of the bowel in a nonobstructive  pattern. Normal stool volume. No free air. No definite radiopaque  renal calculi.    BRENDA MALHOTRA MD   UA reflex to Microscopic     Status: Abnormal   Result Value Ref Range    Color Urine Yellow     Appearance Urine Clear     Glucose Urine Negative NEG^Negative mg/dL    Bilirubin Urine Negative NEG^Negative    Ketones Urine Negative NEG^Negative mg/dL    Specific Gravity Urine 1.010 1.003 - 1.035    Blood Urine Negative NEG^Negative    pH Urine 5.5 5.0 - 7.0 pH    Protein Albumin Urine Trace (A) NEG^Negative mg/dL    Urobilinogen Urine 0.2 0.2 - 1.0 EU/dL    Nitrite Urine Negative NEG^Negative    Leukocyte Esterase Urine Negative NEG^Negative    Source Midstream Urine    Urine Microscopic     Status: None   Result Value Ref Range    WBC Urine 0 - 5 OTO5^0 - 5 /HPF    RBC Urine O - 2 OTO2^O - 2 /HPF    Squamous Epithelial /LPF Urine Few FEW^Few /LPF           Assessment & Plan     1. Internal hemorrhoids    - hydrocortisone (ANUSOL-HC) 25 MG suppository; Place 1 suppository (25 mg) rectally 2 times daily  Dispense: 12 suppository; Refill: 2    2. Left flank pain    - XR Abdomen 2 Views  - UA  reflex to Microscopic  - Urine Microscopic    3. Chronic pain syndrome    - traMADol (ULTRAM) 50 MG tablet; TAKE 1 TO 2 TABLETS BY MOUTH TWICE A DAY MAX OF 2 TABS PER DAY  Dispense: 60 tablet; Refill: 0    4. Screen for colon cancer    - GASTROENTEROLOGY ADULT REF PROCEDURE ONLY [facility] [phone]; No Provider Preference       FUTURE APPOINTMENTS:       - Follow-up visit in 4 weeks.      Robb Magana MD  [facility]

## 2020-03-04 NOTE — TELEPHONE ENCOUNTER
Rx for Hydrocortisone 2.5% cream sent as substitute for Anusol suppository.    Patient informed by this provider.

## 2020-03-05 ASSESSMENT — ANXIETY QUESTIONNAIRES: GAD7 TOTAL SCORE: 2

## 2020-03-06 ENCOUNTER — TELEPHONE (OUTPATIENT)
Dept: FAMILY MEDICINE | Facility: CLINIC | Age: 65
End: 2020-03-06

## 2020-03-06 DIAGNOSIS — K21.00 GASTROESOPHAGEAL REFLUX DISEASE WITH ESOPHAGITIS: Primary | ICD-10-CM

## 2020-03-06 RX ORDER — PANTOPRAZOLE SODIUM 40 MG/1
40 TABLET, DELAYED RELEASE ORAL DAILY
Qty: 90 TABLET | Refills: 1 | Status: SHIPPED | OUTPATIENT
Start: 2020-03-06 | End: 2020-07-14 | Stop reason: ALTCHOICE

## 2020-03-06 NOTE — TELEPHONE ENCOUNTER
Prescription approved per [facility] Refill Protocol.    Justyna Tafoya RN, [facility] Triage

## 2020-03-06 NOTE — TELEPHONE ENCOUNTER
Fax from [facility] stating esomeprazole (NEXIUM) 20 MG DR capsule needs a PA but no other info sent.  Please start PA or send alternative

## 2020-03-12 PROBLEM — K64.8 INTERNAL HEMORRHOIDS: Status: ACTIVE | Noted: 2020-03-12

## 2020-03-26 DIAGNOSIS — F33.0 MAJOR DEPRESSIVE DISORDER, RECURRENT EPISODE, MILD (H): ICD-10-CM

## 2020-03-26 RX ORDER — TRAZODONE HYDROCHLORIDE 100 MG/1
TABLET ORAL
Qty: 180 TABLET | Refills: 2 | Status: SHIPPED | OUTPATIENT
Start: 2020-03-26 | End: 2020-12-23

## 2020-04-02 DIAGNOSIS — G89.4 CHRONIC PAIN SYNDROME: ICD-10-CM

## 2020-04-02 RX ORDER — OXYCODONE HYDROCHLORIDE 5 MG/1
5 TABLET ORAL 2 TIMES DAILY PRN
Qty: 60 TABLET | Refills: 0 | Status: SHIPPED | OUTPATIENT
Start: 2020-04-02 | End: 2020-05-12

## 2020-04-02 NOTE — TELEPHONE ENCOUNTER
Reason for Call:  Other prescription    Detailed comments: Patient asking for oxyCODONE (ROXICODONE) 5 MG tablet    Phone Number Patient can be reached at: Home number on file [phone] (home)    Best Time: any    Can we leave a detailed message on this number? YES    Call taken on 4/2/2020 at 9:05 AM by Staci Garnica

## 2020-04-02 NOTE — TELEPHONE ENCOUNTER
Requested Prescriptions   Pending Prescriptions Disp Refills     oxyCODONE (ROXICODONE) 5 MG tablet        Last Written Prescription Date:  03/02/2020  Last Fill Quantity: 60,   # refills: 0  Last Office Visit: 03/04/2020-Vocal  Future Office visit:       Routing refill request to provider for review/approval because:  Drug not on the [facility], [facility] or [facility] refill protocol or controlled substance 60 tablet 0     Sig: Take 1 tablet (5 mg) by mouth 2 times daily as needed for moderate to severe pain (up to 2 a day)       There is no refill protocol information for this order        Last  12/10/19

## 2020-04-02 NOTE — TELEPHONE ENCOUNTER
Controlled Substance Refill Request for Oxycodone  Problem List Complete:  Yes  Chronic pain syndrome   Problem Detail     Noted:  3/14/2019    Priority:  Medium    Overview Addendum 4/2/2020  2:28 PM by Ibeth Deluna RN    Patient is followed by Tori Rizvi MD for ongoing prescription of pain medication.  All refills should only be approved by this provider, or covering partner.     Medication(s): oxycodone 5 mg twice a day.   Maximum quantity per month: 60  Clinic visit frequency required: Q 3 months      Controlled substance agreement:  Encounter-Level CSA:    There are no encounter-level csa.      Patient-Level CSA:    Controlled Substance Agreement - Opioid - Scan on 3/27/2019  2:34 PM (below)            Pain Clinic evaluation in the past: No     DIRE Total Score(s):  No flowsheet data found.     Last [place]MP website verification: 04/02/20    [email or web address]     Chronic low back pain and neuropathy from diabetes.       checked in past 3 months?  Yes 4/2/2020    Last Written Prescription Date:  3/2/2020  Last Fill Quantity: 60 tablets  # refills: 0   Last office visit: 3/4/2020 with prescribing provider:  3/4/2020   Future Office Visit:  None    RX monitoring program ([place]) reviewed:  reviewed- no concerns    [place]:  [email or web address]        Ibeth Deluna RN, BSN, PHN

## 2020-04-08 DIAGNOSIS — R35.0 URINARY FREQUENCY: ICD-10-CM

## 2020-04-08 DIAGNOSIS — F33.0 MILD RECURRENT MAJOR DEPRESSION (H): ICD-10-CM

## 2020-04-08 DIAGNOSIS — R35.1 NOCTURIA: ICD-10-CM

## 2020-04-08 NOTE — TELEPHONE ENCOUNTER
"Requested Prescriptions   Pending Prescriptions Disp Refills     tamsulosin (FLOMAX) 0.4 MG capsule [Pharmacy Med Name: TAMSULOSIN HCL 0.4 MG CAPSULE]  Last Written Prescription Date:  10/24/19  Last Fill Quantity: 90,  # refills: 1   Last Office Visit with [facility] or [facility] prescribing provider:  03/04/2020-Vocal   Future Office Visit:    90 capsule 1     Sig: TAKE 1 CAPSULE BY MOUTH EVERY DAY       Alpha Blockers Passed - 4/8/2020  1:08 AM        Passed - Blood pressure under 140/90 in past 12 months     BP Readings from Last 3 Encounters:   03/04/20 126/86   10/24/19 138/88   07/09/19 125/81                 Passed - Recent (12 mo) or future (30 days) visit within the authorizing provider's specialty     Patient has had an office visit with the authorizing provider or a provider within the authorizing providers department within the previous 12 mos or has a future within next 30 days. See \"Patient Info\" tab in inbasket, or \"Choose Columns\" in Meds & Orders section of the refill encounter.              Passed - Patient does not have Tadalafil, Vardenafil, or Sildenafil on their medication list        Passed - Medication is active on med list        Passed - Patient is 18 years of age or older           venlafaxine (EFFEXOR-XR) 37.5 MG 24 hr capsule [Pharmacy Med Name: VENLAFAXINE HCL ER 37.5 MG CAP]  Last Written Prescription Date:  10/25/19  Last Fill Quantity: 180,  # refills: 1   Last Office Visit with [facility] or [facility] prescribing provider:  03/04/2020-Vocal   Future Office Visit:    180 capsule 1     Sig: TAKE 2 CAPSULES (75 MG) BY MOUTH DAILY       Serotonin-Norepinephrine Reuptake Inhibitors  Failed - 4/8/2020  1:08 AM        Failed - PHQ-9 score of less than 5 in past 6 months     Please review last PHQ-9 score.           Passed - Blood pressure under 140/90 in past 12 months     BP Readings from Last 3 Encounters:   03/04/20 126/86   10/24/19 138/88   07/09/19 125/81                 Passed - " "Medication is active on med list        Passed - Patient is age 18 or older        Passed - Normal serum creatinine on file in past 12 months     Recent Labs   Lab Test 10/24/19  1001   CR 1.12       Ok to refill medication if creatinine is low          Passed - Recent (6 mo) or future (30 days) visit within the authorizing provider's specialty     Patient had office visit in the last 6 months or has a visit in the next 30 days with authorizing provider or within the authorizing provider's specialty.  See \"Patient Info\" tab in inbasket, or \"Choose Columns\" in Meds & Orders section of the refill encounter.                 "

## 2020-04-10 RX ORDER — TAMSULOSIN HYDROCHLORIDE 0.4 MG/1
CAPSULE ORAL
Qty: 90 CAPSULE | Refills: 2 | Status: SHIPPED | OUTPATIENT
Start: 2020-04-10 | End: 2020-12-18

## 2020-04-10 RX ORDER — VENLAFAXINE HYDROCHLORIDE 37.5 MG/1
75 CAPSULE, EXTENDED RELEASE ORAL DAILY
Qty: 180 CAPSULE | Refills: 1 | Status: SHIPPED | OUTPATIENT
Start: 2020-04-10 | End: 2020-08-27

## 2020-04-10 NOTE — TELEPHONE ENCOUNTER
Routing refill request to provider for review/approval because: Venlafaxine              Prescription approved per [facility] Refill Protocol-tamsulosin    Bhumi Martínez RN

## 2020-05-08 DIAGNOSIS — G89.4 CHRONIC PAIN SYNDROME: ICD-10-CM

## 2020-05-08 NOTE — TELEPHONE ENCOUNTER
Pending Prescriptions:                       Disp   Refills    oxyCODONE (ROXICODONE) 5 MG tablet        60 tab*0            Sig: Take 1 tablet (5 mg) by mouth 2 times daily as           needed for moderate to severe pain (up to 2 a           day)

## 2020-05-12 DIAGNOSIS — E78.5 HYPERLIPIDEMIA LDL GOAL <100: ICD-10-CM

## 2020-05-12 RX ORDER — OXYCODONE HYDROCHLORIDE 5 MG/1
5 TABLET ORAL 2 TIMES DAILY PRN
Qty: 60 TABLET | Refills: 0 | Status: SHIPPED | OUTPATIENT
Start: 2020-05-12 | End: 2020-07-01

## 2020-05-12 NOTE — TELEPHONE ENCOUNTER
Reason for Call:  Prescription    Detailed comments: Wants a nurse to call him regarding medication request     Phone Number Patient can be reached at: Cell number on file:    Telephone Information:   Mobile [phone]       Best Time: Any    Can we leave a detailed message on this number? YES    Call taken on 5/12/2020 at 9:06 AM by Moose Juarez

## 2020-05-12 NOTE — TELEPHONE ENCOUNTER
Controlled Substance Refill Request for Oxycodone  Problem List Complete:  Yes  Chronic pain syndrome   Problem Detail     Noted:  3/14/2019    Priority:  Medium    Overview Addendum 4/2/2020  2:28 PM by Ibeth Deluna RN    Patient is followed by Tori Rizvi MD for ongoing prescription of pain medication.  All refills should only be approved by this provider, or covering partner.     Medication(s): oxycodone 5 mg twice a day.   Maximum quantity per month: 60  Clinic visit frequency required: Q 3 months      Controlled substance agreement:  Encounter-Level CSA:    There are no encounter-level csa.      Patient-Level CSA:    Controlled Substance Agreement - Opioid - Scan on 3/27/2019  2:34 PM (below)            Pain Clinic evaluation in the past: No     DIRE Total Score(s):  No flowsheet data found.     Last [place]NPMP website verification: 04/02/20    [email or web address]     Chronic low back pain and neuropathy from diabetes.     checked in past 3 months?  Yes 4/2/2020   Last Written Prescription Date:  4/2/20  Last Fill Quantity: 60 tablets  # refills: 0   Last office visit: 3/4/2020 with prescribing provider:  3/4/2020   Future Office Visit:  None    RX monitoring program ([place]) reviewed:  not reviewed/not due - last done on 4/2/2020    [place]MP profile:  [email or web address]          Ibeth Deluna RN, BSN, PHN

## 2020-05-15 RX ORDER — ISOSORBIDE MONONITRATE 20 MG/1
TABLET ORAL
Qty: 180 TABLET | Refills: 2 | Status: SHIPPED | OUTPATIENT
Start: 2020-05-15 | End: 2021-02-09

## 2020-05-15 NOTE — TELEPHONE ENCOUNTER
"Requested Prescriptions   Pending Prescriptions Disp Refills     isosorbide mononitrate (ISMO/MONOKET) 20 MG tablet [Pharmacy Med Name: ISOSORBIDE MONONIT 20 MG TAB] 180 tablet 2     Sig: TAKE 1 TABLET BY MOUTH TWICE A DAY       Nitrates Passed - 5/12/2020  8:07 AM        Passed - Blood pressure under 140/90 in past 12 months     BP Readings from Last 3 Encounters:   03/04/20 126/86   10/24/19 138/88   07/09/19 125/81                 Passed - Pt is not on erectile dysfunction medications        Passed - Recent (12 mo) or future (30 days) visit within the authorizing provider's specialty     Patient has had an office visit with the authorizing provider or a provider within the authorizing providers department within the previous 12 mos or has a future within next 30 days. See \"Patient Info\" tab in inbasket, or \"Choose Columns\" in Meds & Orders section of the refill encounter.              Passed - Medication is active on med list        Passed - Patient is age 18 or older           Prescription approved per [facility] Refill Protocol.      Ibeth Deluna RN, BSN, PHN    "

## 2020-06-19 ENCOUNTER — TRANSFERRED RECORDS (OUTPATIENT)
Dept: HEALTH INFORMATION MANAGEMENT | Facility: CLINIC | Age: 65
End: 2020-06-19

## 2020-06-22 DIAGNOSIS — K21.9 GASTROESOPHAGEAL REFLUX DISEASE, ESOPHAGITIS PRESENCE NOT SPECIFIED: ICD-10-CM

## 2020-06-22 NOTE — TELEPHONE ENCOUNTER
Pharmacy told patient he needed to contact you regarding a new order for his  esomeprazole (NEXIUM) 20 MG DR capsule     Can you call that to [facility] [phone]    Call patient if questions  [phone] ok to leave message

## 2020-06-24 ENCOUNTER — TELEPHONE (OUTPATIENT)
Dept: FAMILY MEDICINE | Facility: CLINIC | Age: 65
End: 2020-06-24

## 2020-06-24 DIAGNOSIS — J45.30 MILD PERSISTENT ASTHMA WITHOUT COMPLICATION: ICD-10-CM

## 2020-06-24 DIAGNOSIS — K21.00 GASTROESOPHAGEAL REFLUX DISEASE WITH ESOPHAGITIS: Primary | ICD-10-CM

## 2020-06-24 NOTE — TELEPHONE ENCOUNTER
Prescription approved per [facility] Refill Protocol.    Justyna Tafoya RN, [facility] Triage

## 2020-06-25 RX ORDER — BUDESONIDE AND FORMOTEROL FUMARATE DIHYDRATE 160; 4.5 UG/1; UG/1
AEROSOL RESPIRATORY (INHALATION)
Qty: 30.6 INHALER | Refills: 1 | Status: SHIPPED | OUTPATIENT
Start: 2020-06-25 | End: 2020-12-22

## 2020-06-25 NOTE — TELEPHONE ENCOUNTER
Routing refill request to provider for review/approval because:  Failed ACT    Justyna Tafoya RN, [facility] Triage

## 2020-06-30 ENCOUNTER — TELEPHONE (OUTPATIENT)
Dept: FAMILY MEDICINE | Facility: CLINIC | Age: 65
End: 2020-06-30

## 2020-06-30 DIAGNOSIS — G89.4 CHRONIC PAIN SYNDROME: ICD-10-CM

## 2020-07-01 RX ORDER — OXYCODONE HYDROCHLORIDE 5 MG/1
5 TABLET ORAL 2 TIMES DAILY PRN
Qty: 60 TABLET | Refills: 0 | Status: SHIPPED | OUTPATIENT
Start: 2020-07-01 | End: 2020-08-04

## 2020-07-01 NOTE — TELEPHONE ENCOUNTER
This writer attempted to contact patient on 07/01/20      Reason for call rx sent and left detailed message.      If patient calls back:   Relay message pain medication sent, (read verbatim), document that pt called and close encounter        Gladis Knight MA\

## 2020-07-01 NOTE — TELEPHONE ENCOUNTER
Patient is checking on status of request. States he's in a lot of pain and request that this be address today.    Gladis Knight MA

## 2020-07-01 NOTE — TELEPHONE ENCOUNTER
Called patient's insurance, was put on hold 17 minutes.    Called patient, informed him to call his insurance to find out what alternative is cover.  Will wait for patient to return call to clinic.    Gladis Knight MA

## 2020-07-07 NOTE — TELEPHONE ENCOUNTER
PA Initiation    Medication: esomeprazole (NEXIUM) 20 MG DR capsule   Insurance Company: [employer] - Phone [phone] Fax [phone]  Pharmacy Filling the Rx: [id number] - [facility], [place] - [address] [place] [address].  Filling Pharmacy Phone: [phone]  Filling Pharmacy Fax: [phone]  Start Date: 7/7/2020

## 2020-07-08 NOTE — TELEPHONE ENCOUNTER
PRIOR AUTHORIZATION DENIED    Medication: esomeprazole (NEXIUM) 20 MG DR capsule     Denial Date: 7/8/2020    Denial Rational: Patient has to try/fail: Dexilant, lansoprazole, omeprazole          Appeal Information:  If provider would like to appeal we will need a detailed letter of medical necessity to start the process. Then re-route this request back to the PA pool.

## 2020-08-04 DIAGNOSIS — G89.4 CHRONIC PAIN SYNDROME: ICD-10-CM

## 2020-08-04 RX ORDER — OXYCODONE HYDROCHLORIDE 5 MG/1
5 TABLET ORAL 2 TIMES DAILY PRN
Qty: 60 TABLET | Refills: 0 | Status: SHIPPED | OUTPATIENT
Start: 2020-08-04 | End: 2020-09-16

## 2020-08-04 NOTE — TELEPHONE ENCOUNTER
Controlled Substance Refill Request for Oxycodone  Problem List Complete:  Yes  Chronic pain syndrome   Problem Detail     Noted:  3/14/2019    Priority:  Medium    Overview Addendum 8/4/2020  9:04 AM by Ibeth Deluna RN    Patient is followed by Tori Rizvi MD for ongoing prescription of pain medication.  All refills should only be approved by this provider, or covering partner.     Medication(s): oxycodone 5 mg twice a day.   Maximum quantity per month: 60  Clinic visit frequency required: Q 3 months      Controlled substance agreement:  Encounter-Level CSA:    There are no encounter-level csa.      Patient-Level CSA:    Controlled Substance Agreement - Opioid - Scan on 3/27/2019  2:34 PM (below)            Pain Clinic evaluation in the past: No     DIRE Total Score(s):  No flowsheet data found.     Last [place]NPMP website verification: 08/04/20    [email or web address]     Chronic low back pain and neuropathy from diabetes.       checked in past 3 months?  Yes 8/4/20   RX monitoring program ([place]MP) reviewed:  reviewed- no concerns  [place]MP profile:  [email or web address]  Last Written Prescription Date:  7/1/20  Last Fill Quantity: 60 tablets  # refills: 0   Last office visit: 3/4/2020 with prescribing provider:  3/24/20   Future Office Visit:  None        Ibeth Deluna RN, BSN, PHN

## 2020-08-04 NOTE — TELEPHONE ENCOUNTER
Reason for Call:  Medication or medication refill: Medication Refill.    Do you use a [facility]?  Name of the pharmacy and phone number for the current request:  [id number] - [facility], [place] - [address]    Name of the medication requested: Oxycodone (Roxicodone) 5MG    Other request: Anytime.    Can we leave a detailed message on this number? YES    Phone number patient can be reached at: Cell number on file:    Telephone Information:   Mobile [phone]       Best Time: Anytime.    Call taken on 8/4/2020 at 8:52 AM by Blanca Ponce

## 2020-08-09 DIAGNOSIS — E78.5 HYPERLIPIDEMIA, UNSPECIFIED HYPERLIPIDEMIA TYPE: ICD-10-CM

## 2020-08-09 DIAGNOSIS — I10 ESSENTIAL HYPERTENSION WITH GOAL BLOOD PRESSURE LESS THAN 140/90: ICD-10-CM

## 2020-08-12 RX ORDER — FUROSEMIDE 40 MG
TABLET ORAL
Qty: 90 TABLET | Refills: 0 | Status: SHIPPED | OUTPATIENT
Start: 2020-08-12 | End: 2020-08-27

## 2020-08-12 RX ORDER — SIMVASTATIN 40 MG
40 TABLET ORAL AT BEDTIME
Qty: 90 TABLET | Refills: 0 | Status: SHIPPED | OUTPATIENT
Start: 2020-08-12 | End: 2020-10-29

## 2020-08-12 NOTE — TELEPHONE ENCOUNTER
Prescription approved per [facility] Refill Protocol.    Justyna Tafoya RN, [facility] Triage

## 2020-08-22 DIAGNOSIS — K21.00 GASTROESOPHAGEAL REFLUX DISEASE WITH ESOPHAGITIS: Primary | ICD-10-CM

## 2020-08-24 RX ORDER — PANTOPRAZOLE SODIUM 40 MG/1
TABLET, DELAYED RELEASE ORAL
Qty: 90 TABLET | Refills: 0 | Status: SHIPPED | OUTPATIENT
Start: 2020-08-24 | End: 2021-06-20

## 2020-08-24 NOTE — TELEPHONE ENCOUNTER
Routing refill request to provider for review/approval because:  Drug not active on patient's medication list  Bhumi Martínez RN

## 2020-08-27 ENCOUNTER — TELEPHONE (OUTPATIENT)
Dept: OTHER | Facility: CLINIC | Age: 65
End: 2020-08-27

## 2020-08-27 ENCOUNTER — OFFICE VISIT (OUTPATIENT)
Dept: FAMILY MEDICINE | Facility: CLINIC | Age: 65
End: 2020-08-27
Payer: MEDICAID

## 2020-08-27 VITALS
HEIGHT: 62 IN | HEART RATE: 117 BPM | OXYGEN SATURATION: 95 % | TEMPERATURE: 98.4 F | SYSTOLIC BLOOD PRESSURE: 152 MMHG | DIASTOLIC BLOOD PRESSURE: 104 MMHG | WEIGHT: 208 LBS | BODY MASS INDEX: 38.28 KG/M2

## 2020-08-27 DIAGNOSIS — E11.42 DIABETIC POLYNEUROPATHY ASSOCIATED WITH TYPE 2 DIABETES MELLITUS (H): ICD-10-CM

## 2020-08-27 DIAGNOSIS — G89.4 CHRONIC PAIN SYNDROME: ICD-10-CM

## 2020-08-27 DIAGNOSIS — F33.0 MAJOR DEPRESSIVE DISORDER, RECURRENT EPISODE, MILD (H): ICD-10-CM

## 2020-08-27 DIAGNOSIS — I73.9 CLAUDICATION OF BOTH LOWER EXTREMITIES (H): ICD-10-CM

## 2020-08-27 DIAGNOSIS — I73.9 CLAUDICATION OF BOTH LOWER EXTREMITIES (H): Primary | ICD-10-CM

## 2020-08-27 DIAGNOSIS — I10 HYPERTENSION GOAL BP (BLOOD PRESSURE) < 140/90: ICD-10-CM

## 2020-08-27 DIAGNOSIS — R10.9 RIGHT FLANK PAIN: ICD-10-CM

## 2020-08-27 DIAGNOSIS — I25.10 CARDIOVASCULAR DISEASE: ICD-10-CM

## 2020-08-27 DIAGNOSIS — F33.0 MILD RECURRENT MAJOR DEPRESSION (H): ICD-10-CM

## 2020-08-27 DIAGNOSIS — I50.43 ACUTE ON CHRONIC COMBINED SYSTOLIC AND DIASTOLIC CONGESTIVE HEART FAILURE (H): ICD-10-CM

## 2020-08-27 DIAGNOSIS — R73.9 ELEVATED BLOOD SUGAR: ICD-10-CM

## 2020-08-27 DIAGNOSIS — G60.9 IDIOPATHIC NEUROPATHY: ICD-10-CM

## 2020-08-27 DIAGNOSIS — I10 ESSENTIAL HYPERTENSION WITH GOAL BLOOD PRESSURE LESS THAN 140/90: ICD-10-CM

## 2020-08-27 DIAGNOSIS — E66.01 MORBID OBESITY (H): ICD-10-CM

## 2020-08-27 DIAGNOSIS — N18.30 CKD (CHRONIC KIDNEY DISEASE) STAGE 3, GFR 30-59 ML/MIN (H): ICD-10-CM

## 2020-08-27 DIAGNOSIS — I25.118 CORONARY ARTERY DISEASE OF NATIVE ARTERY OF NATIVE HEART WITH STABLE ANGINA PECTORIS (H): ICD-10-CM

## 2020-08-27 DIAGNOSIS — B18.2 CHRONIC HEPATITIS C WITHOUT HEPATIC COMA (H): ICD-10-CM

## 2020-08-27 DIAGNOSIS — J44.89 CHRONIC OBSTRUCTIVE AIRWAY DISEASE WITH ASTHMA (H): Primary | ICD-10-CM

## 2020-08-27 LAB
ALBUMIN UR-MCNC: 100 MG/DL
ANION GAP SERPL CALCULATED.3IONS-SCNC: 4 MMOL/L (ref 3–14)
APPEARANCE UR: CLEAR
BILIRUB UR QL STRIP: NEGATIVE
BUN SERPL-MCNC: 20 MG/DL (ref 7–30)
CALCIUM SERPL-MCNC: 9.7 MG/DL (ref 8.5–10.1)
CHLORIDE SERPL-SCNC: 104 MMOL/L (ref 94–109)
CO2 SERPL-SCNC: 30 MMOL/L (ref 20–32)
COLOR UR AUTO: YELLOW
CREAT SERPL-MCNC: 1.17 MG/DL (ref 0.66–1.25)
CRP SERPL-MCNC: 6.6 MG/L (ref 0–8)
ERYTHROCYTE [DISTWIDTH] IN BLOOD BY AUTOMATED COUNT: 12.4 % (ref 10–15)
ERYTHROCYTE [SEDIMENTATION RATE] IN BLOOD BY WESTERGREN METHOD: 7 MM/H (ref 0–20)
GFR SERPL CREATININE-BSD FRML MDRD: 65 ML/MIN/{1.73_M2}
GLUCOSE SERPL-MCNC: 109 MG/DL (ref 70–99)
GLUCOSE UR STRIP-MCNC: NEGATIVE MG/DL
HBA1C MFR BLD: 5.7 % (ref 0–5.6)
HCT VFR BLD AUTO: 54.9 % (ref 40–53)
HGB BLD-MCNC: 18.4 G/DL (ref 13.3–17.7)
HGB UR QL STRIP: ABNORMAL
KETONES UR STRIP-MCNC: NEGATIVE MG/DL
LEUKOCYTE ESTERASE UR QL STRIP: NEGATIVE
MCH RBC QN AUTO: 29.5 PG (ref 26.5–33)
MCHC RBC AUTO-ENTMCNC: 33.5 G/DL (ref 31.5–36.5)
MCV RBC AUTO: 88 FL (ref 78–100)
NITRATE UR QL: NEGATIVE
PH UR STRIP: 6 PH (ref 5–7)
PLATELET # BLD AUTO: 241 10E9/L (ref 150–450)
POTASSIUM SERPL-SCNC: 4.1 MMOL/L (ref 3.4–5.3)
RBC # BLD AUTO: 6.24 10E12/L (ref 4.4–5.9)
RBC #/AREA URNS AUTO: NORMAL /HPF
SODIUM SERPL-SCNC: 138 MMOL/L (ref 133–144)
SOURCE: ABNORMAL
SP GR UR STRIP: 1.01 (ref 1–1.03)
UROBILINOGEN UR STRIP-ACNC: 0.2 EU/DL (ref 0.2–1)
WBC # BLD AUTO: 11.7 10E9/L (ref 4–11)
WBC #/AREA URNS AUTO: NORMAL /HPF

## 2020-08-27 PROCEDURE — 99214 OFFICE O/P EST MOD 30 MIN: CPT | Performed by: FAMILY MEDICINE

## 2020-08-27 PROCEDURE — 36415 COLL VENOUS BLD VENIPUNCTURE: CPT | Performed by: FAMILY MEDICINE

## 2020-08-27 PROCEDURE — 96127 BRIEF EMOTIONAL/BEHAV ASSMT: CPT | Mod: 59 | Performed by: FAMILY MEDICINE

## 2020-08-27 PROCEDURE — 83036 HEMOGLOBIN GLYCOSYLATED A1C: CPT | Performed by: FAMILY MEDICINE

## 2020-08-27 PROCEDURE — 85027 COMPLETE CBC AUTOMATED: CPT | Performed by: FAMILY MEDICINE

## 2020-08-27 PROCEDURE — 85652 RBC SED RATE AUTOMATED: CPT | Performed by: FAMILY MEDICINE

## 2020-08-27 PROCEDURE — 86140 C-REACTIVE PROTEIN: CPT | Performed by: FAMILY MEDICINE

## 2020-08-27 PROCEDURE — 81001 URINALYSIS AUTO W/SCOPE: CPT | Performed by: FAMILY MEDICINE

## 2020-08-27 PROCEDURE — 80048 BASIC METABOLIC PNL TOTAL CA: CPT | Performed by: FAMILY MEDICINE

## 2020-08-27 RX ORDER — VENLAFAXINE HYDROCHLORIDE 37.5 MG/1
75 CAPSULE, EXTENDED RELEASE ORAL DAILY
Qty: 180 CAPSULE | Refills: 1 | Status: SHIPPED | OUTPATIENT
Start: 2020-08-27 | End: 2021-03-09

## 2020-08-27 RX ORDER — NITROGLYCERIN 0.4 MG/1
0.4 TABLET SUBLINGUAL EVERY 5 MIN PRN
Qty: 25 TABLET | Refills: 11 | Status: SHIPPED | OUTPATIENT
Start: 2020-08-27

## 2020-08-27 RX ORDER — FUROSEMIDE 40 MG
40 TABLET ORAL DAILY
Qty: 90 TABLET | Refills: 3 | Status: SHIPPED | OUTPATIENT
Start: 2020-08-27 | End: 2021-06-20

## 2020-08-27 RX ORDER — GABAPENTIN 300 MG/1
300 CAPSULE ORAL 3 TIMES DAILY
Qty: 270 CAPSULE | Refills: 1 | Status: SHIPPED | OUTPATIENT
Start: 2020-08-27 | End: 2021-02-01

## 2020-08-27 RX ORDER — CARVEDILOL 12.5 MG/1
TABLET ORAL
Qty: 180 TABLET | Refills: 3 | Status: SHIPPED | OUTPATIENT
Start: 2020-08-27 | End: 2021-06-20

## 2020-08-27 RX ORDER — LOSARTAN POTASSIUM 50 MG/1
50 TABLET ORAL DAILY
Qty: 90 TABLET | Refills: 3 | Status: SHIPPED | OUTPATIENT
Start: 2020-08-27 | End: 2021-06-20

## 2020-08-27 RX ORDER — TRAMADOL HYDROCHLORIDE 50 MG/1
TABLET ORAL
Qty: 60 TABLET | Refills: 0 | Status: SHIPPED | OUTPATIENT
Start: 2020-08-27 | End: 2020-10-05

## 2020-08-27 ASSESSMENT — PATIENT HEALTH QUESTIONNAIRE - PHQ9
5. POOR APPETITE OR OVEREATING: NOT AT ALL
SUM OF ALL RESPONSES TO PHQ QUESTIONS 1-9: 8

## 2020-08-27 ASSESSMENT — ANXIETY QUESTIONNAIRES
3. WORRYING TOO MUCH ABOUT DIFFERENT THINGS: NOT AT ALL
7. FEELING AFRAID AS IF SOMETHING AWFUL MIGHT HAPPEN: NOT AT ALL
6. BECOMING EASILY ANNOYED OR IRRITABLE: NOT AT ALL
5. BEING SO RESTLESS THAT IT IS HARD TO SIT STILL: NOT AT ALL
GAD7 TOTAL SCORE: 1
1. FEELING NERVOUS, ANXIOUS, OR ON EDGE: SEVERAL DAYS
2. NOT BEING ABLE TO STOP OR CONTROL WORRYING: NOT AT ALL
IF YOU CHECKED OFF ANY PROBLEMS ON THIS QUESTIONNAIRE, HOW DIFFICULT HAVE THESE PROBLEMS MADE IT FOR YOU TO DO YOUR WORK, TAKE CARE OF THINGS AT HOME, OR GET ALONG WITH OTHER PEOPLE: SOMEWHAT DIFFICULT

## 2020-08-27 ASSESSMENT — MIFFLIN-ST. JEOR: SCORE: 1612.73

## 2020-08-27 NOTE — PATIENT INSTRUCTIONS
Patient Education     Ankle Brachial Index (YVONNE) Test  The ankle brachial index (YVONNE) is a simple test that compares the blood pressure measured at your ankle with the blood pressure measured at your arm. It is used to check for peripheral artery disease (PAD) in the legs, or to see if PAD is getting worse. It can also be used to check your risk for heart attack and stroke. A low YVONNE number may mean that you have PAD.     What is PAD?  A fatty substance called plaque can build up in your arteries. This causes a narrowing or blockage of arteries. PAD often affects the vessels that bring blood to the legs. The reduced blood flow can cause pain and numbness. A low YVONNE number may mean that your legs and feet aren t getting as much blood as they need. But an YVONNE test won t show exactly which blood vessels have become narrowed or blocked.   Why is this test done?  Your healthcare provider might want you to have an YVONNE test if you are at risk for PAD. Things that increase your risk for PAD include:    Smoking    Diabetes    Being older than age 70    High levels of lipids in your blood    Coronary artery disease    Abnormal pulses in your lower legs    Being younger than age 50, with diabetes and another risk factor, such as smoking or high blood pressure  The YVONNE test can:    Diagnose PAD and prevent it from getting worse    Find out if you have a high risk for coronary artery disease  Your healthcare provider also might recommend an YVONNE:    If you have symptoms of PAD, such as pain in your legs with activity. But not everyone with PAD has symptoms. This makes the test even more important.    To see how serious your PAD is. Your provider might order this test every year, to see if your condition is getting worse.    To see how well blood is flowing into your legs, if you ve had surgery on the blood vessels of your legs. Sometimes healthcare providers use YVONNE to check your risk for heart attack or stroke.  Before your  test  There is very little you need to do to get ready for an YVONNE test:    You can eat as normal on the day of the test.    You should be able to take any medicines as usual before the procedure.    You may want to wear loose, comfortable clothes. This will let the technician easily place the blood pressure cuff on your arm and ankle.    You ll need to rest for at least 15 to 30 minutes before the procedure.    Ask if your healthcare provider has any special instructions.  During your test  The ankle brachial index test is very similar to a standard blood pressure test. During your YVONNE test:    You will lie flat during the procedure.    A technician will place a blood pressure cuff just above your ankle.    The technician will put an ultrasound probe over the artery. He or she will use this to listen to the blood flow through the vessel.    The technician will inflate the blood pressure cuff. He or she will increase the pressure until the blood stops flowing through the vessel. This may be a little uncomfortable. But it won t hurt.    The technician will slowly release the pressure in the cuff. The systolic pressure is the pressure at which the blood flow is heard again. That is the part of the blood pressure measurement needed for the YVONNE.    The technician will repeat this process on your other ankle and on both of your arms.    Next, the technician will calculate the YVONNE. The top number (numerator) is the higher systolic blood pressure found in the ankles. The lower number (denominator) is the higher systolic blood pressure found in the arms.  Sometimes healthcare providers will combine an YVONNE test with an exercise test. You might have an YVONNE done before and right after exercise, to see how exercise changes this value.  After your test    You should be able to go back to your normal activities right after your YVONNE test.    Be sure to follow up with your healthcare provider about your results. In some cases, you  may need follow-up testing to get more information about a blocked vessel. This might include an MRI or an arteriogram.    Talk with your provider about what your YVONNE value means for you.  If you have PAD, you may need treatment. Possible treatments include:    Stopping smoking    Treating high blood pressure, high cholesterol, and diabetes, if needed    Staying physically active    Eating a healthy diet    Taking medicine to increase blood flow to your legs or to prevent blood clots    Having procedures to restore blood flow, like angioplasty    Having surgery to your leg if the blockage is severe     Possible risks and complications  For most people, there are no risks linked to having an YVONNE test. But this test is not recommended if you have a blood clot in your leg. You may need a different type of test if you have severe pain in your legs.   Date Last Reviewed: 10/1/2017    0596-4146 [employer]. [address] [place] [address]. All rights reserved. This information is not intended as a substitute for professional medical care. Always follow your healthcare professional's instructions.           Patient Education     Understanding Peripheral Arterial Disease    Peripheral arteries deliver oxygen-rich blood to the tissues outside the heart. As you age, your arteries become stiffer and thicker. In addition, risk factors, such as smoking and high cholesterol, can damage the artery lining. This allows a buildup of fat and other materials (plaque) to form within the artery walls. The buildup of plaque narrows the space inside the artery and sometimes blocks blood flow. Peripheral arterial disease (PAD) happens when blood flow through the arteries is reduced because of plaque buildup. It often happens in the legs and feet, but can also happen elsewhere in the body. If this buildup happens in the a large artery in the neck (carotid artery), it can be a major contributor to stroke.  A healthy  artery  An artery is a muscular tube that carries oxgen rich blood and nutrients from the heart to the rest of the body. It has a smooth lining and flexible walls that allow blood to pass freely. When active, muscles need more oxygen. This increases blood flow. Healthy arteries can adapt to meet this need.  A damaged artery    PAD begins when the lining of an artery is damaged. This is often because of risk factors, such as smoking, older age, or diabetes. Plaque then starts to form within the artery wall. At this stage, blood flows normally, so you re not likely to have symptoms.  A narrowed artery    If plaque continues to build up, the space inside the artery narrows. The artery walls become less able to expand. The artery still provides enough blood and oxygen to your muscles during rest. But when you re active, the increased demand for blood can t be met. As a result, your leg may cramp or ache when you walk.  A blocked artery    An artery can become blocked by plaque or by a blood clot lodged in a narrowed section. When this happens, oxygen can t reach the muscle below the blockage. Then you may feel pain when lying down or when you are not active (rest pain). This type of pain is especially common at night when you re lying flat. In time, the affected tissue can die. This can lead to the loss of a toe or foot.  Date Last Reviewed: 5/1/2016 2000-2019 [employer]. [address] [place] [address], [place] [address]. All rights reserved. This information is not intended as a substitute for professional medical care. Always follow your healthcare professional's instructions.

## 2020-08-27 NOTE — TELEPHONE ENCOUNTER
Patient is scheduled for his Ultrasound on 08/31/20. He is scheduled for his in person Consult Appointment with Dr Martinez on 09/08/20.

## 2020-08-27 NOTE — TELEPHONE ENCOUNTER
Pt referred to [facility] by Tori Rizvi MD for Claudication of both lower extremities/PAD.     Pt needs to be scheduled for YVONNE with TBI and in person consult with vascular medicine.  Will route to scheduling to coordinate an appointment at next available.    RAFAEL FryeN, RN  [facility]

## 2020-08-27 NOTE — LETTER
[facility]  08/27/20    Patient: Santiago Hylton  YOB: 1955  Medical Record Number: [id number]                                                                  Opioid / Opioid Plus Controlled Substance Agreement    I understand that my care provider has prescribed an opioid (narcotic) controlled substance to help manage my condition(s). I am taking this medicine to help me function or work. I know this is strong medicine, and that it can cause serious side effects. Opioid medicine can be sedating, addicting and may cause a dependency on the drug. They can affect my ability to drive or think, and cause depression. They need to be taken exactly as prescribed. Combining opioids with certain medicines or chemicals (such as cocaine, sedatives and tranquilizers, sleeping pills, meth) can be dangerous or even fatal. Also, if I stop opioids suddenly, I may have severe withdrawal symptoms. Last, I understand that opioids do not work for all types of pain nor for all patients. If not helpful, I may be asked to stop them.        The risks, benefits, and side effects of these medicine(s) were explained to me. I agree that:    1. I will take part in other treatments as advised by my care team. This may be psychiatry or counseling, physical therapy, behavioral therapy, group treatment or a referral to a pain clinic. I will reduce or stop my medicine when my care team tells me to do so.  2. I will take my medicines as prescribed. I will not change the dose or schedule unless my care team tells me to. There will be no refills if I  run out early.   I may be contactedwithout warning and asked to complete a urine drug test or pill count at any time.   3. I will keep all my appointments, and understand this is part of the monitoring of opioids. My care team may require an office visit for EVERY opioid/controlled substance refill. If I miss appointments or don t follow instructions, my care team may stop my  medicine.  4. I will not ask other providers to prescribe controlled substances, and I will not accept controlled substances from other people. If I need another prescribed controlled substance for a new reason, I will tell my care team within 1 business day.  5. I will use one pharmacy to fill all of my controlled substance prescriptions, and it is up to me to make sure that I do not run out of my medicines on weekends or holidays. If my care team is willing to refill my opioid prescription without a visit, I must request refills only during office hours, refills may take up to 3 days to process, and it may take up to 5 to 7 days for my medicine to be mailed and ready at my pharmacy. Prescriptions will not be mailed anywhere except my pharmacy.        [id number]  Rev 12/18         Registration to scan to EHR                             Page 1 of 2               Controlled Substance Agreement Opioid        [facility]  08/27/20  Patient: Santiago Hylton  YOB: 1955  Medical Record Number: [id number]                                                                  6. I am responsible for my prescriptions. If the medicine/prescription is lost or stolen, it will not be replaced. I also agree not to share controlled substance medicines with anyone.  7. I agree to not use ANY illegal or recreational drugs. This includes marijuana, cocaine, bath salts or other drugs. I agree not to use alcohol unless my care team says I may.          I agree to give urine samples whenever asked. If I don t give a urine sample, the care team may stop my medicine.    8. If I enroll in the [place] Medical Marijuana program, I will tell my care team. I will also sign an agreement to share my medical records with my care team.   9. I will bring in my list of medicines (or my medicine bottles) each time I come to the clinic.   10. I will tell my care team right away if I become pregnant or have a new medical problem  treated outside of my regular clinic.  11. I understand that this medicine can affect my thinking and judgment. It may be unsafe for me to drive, use machinery and do dangerous tasks. I will not do any of these things until I know how the medicine affects me. If my dose changes, I will wait to see how it affects me. I will contact my care team if I have concerns about medicine side effects.    I understand that if I do not follow any of the conditions above, my prescriptions or treatment may be stopped.      I agree that my provider, clinic care team, and pharmacy may work with any city, state or federal law enforcement agency that investigates the misuse, sale, or other diversion of my controlled medicine. I will allow my provider to discuss my care with or share a copy of this agreement with any other treating provider, pharmacy or emergency room where I receive care. I agree to give up (waive) any right of privacy or confidentiality with respect to these consents.     I have read this agreement and have asked questions about anything I did not understand.      ________________________________________________________________________  Patient signature - Date/Time -  Santiago Hylton                                      ________________________________________________________________________  Witness signature                                                            ________________________________________________________________________  Provider signature - Tori Rizvi MD      [id number]  Rev 12/18         Registration to scan to EHR                         Page 2 of 2                   Controlled Substance Agreement Opioid           Page 1 of 2  Opioid Pain Medicines (also known as Narcotics)  What You Need to Know    What are opioids?   Opioids are pain medicines that must be prescribed by a doctor.  They are also known as narcotics.    Examples are:     morphine (MS Contin, Traci)    oxycodone  (Oxycontin)    oxycodone and acetaminophen (Percocet)    hydrocodone and acetaminophen (Vicodin, Norco)     fentanyl patch (Duragesic)     hydromorphone (Dilaudid)     methadone     What do opioids do well?   Opioids are best for short-term pain after a surgery or injury. They also work well for cancer pain. Unlike other pain medicines, they do not cause liver or kidney failure or ulcers. They may help some people with long-lasting (chronic) pain.     What do opioids NOT do well?   Opioids never get rid of pain entirely, and they do not work well for most patients with chronic pain. Opioids do not reduce swelling, one of the causes of pain. They also don t work well for nerve pain.                           For informational purposes only.  Not to replace the advice of your care provider.  Copyright [address] [facility]. All right reserved. [facility] 858895-Dus 02/18.      Page 2 of 2    Risks and side effects   Talk to your doctor before you start or decide to keep taking one of these medicines. Side effects include:    Lowering your breathing rate enough to cause death    Overdose, including death, especially if taking higher than prescribed doses    Long-term opioid use    Worse depression symptoms; less pleasure in things you usually enjoy    Feeling tired or sluggish    Slower thoughts or cloudy thinking    Being more sensitive to pain over time; pain is harder to control    Trouble sleeping or restless sleep    Changes in hormone levels (for example, less testosterone)    Changes in sex drive or ability to have sex    Constipation    Unsafe driving    Itching and sweating    Feeling dizzy    Nausea, vomiting and dry mouth    What else should I know about opioids?  When someone takes opioids for too long or too often, they become dependent. This means that if you stop or reduce the medicine too quickly, you will have withdrawal symptoms.    Dependence is not the same as addiction. Addiction is when  people keep using a substance that harms their body, their mind or their relations with others. If you have a history of drug or alcohol abuse, taking opioids can cause a relapse.    Over time, opioids don t work as well. Most people will need higher and higher doses. The higher the dose, the more serious the side effects. We don t know the long-term effects of opioids.      Prescribed opioids aren't the best way to manage chronic pain    Other ways to manage pain include:      Ibuprofen or acetaminophen.  You should always try this first.      Treat health problems that may be causing pain.      acupuncture or massage, deep breathing, meditation, visual imagery, aromatherapy.      Use heat or ice at the pain site      Physical therapy and exercise      Stop smoking      See a counselor or therapist                                                  People who have used opioids for a long time may have a lower quality of life, worse depression, higher levels of pain and more visits to doctors.    Never share your opioids with others. Be sure to store opioids in a secure place, locked if possible.Young children can easily swallow them and overdose.     You can overdose on opioids.  Signs of overdose include decrease or loss of consciousness, slowed breathing, trouble waking and blue lips.  If someone is worried about overdose, they should call 911.    If you are at risk for overdose, you may get naloxone (Narcan, a medicine that reverses the effects of opioids.  If you overdose, a friend or family member can give you Narcan while waiting for the ambulance.  They need to know the signs of overdose and how to give Narcan.    While you're taking opioids:    Don't use alcohol or street drugs. Taking them together can cause death.    Don't take any of these medicines unless your doctor says its okay.  Taking these with opioids can cause death.    Benzodiazepines (such as lorazepam         or diazepam)    Muscle relaxers  (such as cyclobenzaprine)    sleeping pills    other opioids    Safe disposal of opioids  Find your area drug take-back program, your pharmacy mail-back program, buy a special disposal bag (such as Deterra) from your pharmacy or flush them down the toilet.  Use the guidelines at:  www.fda.gov/drugs/resourcesforyou

## 2020-08-27 NOTE — PROGRESS NOTES
Subjective     Santiago Hylton is a 65 year old male who presents to clinic today for the following health issues:    HPI       Musculoskeletal problem/pain  Onset/Duration: Months   Description  Location: Both Feet   Joint Swelling: no  Redness: no  Pain: YES- severe pain   Warmth: no  Intensity:  severe  Progression of Symptoms:  constant  Accompanying signs and symptoms:   Fevers: no  Numbness/tingling/weakness: YES- both feet - more in left foot now and worse with walking. Eases up somewhat with rest.  History  Trauma to the area: no  Recent illness:  no  Previous similar problem: YES  Previous evaluation:  no  Precipitating or alleviating factors:  Aggravating factors include: standing and walking  Therapies tried and outcome: Gabapentin and Allopurinol helps sometimes and patient ran out of trazodone 1 week ago. Alternates between trazodone and oxycodone depending on the pain level and if he runs out of oxycodone.     Hyperlipidemia Follow-Up      Are you regularly taking any medication or supplement to lower your cholesterol?   Yes- simvastatin    Are you having muscle aches or other side effects that you think could be caused by your cholesterol lowering medication?  No    Hypertension Follow-up      Do you check your blood pressure regularly outside of the clinic? Yes     Are you following a low salt diet? Yes    Are your blood pressures ever more than 140 on the top number (systolic) OR more   than 90 on the bottom number (diastolic), for example 140/90? No    Vascular Disease Follow-up      How often do you take nitroglycerin? Never    Do you take an aspirin every day? Yes    Depression and Anxiety Follow-Up    How are you doing with your depression since your last visit? No change    How are you doing with your anxiety since your last visit?  No change    Are you having other symptoms that might be associated with depression or anxiety? No    Have you had a significant life event? Health Concerns     Do you  "have any concerns with your use of alcohol or other drugs? No    Social History     Tobacco Use     Smoking status: Former Smoker     Packs/day: 0.25     Years: 15.00     Pack years: 3.75     Types: Cigarettes     Last attempt to quit: 9/21/2016     Years since quitting: 3.9     Smokeless tobacco: Never Used     Tobacco comment: 3-4 a day   Substance Use Topics     Alcohol use: Yes     Comment: weekend beer     Drug use: No     PHQ 7/9/2019 3/4/2020 8/27/2020   PHQ-9 Total Score 6 5 8   Q9: Thoughts of better off dead/self-harm past 2 weeks Not at all Not at all Not at all     GENARO-7 SCORE 1/22/2019 3/4/2020 8/27/2020   Total Score 7 2 1         Suicide Assessment Five-step Evaluation and Treatment (SAFE-T)    Chronic Pain Follow-Up    Where in your body do you have pain? Where does it hurt the worse? All over. Right flank, left foot.   How has your pain affected your ability to work? Can work part time with limitations  Which of these pain treatments have you tried since your last clinic visit? Physical Therapy and Rest  How well are you sleeping? Fair  How has your mood been since your last visit? About the same  Have you had a significant life event? Health Concerns  Other aggravating factors: sedentary lifestyle  Taking medication as directed? Yes    PHQ-9 SCORE 7/9/2019 3/4/2020 8/27/2020   PHQ-9 Total Score - - -   PHQ-9 Total Score 6 5 8     GENARO-7 SCORE 1/22/2019 3/4/2020 8/27/2020   Total Score 7 2 1     No flowsheet data found.  Encounter-Level CSA:    There are no encounter-level csa.     Patient-Level CSA:    Controlled Substance Agreement - Opioid - Scan on 3/27/2019  2:34 PM             Review of Systems   Constitutional, HEENT, cardiovascular, pulmonary, gi and gu systems are negative, except as otherwise noted.      Objective    BP (!) 152/104   Pulse 117   Temp 98.4  F (36.9  C) (Oral)   Ht 1.575 m (5' 2\")   Wt 94.3 kg (208 lb)   SpO2 95%   BMI 38.04 kg/m    Body mass index is 38.04 " kg/m .  Physical Exam   GENERAL: healthy, alert, well nourished, well hydrated, no distress, obese  HENT: ear canals- normal; TMs- normal; Nose- normal; Mouth- no ulcers, no lesions, throat is clear with no erythema or exudate.   NECK: no tenderness, no adenopathy, no asymmetry, no masses, no stiffness; thyroid- normal to palpation  RESP: lungs clear to auscultation - no rales, no rhonchi, no wheezes  CV: regular rates and rhythm, normal S1 S2, no S3 or S4 and no murmur, no click or rub -  ABDOMEN: soft, no tenderness, no  hepatosplenomegaly, no masses, normal bowel sounds  MS: extremities- no gross deformities noted, no edema, decreased pulses left foot with decreased sensation in toes both feet.   SKIN: no suspicious lesions, no rashes  NEURO: strength and tone- normal, sensory exam- grossly normal, mentation- intact, speech- normal, reflexes- symmetric  BACK: no CVA tenderness, paralumbar tenderness with decreased ROM.   PSYCH: Alert and oriented times 3; speech- coherent , normal rate and volume; able to articulate logical thoughts, able to abstract reason, no tangential thoughts, no hallucinations or delusions, affect- normal     Results for orders placed or performed in visit on 08/27/20 (from the past 24 hour(s))   HEMOGLOBIN A1C   Result Value Ref Range    Hemoglobin A1C 5.7 (H) 0 - 5.6 %   BASIC METABOLIC PANEL   Result Value Ref Range    Sodium 138 133 - 144 mmol/L    Potassium 4.1 3.4 - 5.3 mmol/L    Chloride 104 94 - 109 mmol/L    Carbon Dioxide 30 20 - 32 mmol/L    Anion Gap 4 3 - 14 mmol/L    Glucose 109 (H) 70 - 99 mg/dL    Urea Nitrogen 20 7 - 30 mg/dL    Creatinine 1.17 0.66 - 1.25 mg/dL    GFR Estimate 65 >60 mL/min/[1.73_m2]    GFR Estimate If Black 76 >60 mL/min/[1.73_m2]    Calcium 9.7 8.5 - 10.1 mg/dL   CBC with platelets   Result Value Ref Range    WBC 11.7 (H) 4.0 - 11.0 10e9/L    RBC Count 6.24 (H) 4.4 - 5.9 10e12/L    Hemoglobin 18.4 (H) 13.3 - 17.7 g/dL    Hematocrit 54.9 (H) 40.0 - 53.0 %     MCV 88 78 - 100 fl    MCH 29.5 26.5 - 33.0 pg    MCHC 33.5 31.5 - 36.5 g/dL    RDW 12.4 10.0 - 15.0 %    Platelet Count 241 150 - 450 10e9/L   CRP inflammation   Result Value Ref Range    CRP Inflammation 6.6 0.0 - 8.0 mg/L   Erythrocyte sedimentation rate auto   Result Value Ref Range    Sed Rate 7 0 - 20 mm/h   UA reflex to Microscopic and Culture    Specimen: Urine   Result Value Ref Range    Color Urine Yellow     Appearance Urine Clear     Glucose Urine Negative NEG^Negative mg/dL    Bilirubin Urine Negative NEG^Negative    Ketones Urine Negative NEG^Negative mg/dL    Specific Gravity Urine 1.015 1.003 - 1.035    Blood Urine Trace (A) NEG^Negative    pH Urine 6.0 5.0 - 7.0 pH    Protein Albumin Urine 100 (A) NEG^Negative mg/dL    Urobilinogen Urine 0.2 0.2 - 1.0 EU/dL    Nitrite Urine Negative NEG^Negative    Leukocyte Esterase Urine Negative NEG^Negative    Source Urine    Urine Microscopic   Result Value Ref Range    WBC Urine 0 - 5 OTO5^0 - 5 /HPF    RBC Urine O - 2 OTO2^O - 2 /HPF           Assessment & Plan     Chronic obstructive airway disease with asthma (H)  Stable and using inhaler as needed    Acute on chronic combined systolic and diastolic congestive heart failure (H)  No signs or symptoms of failure, but blood pressure too high and not on blood pressure medications     Major depressive disorder, recurrent episode, mild (H)  Stable but in a lot of pain    Idiopathic neuropathy  Pain in feet, but worsening of pain in left foot with toe pain may be more related to PAD symptoms and patient needs further evaluation of this.     Morbid obesity (H)  Weight loss counseling done     Chronic hepatitis C without hepatic coma (H)  In remission post treatment    Coronary artery disease of native artery of native heart with stable angina pectoris (H)  No current symptoms on medication   - nitroGLYcerin (NITROSTAT) 0.4 MG sublingual tablet  Dispense: 25 tablet; Refill: 11    CKD (chronic kidney disease) stage  "3, GFR 30-59 ml/min (H)  Stable   - BASIC METABOLIC PANEL  - UA reflex to Microscopic and Culture  - CBC with platelets    Elevated blood sugar  No elevation of A1C and not taking diabetes medication at this time.   - HEMOGLOBIN A1C  - Urine Microscopic    Hypertension goal BP (blood pressure) < 140/90  Not well controlled on medications. Will add losartan. Stopped amlodipine previously and should not take this with simvastatin.   - losartan (COZAAR) 50 MG tablet  Dispense: 90 tablet; Refill: 3    Right flank pain  New symptom. No inflammation or hematuria  - CRP inflammation  - Erythrocyte sedimentation rate auto    Claudication of both lower extremities (H)  Recommend YVONNE and vascular surgery evaluation  - VASCULAR SURGERY REFERRAL    Chronic pain syndrome  Refill   - traMADol (ULTRAM) 50 MG tablet  Dispense: 60 tablet; Refill: 0    Cardiovascular disease  Follow up with cardiology recommended.   - aspirin (ASA) 325 MG EC tablet  Dispense: 90 tablet; Refill: 3    Mild recurrent major depression (H)  refill  - venlafaxine (EFFEXOR-XR) 37.5 MG 24 hr capsule  Dispense: 180 capsule; Refill: 1    Essential hypertension with goal blood pressure less than 140/90  Refill   - carvedilol (COREG) 12.5 MG tablet  Dispense: 180 tablet; Refill: 3  - furosemide (LASIX) 40 MG tablet  Dispense: 90 tablet; Refill: 3    Diabetic polyneuropathy associated with type 2 diabetes mellitus (H)  - no evidence of diabetes at this time. More likely has idiopathic neuropathy.   - gabapentin (NEURONTIN) 300 MG capsule  Dispense: 270 capsule; Refill: 1       BMI:   Estimated body mass index is 38.04 kg/m  as calculated from the following:    Height as of this encounter: 1.575 m (5' 2\").    Weight as of this encounter: 94.3 kg (208 lb).   Weight management plan: Discussed healthy diet and exercise guidelines        CONSULTATION/REFERRAL to vascular clinic  FUTURE LABS:       - Schedule fasting labs in 3 months  FUTURE APPOINTMENTS:       - " Follow-up visit in 3 months or sooner if any questions or concerns.   Work on weight loss  Regular exercise  See Patient Instructions    Return in about 3 months (around 11/27/2020) for medication follow up, BP Recheck, Weight check.    Tori Rizvi MD  [facility]

## 2020-08-28 ASSESSMENT — ANXIETY QUESTIONNAIRES: GAD7 TOTAL SCORE: 1

## 2020-08-31 ENCOUNTER — HOSPITAL ENCOUNTER (OUTPATIENT)
Dept: ULTRASOUND IMAGING | Facility: CLINIC | Age: 65
Discharge: HOME | End: 2020-08-31
Attending: INTERNAL MEDICINE | Admitting: INTERNAL MEDICINE
Payer: MEDICAID

## 2020-08-31 DIAGNOSIS — I73.9 CLAUDICATION OF BOTH LOWER EXTREMITIES (H): ICD-10-CM

## 2020-08-31 PROCEDURE — 93924 LWR XTR VASC STDY BILAT: CPT

## 2020-09-08 NOTE — TELEPHONE ENCOUNTER
Sheath #1: Sheath: inserted. Sheath inserted/placed in the left radial  artery. This is a pretty standard generic anti-depressant. Please call to see if there are other options on the formulary. Should be covered.  Tori Rizvi MD

## 2020-09-14 DIAGNOSIS — G89.4 CHRONIC PAIN SYNDROME: ICD-10-CM

## 2020-09-14 NOTE — TELEPHONE ENCOUNTER
Reason for Call:  Other prescription    Detailed comments: Pt would like to request the following medication for refill. Thank you.    oxyCODONE (ROXICODONE) 5 MG tablet    Pharmacy is attached,    Phone Number Patient can be reached at: Home number on file [phone] (home)    Best Time: Any    Can we leave a detailed message on this number? YES    Call taken on 9/14/2020 at 9:51 AM by Chani Alva

## 2020-09-16 RX ORDER — OXYCODONE HYDROCHLORIDE 5 MG/1
5 TABLET ORAL 2 TIMES DAILY PRN
Qty: 60 TABLET | Refills: 0 | Status: SHIPPED | OUTPATIENT
Start: 2020-09-16 | End: 2020-11-05

## 2020-09-16 NOTE — TELEPHONE ENCOUNTER
Controlled Substance Refill Request for Oxycodone  Problem List Complete:  Yes  Patient is followed by Tori Rizvi MD for ongoing prescription of pain medication.  All refills should only be approved by this provider, or covering partner.     Medication(s): oxycodone 5 mg twice a day.   Maximum quantity per month: 60  Clinic visit frequency required: Q 3 months      Controlled substance agreement:  Encounter-Level CSA:    There are no encounter-level csa.      Patient-Level CSA:    Controlled Substance Agreement - Opioid - Scan on 3/27/2019  2:34 PM (below)            Pain Clinic evaluation in the past: No     DIRE Total Score(s):  No flowsheet data found.     Last [facility] website verification: 08/04/20     Justyna Tafoya RN, [facility] Triage

## 2020-10-01 DIAGNOSIS — M1A.3790 CHRONIC GOUT DUE TO RENAL IMPAIRMENT INVOLVING ANKLE WITHOUT TOPHUS, UNSPECIFIED LATERALITY: ICD-10-CM

## 2020-10-01 DIAGNOSIS — G89.4 CHRONIC PAIN SYNDROME: ICD-10-CM

## 2020-10-03 DIAGNOSIS — G89.4 CHRONIC PAIN SYNDROME: ICD-10-CM

## 2020-10-05 RX ORDER — ALLOPURINOL 100 MG/1
TABLET ORAL
Qty: 180 TABLET | Refills: 3 | Status: SHIPPED | OUTPATIENT
Start: 2020-10-05 | End: 2021-09-16

## 2020-10-05 RX ORDER — TRAMADOL HYDROCHLORIDE 50 MG/1
TABLET ORAL
Qty: 60 TABLET | Refills: 0 | Status: SHIPPED | OUTPATIENT
Start: 2020-10-05 | End: 2020-11-20

## 2020-10-05 NOTE — TELEPHONE ENCOUNTER
Routing refill request to provider for review/approval because:  Drug not on the [facility] refill protocol   Alta Choi RN

## 2020-10-05 NOTE — TELEPHONE ENCOUNTER
Patient wants this to go to [facility]  [address]

## 2020-10-06 RX ORDER — TRAMADOL HYDROCHLORIDE 50 MG/1
TABLET ORAL
Qty: 60 TABLET | OUTPATIENT
Start: 2020-10-06

## 2020-10-06 NOTE — TELEPHONE ENCOUNTER
Medication was sent to [facility] in [place] on 10/5/220, see current med list for details.   Request from [facility] in [place], denied to pharmacy for reason above.    Samra Luna RN  [facility]

## 2020-10-28 DIAGNOSIS — K21.00 GASTROESOPHAGEAL REFLUX DISEASE WITH ESOPHAGITIS: ICD-10-CM

## 2020-10-28 DIAGNOSIS — E78.5 HYPERLIPIDEMIA, UNSPECIFIED HYPERLIPIDEMIA TYPE: ICD-10-CM

## 2020-10-28 DIAGNOSIS — J45.31 MILD PERSISTENT ASTHMA WITH ACUTE EXACERBATION: ICD-10-CM

## 2020-10-29 RX ORDER — MONTELUKAST SODIUM 10 MG/1
TABLET ORAL
Qty: 90 TABLET | Refills: 3 | Status: SHIPPED | OUTPATIENT
Start: 2020-10-29 | End: 2021-10-13

## 2020-10-29 RX ORDER — SIMVASTATIN 40 MG
40 TABLET ORAL AT BEDTIME
Qty: 90 TABLET | Refills: 0 | Status: SHIPPED | OUTPATIENT
Start: 2020-10-29 | End: 2021-06-20

## 2020-10-29 NOTE — TELEPHONE ENCOUNTER
Routing refill request to provider for review/approval because:  Labs not current:  LDL  ACT fails protocol.    Other filled per protocol.    Justyna Tafoya RN, [facility] Triage

## 2020-11-02 DIAGNOSIS — G89.4 CHRONIC PAIN SYNDROME: ICD-10-CM

## 2020-11-02 NOTE — TELEPHONE ENCOUNTER
Reason for Call:  Medication or medication refill:    Do you use a [facility]?  Name of the pharmacy and phone number for the current request:  [facility]/pharmacy #[id number] - [facility], [place] - [address].    Name of the medication requested: oxyCODONE (ROXICODONE) 5 MG tablet    Other request:     Can we leave a detailed message on this number? YES    Phone number patient can be reached at: Home number on file [phone] (home)    Best Time: Any    Call taken on 11/2/2020 at 10:36 AM by Anayeli Ardon

## 2020-11-05 RX ORDER — OXYCODONE HYDROCHLORIDE 5 MG/1
5 TABLET ORAL 2 TIMES DAILY PRN
Qty: 60 TABLET | Refills: 0 | Status: SHIPPED | OUTPATIENT
Start: 2020-11-05 | End: 2020-12-09

## 2020-11-05 NOTE — TELEPHONE ENCOUNTER
Controlled Substance Refill Request for Oxycodone  Problem List Complete:  Yes  Chronic pain syndrome  Problem Detail    Noted:  3/14/2019   Priority:  Medium   Overview Addendum 11/5/2020  3:17 PM by Ibeth Deluna RN   Patient is followed by Tori Rizvi MD for ongoing prescription of pain medication.  All refills should only be approved by this provider, or covering partner.     Medication(s): oxycodone 5 mg twice a day.   Maximum quantity per month: 60  Clinic visit frequency required: Q 3 months      Controlled substance agreement:  Encounter-Level CSA:    There are no encounter-level csa.      Patient-Level CSA:    Controlled Substance Agreement - Opioid - Scan on 3/27/2019  2:34 PM (below)            Pain Clinic evaluation in the past: No     DIRE Total Score(s):  No flowsheet data found.     Last [place] website verification: 11/05/20    [email or web address]    checked in past 3 months?  Yes 11/5/20   [place] ([place] reviewed:  reviewed- no concerns  [place]  [email or web address]  Last Written Prescription Date:  9/16/20  Last Fill Quantity: 60 tablets,  # refills: 0   Last office visit: 8/27/2020 with prescribing provider:  8/27/20   Future Office Visit:  None        Ibeth Deluna RN, BSN, PHN

## 2020-11-18 DIAGNOSIS — G89.4 CHRONIC PAIN SYNDROME: ICD-10-CM

## 2020-11-20 RX ORDER — TRAMADOL HYDROCHLORIDE 50 MG/1
TABLET ORAL
Qty: 60 TABLET | Refills: 1 | Status: SHIPPED | OUTPATIENT
Start: 2020-11-20 | End: 2021-01-28

## 2020-11-20 NOTE — TELEPHONE ENCOUNTER
Controlled Substance Refill Request for Tramadol   Problem List Complete:  Yes  Patient is followed by Tori Rizvi MD for ongoing prescription of pain medication.  All refills should only be approved by this provider, or covering partner.     Medication(s): oxycodone 5 mg twice a day.   Maximum quantity per month: 60  Clinic visit frequency required: Q 3 months      Controlled substance agreement:  Encounter-Level CSA:    There are no encounter-level csa.      Patient-Level CSA:    Controlled Substance Agreement - Opioid - Scan on 3/27/2019  2:34 PM (below)            Pain Clinic evaluation in the past: No     DIRE Total Score(s):  No flowsheet data found.     Last [facility] website verification: 11/05/20    [email or web address]        checked in past 3 months?  Yes 11/5/20     Last Written Prescription Date:  10/5/20  Last Fill Quantity: 60,  # refills: 0   Last office visit: 8/27/2020 with prescribing provider:  Belkys    Future Office Visit:  None    Routing refill request to provider for review/approval because:  Drug not on the [facility] refill protocol     Jana Mendoza RN  [facility]

## 2020-12-09 DIAGNOSIS — G89.4 CHRONIC PAIN SYNDROME: ICD-10-CM

## 2020-12-09 RX ORDER — OXYCODONE HYDROCHLORIDE 5 MG/1
5 TABLET ORAL 2 TIMES DAILY PRN
Qty: 60 TABLET | Refills: 0 | Status: SHIPPED | OUTPATIENT
Start: 2020-12-09 | End: 2021-01-12

## 2020-12-09 NOTE — TELEPHONE ENCOUNTER
Controlled Substance Refill Request for Oxycodone  Problem List Complete:  Yes  Chronic pain syndrome  Problem Detail    Noted:  3/14/2019   Priority:  Medium   Overview Addendum 11/5/2020  3:17 PM by Ibeth Deluna RN   Patient is followed by Tori Rizvi MD for ongoing prescription of pain medication.  All refills should only be approved by this provider, or covering partner.     Medication(s): oxycodone 5 mg twice a day.   Maximum quantity per month: 60  Clinic visit frequency required: Q 3 months      Controlled substance agreement:  Encounter-Level CSA:    There are no encounter-level csa.      Patient-Level CSA:    Controlled Substance Agreement - Opioid - Scan on 3/27/2019  2:34 PM (below)            Pain Clinic evaluation in the past: No     DIRE Total Score(s):  No flowsheet data found.     Last [place] website verification: 11/05/20    [email or web address]    checked in past 3 months?  Yes 11/5/2020  [place] ([place] reviewed:  not reviewed/not due - last done on 11/5/20  [place]:  [email or web address]  Last Written Prescription Date:  11/5/20  Last Fill Quantity: 60 tablets  # refills: 0   Last office visit: 8/27/2020 with prescribing provider:  8/27/20   Future Office Visit:  None        Ibeth Deluna RN, BSN, PHN

## 2020-12-09 NOTE — TELEPHONE ENCOUNTER
Reason for Call:  Medication or medication refill:    Do you use a [facility]?  Name of the pharmacy and phone number for the current request:  [facility]/pharmacy #[id number] - [place], [place] - [address].  [phone]    Name of the medication requested: oxyCODONE (ROXICODONE) 5 MG tablet    Other request:     Can we leave a detailed message on this number? YES    Phone number patient can be reached at: Home number on file [phone] (home)    Best Time:     Call taken on 12/9/2020 at 11:54 AM by Mehnaz Mills       no abrasions, no jaundice, no lesions, no pruritis, and no rashes.

## 2020-12-16 DIAGNOSIS — R35.0 URINARY FREQUENCY: ICD-10-CM

## 2020-12-16 DIAGNOSIS — R35.1 NOCTURIA: ICD-10-CM

## 2020-12-18 RX ORDER — TAMSULOSIN HYDROCHLORIDE 0.4 MG/1
CAPSULE ORAL
Qty: 90 CAPSULE | Refills: 2 | Status: SHIPPED | OUTPATIENT
Start: 2020-12-18 | End: 2021-06-20

## 2020-12-18 NOTE — TELEPHONE ENCOUNTER
"Routing refill request to provider for review/approval because:  BP is out of range.    Requested Prescriptions   Pending Prescriptions Disp Refills     tamsulosin (FLOMAX) 0.4 MG capsule [Pharmacy Med Name: TAMSULOSIN HCL 0.4 MG CAPSULE] 90 capsule 2     Sig: TAKE 1 CAPSULE BY MOUTH EVERY DAY       Alpha Blockers Failed - 12/18/2020  1:06 PM        Failed - Blood pressure under 140/90 in past 12 months     BP Readings from Last 3 Encounters:   08/27/20 (!) 152/104   03/04/20 126/86   10/24/19 138/88                 Passed - Recent (12 mo) or future (30 days) visit within the authorizing provider's specialty     Patient has had an office visit with the authorizing provider or a provider within the authorizing providers department within the previous 12 mos or has a future within next 30 days. See \"Patient Info\" tab in inbasket, or \"Choose Columns\" in Meds & Orders section of the refill encounter.              Passed - Patient does not have Tadalafil, Vardenafil, or Sildenafil on their medication list        Passed - Medication is active on med list        Passed - Patient is 18 years of age or older           Ibeth Deluna RN, BSN, PHN    "

## 2020-12-20 DIAGNOSIS — J45.30 MILD PERSISTENT ASTHMA WITHOUT COMPLICATION: ICD-10-CM

## 2020-12-22 RX ORDER — BUDESONIDE AND FORMOTEROL FUMARATE DIHYDRATE 160; 4.5 UG/1; UG/1
AEROSOL RESPIRATORY (INHALATION)
Qty: 30.6 INHALER | Refills: 1 | Status: SHIPPED | OUTPATIENT
Start: 2020-12-22 | End: 2021-06-14

## 2020-12-22 NOTE — TELEPHONE ENCOUNTER
Routing refill request to provider for review/approval because:  ACT is past due, needs to be updated  ACT Total Scores 6/16/2016 4/28/2017 5/17/2018   ACT TOTAL SCORE - - -   ASTHMA ER VISITS - - -   ASTHMA HOSPITALIZATIONS - - -   ACT TOTAL SCORE (Goal Greater than or Equal to 20) 10 11 13   In the past 12 months, how many times did you visit the emergency room for your asthma without being admitted to the hospital? 0 2 3   In the past 12 months, how many times were you hospitalized overnight because of your asthma? 0 0 1       Samra Luna RN  [facility]

## 2020-12-23 DIAGNOSIS — F33.0 MAJOR DEPRESSIVE DISORDER, RECURRENT EPISODE, MILD (H): ICD-10-CM

## 2020-12-23 RX ORDER — TRAZODONE HYDROCHLORIDE 100 MG/1
TABLET ORAL
Qty: 180 TABLET | Refills: 2 | Status: SHIPPED | OUTPATIENT
Start: 2020-12-23 | End: 2023-04-17

## 2021-01-11 ENCOUNTER — TELEPHONE (OUTPATIENT)
Dept: FAMILY MEDICINE | Facility: CLINIC | Age: 66
End: 2021-01-11

## 2021-01-11 DIAGNOSIS — G89.4 CHRONIC PAIN SYNDROME: ICD-10-CM

## 2021-01-11 NOTE — TELEPHONE ENCOUNTER
Reason for Call:  Medication or medication refill:    Do you use a [facility]?  Name of the pharmacy and phone number for the current request:  [facility]    Name of the medication requested: oxycodone    Other request: none    Can we leave a detailed message on this number? YES    Phone number patient can be reached at: Home number on file [phone] (home)    Best Time: asap    Call taken on 1/11/2021 at 12:54 PM by Angi Walsh

## 2021-01-12 DIAGNOSIS — L50.1 CHRONIC IDIOPATHIC URTICARIA: ICD-10-CM

## 2021-01-12 RX ORDER — OXYCODONE HYDROCHLORIDE 5 MG/1
5 TABLET ORAL 2 TIMES DAILY PRN
Qty: 60 TABLET | Refills: 0 | Status: SHIPPED | OUTPATIENT
Start: 2021-01-12 | End: 2021-02-19

## 2021-01-13 RX ORDER — CETIRIZINE HYDROCHLORIDE 10 MG/1
TABLET ORAL
Qty: 90 TABLET | Refills: 1 | Status: SHIPPED | OUTPATIENT
Start: 2021-01-13

## 2021-01-28 DIAGNOSIS — G89.4 CHRONIC PAIN SYNDROME: ICD-10-CM

## 2021-01-28 RX ORDER — TRAMADOL HYDROCHLORIDE 50 MG/1
TABLET ORAL
Qty: 60 TABLET | Refills: 1 | Status: SHIPPED | OUTPATIENT
Start: 2021-01-28 | End: 2021-05-04

## 2021-01-28 NOTE — TELEPHONE ENCOUNTER
Routing refill request to provider for review/approval because:  Drug not on the [facility] refill protocol     Sarah HALLN, RN

## 2021-01-30 DIAGNOSIS — E11.42 DIABETIC POLYNEUROPATHY ASSOCIATED WITH TYPE 2 DIABETES MELLITUS (H): ICD-10-CM

## 2021-02-01 RX ORDER — GABAPENTIN 300 MG/1
CAPSULE ORAL
Qty: 270 CAPSULE | Refills: 1 | Status: SHIPPED | OUTPATIENT
Start: 2021-02-01 | End: 2021-08-11

## 2021-02-17 ENCOUNTER — VIRTUAL VISIT (OUTPATIENT)
Dept: FAMILY MEDICINE | Facility: CLINIC | Age: 66
End: 2021-02-17
Payer: MEDICAID

## 2021-02-17 DIAGNOSIS — R73.03 PREDIABETES: ICD-10-CM

## 2021-02-17 DIAGNOSIS — I10 HYPERTENSION GOAL BP (BLOOD PRESSURE) < 140/90: ICD-10-CM

## 2021-02-17 DIAGNOSIS — I25.9 CHRONIC ISCHEMIC HEART DISEASE: Primary | ICD-10-CM

## 2021-02-17 PROCEDURE — 99442 PR PHYSICIAN TELEPHONE EVALUATION 11-20 MIN: CPT | Performed by: INTERNAL MEDICINE

## 2021-02-17 RX ORDER — ISOSORBIDE MONONITRATE 20 MG/1
TABLET ORAL
Qty: 180 TABLET | Refills: 3 | Status: SHIPPED | OUTPATIENT
Start: 2021-02-17 | End: 2021-08-04

## 2021-02-17 NOTE — PROGRESS NOTES
Santiago is a 66 year old who is being evaluated via a billable telephone visit.      What phone number would you like to be contacted at? [phone]  How would you like to obtain your AVS? Mail a copy    [facility] Internal Medicine Progress Note           Assessment and Plan:   1. Chronic ischemic heart disease  - isosorbide mononitrate (ISMO/MONOKET) 20 MG tablet; TAKE 1 TABLET BY MOUTH TWICE A DAY  Dispense: 180 tablet; Refill: 3    2. Hyperlipidemia LDL goal <100  - Lipid panel reflex to direct LDL Non-fasting; Standing  - ALT; Standing    3. Hypertension goal BP (blood pressure) < 140/90  - isosorbide mononitrate (ISMO/MONOKET) 20 MG tablet; TAKE 1 TABLET BY MOUTH TWICE A DAY  Dispense: 180 tablet; Refill: 3  - Basic metabolic panel  (Ca, Cl, CO2, Creat, Gluc, K, Na, BUN); Standing  - Albumin Random Urine Quantitative with Creat Ratio; Standing    4. Prediabetes  - Basic metabolic panel  (Ca, Cl, CO2, Creat, Gluc, K, Na, BUN); Standing  - Hemoglobin A1c; Standing           Interval History:     Coronary Artery Disease Follow-up      How often do you take nitroglycerin? Occasionally    Do you take an aspirin every day? Yes     Recent ER visits: none    Aspirin: Yes.    Statins: No. Lipid panel last 2018 shows LDL of 84    Recent evaluation: None. Cardiac MRI last 2013.       Hypertension Follow-up      Outpatient blood pressures monitoring: no    Low Salt Diet: no    Adverse effects: none    Compliance: good    Secondary causes: none    Chronic kidney disease: yes    Hyperthyroidism: no    Anxiety: no    Decongestants: no    Substance abuse: unknown    Diabetes: prediabetes    Ischemic heart disease: no    Stroke: no    Hyperlipidemia: no                 Significant Problems:   Patient Active Problem List   Diagnosis     Hypertension goal BP (blood pressure) < 140/90     Hyperlipidemia LDL goal <100     CHF (congestive heart failure) (H)     Mild recurrent major depression (H)     Gout     GERD  (gastroesophageal reflux disease)     Chronic rhinitis     CKD (chronic kidney disease) stage 2, GFR 60-89 ml/min     History of colonic polyps     Advanced directives, counseling/discussion     Chronic hepatitis C (H)     Obesity (BMI 30-39.9)     Coronary artery disease of native artery of native heart with stable angina pectoris (H)     Chronic obstructive airway disease with asthma (H)     Obesity (BMI 35.0-39.9) with comorbidity (H)     Chronic pain syndrome     CKD (chronic kidney disease) stage 3, GFR 30-59 ml/min     Internal hemorrhoids              Review of Systems:   CONSTITUTIONAL: NEGATIVE for fever, chills, change in weight  INTEGUMENTARY/SKIN: NEGATIVE for worrisome rashes, moles or lesions  EYES: NEGATIVE for vision changes or irritation  ENT/MOUTH: NEGATIVE for ear, mouth and throat problems  RESP: NEGATIVE for significant cough or SOB  CV: POSITIVE for stable angina and NEGATIVE for claudication, dyspnea on exertion, lower extremity edema, orthopnea and palpitations  GI: NEGATIVE for nausea, abdominal pain, heartburn, or change in bowel habits  : NEGATIVE for frequency, dysuria, or hematuria  MUSCULOSKELETAL:POSITIVE  for polyarthralgia, currently on chronic opiate therapy.  NEURO: NEGATIVE for weakness, dizziness or paresthesias  ENDOCRINE: NEGATIVE for temperature intolerance, skin/hair changes  HEME: NEGATIVE for bleeding problems  PSYCHIATRIC: NEGATIVE for changes in mood or affect            Medications:     Current Outpatient Medications   Medication Sig     isosorbide mononitrate (ISMO/MONOKET) 20 MG tablet TAKE 1 TABLET BY MOUTH TWICE A DAY     albuterol (2.5 MG/3ML) 0.083% neb solution INHALE 3 ML BY NEBULIZATION METHOD EVERY 6 HOURS AS NEEDED FOR SHORTNESS OF BREATH     allopurinol (ZYLOPRIM) 100 MG tablet TAKE 2 TABLETS BY MOUTH EVERY DAY     aspirin (ASA) 325 MG EC tablet Take 1 tablet (325 mg) by mouth daily     carvedilol (COREG) 12.5 MG tablet TAKE 1 TABLET BY MOUTH TWICE DAILY  WITH MEALS     cetirizine (ZYRTEC) 10 MG tablet TAKE 1 TABLET BY MOUTH DAILY AT BEDTIME     furosemide (LASIX) 40 MG tablet Take 1 tablet (40 mg) by mouth daily     gabapentin (NEURONTIN) 300 MG capsule TAKE 1 CAPSULE BY MOUTH THREE TIMES A DAY     hydrocortisone (ANUSOL-HC) 25 MG suppository Place 1 suppository (25 mg) rectally 2 times daily     hydrOXYzine (ATARAX) 25 MG tablet Take 1-2 tablets (25-50 mg) by mouth every 8 hours as needed for itching     losartan (COZAAR) 50 MG tablet Take 1 tablet (50 mg) by mouth daily     montelukast (SINGULAIR) 10 MG tablet TAKE 1 TABLET BY MOUTH EVERYDAY AT BEDTIME     nitroGLYcerin (NITROSTAT) 0.4 MG sublingual tablet Place 1 tablet (0.4 mg) under the tongue every 5 minutes as needed for chest pain 0.4 mg by Sublingual route every 5 (five) minutes as needed.     omeprazole (PRILOSEC) 20 MG DR capsule TAKE 1 CAPSULE BY MOUTH EVERY DAY     oxyCODONE (ROXICODONE) 5 MG tablet Take 1 tablet (5 mg) by mouth 2 times daily as needed for moderate to severe pain (up to 2 a day)     pantoprazole (PROTONIX) 40 MG EC tablet TAKE 1 TABLET BY MOUTH EVERY DAY     simvastatin (ZOCOR) 40 MG tablet TAKE 1 TABLET (40 MG) BY MOUTH AT BEDTIME     SYMBICORT 160-4.5 MCG/ACT Inhaler TAKE 2 PUFFS BY MOUTH TWICE A DAY     tamsulosin (FLOMAX) 0.4 MG capsule TAKE 1 CAPSULE BY MOUTH EVERY DAY     traMADol (ULTRAM) 50 MG tablet TAKE 1 TO 2 TABLETS BY MOUTH TWICE A DAY. MAX OF 2 TABS PER DAY     traZODone (DESYREL) 100 MG tablet TAKE 2 TABLETS BY MOUTH AT BEDTIME     venlafaxine (EFFEXOR-XR) 37.5 MG 24 hr capsule Take 2 capsules (75 mg) by mouth daily     No current facility-administered medications for this visit.              Physical Exam:   Vital signs not taken due to nature of visit.    Remainder of exam not performed due to nature of visit.          Data:     Ref Range & Units 1yr ago      Cholesterol <200 mg/dL 148     Triglycerides <150 mg/dL 78    Comment: Fasting specimen    HDL Cholesterol >39  mg/dL 48     LDL Cholesterol Calculated <100 mg/dL 84    Comment: Desirable:       <100 mg/dl    Non HDL Cholesterol <130 mg/dL 100    Resulting Agency  [facility]         Specimen Collected: 10/24/19 10:01 AM Last Resulted: 10/24/19  3:48 PM         Ref Range & Units 1yr ago     Hemoglobin A1C 0 - 5.6 % 5.3    Comment: Normal <5.7% Prediabetes 5.7-6.4%  Diabetes 6.5% or higher - adopted from ADA   consensus guidelines.    Resulting Agency  [facility]         Specimen Collected: 10/24/19 10:01 AM Last Resulted: 10/24/19 10:18 AM      Study Result       I.  Reasons for Referral:  58-year-old male referred to cardiac MRI to assess for CAD and  viability     II. Comparison:  None     III. Impressions:  1.  Normal left-ventricular size and moderately depressed global  function (LVEF 41%). Severe hypokinesis noted in the mid  anterior/anteroseptal/inferoseptal, apical anterior/septal, and true  apical segments.  2.  Normal right-ventricular size with normal right-ventricular  systolic function (RVEF 61%).   3.  There is no evidence of myocardial edema on T2-weighted imaging.   4.  There is evidence of mild microvascular obstruction noted in the  basal/mid anterior, anteroseptal, and inferoseptal segments.  5.  There is no evidence of increased iron deposition within the  myocardium (T2*myocardium = 32 msec; (myocardial T2* < 20 msec, in the  setting of LV dysfunction is suggestive of iron-overload of the  myocardium as the cause of the dysfunction)  6.  There is a mild area of hypoperfusion noted in the basal  anteroseptal segment on rest imaging. There is evidence of  subendocardial late gadolinium enhancement in the following myocardial  segments:  Segment                     Transmural Extent  Basal Anterior             50- 75%  Basal Anteroseptum    50-75%   Mid Anterior                 > 75%   Mid Anteroseptum       > 75%   Apical Anterior            > 75%   Apical Septum            > 75%   Combined [place]                          > 75%                                                                                                              Collectively, these findings are consistent with a previous myocardial  infarction in the LAD distribution. Taking into account the extent of  late gadolinium enhancement as well as the presence of microvascular  obstruction, there is a low probability of functional recovery in the  LAD distribution. The remainder of the segments are considered to have  preserved viability by MRI criteria.     Disposition: Follow-up in 3 - 6 months with Dr. Rizvi.      Robb Magana MD  Internal Medicine  [facility] Team    Phone call duration: 11 minutes  Start: 9:01 AM  End: 9:12 AM

## 2021-02-19 DIAGNOSIS — G89.4 CHRONIC PAIN SYNDROME: ICD-10-CM

## 2021-02-19 RX ORDER — OXYCODONE HYDROCHLORIDE 5 MG/1
5 TABLET ORAL 2 TIMES DAILY PRN
Qty: 60 TABLET | Refills: 0 | Status: SHIPPED | OUTPATIENT
Start: 2021-02-19 | End: 2021-04-05

## 2021-02-19 NOTE — TELEPHONE ENCOUNTER
Reason for call: Patient calling for a refill on the Oxycodone to be sent to [facility]    Can we leave a detailed message: Yes     Patient  can be reached at: [phone]    Call taken at 11:35 am  on 2/19/2021    Sandra Moss MA  [facility]  2nd Floor  Primary Care

## 2021-02-19 NOTE — TELEPHONE ENCOUNTER
Controlled Substance Refill Request for Oxycodone  Problem List Complete:  Yes  Chronic pain syndrome  Problem Detail    Noted:  3/14/2019   Priority:  Medium   Overview Addendum 2/19/2021 11:55 AM by Ibeth Deluna RN   Patient is followed by Tori Rizvi MD for ongoing prescription of pain medication.  All refills should only be approved by this provider, or covering partner.     Medication(s): oxycodone 5 mg twice a day.   Maximum quantity per month: 60  Clinic visit frequency required: Q 3 months      Controlled substance agreement:  Encounter-Level CSA:    There are no encounter-level csa.      Patient-Level CSA:    Controlled Substance Agreement - Opioid - Scan on 3/27/2019  2:34 PM (below)            Pain Clinic evaluation in the past: No     DIRE Total Score(s):  No flowsheet data found.     Last [place] website verification: 02/19/21     [email or web address]    checked in past 3 months?  Yes 2/19/21   RX [place] ([place]) reviewed:  reviewed- no concerns  [place]:  [email or web address]  Last Written Prescription Date:  1/12/21  Last Fill Quantity: 60tablets  # refills: 0   Last office visit: 8/27/2020 with prescribing provider:  2/17/21 Virtual visit   Future Office Visit:  None        Ibeth Deluna RN, BSN, PHN

## 2021-03-07 DIAGNOSIS — F33.0 MILD RECURRENT MAJOR DEPRESSION (H): ICD-10-CM

## 2021-03-09 RX ORDER — VENLAFAXINE HYDROCHLORIDE 37.5 MG/1
75 CAPSULE, EXTENDED RELEASE ORAL DAILY
Qty: 180 CAPSULE | Refills: 1 | Status: SHIPPED | OUTPATIENT
Start: 2021-03-09 | End: 2021-03-17

## 2021-03-09 NOTE — TELEPHONE ENCOUNTER
Routing refill request to provider for review/approval because:  BP Readings from Last 6 Encounters:   08/27/20 (!) 152/104   03/04/20 126/86   10/24/19 138/88   07/09/19 125/81   05/07/19 126/88   02/19/19 139/84     PHQ 7/9/2019 3/4/2020 8/27/2020   PHQ-9 Total Score 6 5 8   Q9: Thoughts of better off dead/self-harm past 2 weeks Not at all Not at all Not at all     Dana Esparza RN

## 2021-03-12 ENCOUNTER — IMMUNIZATION (OUTPATIENT)
Dept: NURSING | Facility: CLINIC | Age: 66
End: 2021-03-12
Payer: MEDICAID

## 2021-03-12 PROCEDURE — 91300 PR COVID VAC PFIZER DIL RECON 30 MCG/0.3 ML IM: CPT

## 2021-03-12 PROCEDURE — 0001A PR COVID VAC PFIZER DIL RECON 30 MCG/0.3 ML IM: CPT

## 2021-03-17 DIAGNOSIS — F33.0 MILD RECURRENT MAJOR DEPRESSION (H): ICD-10-CM

## 2021-03-17 RX ORDER — VENLAFAXINE HYDROCHLORIDE 75 MG/1
75 CAPSULE, EXTENDED RELEASE ORAL DAILY
Qty: 90 CAPSULE | Refills: 1 | Status: SHIPPED | OUTPATIENT
Start: 2021-03-17 | End: 2021-09-16

## 2021-04-02 ENCOUNTER — IMMUNIZATION (OUTPATIENT)
Dept: NURSING | Facility: CLINIC | Age: 66
End: 2021-04-02
Attending: INTERNAL MEDICINE
Payer: MEDICAID

## 2021-04-02 PROCEDURE — 91300 PR COVID VAC PFIZER DIL RECON 30 MCG/0.3 ML IM: CPT

## 2021-04-02 PROCEDURE — 0002A PR COVID VAC PFIZER DIL RECON 30 MCG/0.3 ML IM: CPT

## 2021-04-03 ENCOUNTER — HEALTH MAINTENANCE LETTER (OUTPATIENT)
Age: 66
End: 2021-04-03

## 2021-04-05 DIAGNOSIS — G89.4 CHRONIC PAIN SYNDROME: ICD-10-CM

## 2021-04-05 RX ORDER — OXYCODONE HYDROCHLORIDE 5 MG/1
5 TABLET ORAL 2 TIMES DAILY PRN
Qty: 60 TABLET | Refills: 0 | Status: SHIPPED | OUTPATIENT
Start: 2021-04-05 | End: 2021-06-07

## 2021-04-05 NOTE — TELEPHONE ENCOUNTER
Routing refill request to provider for review/approval because:  Drug not on the [facility] refill protocol     Daphnie Mcfadden RN

## 2021-05-04 DIAGNOSIS — G89.4 CHRONIC PAIN SYNDROME: ICD-10-CM

## 2021-05-04 RX ORDER — TRAMADOL HYDROCHLORIDE 50 MG/1
TABLET ORAL
Qty: 60 TABLET | Refills: 1 | Status: SHIPPED | OUTPATIENT
Start: 2021-05-04 | End: 2021-08-11

## 2021-06-07 DIAGNOSIS — G89.4 CHRONIC PAIN SYNDROME: ICD-10-CM

## 2021-06-07 RX ORDER — OXYCODONE HYDROCHLORIDE 5 MG/1
5 TABLET ORAL 2 TIMES DAILY PRN
Qty: 60 TABLET | Refills: 0 | Status: SHIPPED | OUTPATIENT
Start: 2021-06-07 | End: 2021-07-30

## 2021-06-07 NOTE — TELEPHONE ENCOUNTER
Routing refill request to provider for review/approval because:  Drug not on the [facility] refill protocol   Jenniffer Thomas BSN, RN

## 2021-06-12 DIAGNOSIS — J45.30 MILD PERSISTENT ASTHMA WITHOUT COMPLICATION: ICD-10-CM

## 2021-06-14 RX ORDER — BUDESONIDE AND FORMOTEROL FUMARATE DIHYDRATE 160; 4.5 UG/1; UG/1
AEROSOL RESPIRATORY (INHALATION)
Qty: 30.6 G | Refills: 1 | Status: SHIPPED | OUTPATIENT
Start: 2021-06-14 | End: 2021-12-13

## 2021-06-14 NOTE — TELEPHONE ENCOUNTER
"Routing refill request to provider for review/approval because:  Patient's Asthma control assessment score has not been updated since 2018. Please see failed protocol below.  Requested Prescriptions   Pending Prescriptions Disp Refills     SYMBICORT 160-4.5 MCG/ACT Inhaler [Pharmacy Med Name: SYMBICORT 160-4.5 MCG INHALER]  1     Sig: TAKE 2 PUFFS BY MOUTH TWICE A DAY       Inhaled Steroids Protocol Failed - 6/12/2021 12:37 PM        Failed - Asthma control assessment score within normal limits in last 6 months     Please review ACT score.           Passed - Patient is age 12 or older        Passed - Medication is active on med list        Passed - Recent (6 mo) or future (30 days) visit within the authorizing provider's specialty     Patient had office visit in the last 6 months or has a visit in the next 30 days with authorizing provider or within the authorizing provider's specialty.  See \"Patient Info\" tab in inbasket, or \"Choose Columns\" in Meds & Orders section of the refill encounter.           Long-Acting Beta Agonist Inhalers Protocol  Failed - 6/12/2021 12:37 PM        Failed - Asthma control assessment score within normal limits in last 6 months     Please review ACT score.           Passed - Patient is age 12 or older        Passed - Medication is active on med list        Passed - Recent (6 mo) or future (30 days) visit within the authorizing provider's specialty     Patient had office visit in the last 6 months or has a visit in the next 30 days with authorizing provider or within the authorizing provider's specialty.  See \"Patient Info\" tab in inbasket, or \"Choose Columns\" in Meds & Orders section of the refill encounter.                   Evita Rawls RN  [facility]          "

## 2021-06-18 ENCOUNTER — OFFICE VISIT (OUTPATIENT)
Dept: FAMILY MEDICINE | Facility: CLINIC | Age: 66
End: 2021-06-18
Payer: COMMERCIAL

## 2021-06-18 DIAGNOSIS — E78.5 HYPERLIPIDEMIA LDL GOAL <100: ICD-10-CM

## 2021-06-18 DIAGNOSIS — I10 HYPERTENSION GOAL BP (BLOOD PRESSURE) < 140/90: ICD-10-CM

## 2021-06-18 DIAGNOSIS — E78.5 HYPERLIPIDEMIA, UNSPECIFIED HYPERLIPIDEMIA TYPE: ICD-10-CM

## 2021-06-18 DIAGNOSIS — R73.9 ELEVATED BLOOD SUGAR: ICD-10-CM

## 2021-06-18 DIAGNOSIS — I73.9 CLAUDICATION OF BOTH LOWER EXTREMITIES (H): ICD-10-CM

## 2021-06-18 DIAGNOSIS — B18.2 CHRONIC HEPATITIS C WITHOUT HEPATIC COMA (H): ICD-10-CM

## 2021-06-18 DIAGNOSIS — R35.0 URINARY FREQUENCY: ICD-10-CM

## 2021-06-18 DIAGNOSIS — F33.0 MILD RECURRENT MAJOR DEPRESSION (H): ICD-10-CM

## 2021-06-18 DIAGNOSIS — E66.01 MORBID OBESITY (H): ICD-10-CM

## 2021-06-18 DIAGNOSIS — E66.9 OBESITY (BMI 30-39.9): ICD-10-CM

## 2021-06-18 DIAGNOSIS — I10 ESSENTIAL HYPERTENSION WITH GOAL BLOOD PRESSURE LESS THAN 140/90: ICD-10-CM

## 2021-06-18 DIAGNOSIS — Z13.220 SCREENING FOR HYPERLIPIDEMIA: ICD-10-CM

## 2021-06-18 DIAGNOSIS — N18.31 STAGE 3A CHRONIC KIDNEY DISEASE (H): ICD-10-CM

## 2021-06-18 DIAGNOSIS — G89.4 CHRONIC PAIN SYNDROME: ICD-10-CM

## 2021-06-18 DIAGNOSIS — I25.118 CORONARY ARTERY DISEASE OF NATIVE ARTERY OF NATIVE HEART WITH STABLE ANGINA PECTORIS (H): ICD-10-CM

## 2021-06-18 DIAGNOSIS — R35.1 NOCTURIA: ICD-10-CM

## 2021-06-18 DIAGNOSIS — J44.89 CHRONIC OBSTRUCTIVE AIRWAY DISEASE WITH ASTHMA (H): ICD-10-CM

## 2021-06-18 DIAGNOSIS — I50.43 ACUTE ON CHRONIC COMBINED SYSTOLIC AND DIASTOLIC CONGESTIVE HEART FAILURE (H): ICD-10-CM

## 2021-06-18 DIAGNOSIS — Z00.00 ENCOUNTER FOR MEDICARE ANNUAL WELLNESS EXAM: Primary | ICD-10-CM

## 2021-06-18 LAB
ALT SERPL W P-5'-P-CCNC: 30 U/L (ref 0–70)
ANION GAP SERPL CALCULATED.3IONS-SCNC: 4 MMOL/L (ref 3–14)
BUN SERPL-MCNC: 24 MG/DL (ref 7–30)
CALCIUM SERPL-MCNC: 9 MG/DL (ref 8.5–10.1)
CHLORIDE SERPL-SCNC: 102 MMOL/L (ref 94–109)
CHOLEST SERPL-MCNC: 231 MG/DL
CO2 SERPL-SCNC: 33 MMOL/L (ref 20–32)
CREAT SERPL-MCNC: 1.38 MG/DL (ref 0.66–1.25)
CREAT UR-MCNC: 130 MG/DL
ERYTHROCYTE [DISTWIDTH] IN BLOOD BY AUTOMATED COUNT: 12.8 % (ref 10–15)
GFR SERPL CREATININE-BSD FRML MDRD: 53 ML/MIN/{1.73_M2}
GLUCOSE SERPL-MCNC: 94 MG/DL (ref 70–99)
HBA1C MFR BLD: 5.8 % (ref 0–5.6)
HCT VFR BLD AUTO: 46.3 % (ref 40–53)
HDLC SERPL-MCNC: 53 MG/DL
HGB BLD-MCNC: 15.1 G/DL (ref 13.3–17.7)
LDLC SERPL CALC-MCNC: 139 MG/DL
MCH RBC QN AUTO: 30.6 PG (ref 26.5–33)
MCHC RBC AUTO-ENTMCNC: 32.6 G/DL (ref 31.5–36.5)
MCV RBC AUTO: 94 FL (ref 78–100)
MICROALBUMIN UR-MCNC: 736 MG/L
MICROALBUMIN/CREAT UR: 566.15 MG/G CR (ref 0–17)
NONHDLC SERPL-MCNC: 178 MG/DL
NT-PROBNP SERPL-MCNC: 544 PG/ML (ref 0–125)
PLATELET # BLD AUTO: 239 10E9/L (ref 150–450)
POTASSIUM SERPL-SCNC: 4.3 MMOL/L (ref 3.4–5.3)
RBC # BLD AUTO: 4.94 10E12/L (ref 4.4–5.9)
SODIUM SERPL-SCNC: 139 MMOL/L (ref 133–144)
TRIGL SERPL-MCNC: 196 MG/DL
WBC # BLD AUTO: 9.9 10E9/L (ref 4–11)

## 2021-06-18 PROCEDURE — 80061 LIPID PANEL: CPT | Performed by: FAMILY MEDICINE

## 2021-06-18 PROCEDURE — 90732 PPSV23 VACC 2 YRS+ SUBQ/IM: CPT | Performed by: FAMILY MEDICINE

## 2021-06-18 PROCEDURE — 83036 HEMOGLOBIN GLYCOSYLATED A1C: CPT | Performed by: FAMILY MEDICINE

## 2021-06-18 PROCEDURE — 85027 COMPLETE CBC AUTOMATED: CPT | Performed by: FAMILY MEDICINE

## 2021-06-18 PROCEDURE — 82043 UR ALBUMIN QUANTITATIVE: CPT | Performed by: FAMILY MEDICINE

## 2021-06-18 PROCEDURE — 90471 IMMUNIZATION ADMIN: CPT | Performed by: FAMILY MEDICINE

## 2021-06-18 PROCEDURE — 84460 ALANINE AMINO (ALT) (SGPT): CPT | Performed by: FAMILY MEDICINE

## 2021-06-18 PROCEDURE — 83880 ASSAY OF NATRIURETIC PEPTIDE: CPT | Performed by: FAMILY MEDICINE

## 2021-06-18 PROCEDURE — 36415 COLL VENOUS BLD VENIPUNCTURE: CPT | Performed by: FAMILY MEDICINE

## 2021-06-18 PROCEDURE — 80048 BASIC METABOLIC PNL TOTAL CA: CPT | Performed by: FAMILY MEDICINE

## 2021-06-18 ASSESSMENT — ENCOUNTER SYMPTOMS
EYE PAIN: 1
WEAKNESS: 0
DYSURIA: 0
SHORTNESS OF BREATH: 1
PARESTHESIAS: 0
CHILLS: 0
PALPITATIONS: 0
HEMATURIA: 0
HEARTBURN: 0
CONSTIPATION: 0
ARTHRALGIAS: 1
COUGH: 1
MYALGIAS: 1
NAUSEA: 0
NERVOUS/ANXIOUS: 1
HEMATOCHEZIA: 1
SORE THROAT: 0
FREQUENCY: 1
HEADACHES: 1
DIZZINESS: 1
JOINT SWELLING: 1
ABDOMINAL PAIN: 0
FEVER: 0
DIARRHEA: 0

## 2021-06-18 ASSESSMENT — MIFFLIN-ST. JEOR: SCORE: 1651.28

## 2021-06-18 ASSESSMENT — ACTIVITIES OF DAILY LIVING (ADL): CURRENT_FUNCTION: NO ASSISTANCE NEEDED

## 2021-06-18 ASSESSMENT — PAIN SCALES - GENERAL: PAINLEVEL: NO PAIN (0)

## 2021-06-18 NOTE — PATIENT INSTRUCTIONS
Patient Education   Personalized Prevention Plan  You are due for the preventive services outlined below.  Your care team is available to assist you in scheduling these services.  If you have already completed any of these items, please share that information with your care team to update in your medical record.  Health Maintenance Due   Topic Date Due     URINE DRUG SCREEN  Never done     ANNUAL REVIEW OF HM ORDERS  Never done     Zoster (Shingles) Vaccine (1 of 2) Never done     Eye Exam  12/09/2016     Heart Failure Action Plan  02/12/2017     Discuss Advance Care Planning  03/12/2019     Diabetic Foot Exam  05/17/2019     Diptheria Tetanus Pertussis (DTAP/TDAP/TD) Vaccine (2 - Td) 10/09/2019     Liver Monitoring Lab  10/24/2020     Cholesterol Lab  10/24/2020     Kidney Microalbumin Urine Test  10/24/2020     Annual Wellness Visit  Never done     FALL RISK ASSESSMENT  Never done     Pneumococcal Vaccine (2 of 2) 02/02/2021     AORTIC ANEURYSM SCREENING (SYSTEM ASSIGNED)  Never done     A1C Lab  02/27/2021     Basic Metabolic Panel  02/27/2021     Depression Assessment  02/27/2021     Pneumococcal Vaccine (2 of 2) 02/02/2021

## 2021-06-18 NOTE — NURSING NOTE
Prior to immunization administration, verified patients identity using patient s name and date of birth. Please see Immunization Activity for additional information.     Screening Questionnaire for Adult Immunization    Are you sick today?   No   Do you have allergies to medications, food, a vaccine component or latex?   No   Have you ever had a serious reaction after receiving a vaccination?   No   Do you have a long-term health problem with heart, lung, kidney, or metabolic disease (e.g., diabetes), asthma, a blood disorder, no spleen, complement component deficiency, a cochlear implant, or a spinal fluid leak?  Are you on long-term aspirin therapy?   No   Do you have cancer, leukemia, HIV/AIDS, or any other immune system problem?   Yes   Do you have a parent, brother, or sister with an immune system problem?   No   In the past 3 months, have you taken medications that affect  your immune system, such as prednisone, other steroids, or anticancer drugs; drugs for the treatment of rheumatoid arthritis, Crohn s disease, or psoriasis; or have you had radiation treatments?   No   Have you had a seizure, or a brain or other nervous system problem?   No   During the past year, have you received a transfusion of blood or blood    products, or been given immune (gamma) globulin or antiviral drug?   No   For women: Are you pregnant or is there a chance you could become       pregnant during the next month?   No   Have you received any vaccinations in the past 4 weeks?   No     Immunization questionnaire was positive for at least one answer.  Notified [facility].        Per orders of Dr. Schulz, injection of Pneumovax given by Romelia Rangel MA. Patient instructed to remain in clinic for 15 minutes afterwards, and to report any adverse reaction to me immediately.       Screening performed by Romelia Rangel MA on 6/18/2021 at 3:19 PM.

## 2021-06-18 NOTE — PROGRESS NOTES
"SUBJECTIVE:   Santiago Hylton is a 66 year old male who presents for Preventive Visit.      Patient has been advised of split billing requirements and indicates understanding: No   Are you in the first 12 months of your Medicare coverage?  Yes,  Visual Acuity:  Right Eye: 20/32   Left Eye: 20/32  Both Eyes: 20/25    Healthy Habits:     In general, how would you rate your overall health?  Fair    Frequency of exercise:  2-3 days/week    Duration of exercise:  Less than 15 minutes    Do you usually eat at least 4 servings of fruit and vegetables a day, include whole grains    & fiber and avoid regularly eating high fat or \"junk\" foods?  Yes    Taking medications regularly:  Yes    Barriers to taking medications:  Not applicable    Medication side effects:  Muscle aches and Lightheadedness    Ability to successfully perform activities of daily living:  No assistance needed    Home Safety:  No safety concerns identified    Hearing Impairment:  No hearing concerns    In the past 6 months, have you been bothered by leaking of urine?  No    In general, how would you rate your overall mental or emotional health?  Good      PHQ-2 Total Score: 2    Additional concerns today:  No    Do you feel safe in your environment? Yes    Have you ever done Advance Care Planning? (For example, a Health Directive, POLST, or a discussion with a medical provider or your loved ones about your wishes): Yes, advance care planning is on file.       Fall risk  Fallen 2 or more times in the past year?: Yes  Any fall with injury in the past year?: No    Cognitive Screening   1) Repeat 3 items (Leader, Season, Table)    2) Clock draw: NORMAL  3) 3 item recall: Recalls 3 objects  Results: NORMAL clock, 1-2 items recalled: COGNITIVE IMPAIRMENT LESS LIKELY    Mini-CogTM Copyright SATYA Wade. Licensed by the author for use in [facility]; reprinted with permission (sarahi@.[email or web address]). All rights reserved.      Do you have sleep apnea, excessive snoring " or daytime drowsiness?: no    Reviewed and updated as needed this visit by clinical staff  Tobacco  Allergies  Meds  Problems  Med Hx  Surg Hx  Fam Hx  Soc Hx          Reviewed and updated as needed this visit by Provider  Tobacco    Problems            Social History     Tobacco Use     Smoking status: Former Smoker     Packs/day: 0.25     Years: 15.00     Pack years: 3.75     Types: Cigarettes     Quit date: 2016     Years since quittin.7     Smokeless tobacco: Never Used     Tobacco comment: 3-4 a day   Substance Use Topics     Alcohol use: Yes     Comment: weekend beer         Alcohol Use 2021   Prescreen: >3 drinks/day or >7 drinks/week? No   Prescreen: >3 drinks/day or >7 drinks/week? -           Hyperlipidemia Follow-Up      Are you regularly taking any medication or supplement to lower your cholesterol?   Yes- simvastatin 40    Are you having muscle aches or other side effects that you think could be caused by your cholesterol lowering medication?  No    Hypertension Follow-up      Do you check your blood pressure regularly outside of the clinic? Yes     Are you following a low salt diet? Yes    Are your blood pressures ever more than 140 on the top number (systolic) OR more   than 90 on the bottom number (diastolic), for example 140/90? No    Vascular Disease Follow-up      How often do you take nitroglycerin? Never    Do you take an aspirin every day? Yes    Heart Failure Follow-up    Are you experiencing any shortness of breath? No    Are you experiencing any swelling in your legs or feet?  Stable    Are you using more pillows than usual? No    Do you cough at night?  No    Do you check your weight daily?  No    Have you had a weight change recently?  No    Are you having any of the following side effects from your medications? (Select all that apply)  Fatigue    Since your last visit, how many times have you gone to the cardiologist, urgent care, emergency room, or hospital because  of your heart failure?   None    Depression Followup    How are you doing with your depression since your last visit? No change    Are you having other symptoms that might be associated with depression? No    Have you had a significant life event?  Health Concerns     Are you feeling anxious or having panic attacks?   No    Do you have any concerns with your use of alcohol or other drugs? No    Social History     Tobacco Use     Smoking status: Former Smoker     Packs/day: 0.25     Years: 15.00     Pack years: 3.75     Types: Cigarettes     Quit date: 2016     Years since quittin.7     Smokeless tobacco: Never Used     Tobacco comment: 3-4 a day   Substance Use Topics     Alcohol use: Yes     Comment: weekend beer     Drug use: No     PHQ 2019 3/4/2020 2020   PHQ-9 Total Score 6 5 8   Q9: Thoughts of better off dead/self-harm past 2 weeks Not at all Not at all Not at all     GENARO-7 SCORE 2019 3/4/2020 2020   Total Score 7 2 1         Suicide Assessment Five-step Evaluation and Treatment (SAFE-T)    COPD Follow-Up    Overall, how are your COPD symptoms since your last clinic visit?  No change    How much fatigue or shortness of breath do you have when you are walking?  Same as usual    How much shortness of breath do you have when you are resting?  Same as usual    How often do you cough? Sometimes    Have you noticed any change in your sputum/phlegm?  No    Have you experienced a recent fever? No    Please describe how far you can walk without stopping to rest:  The length of 3-5 rooms    How many flights of stairs are you able to walk up without stopping?  None    Have you had any Emergency Room Visits, Urgent Care Visits, or Hospital Admissions because of your COPD since your last office visit?  No    History   Smoking Status     Former Smoker     Packs/day: 0.25     Years: 15.00     Types: Cigarettes     Quit date: 2016   Smokeless Tobacco     Never Used     Comment: 3-4 a day      Lab Results   Component Value Date    FEV1 50% pred 05/17/2018    KHR1RBL 86% pred 05/17/2018       Chronic Kidney Disease Follow-up      Do you take any over the counter pain medicine?: No    Chronic Pain Follow-Up    Where in your body do you have pain? Low back, knees  How has your pain affected your ability to work? Not applicable  Which of these pain treatments have you tried since your last clinic visit? Rest  How well are you sleeping? Fair  How has your mood been since your last visit? About the same  Have you had a significant life event? Health Concerns  Other aggravating factors: sedentary lifestyle  Taking medication as directed? Yes    PHQ-9 SCORE 7/9/2019 3/4/2020 8/27/2020   PHQ-9 Total Score - - -   PHQ-9 Total Score 6 5 8     GENARO-7 SCORE 1/22/2019 3/4/2020 8/27/2020   Total Score 7 2 1     No flowsheet data found.  Encounter-Level CSA:    There are no encounter-level csa.     Patient-Level CSA:    Controlled Substance Agreement - Opioid - Scan on 9/19/2020  8:30 PM  Controlled Substance Agreement - Opioid - Scan on 3/27/2019  2:34 PM         Current providers sharing in care for this patient include:   Patient Care Team:  Tori Rizvi MD as PCP - General (Family Practice)  Rosemary Prabhakar MD as MD (Cardiology)  Tori Rizvi MD as Assigned PCP    The following health maintenance items are reviewed in Epic and correct as of today:  Health Maintenance Due   Topic Date Due     URINE DRUG SCREEN  Never done     ZOSTER IMMUNIZATION (1 of 2) Never done     EYE EXAM  12/09/2016     HF ACTION PLAN  02/12/2017     ADVANCE CARE PLANNING  03/12/2019     DIABETIC FOOT EXAM  05/17/2019     DTAP/TDAP/TD IMMUNIZATION (2 - Td) 10/09/2019     AORTIC ANEURYSM SCREENING (SYSTEM ASSIGNED)  Never done     PHQ-9  02/27/2021     Lab work is in process  Labs reviewed in EPIC  BP Readings from Last 3 Encounters:   06/18/21 137/87   08/27/20 (!) 152/104   03/04/20 126/86    Wt Readings from Last 3  Encounters:   21 98.7 kg (217 lb 9.6 oz)   20 94.3 kg (208 lb)   20 95.7 kg (211 lb)                  Patient Active Problem List   Diagnosis     Hypertension goal BP (blood pressure) < 140/90     Hyperlipidemia LDL goal <100     CHF (congestive heart failure) (H)     Mild recurrent major depression (H)     Gout     GERD (gastroesophageal reflux disease)     Chronic rhinitis     History of colonic polyps     Advanced directives, counseling/discussion     Chronic hepatitis C (H)     Obesity (BMI 30-39.9)     Coronary artery disease of native artery of native heart with stable angina pectoris (H)     Chronic obstructive airway disease with asthma (H)     Obesity (BMI 35.0-39.9) with comorbidity (H)     Chronic pain syndrome     CKD (chronic kidney disease) stage 3, GFR 30-59 ml/min     Internal hemorrhoids     Chronic ischemic heart disease     Claudication of both lower extremities (H)     Past Surgical History:   Procedure Laterality Date     CARDIAC SURGERY      stent     CATARACT IOL, RT/LT       COLONOSCOPY  2012    Procedure: COLONOSCOPY;  COLONOSCOPY, SCREEN;  Surgeon: Cl Johnson MD;  Location: [facility] OR     ORTHOPEDIC SURGERY      ankle     PHACOEMULSIFICATION WITH STANDARD INTRAOCULAR LENS IMPLANT Right 2016    Procedure: PHACOEMULSIFICATION WITH STANDARD INTRAOCULAR LENS IMPLANT;  Surgeon: Fly Merrill MD;  Location: [facility] OR     PHACOEMULSIFICATION WITH STANDARD INTRAOCULAR LENS IMPLANT Left 2016    Procedure: PHACOEMULSIFICATION WITH STANDARD INTRAOCULAR LENS IMPLANT;  Surgeon: Fly Merrill MD;  Location: [facility] OR       Social History     Tobacco Use     Smoking status: Former Smoker     Packs/day: 0.25     Years: 15.00     Pack years: 3.75     Types: Cigarettes     Quit date: 2016     Years since quittin.7     Smokeless tobacco: Never Used     Tobacco comment: 3-4 a day   Substance Use Topics     Alcohol use: Yes     Comment: weekend beer      Family History   Problem Relation Age of Onset     Heart Disease Maternal Grandmother      Hypertension Maternal Grandmother      Hypertension Father      Hypertension Mother      Hypertension Sister      Thyroid Disease Sister      Cancer Brother      Diabetes Brother      Hypertension Brother      Hypertension Maternal Aunt      Cancer Maternal Uncle      Hypertension Maternal Uncle      Hypertension Paternal Aunt      Hypertension Paternal Uncle      Hypertension Maternal Grandfather      Hypertension Paternal Grandmother      Hypertension Paternal Grandfather      Cerebrovascular Disease No family hx of      Glaucoma No family hx of      Macular Degeneration No family hx of          Current Outpatient Medications   Medication Sig Dispense Refill     albuterol (2.5 MG/3ML) 0.083% neb solution INHALE 3 ML BY NEBULIZATION METHOD EVERY 6 HOURS AS NEEDED FOR SHORTNESS OF BREATH 360 mL 1     allopurinol (ZYLOPRIM) 100 MG tablet TAKE 2 TABLETS BY MOUTH EVERY  tablet 3     aspirin (ASA) 325 MG EC tablet Take 1 tablet (325 mg) by mouth daily 90 tablet 3     carvedilol (COREG) 25 MG tablet TAKE 1 TABLET BY MOUTH TWICE DAILY WITH MEALS 180 tablet 3     cetirizine (ZYRTEC) 10 MG tablet TAKE 1 TABLET BY MOUTH DAILY AT BEDTIME 90 tablet 1     furosemide (LASIX) 40 MG tablet Take 1 tablet (40 mg) by mouth daily 90 tablet 3     gabapentin (NEURONTIN) 300 MG capsule TAKE 1 CAPSULE BY MOUTH THREE TIMES A  capsule 1     hydrocortisone (ANUSOL-HC) 25 MG suppository Place 1 suppository (25 mg) rectally 2 times daily 12 suppository 2     isosorbide mononitrate (ISMO/MONOKET) 20 MG tablet TAKE 1 TABLET BY MOUTH TWICE A  tablet 3     losartan (COZAAR) 50 MG tablet Take 1 tablet (50 mg) by mouth daily 90 tablet 3     montelukast (SINGULAIR) 10 MG tablet TAKE 1 TABLET BY MOUTH EVERYDAY AT BEDTIME 90 tablet 3     nitroGLYcerin (NITROSTAT) 0.4 MG sublingual tablet Place 1 tablet (0.4 mg) under the tongue every 5  minutes as needed for chest pain 0.4 mg by Sublingual route every 5 (five) minutes as needed. 25 tablet 11     omeprazole (PRILOSEC) 20 MG DR capsule TAKE 1 CAPSULE BY MOUTH EVERY DAY 90 capsule 2     oxyCODONE (ROXICODONE) 5 MG tablet Take 1 tablet (5 mg) by mouth 2 times daily as needed for moderate to severe pain (up to 2 a day) 60 tablet 0     simvastatin (ZOCOR) 40 MG tablet Take 1 tablet (40 mg) by mouth At Bedtime 90 tablet 3     SYMBICORT 160-4.5 MCG/ACT Inhaler TAKE 2 PUFFS BY MOUTH TWICE A DAY 30.6 g 1     tamsulosin (FLOMAX) 0.4 MG capsule Take 1 capsule (0.4 mg) by mouth daily 90 capsule 3     traMADol (ULTRAM) 50 MG tablet TAKE 1 TO 2 TABLETS BY MOUTH TWICE A DAY. MAX OF 2 TABS PER DAY 60 tablet 1     traZODone (DESYREL) 100 MG tablet TAKE 2 TABLETS BY MOUTH AT BEDTIME 180 tablet 2     venlafaxine (EFFEXOR-XR) 75 MG 24 hr capsule Take 1 capsule (75 mg) by mouth daily 90 capsule 1     Allergies   Allergen Reactions     No Known Drug Allergies      Recent Labs   Lab Test 06/18/21  1522 08/27/20  0946 10/24/19  1001 09/11/18  1208 09/11/18  1208 05/17/18  1045   A1C 5.8* 5.7* 5.3   < > 5.4 5.8*   *  --  84  --  58 70   HDL 53  --  48  --  40 52   TRIG 196*  --  78  --  190* 118   ALT 30  --  42  --  42 25   CR 1.38* 1.17 1.12   < > 1.20 1.44*   GFRESTIMATED 53* 65 69   < > 61 50*   GFRESTBLACK 62 76 80   < > 74 60*   POTASSIUM 4.3 4.1 4.2  --  4.5 4.4   TSH  --   --   --   --  0.79 2.04    < > = values in this interval not displayed.        Any new diagnosis of family breast, ovarian, or bowel cancer?     FSH-7: No flowsheet data found.          Review of Systems   Constitutional: Negative for chills and fever.   HENT: Negative for congestion, ear pain, hearing loss and sore throat.    Eyes: Positive for pain and visual disturbance.   Respiratory: Positive for cough and shortness of breath.    Cardiovascular: Positive for chest pain and peripheral edema. Negative for palpitations.  "  Gastrointestinal: Positive for hematochezia. Negative for abdominal pain, constipation, diarrhea, heartburn and nausea.   Genitourinary: Positive for frequency, impotence and urgency. Negative for discharge, dysuria, genital sores and hematuria.   Musculoskeletal: Positive for arthralgias, joint swelling and myalgias.   Skin: Negative for rash.   Neurological: Positive for dizziness and headaches. Negative for weakness and paresthesias.   Psychiatric/Behavioral: Positive for mood changes. The patient is nervous/anxious.          OBJECTIVE:   /87   Pulse 85   Temp 98  F (36.7  C) (Tympanic)   Ht 1.575 m (5' 2\")   Wt 98.7 kg (217 lb 9.6 oz)   SpO2 95%   BMI 39.80 kg/m   Estimated body mass index is 39.8 kg/m  as calculated from the following:    Height as of this encounter: 1.575 m (5' 2\").    Weight as of this encounter: 98.7 kg (217 lb 9.6 oz).  Physical Exam  GENERAL: healthy, alert, well nourished, well hydrated, no distress, obese  HENT: ear canals- normal; TMs- normal; Nose- normal; Mouth- no ulcers, no lesions, throat is clear with no erythema or exudate.   NECK: no tenderness, no adenopathy, no asymmetry, no masses, no stiffness; thyroid- normal to palpation  RESP: lungs clear to auscultation - no rales, no rhonchi, no wheezes  CV: regular rates and rhythm, normal S1 S2, no S3 or S4 and no murmur, no click or rub -  ABDOMEN: soft, no tenderness, no  hepatosplenomegaly, no masses, normal bowel sounds  MS: extremities- no gross deformities noted, 1+ edema  SKIN: no suspicious lesions, no rashes  NEURO: strength and tone- normal, sensory exam- grossly normal, mentation- intact, speech- normal, reflexes- symmetric  BACK: no CVA tenderness, no paralumbar tenderness  PSYCH: Alert and oriented times 3; speech- coherent , normal rate and volume; able to articulate logical thoughts, able to abstract reason, no tangential thoughts, no hallucinations or delusions, affect- normal     Diagnostic Test " Results:  Labs reviewed in Epic  Results for orders placed or performed in visit on 06/18/21   ALT     Status: None   Result Value Ref Range    ALT 30 0 - 70 U/L   Lipid panel reflex to direct LDL Non-fasting     Status: Abnormal   Result Value Ref Range    Cholesterol 231 (H) <200 mg/dL    Triglycerides 196 (H) <150 mg/dL    HDL Cholesterol 53 >39 mg/dL    LDL Cholesterol Calculated 139 (H) <100 mg/dL    Non HDL Cholesterol 178 (H) <130 mg/dL   Albumin Random Urine Quantitative with Creat Ratio     Status: Abnormal   Result Value Ref Range    Creatinine Urine 130 mg/dL    Albumin Urine mg/L 736 mg/L    Albumin Urine mg/g Cr 566.15 (H) 0 - 17 mg/g Cr   HEMOGLOBIN A1C     Status: Abnormal   Result Value Ref Range    Hemoglobin A1C 5.8 (H) 0 - 5.6 %   BASIC METABOLIC PANEL     Status: Abnormal   Result Value Ref Range    Sodium 139 133 - 144 mmol/L    Potassium 4.3 3.4 - 5.3 mmol/L    Chloride 102 94 - 109 mmol/L    Carbon Dioxide 33 (H) 20 - 32 mmol/L    Anion Gap 4 3 - 14 mmol/L    Glucose 94 70 - 99 mg/dL    Urea Nitrogen 24 7 - 30 mg/dL    Creatinine 1.38 (H) 0.66 - 1.25 mg/dL    GFR Estimate 53 (L) >60 mL/min/[1.73_m2]    GFR Estimate If Black 62 >60 mL/min/[1.73_m2]    Calcium 9.0 8.5 - 10.1 mg/dL   CBC with platelets     Status: None   Result Value Ref Range    WBC 9.9 4.0 - 11.0 10e9/L    RBC Count 4.94 4.4 - 5.9 10e12/L    Hemoglobin 15.1 13.3 - 17.7 g/dL    Hematocrit 46.3 40.0 - 53.0 %    MCV 94 78 - 100 fl    MCH 30.6 26.5 - 33.0 pg    MCHC 32.6 31.5 - 36.5 g/dL    RDW 12.8 10.0 - 15.0 %    Platelet Count 239 150 - 450 10e9/L   BNP-N terminal pro     Status: Abnormal   Result Value Ref Range    N-Terminal Pro Bnp 544 (H) 0 - 125 pg/mL       ASSESSMENT / PLAN:       ICD-10-CM    1. Encounter for Medicare annual wellness exam  Z00.00 Colonoscopy done last year. Hearing test needed.    2. Screening for hyperlipidemia  Z13.220    3. Hypertension goal BP (blood pressure) < 140/90 - elevated blood pressure and  will increase carvedilol to 25 mg twice a day  I10 Albumin Random Urine Quantitative with Creat Ratio     BASIC METABOLIC PANEL     CBC with platelets     losartan (COZAAR) 50 MG tablet   4. Hyperlipidemia LDL goal <100  E78.5 ALT     Lipid panel reflex to direct LDL Non-fasting   5. Acute on chronic combined systolic and diastolic congestive heart failure (H) - needs to follow up with cardiology for gradually worsening symptoms and due for cardiology check. Would like to transfer care to [facility]. Seen by Dr. Pena in the past.  I50.43 BNP-N terminal pro     CARDIOLOGY EVAL ADULT REFERRAL   6. Chronic obstructive airway disease with asthma (H)  J44.9 Stable on inhalers   7. Chronic pain syndrome  G89.4 Chronic use of pain medications.    8. Coronary artery disease of native artery of native heart with stable angina pectoris (H)  I25.118 CARDIOLOGY EVAL ADULT REFERRAL   9. Chronic hepatitis C without hepatic coma (H)  B18.2 Has been treated and is stable now   10. Mild recurrent major depression (H)  F33.0 Stable    11. Obesity (BMI 30-39.9)  E66.9 Weight loss counseling done    12. Elevated blood sugar  R73.9 HEMOGLOBIN A1C- normal range and does not have diabetes   13. Stage 3a chronic kidney disease  N18.31 Stable    14. Claudication of both lower extremities (H)  I73.9 YVONNE's have been completed and normal. Will obtain a abdomen aorta ultrasound   15. Morbid obesity (H)  E66.01 Complications associated with weight.    16. Essential hypertension with goal blood pressure less than 140/90  I10 carvedilol (COREG) 25 MG tablet     furosemide (LASIX) 40 MG tablet   17. Hyperlipidemia, unspecified hyperlipidemia type  E78.5 simvastatin (ZOCOR) 40 MG tablet   18. Urinary frequency  R35.0 tamsulosin (FLOMAX) 0.4 MG capsule   19. Nocturia  R35.1 tamsulosin (FLOMAX) 0.4 MG capsule       Patient has been advised of split billing requirements and indicates understanding: No  COUNSELING:  Reviewed preventive  "health counseling, as reflected in patient instructions       Consider AAA screening for ages 65-75 and smoking history       Regular exercise       Healthy diet/nutrition       Vision screening       Hearing screening       Bladder control       Prostate cancer screening    Estimated body mass index is 39.8 kg/m  as calculated from the following:    Height as of this encounter: 1.575 m (5' 2\").    Weight as of this encounter: 98.7 kg (217 lb 9.6 oz).    Weight management plan: Discussed healthy diet and exercise guidelines    He reports that he quit smoking about 4 years ago. His smoking use included cigarettes. He has a 3.75 pack-year smoking history. He has never used smokeless tobacco.      Appropriate preventive services were discussed with this patient, including applicable screening as appropriate for cardiovascular disease, diabetes, osteopenia/osteoporosis, and glaucoma.  As appropriate for age/gender, discussed screening for colorectal cancer, prostate cancer, breast cancer, and cervical cancer. Checklist reviewing preventive services available has been given to the patient.    Reviewed patients plan of care and provided an AVS. The Intermediate Care Plan ( asthma action plan, low back pain action plan, and migraine action plan) for Santiago meets the Care Plan requirement. This Care Plan has been established and reviewed with the Patient.    Counseling Resources:  ATP IV Guidelines  Pooled Cohorts Equation Calculator  Breast Cancer Risk Calculator  Breast Cancer: Medication to Reduce Risk  FRAX Risk Assessment  ICSI Preventive Guidelines  Dietary Guidelines for Americans, 2010  USDA's MyPlate  ASA Prophylaxis  Lung CA Screening    Tori Rizvi MD  [facility]    Identified Health Risks:  "

## 2021-06-18 NOTE — PROGRESS NOTES
"  SUBJECTIVE:   Santiago Hylton is a 66 year old male who presents for Preventive Visit.    {Split Bill scripting  The purpose of this visit is to discuss your medical history and prevent health problems before you are sick. You may be responsible for a co-pay, coinsurance, or deductible if your visit today includes services such as checking on a sore throat, having an x-ray or lab test, or treating and evaluating a new or existing condition :488637}  Patient has been advised of split billing requirements and indicates understanding: {Yes and No:604785}  Are you in the first 12 months of your Medicare Part B coverage?  { :866422::\"No\"}    Physical Health:    In general, how would you rate your overall physical health? { :841307}    Outside of work, how many days during the week do you exercise? { :854024}    Outside of work, approximately how many minutes a day do you exercise?{ :469509}    If you drink alcohol do you typically have >3 drinks per day or >7 drinks per week? { :884309}    Do you usually eat at least 4 servings of fruit and vegetables a day, include whole grains & fiber and avoid regularly eating high fat or \"junk\" foods? { :654934::\"Yes\"}    Do you have any problems taking medications regularly?  { :928130::\"No\"}    Do you have any side effects from medications? { :137108}    Needs assistance for the following daily activities: { :990165}    Which of the following safety concerns are present in your home?  { :608465::\"none identified\"}     Hearing impairment: { :056813}    In the past 6 months, have you been bothered by leaking of urine? { :799374}    Mental Health:    In general, how would you rate your overall mental or emotional health? { :280267}  PHQ-2 Score: (P) 2    Do you feel safe in your environment? { :779463}    Have you ever done Advance Care Planning? (For example, a Health Directive, POLST, or a discussion with a medical provider or your loved ones about your wishes): { " ":726187}    Additional concerns to address?  {If YES, notify patient they may be responsible for a copay, coinsurance or deductible if the visit today includes services such as checking on a sore throat, having an x-ray or lab test, or treating and evaluating a new or existing condition :048828::\"No\"}    Fall risk:  { :610368}  {If any of the above assessments are answered yes, consider ordering appropriate referrals (Optional):710358::\"click delete button to remove this line now\"}  Cognitive Screenin) Repeat 3 items (Leader, Season, Table)  {Get patient's attention, then say, \"I am going to say three words that I want you to remember now and later.  The words are Leader, Season, and Table.  Please say them for me now.\"  If pt. unable after 3 tries, go to next item}  2) Clock draw: {Say the following phrases in order: \"Please draw a clock.  Start by drawing a large [place].  Put all the numbers in the [place] and set the hands to show 11:10 (10 past 11).\"  If pt cannot complete in 3 minutes, stop and ask for recall items.  \"NORMAL\" test = all twelve numbers in correct location, and clock hands correctly designating 11:10}NORMAL  3) 3 item recall: {Say: \"What were the three words I asked you to remember?\"}Recalls 2 objects   Results: NORMAL clock, 1-2 items recalled: COGNITIVE IMPAIRMENT LESS LIKELY    Mini-CogTM Copyright S Mauro. Licensed by the author for use in [facility]; reprinted with permission (sarahi@.[facility]). All rights reserved.      Do you have sleep apnea, excessive snoring or daytime drowsiness?: no    {Outside tests to abstract? :204085}    {additional problems to add (Optional):393853}    Reviewed and updated as needed this visit by clinical staff                 Reviewed and updated as needed this visit by Provider                Social History     Tobacco Use     Smoking status: Former Smoker     Packs/day: 0.25     Years: 15.00     Pack years: 3.75     Types: Cigarettes     Quit " date: 2016     Years since quittin.7     Smokeless tobacco: Never Used     Tobacco comment: 3-4 a day   Substance Use Topics     Alcohol use: Yes     Comment: weekend beer                           Current providers sharing in care for this patient include: {Rooming staff:  Please update Care Team in Rooming Activity, refresh this note and then delete this statement}  Patient Care Team:  Tori Rizvi MD as PCP - General (Family Practice)  Rosemary Prabhakar MD as MD (Cardiology)  Tori Rizvi MD as Assigned PCP    The following health maintenance items are reviewed in Epic and correct as of today:  Health Maintenance   Topic Date Due     URINE DRUG SCREEN  Never done     ANNUAL REVIEW OF HM ORDERS  Never done     ZOSTER IMMUNIZATION (1 of 2) Never done     EYE EXAM  2016     HF ACTION PLAN  2017     ADVANCE CARE PLANNING  2019     DIABETIC FOOT EXAM  2019     DTAP/TDAP/TD IMMUNIZATION (2 - Td) 10/09/2019     ALT  10/24/2020     LIPID  10/24/2020     MICROALBUMIN  10/24/2020     MEDICARE ANNUAL WELLNESS VISIT  Never done     FALL RISK ASSESSMENT  Never done     Pneumococcal Vaccine: Pediatrics (0 to 5 Years) and At-Risk Patients (6 to 64 Years) (2 of 2) 2021     AORTIC ANEURYSM SCREENING (SYSTEM ASSIGNED)  Never done     A1C  2021     BMP  2021     PHQ-9  2021     Pneumococcal Vaccine: 65+ Years (2 of 2) 2021     CBC  2021     INFLUENZA VACCINE (Season Ended) 2021     COLORECTAL CANCER SCREENING  2025     TSH W/FREE T4 REFLEX  Completed     SPIROMETRY  Completed     HIV SCREENING  Completed     COPD ACTION PLAN  Completed     DEPRESSION ACTION PLAN  Completed     COVID-19 Vaccine  Completed     IPV IMMUNIZATION  Aged Out     MENINGITIS IMMUNIZATION  Aged Out     {Chronicprobdata (Optional):368005}  {Decision Support (Optional):277630}    ROS:  {ROS COMP:746608}    OBJECTIVE:   There were no vitals taken for this  "visit. Estimated body mass index is 38.04 kg/m  as calculated from the following:    Height as of 20: 1.575 m (5' 2\").    Weight as of 20: 94.3 kg (208 lb).  EXAM:   {Exam :852046}    {Diagnostic Test Results (Optional):721559::\"Diagnostic Test Results:\",\"Labs reviewed in Epic\"}    ASSESSMENT / PLAN:   {Dia Picklist:934745}    Patient has been advised of split billing requirements and indicates understanding: {YES / NO:652991::\"Yes\"}    COUNSELING:  {Medicare Counselin}    Estimated body mass index is 38.04 kg/m  as calculated from the following:    Height as of 20: 1.575 m (5' 2\").    Weight as of 20: 94.3 kg (208 lb).    {Weight Management Plan (ACO) Complete if BMI is abnormal-  Ages 18-64  BMI >24.9.  Age 65+ with BMI <23 or >30 (Optional):560974}    He reports that he quit smoking about 4 years ago. His smoking use included cigarettes. He has a 3.75 pack-year smoking history. He has never used smokeless tobacco.    Appropriate preventive services were discussed with this patient, including applicable screening as appropriate for cardiovascular disease, diabetes, osteopenia/osteoporosis, and glaucoma.  As appropriate for age/gender, discussed screening for colorectal cancer, prostate cancer, breast cancer, and cervical cancer. Checklist reviewing preventive services available has been given to the patient.    Reviewed patients plan of care and provided an AVS. The {CarePlan:601607} for Santiago meets the Care Plan requirement. This Care Plan has been established and reviewed with the {PATIENT, FAMILY MEMBER, CAREGIVER:590040}.    Counseling Resources:  ATP IV Guidelines  Pooled Cohorts Equation Calculator  Breast Cancer Risk Calculator  BRCA-Related Cancer Risk Assessment: FHS-7 Tool  FRAX Risk Assessment  ICSI Preventive Guidelines  Dietary Guidelines for Americans,   USDA's MyPlate  ASA Prophylaxis  Lung CA Screening    Tori Rizvi MD  [facility] " [place]

## 2021-06-20 VITALS
SYSTOLIC BLOOD PRESSURE: 137 MMHG | HEART RATE: 85 BPM | WEIGHT: 217.6 LBS | OXYGEN SATURATION: 95 % | DIASTOLIC BLOOD PRESSURE: 87 MMHG | HEIGHT: 62 IN | TEMPERATURE: 98 F | BODY MASS INDEX: 40.04 KG/M2

## 2021-06-20 RX ORDER — LOSARTAN POTASSIUM 50 MG/1
50 TABLET ORAL DAILY
Qty: 90 TABLET | Refills: 3 | Status: SHIPPED | OUTPATIENT
Start: 2021-06-20 | End: 2022-07-07

## 2021-06-20 RX ORDER — TAMSULOSIN HYDROCHLORIDE 0.4 MG/1
0.4 CAPSULE ORAL DAILY
Qty: 90 CAPSULE | Refills: 3 | Status: SHIPPED | OUTPATIENT
Start: 2021-06-20 | End: 2022-07-28

## 2021-06-20 RX ORDER — CARVEDILOL 25 MG/1
TABLET ORAL
Qty: 180 TABLET | Refills: 3 | Status: SHIPPED | OUTPATIENT
Start: 2021-06-20 | End: 2022-06-03

## 2021-06-20 RX ORDER — FUROSEMIDE 40 MG
40 TABLET ORAL DAILY
Qty: 90 TABLET | Refills: 3 | Status: SHIPPED | OUTPATIENT
Start: 2021-06-20 | End: 2022-07-28

## 2021-06-20 RX ORDER — SIMVASTATIN 40 MG
40 TABLET ORAL AT BEDTIME
Qty: 90 TABLET | Refills: 3 | Status: SHIPPED | OUTPATIENT
Start: 2021-06-20 | End: 2022-09-06

## 2021-06-21 NOTE — RESULT ENCOUNTER NOTE
Dear Santiago    Your test results are attached.     The test for heart failure is higher and I put in an order for an echocardiogram. It is very important to follow up with cardiology to check your heart. I also have an order for an abdominal ultrasound to check your aorta.     The diabetes test is normal. Your kidneys are stable. The cholesterol is higher and I sent in a refill for your cholesterol medication. Please take this once a day. We can recheck labs in 3 months.     Please contact me by Zscaler if you have any questions about your labs or management. You may also call my office number [phone] for any questions.     Tori Rizvi MD

## 2021-07-30 DIAGNOSIS — G89.4 CHRONIC PAIN SYNDROME: ICD-10-CM

## 2021-07-30 RX ORDER — OXYCODONE HYDROCHLORIDE 5 MG/1
5 TABLET ORAL 2 TIMES DAILY PRN
Qty: 60 TABLET | Refills: 0 | Status: SHIPPED | OUTPATIENT
Start: 2021-07-30 | End: 2021-09-16

## 2021-07-30 NOTE — TELEPHONE ENCOUNTER
The patient is calling for a refill for oxyCODONE (ROXICODONE) 5 MG tablet.  [facility] pharmacy in [place].  Lucia Epstein,  [facility]

## 2021-08-04 ENCOUNTER — OFFICE VISIT (OUTPATIENT)
Dept: CARDIOLOGY | Facility: CLINIC | Age: 66
End: 2021-08-04
Payer: COMMERCIAL

## 2021-08-04 VITALS
WEIGHT: 218.8 LBS | OXYGEN SATURATION: 95 % | HEART RATE: 90 BPM | BODY MASS INDEX: 35.17 KG/M2 | SYSTOLIC BLOOD PRESSURE: 126 MMHG | HEIGHT: 66 IN | DIASTOLIC BLOOD PRESSURE: 77 MMHG

## 2021-08-04 DIAGNOSIS — I10 HYPERTENSION GOAL BP (BLOOD PRESSURE) < 140/90: ICD-10-CM

## 2021-08-04 DIAGNOSIS — I25.9 CHRONIC ISCHEMIC HEART DISEASE: ICD-10-CM

## 2021-08-04 PROCEDURE — 99215 OFFICE O/P EST HI 40 MIN: CPT | Mod: 25 | Performed by: STUDENT IN AN ORGANIZED HEALTH CARE EDUCATION/TRAINING PROGRAM

## 2021-08-04 PROCEDURE — 93000 ELECTROCARDIOGRAM COMPLETE: CPT | Performed by: STUDENT IN AN ORGANIZED HEALTH CARE EDUCATION/TRAINING PROGRAM

## 2021-08-04 RX ORDER — ISOSORBIDE MONONITRATE 60 MG/1
60 TABLET, EXTENDED RELEASE ORAL DAILY
Qty: 90 TABLET | Refills: 3 | Status: SHIPPED | OUTPATIENT
Start: 2021-08-04 | End: 2023-03-01

## 2021-08-04 ASSESSMENT — PAIN SCALES - GENERAL: PAINLEVEL: NO PAIN (0)

## 2021-08-04 ASSESSMENT — MIFFLIN-ST. JEOR: SCORE: 1713.09

## 2021-08-04 NOTE — PROGRESS NOTES
"Santiago Concetta's goals for this visit include: Back pain, shingles. Wants to check on the status of his stents.     He requests these members of his care team be copied on today's visit information: PCP    PCP: Tori Rizvi    Referring Provider:  Tori Rizvi MD  [id number] [place] [address]    /77 (BP Location: Left arm, Patient Position: Sitting, Cuff Size: Adult Regular)   Pulse 90   Ht 1.665 m (5' 5.55\")   Wt 99.2 kg (218 lb 12.8 oz)   SpO2 95%   BMI 35.80 kg/m      Do you need any medication refills at today's visit? No.     Baldo Glover, EMT  Clinic Support  [facility]    [phone]    Employed by [facility] [employer]        "
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
## 2021-08-04 NOTE — PATIENT INSTRUCTIONS
You were seen at the [facility] Cardiology clinic today.   You saw Dr. Amadeo Hardin, [facility], [place].    The following is a summary of your office visit today:    1. CT of heart's arteries (CT coronary angiogram) to see if stents are open  2. Increase dose of isosorbide mononitrate to 60 mg once daily (new prescription sent)  3. Follow-up with me in three months after testing is done     Nurse contact information:    Cardiology Care Coordinators: Sarah Faustin RN and Diana Parsons RN      Phone: [phone]   Fax: [phone]      HOW TO CHECK YOUR BLOOD PRESSURE AT HOME:     Avoid eating, smoking, and exercising for at least 30 minutes before taking a reading.    Be sure you have taken your BP medication at least 2-3 hours before you check it.     Sit quietly for 10 minutes before a reading.     Sit in a chair with your feet flat on the floor. Rest your  arm on a table so that the arm cuff is at the same level as your heart.    Remain still during the reading.    Record your blood pressure and pulse in a log and bring to your next appointment.     If you have had any blood work, imaging or other testing completed we will be in touch within 1-2 weeks regarding the results. If you have any questions, concerns or need to schedule a follow up, please contact us at [phone]. If you are needing refills please contact your pharmacy. For urgent after hour care please call the [facility] at [phone] or the [facility] at [phone] and ask to speak to the on-call Cardiologist.    It was a pleasure meeting with you today. Please let us know if there is anything else we can do for you so that we can be sure you are leaving completely satisfied with your care experience.     Your Cardiology Team at [facility]  RN Care Coordinators: Alvaro LORD: Aniyah

## 2021-08-08 DIAGNOSIS — I10 ESSENTIAL HYPERTENSION WITH GOAL BLOOD PRESSURE LESS THAN 140/90: ICD-10-CM

## 2021-08-10 RX ORDER — CARVEDILOL 12.5 MG/1
TABLET ORAL
Qty: 180 TABLET | Refills: 3 | OUTPATIENT
Start: 2021-08-10

## 2021-08-11 DIAGNOSIS — I10 ESSENTIAL HYPERTENSION WITH GOAL BLOOD PRESSURE LESS THAN 140/90: ICD-10-CM

## 2021-08-11 DIAGNOSIS — E11.42 DIABETIC POLYNEUROPATHY ASSOCIATED WITH TYPE 2 DIABETES MELLITUS (H): ICD-10-CM

## 2021-08-11 DIAGNOSIS — G89.4 CHRONIC PAIN SYNDROME: ICD-10-CM

## 2021-08-11 RX ORDER — CARVEDILOL 12.5 MG/1
TABLET ORAL
Qty: 180 TABLET | Refills: 3 | OUTPATIENT
Start: 2021-08-11

## 2021-08-11 RX ORDER — TRAMADOL HYDROCHLORIDE 50 MG/1
TABLET ORAL
Qty: 60 TABLET | Refills: 1 | Status: SHIPPED | OUTPATIENT
Start: 2021-08-11 | End: 2021-11-15

## 2021-08-11 RX ORDER — GABAPENTIN 300 MG/1
CAPSULE ORAL
Qty: 270 CAPSULE | Refills: 1 | Status: SHIPPED | OUTPATIENT
Start: 2021-08-11 | End: 2022-01-19

## 2021-09-12 ENCOUNTER — HEALTH MAINTENANCE LETTER (OUTPATIENT)
Age: 66
End: 2021-09-12

## 2021-09-15 ENCOUNTER — TELEPHONE (OUTPATIENT)
Dept: FAMILY MEDICINE | Facility: CLINIC | Age: 66
End: 2021-09-15

## 2021-09-15 DIAGNOSIS — M1A.3790 CHRONIC GOUT DUE TO RENAL IMPAIRMENT INVOLVING ANKLE WITHOUT TOPHUS, UNSPECIFIED LATERALITY: ICD-10-CM

## 2021-09-15 DIAGNOSIS — F33.0 MILD RECURRENT MAJOR DEPRESSION (H): ICD-10-CM

## 2021-09-15 DIAGNOSIS — G89.4 CHRONIC PAIN SYNDROME: ICD-10-CM

## 2021-09-15 NOTE — TELEPHONE ENCOUNTER
"Requested Prescriptions   Pending Prescriptions Disp Refills     allopurinol (ZYLOPRIM) 100 MG tablet [Pharmacy Med Name: ALLOPURINOL 100 MG TABLET] 180 tablet 3     Sig: TAKE 2 TABLETS BY MOUTH EVERY DAY       Gout Agents Protocol Failed - 9/15/2021 10:42 AM        Failed - Has Uric Acid on file in past 12 months and value is less than 6     Recent Labs   Lab Test 10/24/19  1001   URIC 5.2     If level is 6mg/dL or greater, ok to refill one time and refer to provider.           Failed - Normal serum creatinine on file in the past 12 months     Recent Labs   Lab Test 06/18/21  1522   CR 1.38*       Ok to refill medication if creatinine is low          Passed - CBC on file in past 12 months     Recent Labs   Lab Test 06/18/21  1522   WBC 9.9   RBC 4.94   HGB 15.1   HCT 46.3                    Passed - ALT on file in past 12 months     Recent Labs   Lab Test 06/18/21  1522   ALT 30             Passed - Recent (12 mo) or future (30 days) visit within the authorizing provider's specialty     Patient has had an office visit with the authorizing provider or a provider within the authorizing providers department within the previous 12 mos or has a future within next 30 days. See \"Patient Info\" tab in inbasket, or \"Choose Columns\" in Meds & Orders section of the refill encounter.              Passed - Medication is active on med list        Passed - Patient is age 18 or older           venlafaxine (EFFEXOR-XR) 75 MG 24 hr capsule [Pharmacy Med Name: VENLAFAXINE HCL ER 75 MG CAP] 90 capsule 1     Sig: TAKE 1 CAPSULE BY MOUTH EVERY DAY       Serotonin-Norepinephrine Reuptake Inhibitors  Failed - 9/15/2021 10:42 AM        Failed - PHQ-9 score of less than 5 in past 6 months     Please review last PHQ-9 score.           Failed - Normal serum creatinine on file in past 12 months     Recent Labs   Lab Test 06/18/21  1522   CR 1.38*       Ok to refill medication if creatinine is low          Passed - Blood pressure under " "140/90 in past 12 months     BP Readings from Last 3 Encounters:   08/04/21 126/77   06/18/21 137/87   08/27/20 (!) 152/104                 Passed - Medication is active on med list        Passed - Patient is age 18 or older        Passed - Recent (6 mo) or future (30 days) visit within the authorizing provider's specialty     Patient had office visit in the last 6 months or has a visit in the next 30 days with authorizing provider or within the authorizing provider's specialty.  See \"Patient Info\" tab in inbasket, or \"Choose Columns\" in Meds & Orders section of the refill encounter.               oxyCODONE (ROXICODONE) 5 MG tablet 60 tablet 0     Sig: Take 1 tablet (5 mg) by mouth 2 times daily as needed for moderate to severe pain (up to 2 a day)       There is no refill protocol information for this order            "

## 2021-09-16 RX ORDER — ALLOPURINOL 100 MG/1
TABLET ORAL
Qty: 180 TABLET | Refills: 3 | Status: SHIPPED | OUTPATIENT
Start: 2021-09-16 | End: 2022-09-06

## 2021-09-16 RX ORDER — VENLAFAXINE HYDROCHLORIDE 75 MG/1
CAPSULE, EXTENDED RELEASE ORAL
Qty: 90 CAPSULE | Refills: 1 | Status: SHIPPED | OUTPATIENT
Start: 2021-09-16 | End: 2022-03-10

## 2021-09-16 RX ORDER — OXYCODONE HYDROCHLORIDE 5 MG/1
5 TABLET ORAL 2 TIMES DAILY PRN
Qty: 60 TABLET | Refills: 0 | Status: SHIPPED | OUTPATIENT
Start: 2021-09-16 | End: 2021-10-26

## 2021-09-16 NOTE — TELEPHONE ENCOUNTER
Patient needs to sign new Controlled Substance Agreement and is due for urine toxin screen. Please call to schedule. Tori Rizvi MD

## 2021-10-05 ENCOUNTER — DOCUMENTATION ONLY (OUTPATIENT)
Dept: LAB | Facility: CLINIC | Age: 66
End: 2021-10-05

## 2021-10-05 DIAGNOSIS — I10 HYPERTENSION GOAL BP (BLOOD PRESSURE) < 140/90: Primary | ICD-10-CM

## 2021-10-05 DIAGNOSIS — I50.43 ACUTE ON CHRONIC COMBINED SYSTOLIC AND DIASTOLIC CONGESTIVE HEART FAILURE (H): ICD-10-CM

## 2021-10-05 NOTE — PROGRESS NOTES
Patient has an up coming lab appointment on 10.8.2021. Please review and place future orders that may be needed.   The only order in his chart is for a urine drug screen.  Thank you  Barby Castaneda MLT ([facility])

## 2021-10-05 NOTE — PATIENT INSTRUCTIONS
At [facility], we strive to deliver an exceptional experience to you, every time we see you. If you receive a survey, please complete it as we do value your feedback.  If you have MyChart, you can expect to receive results automatically within 24 hours of their completion.  Your provider will send a note interpreting your results as well.   If you do not have MyChart, you should receive your results in about a week by mail.    Your care team:                            Family Medicine Internal Medicine   MD Robb Teague MD Shantel Branch-Fleming, MD Srinivasa Vaka, MD Katya Belousova, PAAdileneC  Lurdes Quinteros, APRN CNP    Mitul Hui, MD Pediatrics   Tyler Morgan, PAMELVA Bustamante, CNP MD Araceli Coates APRN CNP   MD Yoly Thomas MD Deborah Mielke, MD Kim Thein, APRN [facility]      Clinic hours: Monday - Thursday 7 am-6 pm; Fridays 7 am-5 pm.   Urgent care: Monday - Friday 10 am- 8 pm; Saturday and Sunday 9 am-5 pm.    Clinic: [phone]       [facility]: Monday - Thursday 8 am - 7 pm; Friday 8 am - 6 pm  [facility]: [phone]     Use www.[email or web address].org for 24/7 diagnosis and treatment of dozens of conditions.    Patient Education     Understanding Rectal Bleeding  Rectal bleeding is when blood passes through your rectum and anus. It can happen with or without a bowel movement. Rectal bleeding may be a sign of a serious problem in your rectum, colon, or upper GI tract. Call your healthcare provider right away if you have any rectal bleeding.     The GI tract  The gastrointestinal (GI) tract includes the:     Mouth    Esophagus    Stomach    Small intestine    Large intestine (colon)    Rectum    Anus  The food you eat is digested as it passes through the GI tract. Solid waste leaves the body through the rectum.   Rectal bleeding and GI problems  The cause of rectal  bleeding may be found in any part of the GI tract. The colon or rectum may be the site of your bleeding problem. Or bleeding may be due to problems farther up the GI tract, such as in the small intestine, duodenum, or stomach.   Causes of rectal bleeding  These are some possible causes:    Hemorrhoids (swollen veins in the rectum and anus)    Fissures (tears in or near the anus)    Diverticulosis (inflamed pockets in the colon wall)    Infection    Ischemia (low blood flow)    Radiation damage    Inflammatory bowel disease (Crohn's disease or ulcerative colitis)    Ulcers in the upper GI tract and inflammation of the large intestine    Abnormal tissue growths (tumors or polyps) in the GI tract    A bulging rectum (also called a rectal prolapse)    Abnormal blood vessels in the small intestine or in the colon  Common symptoms  Common symptoms are:    Rectal pain, itching, or soreness    Belly pain, including upper belly pain near the stomach (epigastric pain)    Small drops of blood that sometimes appear on the stool or toilet paper    Stool that looks black or tarry   Rectal bleeding can also happen without pain.  [employer] last reviewed this educational content on 6/1/2019 2000-2021 [employer]. All rights reserved. This information is not intended as a substitute for professional medical care. Always follow your healthcare professional's instructions.

## 2021-10-05 NOTE — PROGRESS NOTES
Assessment & Plan     Chronic obstructive airway disease with asthma (H)  Requests refill of albuterol inhaler. Continue to use Dulera twice a day.   - albuterol (PROAIR HFA/PROVENTIL HFA/VENTOLIN HFA) 108 (90 Base) MCG/ACT inhaler; Inhale 2 puffs into the lungs every 4 hours as needed for shortness of breath / dyspnea    Rectal bleeding  Episode of rectal bleed when he got upset. Feels it is from his hemorrhoids. Previous evaluation done and declines further work up. Will call if this happens again.     Idiopathic peripheral neuropathy  Neuropathy with decreased sensation on feet. Gabapentin helps at night but needs to take 3-4.     Hypertension goal BP (blood pressure) < 140/90  Not well controlled on medications and will adjust.    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     Chronic pain syndrome  Using daily narcotic for pain control and is stable at this time. Controlled Substance Agreement signed today and urine toxin screen obtained.     Internal hemorrhoids  Discussed diagnosis and treatment. Follow up if persistent symptoms.                CONSULTATION/REFERRAL to cardiology and gastroenterology   FUTURE LABS:       - Schedule fasting labs in 3 months  FUTURE APPOINTMENTS:       - Follow-up visit in 3 months or sooner if any questions or concerns. Will need to transfer providers and discussed establishing care. He will decide later.   Work on weight loss  Regular exercise  See Patient Instructions    Return in about 3 months (around 1/8/2022) for Follow up, with any available provider.    Tori Rizvi MD  [facility]    Gopal Henderson is a 66 year old who presents for the following health issues     HPI     Hyperlipidemia Follow-Up      Are you regularly taking any medication or supplement to lower your cholesterol?   Yes- simvastatin 40    Are you having muscle aches or other side effects that  you think could be caused by your cholesterol lowering medication?  No    Hypertension Follow-up      Do you check your blood pressure regularly outside of the clinic? Yes     Are you following a low salt diet? Yes    Are your blood pressures ever more than 140 on the top number (systolic) OR more   than 90 on the bottom number (diastolic), for example 140/90? No    Heart Failure Follow-up  Are you experiencing any shortness of breath? Yes, with activity only   How would you describe your shortness of breath?  Same as usual        Are you experiencing any swelling in your legs or feet?  Stable    Are you using more pillows than usual? No    Do you cough at night?  No    Do you check your weight daily?  Yes    Have you had a weight change recently?  No    Are you having any of the following side effects from your medications? (Select all that apply)  Fatigue    Since your last visit, how many times have you gone to the cardiologist, urgent care, emergency room, or hospital because of your heart failure?   None    Depression Followup    How are you doing with your depression since your last visit? No change    Are you having other symptoms that might be associated with depression? No    Have you had a significant life event?  Relationship Concerns and Health Concerns     Are you feeling anxious or having panic attacks?   No    Do you have any concerns with your use of alcohol or other drugs? No    Social History     Tobacco Use     Smoking status: Former Smoker     Packs/day: 0.25     Years: 15.00     Pack years: 3.75     Types: Cigarettes     Quit date: 2016     Years since quittin.0     Smokeless tobacco: Never Used     Tobacco comment: 3-4 a day   Substance Use Topics     Alcohol use: Yes     Comment: weekend beer     Drug use: No     PHQ 3/4/2020 2020 10/8/2021   PHQ-9 Total Score 5 8 5   Q9: Thoughts of better off dead/self-harm past 2 weeks Not at all Not at all Not at all     GENARO-7 SCORE 2019  3/4/2020 8/27/2020   Total Score 7 2 1         Suicide Assessment Five-step Evaluation and Treatment (SAFE-T)    COPD Follow-Up    Overall, how are your COPD symptoms since your last clinic visit?  Slightly worse    How much fatigue or shortness of breath do you have when you are walking?  More than usual    How much shortness of breath do you have when you are resting?  Same as usual    How often do you cough? Rarely    Have you noticed any change in your sputum/phlegm?  No    Have you experienced a recent fever? No    Please describe how far you can walk without stopping to rest:  The length of 1-2 rooms    How many flights of stairs are you able to walk up without stopping?  None    Have you had any Emergency Room Visits, Urgent Care Visits, or Hospital Admissions because of your COPD since your last office visit?  No    History   Smoking Status     Former Smoker     Packs/day: 0.25     Years: 15.00     Types: Cigarettes     Quit date: 9/21/2016   Smokeless Tobacco     Never Used     Comment: 3-4 a day     Lab Results   Component Value Date    FEV1 50% pred 05/17/2018    OPH3NMQ 86% pred 05/17/2018       Chronic Pain Follow-Up    Where in your body do you have pain? Back, feet hurting like walking on rubber balls.   How has your pain affected your ability to work? Pain does not limit ability to work   What type of work do you or did you do?    Which of these pain treatments have you tried since your last clinic visit? Rest  How well are you sleeping? Fair  How has your mood been since your last visit? About the same  Have you had a significant life event? Relationship Concerns and Health Concerns  Other aggravating factors: none  Taking medication as directed? Yes    PHQ-9 SCORE 3/4/2020 8/27/2020 10/8/2021   PHQ-9 Total Score - - -   PHQ-9 Total Score MyChart - - 5 (Mild depression)   PHQ-9 Total Score 5 8 5     GENARO-7 SCORE 1/22/2019 3/4/2020 8/27/2020   Total Score 7 2 1     No flowsheet data  "found.  Encounter-Level CSA:    There are no encounter-level csa.     Patient-Level CSA:    Controlled Substance Agreement - Opioid - Scan on 9/19/2020  8:30 PM  Controlled Substance Agreement - Opioid - Scan on 3/27/2019  2:34 PM           Hemorrhoids  Onset/Duration: recurrent for a while  Description:   Laina-anal lump: no  Pain: no  Itching: no  Accompanying Signs & Symptoms:  Blood in stool: YES  Changes in stool pattern: no  History:   Any previous GI studies done:Colonoscopy  Family History of colon cancer: no  Precipitating factors:   Stress and getting too upset  Alleviating factors:  None  Therapies tried and outcome: increased fluids        Review of Systems   Constitutional, HEENT, cardiovascular, pulmonary, gi and gu systems are negative, except as otherwise noted.      Objective    BP (!) 149/95 (BP Location: Left arm, Patient Position: Sitting, Cuff Size: Adult Regular)   Pulse 80   Temp 97.3  F (36.3  C) (Tympanic)   Ht 1.664 m (5' 5.5\")   Wt 99.6 kg (219 lb 9.6 oz)   SpO2 97%   BMI 35.99 kg/m    Body mass index is 35.99 kg/m .  Physical Exam   GENERAL: healthy, alert, well nourished, well hydrated, no distress, obese  HENT: ear canals- normal; TMs- normal; Nose- normal; Mouth- no ulcers, no lesions, throat is clear with no erythema or exudate.   NECK: no tenderness, no adenopathy, no asymmetry, no masses, no stiffness; thyroid- normal to palpation  RESP: lungs clear to auscultation - no rales, no rhonchi, no wheezes  CV: regular rates and rhythm, normal S1 S2, no S3 or S4 and no murmur, no click or rub -  ABDOMEN: soft, no tenderness, no  hepatosplenomegaly, no masses, normal bowel sounds  MS: extremities- no gross deformities noted, no edema  SKIN: no suspicious lesions, no rashes  NEURO: strength and tone- normal, sensory exam- grossly normal, mentation- intact, speech- normal, reflexes- symmetric  BACK: no CVA tenderness, no paralumbar tenderness  PSYCH: Alert and oriented times 3; speech- " coherent , normal rate and volume; able to articulate logical thoughts, able to abstract reason, no tangential thoughts, no hallucinations or delusions, affect- normal     Results for orders placed or performed in visit on 10/08/21 (from the past 24 hour(s))   BNP-N terminal pro   Result Value Ref Range    N Terminal Pro BNP Outpatient 636 (H) 0 - 125 pg/mL   Basic metabolic panel   Result Value Ref Range    Sodium 139 133 - 144 mmol/L    Potassium 4.1 3.4 - 5.3 mmol/L    Chloride 103 94 - 109 mmol/L    Carbon Dioxide (CO2) 33 (H) 20 - 32 mmol/L    Anion Gap 3 3 - 14 mmol/L    Urea Nitrogen 29 7 - 30 mg/dL    Creatinine 1.36 (H) 0.66 - 1.25 mg/dL    Calcium 8.8 8.5 - 10.1 mg/dL    Glucose 99 70 - 99 mg/dL    GFR Estimate 54 (L) >60 mL/min/1.73m2   CBC with platelets   Result Value Ref Range    WBC Count 8.5 4.0 - 11.0 10e3/uL    RBC Count 4.93 4.40 - 5.90 10e6/uL    Hemoglobin 15.0 13.3 - 17.7 g/dL    Hematocrit 46.1 40.0 - 53.0 %    MCV 94 78 - 100 fL    MCH 30.4 26.5 - 33.0 pg    MCHC 32.5 31.5 - 36.5 g/dL    RDW 13.0 10.0 - 15.0 %    Platelet Count 173 150 - 450 10e3/uL               Answers for HPI/ROS submitted by the patient on 10/8/2021  If you checked off any problems, how difficult have these problems made it for you to do your work, take care of things at home, or get along with other people?: Somewhat difficult  PHQ9 TOTAL SCORE: 5

## 2021-10-08 ENCOUNTER — OFFICE VISIT (OUTPATIENT)
Dept: FAMILY MEDICINE | Facility: CLINIC | Age: 66
End: 2021-10-08
Payer: COMMERCIAL

## 2021-10-08 ENCOUNTER — LAB (OUTPATIENT)
Dept: LAB | Facility: CLINIC | Age: 66
End: 2021-10-08
Payer: COMMERCIAL

## 2021-10-08 VITALS
SYSTOLIC BLOOD PRESSURE: 149 MMHG | HEART RATE: 80 BPM | WEIGHT: 219.6 LBS | BODY MASS INDEX: 35.29 KG/M2 | DIASTOLIC BLOOD PRESSURE: 95 MMHG | TEMPERATURE: 97.3 F | HEIGHT: 66 IN | OXYGEN SATURATION: 97 %

## 2021-10-08 DIAGNOSIS — G89.4 CHRONIC PAIN SYNDROME: ICD-10-CM

## 2021-10-08 DIAGNOSIS — I10 HYPERTENSION GOAL BP (BLOOD PRESSURE) < 140/90: ICD-10-CM

## 2021-10-08 DIAGNOSIS — K64.8 INTERNAL HEMORRHOIDS: ICD-10-CM

## 2021-10-08 DIAGNOSIS — G60.9 IDIOPATHIC PERIPHERAL NEUROPATHY: ICD-10-CM

## 2021-10-08 DIAGNOSIS — K62.5 RECTAL BLEEDING: ICD-10-CM

## 2021-10-08 DIAGNOSIS — I50.43 ACUTE ON CHRONIC COMBINED SYSTOLIC AND DIASTOLIC CONGESTIVE HEART FAILURE (H): ICD-10-CM

## 2021-10-08 DIAGNOSIS — J44.89 CHRONIC OBSTRUCTIVE AIRWAY DISEASE WITH ASTHMA (H): Primary | ICD-10-CM

## 2021-10-08 LAB
AMPHETAMINES UR QL: NOT DETECTED
ANION GAP SERPL CALCULATED.3IONS-SCNC: 3 MMOL/L (ref 3–14)
BARBITURATES UR QL SCN: NOT DETECTED
BENZODIAZ UR QL SCN: NOT DETECTED
BUN SERPL-MCNC: 29 MG/DL (ref 7–30)
BUPRENORPHINE UR QL: NOT DETECTED
CALCIUM SERPL-MCNC: 8.8 MG/DL (ref 8.5–10.1)
CANNABINOIDS UR QL: DETECTED
CHLORIDE BLD-SCNC: 103 MMOL/L (ref 94–109)
CO2 SERPL-SCNC: 33 MMOL/L (ref 20–32)
COCAINE UR QL SCN: NOT DETECTED
CREAT SERPL-MCNC: 1.36 MG/DL (ref 0.66–1.25)
D-METHAMPHET UR QL: NOT DETECTED
ERYTHROCYTE [DISTWIDTH] IN BLOOD BY AUTOMATED COUNT: 13 % (ref 10–15)
GFR SERPL CREATININE-BSD FRML MDRD: 54 ML/MIN/1.73M2
GLUCOSE BLD-MCNC: 99 MG/DL (ref 70–99)
HCT VFR BLD AUTO: 46.1 % (ref 40–53)
HGB BLD-MCNC: 15 G/DL (ref 13.3–17.7)
MCH RBC QN AUTO: 30.4 PG (ref 26.5–33)
MCHC RBC AUTO-ENTMCNC: 32.5 G/DL (ref 31.5–36.5)
MCV RBC AUTO: 94 FL (ref 78–100)
METHADONE UR QL SCN: NOT DETECTED
NT-PROBNP SERPL-MCNC: 636 PG/ML (ref 0–125)
OPIATES UR QL SCN: NOT DETECTED
OXYCODONE UR QL SCN: NOT DETECTED
PCP UR QL SCN: NOT DETECTED
PLATELET # BLD AUTO: 173 10E3/UL (ref 150–450)
POTASSIUM BLD-SCNC: 4.1 MMOL/L (ref 3.4–5.3)
PROPOXYPH UR QL: NOT DETECTED
RBC # BLD AUTO: 4.93 10E6/UL (ref 4.4–5.9)
SODIUM SERPL-SCNC: 139 MMOL/L (ref 133–144)
TRICYCLICS UR QL SCN: NOT DETECTED
WBC # BLD AUTO: 8.5 10E3/UL (ref 4–11)

## 2021-10-08 PROCEDURE — 80306 DRUG TEST PRSMV INSTRMNT: CPT

## 2021-10-08 PROCEDURE — 90471 IMMUNIZATION ADMIN: CPT | Performed by: FAMILY MEDICINE

## 2021-10-08 PROCEDURE — 99214 OFFICE O/P EST MOD 30 MIN: CPT | Mod: 25 | Performed by: FAMILY MEDICINE

## 2021-10-08 PROCEDURE — 36415 COLL VENOUS BLD VENIPUNCTURE: CPT

## 2021-10-08 PROCEDURE — 85027 COMPLETE CBC AUTOMATED: CPT

## 2021-10-08 PROCEDURE — 90662 IIV NO PRSV INCREASED AG IM: CPT | Performed by: FAMILY MEDICINE

## 2021-10-08 PROCEDURE — 83880 ASSAY OF NATRIURETIC PEPTIDE: CPT

## 2021-10-08 PROCEDURE — 80048 BASIC METABOLIC PNL TOTAL CA: CPT

## 2021-10-08 RX ORDER — ALBUTEROL SULFATE 90 UG/1
2 AEROSOL, METERED RESPIRATORY (INHALATION) EVERY 4 HOURS PRN
Qty: 18 G | Refills: 3 | Status: SHIPPED | OUTPATIENT
Start: 2021-10-08 | End: 2022-03-22

## 2021-10-08 ASSESSMENT — MIFFLIN-ST. JEOR: SCORE: 1715.91

## 2021-10-08 ASSESSMENT — PATIENT HEALTH QUESTIONNAIRE - PHQ9
SUM OF ALL RESPONSES TO PHQ QUESTIONS 1-9: 5
SUM OF ALL RESPONSES TO PHQ QUESTIONS 1-9: 5
10. IF YOU CHECKED OFF ANY PROBLEMS, HOW DIFFICULT HAVE THESE PROBLEMS MADE IT FOR YOU TO DO YOUR WORK, TAKE CARE OF THINGS AT HOME, OR GET ALONG WITH OTHER PEOPLE: SOMEWHAT DIFFICULT

## 2021-10-08 ASSESSMENT — PAIN SCALES - GENERAL: PAINLEVEL: NO PAIN (0)

## 2021-10-08 NOTE — LETTER
Opioid / Opioid Plus Controlled Substance Agreement    This is an agreement between you and your provider about the safe and appropriate use of controlled substance/opioids prescribed by your care team. Controlled substances are medicines that can cause physical and mental dependence (abuse).    There are strict laws about having and using these medicines. We here at [facility] are committing to working with you in your efforts to get better. To support you in this work, we ll help you schedule regular office appointments for medicine refills. If we must cancel or change your appointment for any reason, we ll make sure you have enough medicine to last until your next appointment.     As a Provider, I will:    Listen carefully to your concerns and treat you with respect.     Recommend a treatment plan that I believe is in your best interest. This plan may involve therapies other than opioid pain medication.     Talk with you often about the possible benefits, and the risk of harm of any medicine that we prescribe for you.     Provide a plan on how to taper (discontinue or go off) using this medicine if the decision is made to stop its use.    As a Patient, I understand that opioid(s):     Are a controlled substance prescribed by my care team to help me function or work and manage my condition(s).     Are strong medicines and can cause serious side effects such as:    Drowsiness, which can seriously affect my driving ability    A lower breathing rate, enough to cause death    Harm to my thinking ability     Depression     Abuse of and addiction to this medicine    Need to be taken exactly as prescribed. Combining opioids with certain medicines or chemicals (such as illegal drugs, sedatives, sleeping pills, and benzodiazepines) can be dangerous or even fatal. If I stop opioids suddenly, I may have severe withdrawal symptoms.    Do not work for all types of pain nor for all patients. If they re not helpful, I may  be asked to stop them.        The risks, benefits and side effects of these medicine(s) were explained to me. I agree that:  1. I will take part in other treatments as advised by my care team. This may be psychiatry or counseling, physical therapy, behavioral therapy, group treatment or a referral to a specialist.     2. I will keep all my appointments. I understand that this is part of the monitoring of opioids. My care team may require an office visit for EVERY opioid/controlled substance refill. If I miss appointments or don t follow instructions, my care team may stop my medicine.    3. I will take my medicines as prescribed. I will not change the dose or schedule unless my care team tells me to. There will be no refills if I run out early.     4. I may be asked to come to the clinic and complete a urine drug test or complete a pill count at any time. If I don t give a urine sample or participate in a pill count, the care team may stop my medicine.    5. I will only receive prescriptions from this clinic for chronic pain. If I am treated by another provider for acute pain issues, I will tell them that I am taking opioid pain medication for chronic pain and that I have a treatment agreement with this provider. I will inform my [facility] care team within one business day if I am given a prescription for any pain medication by another healthcare provider. My [facility] care team can contact other providers and pharmacists about my use of any medicines.    6. It is up to me to make sure that I don t run out of my medicines on weekends or holidays. If my care team is willing to refill my opioid prescription without a visit, I must request refills only during office hours. Refills may take up to 3 business days to process. I will use one pharmacy to fill all my opioid and other controlled substance prescriptions. I will notify the clinic about any changes to my insurance or medication  availability.    7. I am responsible for my prescriptions. If the medicine/prescription is lost, stolen or destroyed, it will not be replaced. I also agree not to share controlled substance medicines with anyone.    8. I am aware I should not use any illegal or recreational drugs. I agree not to drink alcohol unless my care team says I can.       9. If I enroll in the [place] [facility] Cannabis program, I will tell my care team prior to my next refill.     10. I will tell my care team right away if I become pregnant, have a new medical problem treated outside of my regular clinic, or have a change in my medications.    11. I understand that this medicine can affect my thinking, judgment and reaction time. Alcohol and drugs affect the brain and body, which can affect the safety of my driving. Being under the influence of alcohol or drugs can affect my decision-making, behaviors, personal safety, and the safety of others. Driving while impaired (DWI) can occur if a person is driving, operating, or in physical control of a car, motorcycle, boat, snowmobile, ATV, motorbike, off-road vehicle, or any other motor vehicle ([place] Statute 169A.20). I understand the risk if I choose to drive or operate any vehicle or machinery.    I understand that if I do not follow any of the conditions above, my prescriptions or treatment may be stopped or changed.          Opioids  What You Need to Know    What are opioids?   Opioids are pain medicines that must be prescribed by a doctor. They are also known as narcotics.     Examples are:   1. morphine (MS Contin, Traci)  2. oxycodone (Oxycontin)  3. oxycodone and acetaminophen (Percocet)  4. hydrocodone and acetaminophen (Vicodin, Norco)   5. fentanyl patch (Duragesic)   6. hydromorphone (Dilaudid)   7. methadone  8. codeine (Tylenol #3)     What do opioids do well?   Opioids are best for severe short-term pain such as after a surgery or injury. They may work well for cancer pain. They may  help some people with long-lasting (chronic) pain.     What do opioids NOT do well?   Opioids never get rid of pain entirely, and they don t work well for most patients with chronic pain. Opioids don t reduce swelling, one of the causes of pain.                                    Other ways to manage chronic pain and improve function include:       Treat the health problem that may be causing pain    Anti-inflammation medicines, which reduce swelling and tenderness, such as ibuprofen (Advil, Motrin) or naproxen (Aleve)    Acetaminophen (Tylenol)    Antidepressants and anti-seizure medicines, especially for nerve pain    Topical treatments such as patches or creams    Injections or nerve blocks    Chiropractic or osteopathic treatment    Acupuncture, massage, deep breathing, meditation, visual imagery, aromatherapy    Use heat or ice at the pain site    Physical therapy     Exercise    Stop smoking    Take part in therapy       Risks and side effects     Talk to your doctor before you start or decide to keep taking opioids. Possible side effects include:      Lowering your breathing rate enough to cause death    Overdose, including death, especially if taking higher than prescribed doses    Worse depression symptoms; less pleasure in things you usually enjoy    Feeling tired or sluggish    Slower thoughts or cloudy thinking    Being more sensitive to pain over time; pain is harder to control    Trouble sleeping or restless sleep    Changes in hormone levels (for example, less testosterone)    Changes in sex drive or ability to have sex    Constipation    Unsafe driving    Itching and sweating    Dizziness    Nausea, throwing up and dry mouth    What else should I know about opioids?    Opioids may lead to dependence, tolerance, or addiction.      Dependence means that if you stop or reduce the medicine too quickly, you will have withdrawal symptoms. These include loose poop (diarrhea), jitters, flu-like symptoms,  nervousness and tremors. Dependence is not the same as addiction.                       Tolerance means needing higher doses over time to get the same effect. This may increase the chance of serious side effects.      Addiction is when people improperly use a substance that harms their body, their mind or their relations with others. Use of opiates can cause a relapse of addiction if you have a history of drug or alcohol abuse.      People who have used opioids for a long time may have a lower quality of life, worse depression, higher levels of pain and more visits to doctors.    You can overdose on opioids. Take these steps to lower your risk of overdose:    1. Recognize the signs:  Signs of overdose include decrease or loss of consciousness (blackout), slowed breathing, trouble waking up and blue lips. If someone is worried about overdose, they should call 911.    2. Talk to your doctor about Narcan (naloxone).   If you are at risk for overdose, you may be given a prescription for Narcan. This medicine very quickly reverses the effects of opioids.   If you overdose, a friend or family member can give you Narcan while waiting for the ambulance. They need to know the signs of overdose and how to give Narcan.     3. Don't use alcohol or street drugs.   Taking them with opioids can cause death.    4. Do not take any of these medicines unless your doctor says it s OK. Taking these with opioids can cause death:    Benzodiazepines, such as lorazepam (Ativan), alprazolam (Xanax) or diazepam (Valium)    Muscle relaxers, such as cyclobenzaprine (Flexeril)    Sleeping pills like zolpidem (Ambien)     Other opioids      How to keep you and other people safe while taking opioids:    1. Never share your opioids with others.  Opioid medicines are regulated by the Drug Enforcement Agency (DAE). Selling or sharing medications is a criminal act.    2. Be sure to store opioids in a secure place, locked up if possible. Young children  can easily swallow them and overdose.    3. When you are traveling with your medicines, keep them in the original bottles. If you use a pill box, be sure you also carry a copy of your medicine list from your clinic or pharmacy.    4. Safe disposal of opioids    Most pharmacies have places to get rid of medicine, called disposal kiosks. Medicine disposal options are also available in every [facility]. Search your county and  medication disposal  to find more options. You can find more details at:  [email or web address]     I agree that my provider, clinic care team, and pharmacy may work with any city, state or federal law enforcement agency that investigates the misuse, sale, or other diversion of my controlled medicine. I will allow my provider to discuss my care with, or share a copy of, this agreement with any other treating provider, pharmacy or emergency room where I receive care.    I have read this agreement and have asked questions about anything I did not understand.    _______________________________________________________  Patient Signature - Santiago Hylton _____________________                   Date     _______________________________________________________  Provider Signature - Tori Rizvi MD   _____________________                   Date     _______________________________________________________  Witness Signature (required if provider not present while patient signing)   _____________________                   Date

## 2021-10-09 ASSESSMENT — PATIENT HEALTH QUESTIONNAIRE - PHQ9: SUM OF ALL RESPONSES TO PHQ QUESTIONS 1-9: 5

## 2021-10-10 NOTE — RESULT ENCOUNTER NOTE
Dear Santiago    Your test results are attached. I am happy to let you know that they are stable.    Please contact me by [employer] if you have any questions about your labs or management. You may also call my office number [phone] for any questions.     Tori Rizvi MD

## 2021-10-13 DIAGNOSIS — I10 ESSENTIAL HYPERTENSION WITH GOAL BLOOD PRESSURE LESS THAN 140/90: ICD-10-CM

## 2021-10-13 DIAGNOSIS — J45.31 MILD PERSISTENT ASTHMA WITH ACUTE EXACERBATION: ICD-10-CM

## 2021-10-13 RX ORDER — MONTELUKAST SODIUM 10 MG/1
TABLET ORAL
Qty: 90 TABLET | Refills: 3 | Status: SHIPPED | OUTPATIENT
Start: 2021-10-13 | End: 2022-09-19

## 2021-10-13 RX ORDER — CARVEDILOL 12.5 MG/1
TABLET ORAL
Qty: 180 TABLET | Refills: 3 | OUTPATIENT
Start: 2021-10-13

## 2021-10-13 NOTE — TELEPHONE ENCOUNTER
Routing refill request to provider for review/approval because:  ACT Total Scores 6/16/2016 4/28/2017 5/17/2018   ACT TOTAL SCORE - - -   ASTHMA ER VISITS - - -   ASTHMA HOSPITALIZATIONS - - -   ACT TOTAL SCORE (Goal Greater than or Equal to 20) 10 11 13   In the past 12 months, how many times did you visit the emergency room for your asthma without being admitted to the hospital? 0 2 3   In the past 12 months, how many times were you hospitalized overnight because of your asthma? 0 0 1    Jenniffer HALLN, RN

## 2021-10-26 DIAGNOSIS — G89.4 CHRONIC PAIN SYNDROME: ICD-10-CM

## 2021-10-26 RX ORDER — OXYCODONE HYDROCHLORIDE 5 MG/1
5 TABLET ORAL 2 TIMES DAILY PRN
Qty: 60 TABLET | Refills: 0 | Status: SHIPPED | OUTPATIENT
Start: 2021-10-26 | End: 2021-12-08

## 2021-10-26 NOTE — TELEPHONE ENCOUNTER
Reason for Call:  Medication or medication refill:    Do you use a [facility]?  Name of the pharmacy and phone number for the current request:  [facility] [place] on [address].    Name of the medication requested: oxycodone (Roxicodone) 5 mg    Other request: Refill request. No pills remaining. Explained we need 72 business hours to refill. Pt ok with that.    Can we leave a detailed message on this number? YES    Phone number patient can be reached at: Cell number on file:    Telephone Information:   Mobile [phone]       Best Time: Anytime    Call taken on 10/26/2021 at 11:58 AM by Nicci Khoury,   [facility]

## 2021-11-12 DIAGNOSIS — G89.4 CHRONIC PAIN SYNDROME: ICD-10-CM

## 2021-11-15 RX ORDER — TRAMADOL HYDROCHLORIDE 50 MG/1
TABLET ORAL
Qty: 60 TABLET | Refills: 1 | Status: SHIPPED | OUTPATIENT
Start: 2021-11-15 | End: 2022-02-22

## 2021-11-15 NOTE — TELEPHONE ENCOUNTER
Routing refill request to provider for review/approval because:  Drug not on the [facility] refill protocol       Noemy Whitney RN  [facility]

## 2021-12-07 DIAGNOSIS — G89.4 CHRONIC PAIN SYNDROME: ICD-10-CM

## 2021-12-07 DIAGNOSIS — J45.30 MILD PERSISTENT ASTHMA, UNCOMPLICATED: ICD-10-CM

## 2021-12-07 NOTE — TELEPHONE ENCOUNTER
Patient calling for a refill on his Albuterol neb solution and Oxycodone to be sent to [facility] in [place].  Sandra Moss MA  [facility]  2nd Floor  Primary Care

## 2021-12-08 RX ORDER — ALBUTEROL SULFATE 0.83 MG/ML
SOLUTION RESPIRATORY (INHALATION)
Qty: 360 ML | Refills: 1 | Status: SHIPPED | OUTPATIENT
Start: 2021-12-08

## 2021-12-08 RX ORDER — OXYCODONE HYDROCHLORIDE 5 MG/1
5 TABLET ORAL 2 TIMES DAILY PRN
Qty: 60 TABLET | Refills: 0 | Status: SHIPPED | OUTPATIENT
Start: 2021-12-08 | End: 2022-01-25

## 2021-12-08 NOTE — TELEPHONE ENCOUNTER
"Requested Prescriptions   Pending Prescriptions Disp Refills    oxyCODONE (ROXICODONE) 5 MG tablet 60 tablet 0     Sig: Take 1 tablet (5 mg) by mouth 2 times daily as needed for moderate to severe pain (up to 2 a day)        There is no refill protocol information for this order        albuterol (PROVENTIL) (2.5 MG/3ML) 0.083% neb solution 360 mL 1        Asthma Maintenance Inhalers - Anticholinergics Failed - 12/7/2021  1:21 PM        Failed - Asthma control assessment score within normal limits in last 6 months     Please review ACT score.           Passed - Patient is age 12 years or older        Passed - Medication is active on med list        Passed - Recent (6 mo) or future (30 days) visit within the authorizing provider's specialty     Patient had office visit in the last 6 months or has a visit in the next 30 days with authorizing provider or within the authorizing provider's specialty.  See \"Patient Info\" tab in inbasket, or \"Choose Columns\" in Meds & Orders section of the refill encounter.           Short-Acting Beta Agonist Inhalers Protocol  Failed - 12/7/2021  1:21 PM        Failed - Asthma control assessment score within normal limits in last 6 months     Please review ACT score.           Passed - Patient is age 12 or older        Passed - Medication is active on med list        Passed - Recent (6 mo) or future (30 days) visit within the authorizing provider's specialty     Patient had office visit in the last 6 months or has a visit in the next 30 days with authorizing provider or within the authorizing provider's specialty.  See \"Patient Info\" tab in inbasket, or \"Choose Columns\" in Meds & Orders section of the refill encounter.                  "

## 2021-12-12 DIAGNOSIS — J45.30 MILD PERSISTENT ASTHMA WITHOUT COMPLICATION: ICD-10-CM

## 2021-12-12 NOTE — LETTER
December 13, 2021      Santiago Hylton  [address]  [facility] [address]        Dear MarieConcetta,    We are writing to inform you of your test results.    {results letter list:[id number]}    No results found from the In Basket message.    If you have any questions or concerns, please call the clinic at the number listed above.       Sincerely,      Tori Rizvi MD

## 2021-12-13 RX ORDER — BUDESONIDE AND FORMOTEROL FUMARATE DIHYDRATE 160; 4.5 UG/1; UG/1
AEROSOL RESPIRATORY (INHALATION)
Qty: 30.6 G | Refills: 1 | Status: SHIPPED | OUTPATIENT
Start: 2021-12-13 | End: 2022-03-22

## 2021-12-13 NOTE — TELEPHONE ENCOUNTER
"Requested Prescriptions   Pending Prescriptions Disp Refills    SYMBICORT 160-4.5 MCG/ACT Inhaler [Pharmacy Med Name: SYMBICORT 160-4.5 MCG INHALER]  1     Sig: TAKE 2 PUFFS BY MOUTH TWICE A DAY        Inhaled Steroids Protocol Failed - 12/13/2021  3:03 PM        Failed - Asthma control assessment score within normal limits in last 6 months     Please review ACT score.           Passed - Patient is age 12 or older        Passed - Medication is active on med list        Passed - Recent (6 mo) or future (30 days) visit within the authorizing provider's specialty     Patient had office visit in the last 6 months or has a visit in the next 30 days with authorizing provider or within the authorizing provider's specialty.  See \"Patient Info\" tab in inbasket, or \"Choose Columns\" in Meds & Orders section of the refill encounter.           Long-Acting Beta Agonist Inhalers Protocol  Failed - 12/13/2021  3:03 PM        Failed - Asthma control assessment score within normal limits in last 6 months     Please review ACT score.           Passed - Patient is age 12 or older        Passed - Medication is active on med list        Passed - Recent (6 mo) or future (30 days) visit within the authorizing provider's specialty     Patient had office visit in the last 6 months or has a visit in the next 30 days with authorizing provider or within the authorizing provider's specialty.  See \"Patient Info\" tab in inbasket, or \"Choose Columns\" in Meds & Orders section of the refill encounter.                  "

## 2022-01-02 ENCOUNTER — HEALTH MAINTENANCE LETTER (OUTPATIENT)
Age: 67
End: 2022-01-02

## 2022-01-18 DIAGNOSIS — E11.42 DIABETIC POLYNEUROPATHY ASSOCIATED WITH TYPE 2 DIABETES MELLITUS (H): ICD-10-CM

## 2022-01-18 NOTE — TELEPHONE ENCOUNTER
Routing refill request to provider for review/approval because:  Drug not on the [facility] refill protocol     Samra Luna RN  [facility]

## 2022-01-19 RX ORDER — GABAPENTIN 300 MG/1
300 CAPSULE ORAL 3 TIMES DAILY
Qty: 270 CAPSULE | Refills: 0 | Status: SHIPPED | OUTPATIENT
Start: 2022-01-19 | End: 2022-03-17

## 2022-01-24 DIAGNOSIS — G89.4 CHRONIC PAIN SYNDROME: ICD-10-CM

## 2022-01-24 RX ORDER — OXYCODONE HYDROCHLORIDE 5 MG/1
5 TABLET ORAL 2 TIMES DAILY PRN
Qty: 60 TABLET | Refills: 0 | Status: CANCELLED | OUTPATIENT
Start: 2022-01-24

## 2022-01-24 NOTE — TELEPHONE ENCOUNTER
.Reason for call:  Medication   If this is a refill request, has the caller requested the refill from the pharmacy already? No  Will the patient be using a [facility]? No  Name of the pharmacy and phone [facility]    Name of the medication requested: oxycodone    Other request: fill script    Phone number to reach patient:  Home number on file [phone] (home)    Best Time:  anytime    Can we leave a detailed message on this number?  YES    Travel screening: Negative

## 2022-01-25 ENCOUNTER — OFFICE VISIT (OUTPATIENT)
Dept: FAMILY MEDICINE | Facility: CLINIC | Age: 67
End: 2022-01-25
Payer: COMMERCIAL

## 2022-01-25 VITALS
HEART RATE: 86 BPM | SYSTOLIC BLOOD PRESSURE: 125 MMHG | BODY MASS INDEX: 35.5 KG/M2 | DIASTOLIC BLOOD PRESSURE: 85 MMHG | WEIGHT: 216.6 LBS | TEMPERATURE: 97.6 F | OXYGEN SATURATION: 96 %

## 2022-01-25 DIAGNOSIS — G89.4 CHRONIC PAIN SYNDROME: Primary | ICD-10-CM

## 2022-01-25 DIAGNOSIS — J44.89 CHRONIC OBSTRUCTIVE AIRWAY DISEASE WITH ASTHMA (H): ICD-10-CM

## 2022-01-25 DIAGNOSIS — I50.43 ACUTE ON CHRONIC COMBINED SYSTOLIC AND DIASTOLIC CONGESTIVE HEART FAILURE (H): ICD-10-CM

## 2022-01-25 DIAGNOSIS — I10 HYPERTENSION GOAL BP (BLOOD PRESSURE) < 140/90: ICD-10-CM

## 2022-01-25 DIAGNOSIS — B18.2 CHRONIC HEPATITIS C WITHOUT HEPATIC COMA (H): ICD-10-CM

## 2022-01-25 DIAGNOSIS — Z23 ENCOUNTER FOR IMMUNIZATION: ICD-10-CM

## 2022-01-25 DIAGNOSIS — I73.9 CLAUDICATION OF BOTH LOWER EXTREMITIES (H): ICD-10-CM

## 2022-01-25 DIAGNOSIS — R73.03 PRE-DIABETES: ICD-10-CM

## 2022-01-25 DIAGNOSIS — N18.31 STAGE 3A CHRONIC KIDNEY DISEASE (H): ICD-10-CM

## 2022-01-25 DIAGNOSIS — I25.118 CORONARY ARTERY DISEASE OF NATIVE ARTERY OF NATIVE HEART WITH STABLE ANGINA PECTORIS (H): ICD-10-CM

## 2022-01-25 DIAGNOSIS — F33.0 MILD RECURRENT MAJOR DEPRESSION (H): ICD-10-CM

## 2022-01-25 DIAGNOSIS — G60.9 IDIOPATHIC PERIPHERAL NEUROPATHY: ICD-10-CM

## 2022-01-25 DIAGNOSIS — E66.01 MORBID OBESITY (H): ICD-10-CM

## 2022-01-25 PROCEDURE — 90714 TD VACC NO PRESV 7 YRS+ IM: CPT | Performed by: PREVENTIVE MEDICINE

## 2022-01-25 PROCEDURE — 90471 IMMUNIZATION ADMIN: CPT | Performed by: PREVENTIVE MEDICINE

## 2022-01-25 PROCEDURE — 99214 OFFICE O/P EST MOD 30 MIN: CPT | Mod: 25 | Performed by: PREVENTIVE MEDICINE

## 2022-01-25 RX ORDER — OXYCODONE HYDROCHLORIDE 5 MG/1
5 TABLET ORAL 2 TIMES DAILY PRN
Qty: 60 TABLET | Refills: 0 | Status: SHIPPED | OUTPATIENT
Start: 2022-01-25 | End: 2022-04-04

## 2022-01-25 ASSESSMENT — PAIN SCALES - GENERAL: PAINLEVEL: SEVERE PAIN (7)

## 2022-01-25 NOTE — PROGRESS NOTES
Assessment & Plan     Chronic pain syndrome  -I provided a one time refill, no further refills from me   -reviewed [place]   -he should see the Pain clinic  - Pain Management Referral  - oxyCODONE (ROXICODONE) 5 MG tablet  Dispense: 60 tablet; Refill: 0  -of note had a urine drug screen done 10/8/21, positive for THC+, and negative for opioids.     Chronic obstructive airway disease with asthma (H)  -stable on inhalers     Coronary artery disease of native artery of native heart with stable angina pectoris (H)  -saw Cardiology 8/4/21  -they had requested follow up in 3 months, ECHO and CTA Angiogram coronary artery, patient has not done this.     Claudication of both lower extremities (H)  -ABIs have been normal in the past  -US AAA ordered by PCP at last visit, not done as yet     Mild recurrent major depression (H)  -stable on selective serotonin reuptake inhibitor Venlafaxine XR    Acute on chronic combined systolic and diastolic congestive heart failure (H)  -ECHO not done that was previously ordered by PCP  -no signs of fluid overload on exam  -encouraged daily weight checks    Morbid obesity (H)  Wt Readings from Last 2 Encounters:   01/25/22 98.2 kg (216 lb 9.6 oz)   10/08/21 99.6 kg (219 lb 9.6 oz)       Stage 3a chronic kidney disease (H)  -avoid NSAIDs    Chronic hepatitis C without hepatic coma (H)  -treated previously     Idiopathic peripheral neuropathy  -on gabapentin     Pre-diabetes  -minimal physical activity due to pain and cold weather     Lab Results   Component Value Date    A1C 5.8 06/18/2021    A1C 5.7 08/27/2020    A1C 5.3 10/24/2019    A1C 5.8 01/15/2019    A1C 5.4 09/11/2018       Hypertension goal BP (blood pressure) < 140/90  -improved on recheck     Encounter for immunization  -Td vaccine done today       Prescription drug management  35 minutes spent on the date of the encounter doing chart review, history and exam, documentation and further activities per the note       BMI:  "  Estimated body mass index is 35.5 kg/m  as calculated from the following:    Height as of 10/8/21: 1.664 m (5' 5.5\").    Weight as of this encounter: 98.2 kg (216 lb 9.6 oz).     Return in about 3 months (around 4/25/2022) for Follow up, with me, with any available provider.    Angeline Vaughan MD MPH    [facility]    Gopal Henderson is a 66 year old who presents for the following health issues     HPI     Establish care, pain medication refill, parking permit form, and pain in finger tips and feet, dull pain 6.5/10.    Hyperlipidemia Follow-Up      Are you regularly taking any medication or supplement to lower your cholesterol?   Yes- statin    Are you having muscle aches or other side effects that you think could be caused by your cholesterol lowering medication?  No    Hypertension Follow-up      Do you check your blood pressure regularly outside of the clinic? No     Are you following a low salt diet? Yes    Are your blood pressures ever more than 140 on the top number (systolic) OR more   than 90 on the bottom number (diastolic), for example 140/90? No    Feels bad when blood pressure goes up    COPD Follow-Up    Stable on inhalers    No recent exacerbations     History   Smoking Status     Former Smoker     Packs/day: 0.25     Years: 15.00     Types: Cigarettes     Quit date: 9/21/2016   Smokeless Tobacco     Never Used     Comment: 3-4 a day     Lab Results   Component Value Date    FEV1 50% pred 05/17/2018    ENU9QAT 86% pred 05/17/2018     Chronic Pain :  -would like a refill on Pain medication   -has been on narcotics for a while  -does not look like he has been seen by a Pain clinic   Heart failure:  -occasionally checks weight at home  -no new chest pain  -no pedal edema    Chronic Kidney Disease Follow-up      Do you take any over the counter pain medicine?: No      Review of Systems   Constitutional, HEENT, cardiovascular, pulmonary, gi and gu systems are negative, except " as otherwise noted.      Objective    /85   Pulse 86   Temp 97.6  F (36.4  C) (Tympanic)   Wt 98.2 kg (216 lb 9.6 oz)   SpO2 96%   BMI 35.50 kg/m    Body mass index is 35.5 kg/m .  Physical Exam   GENERAL APPEARANCE: healthy, alert and no distress  EYES: Eyes grossly normal to inspection and conjunctivae and sclerae normal  NECK: no adenopathy and trachea midline and normal to palpation  RESP: lungs clear to auscultation - no rales, rhonchi or wheezes  CV: regular rates and rhythm, normal S1 S2  ABDOMEN: soft, non-tender and no rebound or guarding, obese+   MS: extremities normal- trace bilateral edema+  SKIN: no suspicious lesions or rashes  NEURO: Normal strength and tone, mentation intact and speech normal  PSYCH: mentation appears normal      No results found for any visits on 01/25/22.    Lab on 10/08/2021   Component Date Value Ref Range Status     N Terminal Pro BNP Outpatient 10/08/2021 636* 0 - 125 pg/mL Final    Reference range shown and results flagged as abnormal are for the outpatient, non acute settings. Establishing a baseline value for each individual patient is useful for follow-up.    Suggested inpatient cut points for confirming diagnosis of CHF in an acute setting are:  >450 pg/mL (age 18 to less than 50)  >900 pg/mL (age 50 to less than 75)  >1800 pg/mL (75 yrs and older)    An inpatient or emergency department NT-proPBNP <300 pg/mL effectively rules out acute CHF, with 99% negative predictive value.         Sodium 10/08/2021 139  133 - 144 mmol/L Final     Potassium 10/08/2021 4.1  3.4 - 5.3 mmol/L Final     Chloride 10/08/2021 103  94 - 109 mmol/L Final     Carbon Dioxide (CO2) 10/08/2021 33* 20 - 32 mmol/L Final     Anion Gap 10/08/2021 3  3 - 14 mmol/L Final     Urea Nitrogen 10/08/2021 29  7 - 30 mg/dL Final     Creatinine 10/08/2021 1.36* 0.66 - 1.25 mg/dL Final     Calcium 10/08/2021 8.8  8.5 - 10.1 mg/dL Final     Glucose 10/08/2021 99  70 - 99 mg/dL Final     GFR Estimate  10/08/2021 54* >60 mL/min/1.73m2 Final    As of July 11, 2021, eGFR is calculated by the CKD-EPI creatinine equation, without race adjustment. eGFR can be influenced by muscle mass, exercise, and diet. The reported eGFR is an estimation only and is only applicable if the renal function is stable.     WBC Count 10/08/2021 8.5  4.0 - 11.0 10e3/uL Final     RBC Count 10/08/2021 4.93  4.40 - 5.90 10e6/uL Final     Hemoglobin 10/08/2021 15.0  13.3 - 17.7 g/dL Final     Hematocrit 10/08/2021 46.1  40.0 - 53.0 % Final     MCV 10/08/2021 94  78 - 100 fL Final     MCH 10/08/2021 30.4  26.5 - 33.0 pg Final     MCHC 10/08/2021 32.5  31.5 - 36.5 g/dL Final     RDW 10/08/2021 13.0  10.0 - 15.0 % Final     Platelet Count 10/08/2021 173  150 - 450 10e3/uL Final     Cannabinoids (94-ise-0-carboxy-9-T* 10/08/2021 Detected* Not Detected, Indeterminate Final    Cutoff for a positive cannabinoid is greater than 50 ng/ml.  This is an unconfirmed screening result to be used for medical purposes only.      Phencyclidine 10/08/2021 Not Detected  Not Detected, Indeterminate Final    Cutoff for a negative PCP is 25 ng/mL or less.     Cocaine (Benzoylecgonine) 10/08/2021 Not Detected  Not Detected, Indeterminate Final    Cutoff for a negative cocaine is 150 ng/ml or less.     Methamphetamine (d-Methamphetamine) 10/08/2021 Not Detected  Not Detected, Indeterminate Final    Cutoff for a negative methamphetamine is 500 ng/ml or less.     Opiates (Morphine) 10/08/2021 Not Detected  Not Detected, Indeterminate Final    Cutoff for a negative opiate is 100 ng/ml or less.     Amphetamine (d-Amphetamine) 10/08/2021 Not Detected  Not Detected, Indeterminate Final    Cutoff for a negative amphetamine is 500 ng/mL or less.     Benzodiazepines (Nordiazepam) 10/08/2021 Not Detected  Not Detected, Indeterminate Final    Cutoff for a negative benzodiazepine is 150 ng/ml or less.     Tricyclic Antidepressants (Desipra* 10/08/2021 Not Detected  Not Detected,  Indeterminate Final    Cutoff for a negative tricyclic antidepressant is 300 ng/ml or less.     Methadone 10/08/2021 Not Detected  Not Detected, Indeterminate Final    Cutoff for a negative methadone is 200 ng/ml or less.     Barbiturates (Butalbital) 10/08/2021 Not Detected  Not Detected, Indeterminate Final    Cutoff for a negative barbituate is 200 ng/ml or less.     Oxycodone 10/08/2021 Not Detected  Not Detected, Indeterminate Final    Cutoff for a negative oxycodone is 100 ng/mL or less.     Propoxyphene (Norpropoxyphene) 10/08/2021 Not Detected  Not Detected, Indeterminate Final    Cutoff for a negative propoxyphene is 300 ng/ml or less.     Buprenorphine 10/08/2021 Not Detected  Not Detected, Indeterminate Final    Cutoff for a negative buprenorphine is 10 ng/ml or less.

## 2022-02-16 DIAGNOSIS — G89.4 CHRONIC PAIN SYNDROME: ICD-10-CM

## 2022-02-17 PROBLEM — G89.4 CHRONIC PAIN SYNDROME: Status: ACTIVE | Noted: 2019-03-14

## 2022-02-21 RX ORDER — TRAMADOL HYDROCHLORIDE 50 MG/1
50 TABLET ORAL EVERY 6 HOURS PRN
Qty: 60 TABLET | Refills: 0 | Status: CANCELLED | OUTPATIENT
Start: 2022-02-21

## 2022-02-21 NOTE — TELEPHONE ENCOUNTER
Pt has follow up visit scheduled for tomorrow.      Noemy Whitney RN  [facility]

## 2022-02-21 NOTE — TELEPHONE ENCOUNTER
Patient was advised at appt last month to sched f/u with pain med.  Has he scheduled this? Once scheduled can provide hiral refill    Tyler Morgan PA-C

## 2022-02-22 ENCOUNTER — VIRTUAL VISIT (OUTPATIENT)
Dept: FAMILY MEDICINE | Facility: CLINIC | Age: 67
End: 2022-02-22
Payer: COMMERCIAL

## 2022-02-22 DIAGNOSIS — G89.4 CHRONIC PAIN SYNDROME: ICD-10-CM

## 2022-02-22 DIAGNOSIS — J44.89 CHRONIC OBSTRUCTIVE AIRWAY DISEASE WITH ASTHMA (H): Primary | ICD-10-CM

## 2022-02-22 PROCEDURE — 99213 OFFICE O/P EST LOW 20 MIN: CPT | Mod: TEL | Performed by: PHYSICIAN ASSISTANT

## 2022-02-22 RX ORDER — PREDNISONE 20 MG/1
40 TABLET ORAL DAILY
Qty: 10 TABLET | Refills: 0 | Status: SHIPPED | OUTPATIENT
Start: 2022-02-22 | End: 2022-02-27

## 2022-02-22 RX ORDER — TRAMADOL HYDROCHLORIDE 50 MG/1
100 TABLET ORAL DAILY PRN
Qty: 60 TABLET | Refills: 0 | Status: SHIPPED | OUTPATIENT
Start: 2022-02-22 | End: 2022-04-04

## 2022-02-22 NOTE — PATIENT INSTRUCTIONS
At [facility], we strive to deliver an exceptional experience to you, every time we see you. If you receive a survey, please complete it as we do value your feedback.  If you have MyChart, you can expect to receive results automatically within 24 hours of their completion.  Your provider will send a note interpreting your results as well.   If you do not have MyChart, you should receive your results in about a week by mail.    Your care team:                            Family Medicine Internal Medicine   MD Robb Teague MD Shantel Branch-Fleming, MD Srinivasa Vaka, MD Katya Belousova, PAMELVA Quinteros, APRN CNP    Mitul Hui, MD Pediatrics   Tyler Morgan, PAMELVA Bustamante, CNP MD Araceli Coates APRN CNP   MD Yoly Thomas MD Deborah Mielke, MD Tessa Mcintosh, APRN [facility]      Clinic hours: Monday - Thursday 7 am-6 pm; Fridays 7 am-5 pm.   Urgent care: Monday - Friday 10 am- 8 pm; Saturday and Sunday 9 am-5 pm.    Clinic: [phone]       [facility]: Monday - Thursday 8 am - 7 pm; Friday 8 am - 6 pm  [facility]: [phone]     Use [email or web address] for 24/7 diagnosis and treatment of dozens of conditions.

## 2022-02-22 NOTE — PROGRESS NOTES
"Santiago is a 67 year old who is being evaluated via a billable telephone visit.      What phone number would you like to be contacted at? [phone]  How would you like to obtain your AVS? Jason    Assessment & Plan   Problem List Items Addressed This Visit        Nervous and Auditory    Chronic pain syndrome    Relevant Medications    traMADol (ULTRAM) 50 MG tablet       Respiratory    Chronic obstructive airway disease with asthma (H) - Primary    Relevant Medications    predniSONE (DELTASONE) 20 MG tablet         Tramadol was refilled 100 mg once daily   Patient reports that he has a follow up with pain clinic as well    Continue symbicort 2 puffs twice a day   Albuterol 2 puffs every 4 hours as needed  Prednisone 40 mg daily for 5 days      12 minutes spent on the date of the encounter doing chart review, history and exam, documentation and further activities per the note       BMI:   Estimated body mass index is 35.5 kg/m  as calculated from the following:    Height as of 10/8/21: 1.664 m (5' 5.5\").    Weight as of 1/25/22: 98.2 kg (216 lb 9.6 oz).           Return in about 3 days (around 2/25/2022), or if symptoms worsen or fail to improve, for in person.    MIGUEL Cordova [facility]    Gopal Henderson is a 67 year old who presents for the following health issues     HPI     Pain History:  When did you first notice your pain? - More than 6 weeks   Have you seen this provider for your pain in the past?   No   Where in your body do your have pain? Low back, legs   Are you seeing anyone else for your pain? No  What makes your pain better? Rest, pain medications   What makes your pain worse? activity  Patient takes Tramadol one months and oxycodone the other months per instructions of the previous health care provider     PHQ-9 SCORE 3/4/2020 8/27/2020 10/8/2021   PHQ-9 Total Score - - -   PHQ-9 Total Score Jason - - 5 (Mild depression)   PHQ-9 Total Score 5 8 5 "               COPD Follow-Up    Overall, how are your COPD symptoms since your last clinic visit?  Slightly worse, has some wheezing with walking, uses Albuterol every 4 hours  For the last 2 days. Patient denies any acute URI symptoms     How much fatigue or shortness of breath do you have when you are walking?  More than usual    How much shortness of breath do you have when you are resting?  None    How often do you cough? Rarely    Have you noticed any change in your sputum/phlegm?  No    Have you experienced a recent fever? No    Please describe how far you can walk without stopping to rest:  The length of 3-5 rooms    How many flights of stairs are you able to walk up without stopping?  1    Have you had any Emergency Room Visits, Urgent Care Visits, or Hospital Admissions because of your COPD since your last office visit?  No    History   Smoking Status     Former Smoker     Packs/day: 0.25     Years: 15.00     Types: Cigarettes     Quit date: 9/21/2016   Smokeless Tobacco     Never Used     Comment: 3-4 a day     Lab Results   Component Value Date    FEV1 50% pred 05/17/2018    HAW0ULH 86% pred 05/17/2018         Review of Systems   Constitutional, HEENT, cardiovascular, pulmonary, gi and gu systems are negative, except as otherwise noted.      Objective           Vitals:  No vitals were obtained today due to virtual visit.    Physical Exam   healthy, alert and no distress  PSYCH: Alert and oriented times 3; coherent speech, normal   rate and volume, able to articulate logical thoughts, able   to abstract reason, no tangential thoughts, no hallucinations   or delusions  His affect is normal  RESP: No cough, no audible wheezing, able to talk in full sentences  Remainder of exam unable to be completed due to telephone visits                Phone call duration: 12 minutes

## 2022-03-01 ENCOUNTER — TRANSFERRED RECORDS (OUTPATIENT)
Dept: HEALTH INFORMATION MANAGEMENT | Facility: CLINIC | Age: 67
End: 2022-03-01

## 2022-03-01 LAB — RETINOPATHY: NORMAL

## 2022-03-08 DIAGNOSIS — F33.0 MILD RECURRENT MAJOR DEPRESSION (H): ICD-10-CM

## 2022-03-09 NOTE — TELEPHONE ENCOUNTER
"Requested Prescriptions   Pending Prescriptions Disp Refills    venlafaxine (EFFEXOR-XR) 75 MG 24 hr capsule 90 capsule 1        Serotonin-Norepinephrine Reuptake Inhibitors  Failed - 3/8/2022  7:47 AM        Failed - PHQ-9 score of less than 5 in past 6 months     Please review last PHQ-9 score.           Failed - Normal serum creatinine on file in past 12 months     Recent Labs   Lab Test 10/08/21  1024   CR 1.36*       Ok to refill medication if creatinine is low          Failed - Recent (6 mo) or future (30 days) visit within the authorizing provider's specialty     Patient had office visit in the last 6 months or has a visit in the next 30 days with authorizing provider or within the authorizing provider's specialty.  See \"Patient Info\" tab in inbasket, or \"Choose Columns\" in Meds & Orders section of the refill encounter.            Passed - Blood pressure under 140/90 in past 12 months       BP Readings from Last 3 Encounters:   01/25/22 125/85   10/08/21 (!) 149/95   08/04/21 126/77                 Passed - Medication is active on med list        Passed - Patient is age 18 or older              "

## 2022-03-10 RX ORDER — VENLAFAXINE HYDROCHLORIDE 75 MG/1
CAPSULE, EXTENDED RELEASE ORAL
Qty: 90 CAPSULE | Refills: 0 | Status: SHIPPED | OUTPATIENT
Start: 2022-03-10 | End: 2022-06-03

## 2022-03-16 DIAGNOSIS — F33.0 MILD RECURRENT MAJOR DEPRESSION (H): ICD-10-CM

## 2022-03-16 DIAGNOSIS — E11.42 DIABETIC POLYNEUROPATHY ASSOCIATED WITH TYPE 2 DIABETES MELLITUS (H): ICD-10-CM

## 2022-03-17 RX ORDER — VENLAFAXINE HYDROCHLORIDE 75 MG/1
CAPSULE, EXTENDED RELEASE ORAL
Qty: 90 CAPSULE | Refills: 0 | OUTPATIENT
Start: 2022-03-17

## 2022-03-17 RX ORDER — GABAPENTIN 300 MG/1
300 CAPSULE ORAL 3 TIMES DAILY
Qty: 270 CAPSULE | Refills: 0 | Status: SHIPPED | OUTPATIENT
Start: 2022-03-17 | End: 2022-08-07

## 2022-03-21 DIAGNOSIS — G89.4 CHRONIC PAIN SYNDROME: ICD-10-CM

## 2022-03-21 NOTE — TELEPHONE ENCOUNTER
Routing refill request to provider for review/approval because:  Drug/ supplies not on the [facility] refill protocol     Sarah Gupta BSN, RN

## 2022-03-28 RX ORDER — TRAMADOL HYDROCHLORIDE 50 MG/1
100 TABLET ORAL DAILY PRN
Qty: 60 TABLET | Refills: 0 | OUTPATIENT
Start: 2022-03-28

## 2022-03-31 NOTE — TELEPHONE ENCOUNTER
Per chart review, is following at outside clinic who have referred him to pain med  Tyler Morgan PA-C

## 2022-04-01 NOTE — PROGRESS NOTES
"Santiago is a 67 year old who is being evaluated via a billable telephone visit.      What phone number would you like to be contacted at? [phone]  How would you like to obtain your AVS? MyChart    Assessment & Plan     Chronic pain syndrome  Apparently alternates oxycodone and tramadol every other month. Takes gabapentin 300 mg TID. Interested in pain clinic referral which I placed today.  - oxyCODONE (ROXICODONE) 5 MG tablet; Take 1 tablet (5 mg) by mouth 2 times daily as needed for moderate to severe pain (up to 2 a day)  - Pain Management Referral; Future    Idiopathic peripheral neuropathy  As above.  - oxyCODONE (ROXICODONE) 5 MG tablet; Take 1 tablet (5 mg) by mouth 2 times daily as needed for moderate to severe pain (up to 2 a day)  - Pain Management Referral; Future             BMI:   Estimated body mass index is 35.5 kg/m  as calculated from the following:    Height as of 10/8/21: 1.664 m (5' 5.5\").    Weight as of 1/25/22: 98.2 kg (216 lb 9.6 oz).       See Patient Instructions    Per cardiology note 8/2021:  Schedule CTA, echo and abdominal US  Schedule with pain clinic  Schedule follow up with cardiology    To schedule your imaging exam at any of the [facility] imaging locations, please call [phone]     Return in about 4 weeks (around 5/2/2022).     The benefits, risks and potential side effects were discussed in detail. Black box warnings discussed as relevant. All patient questions were answered. The patient was instructed to follow up immediately if any adverse reactions develop.    Return precautions discussed, including when to seek urgent/emergent care.    Patient verbalizes understanding and agrees with plan of care.      FEDERICA Todd CNP  [facility] PEYTON Henderson is a 67 year old who presents for the following health issues     History of Present Illness       Reason for visit:  Medication Refill  Symptom onset:  More than a " month  Symptoms include:  Pain in feet  Symptom intensity:  Moderate  Symptom progression:  Staying the same  Had these symptoms before:  Yes  Has tried/received treatment for these symptoms:  Yes  Previous treatment was successful:  No  What makes it worse:  No  What makes it better:  No    He eats 2-3 servings of fruits and vegetables daily.He consumes 0 sweetened beverage(s) daily.He exercises with enough effort to increase his heart rate 20 to 29 minutes per day.  He exercises with enough effort to increase his heart rate 3 or less days per week.   He is taking medications regularly.       Medication Followup of Oxycodone    Taking Medication as prescribed: yes    Side Effects:  None    Medication Helping Symptoms:  yes     Has chronic pain syndrome - pain in legs, feet, fingers  Had went to [facility] but would like referral to [place] pain management instead  primary care provider retired  Taking tramadol #60/month and oxycodone #60 per month every other month. Takes tramadol one month and oxycodone the next month. Last month had tramadol. Now out of medications.  Taking gabapentin 300 mg TID (provider he saw couple weeks ago at outside system clinic recommended he increase to 600 mg but he didn't make the change)  No drugs/alcohol    Last saw cardiology Aug 2021  Still needs to complete CTA, echo and US to screen for AAA      Review of Systems   Constitutional, HEENT, cardiovascular, pulmonary, gi and gu systems are negative, except as otherwise noted.      Objective          Vitals:  No vitals were obtained today due to virtual visit.    Physical Exam   healthy, alert and no distress  PSYCH: Alert and oriented times 3; coherent speech, normal   rate and volume, able to articulate logical thoughts, able   to abstract reason, no tangential thoughts, no hallucinations   or delusions  His affect is normal  RESP: No cough, no audible wheezing, able to talk in full sentences  Remainder of exam unable to be  completed due to telephone visits                Phone call duration: 12 minutes

## 2022-04-04 ENCOUNTER — VIRTUAL VISIT (OUTPATIENT)
Dept: FAMILY MEDICINE | Facility: CLINIC | Age: 67
End: 2022-04-04
Payer: COMMERCIAL

## 2022-04-04 DIAGNOSIS — G60.9 IDIOPATHIC PERIPHERAL NEUROPATHY: ICD-10-CM

## 2022-04-04 DIAGNOSIS — G89.4 CHRONIC PAIN SYNDROME: Primary | ICD-10-CM

## 2022-04-04 PROBLEM — F19.11 MIXED, OR NONDEPENDENT DRUG ABUSE, IN REMISSION (H): Status: ACTIVE | Noted: 2022-04-04

## 2022-04-04 PROBLEM — F19.11 MIXED, OR NONDEPENDENT DRUG ABUSE, IN REMISSION (H): Status: RESOLVED | Noted: 2022-04-04 | Resolved: 2022-04-04

## 2022-04-04 PROCEDURE — 99213 OFFICE O/P EST LOW 20 MIN: CPT | Mod: TEL | Performed by: NURSE PRACTITIONER

## 2022-04-04 RX ORDER — OXYCODONE HYDROCHLORIDE 5 MG/1
5 TABLET ORAL 2 TIMES DAILY PRN
Qty: 60 TABLET | Refills: 0 | Status: SHIPPED | OUTPATIENT
Start: 2022-04-04 | End: 2022-05-11

## 2022-04-04 NOTE — PATIENT INSTRUCTIONS
Schedule CTA, echo and abdominal US  Schedule with pain clinic  Schedule follow up with cardiology    To schedule your imaging exam at any of the [facility] imaging locations, please call [phone]

## 2022-04-08 ENCOUNTER — HOSPITAL ENCOUNTER (OUTPATIENT)
Dept: CT IMAGING | Facility: CLINIC | Age: 67
Discharge: HOME | End: 2022-04-08
Attending: STUDENT IN AN ORGANIZED HEALTH CARE EDUCATION/TRAINING PROGRAM
Payer: COMMERCIAL

## 2022-04-08 ENCOUNTER — TELEPHONE (OUTPATIENT)
Dept: CARDIOLOGY | Facility: CLINIC | Age: 67
End: 2022-04-08
Payer: COMMERCIAL

## 2022-04-08 VITALS
OXYGEN SATURATION: 92 % | DIASTOLIC BLOOD PRESSURE: 86 MMHG | RESPIRATION RATE: 20 BRPM | SYSTOLIC BLOOD PRESSURE: 136 MMHG | HEART RATE: 76 BPM

## 2022-04-08 DIAGNOSIS — I25.9 CHRONIC ISCHEMIC HEART DISEASE: ICD-10-CM

## 2022-04-08 LAB — RADIOLOGIST FLAGS: ABNORMAL

## 2022-04-08 PROCEDURE — 250N000013 HC RX MED GY IP 250 OP 250 PS 637: Performed by: INTERNAL MEDICINE

## 2022-04-08 PROCEDURE — 75574 CT ANGIO HRT W/3D IMAGE: CPT | Mod: 26 | Performed by: INTERNAL MEDICINE

## 2022-04-08 PROCEDURE — 75574 CT ANGIO HRT W/3D IMAGE: CPT

## 2022-04-08 PROCEDURE — 250N000011 HC RX IP 250 OP 636: Performed by: INTERNAL MEDICINE

## 2022-04-08 PROCEDURE — 250N000009 HC RX 250: Performed by: INTERNAL MEDICINE

## 2022-04-08 RX ORDER — NITROGLYCERIN 0.4 MG/1
.4-.8 TABLET SUBLINGUAL
Status: DISCONTINUED | OUTPATIENT
Start: 2022-04-08 | End: 2022-04-09 | Stop reason: HOSPADM

## 2022-04-08 RX ORDER — METOPROLOL TARTRATE 1 MG/ML
5-15 INJECTION, SOLUTION INTRAVENOUS
Status: DISCONTINUED | OUTPATIENT
Start: 2022-04-08 | End: 2022-04-09 | Stop reason: HOSPADM

## 2022-04-08 RX ORDER — DIPHENHYDRAMINE HYDROCHLORIDE 50 MG/ML
25-50 INJECTION INTRAMUSCULAR; INTRAVENOUS
Status: DISCONTINUED | OUTPATIENT
Start: 2022-04-08 | End: 2022-04-09 | Stop reason: HOSPADM

## 2022-04-08 RX ORDER — METOPROLOL TARTRATE 25 MG/1
25-100 TABLET, FILM COATED ORAL
Status: COMPLETED | OUTPATIENT
Start: 2022-04-08 | End: 2022-04-08

## 2022-04-08 RX ORDER — IVABRADINE 5 MG/1
5-15 TABLET, FILM COATED ORAL
Status: COMPLETED | OUTPATIENT
Start: 2022-04-08 | End: 2022-04-08

## 2022-04-08 RX ORDER — IOPAMIDOL 755 MG/ML
120 INJECTION, SOLUTION INTRAVASCULAR ONCE
Status: COMPLETED | OUTPATIENT
Start: 2022-04-08 | End: 2022-04-08

## 2022-04-08 RX ORDER — IVABRADINE 5 MG/1
5 TABLET, FILM COATED ORAL ONCE
Status: COMPLETED | OUTPATIENT
Start: 2022-04-08 | End: 2022-04-08

## 2022-04-08 RX ORDER — ONDANSETRON 2 MG/ML
4 INJECTION INTRAMUSCULAR; INTRAVENOUS
Status: DISCONTINUED | OUTPATIENT
Start: 2022-04-08 | End: 2022-04-09 | Stop reason: HOSPADM

## 2022-04-08 RX ORDER — METHYLPREDNISOLONE SODIUM SUCCINATE 125 MG/2ML
125 INJECTION, POWDER, LYOPHILIZED, FOR SOLUTION INTRAMUSCULAR; INTRAVENOUS
Status: DISCONTINUED | OUTPATIENT
Start: 2022-04-08 | End: 2022-04-09 | Stop reason: HOSPADM

## 2022-04-08 RX ORDER — DIPHENHYDRAMINE HCL 25 MG
25 CAPSULE ORAL
Status: DISCONTINUED | OUTPATIENT
Start: 2022-04-08 | End: 2022-04-09 | Stop reason: HOSPADM

## 2022-04-08 RX ORDER — ACYCLOVIR 200 MG/1
0-1 CAPSULE ORAL
Status: DISCONTINUED | OUTPATIENT
Start: 2022-04-08 | End: 2022-04-09 | Stop reason: HOSPADM

## 2022-04-08 RX ADMIN — IVABRADINE 10 MG: 5 TABLET, FILM COATED ORAL at 08:04

## 2022-04-08 RX ADMIN — METOPROLOL TARTRATE 50 MG: 50 TABLET, FILM COATED ORAL at 08:04

## 2022-04-08 RX ADMIN — IOPAMIDOL 120 ML: 755 INJECTION, SOLUTION INTRAVENOUS at 09:37

## 2022-04-08 RX ADMIN — NITROGLYCERIN 0.8 MG: 0.4 TABLET SUBLINGUAL at 09:43

## 2022-04-08 RX ADMIN — METOPROLOL TARTRATE 10 MG: 5 INJECTION INTRAVENOUS at 09:46

## 2022-04-08 RX ADMIN — IVABRADINE 5 MG: 5 TABLET, FILM COATED ORAL at 08:58

## 2022-04-08 NOTE — PROGRESS NOTES
Pt arrived for Coronary CT angiogram. Test, meds and side effects reviewed with pt. Resting HR 75 bpm; given 50 mg PO Metoprolol + 15 mg PO Ivabradine per verbal order. Administered 0.8 mg SL nitro and 10 mg IV metoprolol on CT table per order. CTA completed; tolerated procedure well and denies symptoms of allergic reaction. Post monitoring completed and VSS. D/C instructions reviewed with pt whom verbalized understanding of need to increase PO fluids today. D/C to gold waiting room accompanied by staff.

## 2022-04-10 DIAGNOSIS — I25.118 CORONARY ARTERY DISEASE OF NATIVE ARTERY OF NATIVE HEART WITH STABLE ANGINA PECTORIS (H): Primary | ICD-10-CM

## 2022-04-10 DIAGNOSIS — I23.6 LV (LEFT VENTRICULAR) MURAL THROMBUS FOLLOWING MI (H): ICD-10-CM

## 2022-04-11 ENCOUNTER — TELEPHONE (OUTPATIENT)
Dept: ANTICOAGULATION | Facility: CLINIC | Age: 67
End: 2022-04-11
Payer: COMMERCIAL

## 2022-04-11 ENCOUNTER — CARE COORDINATION (OUTPATIENT)
Dept: CARDIOLOGY | Facility: CLINIC | Age: 67
End: 2022-04-11
Payer: COMMERCIAL

## 2022-04-11 DIAGNOSIS — I23.6 LV (LEFT VENTRICULAR) MURAL THROMBUS FOLLOWING MI (H): Primary | ICD-10-CM

## 2022-04-11 RX ORDER — APIXABAN 5 MG (74)
KIT ORAL
Qty: 60 EACH | Refills: 0 | Status: SHIPPED | OUTPATIENT
Start: 2022-04-11 | End: 2022-05-11

## 2022-04-11 RX ORDER — WARFARIN SODIUM 5 MG/1
5 TABLET ORAL DAILY
Qty: 30 TABLET | Refills: 2 | Status: SHIPPED | OUTPATIENT
Start: 2022-04-11 | End: 2022-07-21

## 2022-04-11 NOTE — TELEPHONE ENCOUNTER
Chart reviewed with Pauly Guadalupe [facility] and it looks like Santiago is transferring his care to a [place] provider so will forward this to the  [facility] - pt will still use the [facility] for lab draws.     Aime Wyatt RN, BSN  [facility] Anticoagulation Team

## 2022-04-11 NOTE — RESULT ENCOUNTER NOTE
Cardiology    This pleasant gentleman had a CTA to evaluate CTA patency. I reviewed the CT scan results today and, while the stent was patent, noted the finding of the LV thrombi had been reported. The radiology consult and the CT coronary artery evaluation had somewhat conflicting interpretations. Due to this, I reviewed the results with my colleagues earlier today and we came to the consensuses that the filling defects, in the context of this gentleman's history of an LAD culprit infarction and known ischemic cardiomyopathy, were suspicious for the presence of thrombi.    I discussed the findings with Mr. Hylton and his wife over the phone. We will proceed with anticoagulation for three months after a lengthy discussion about the merits and drawbacks of anticoagulation. I discussed the merits and drawbacks of warfarin and apixaban, including dosing regimens, cost, efficacy, and risk of bleeding. My preference is for apixaban due to the lack of serum INR monitoring. Anticoagulation referral placed.     Once the three month period of anticoagulation has been completed, we will then seek a cardiac MRI to determine whether there has been resolution of the thrombus.           Amadeo Hardin, [facility] [place]    Cardiovascular Division

## 2022-04-11 NOTE — PROGRESS NOTES
Set pt up for cardiac MRI on July 12th. Called pt to relay the plan. Pt verbalized understanding and denies any further questions at this time.     Cardiac MRI instructions sent to pt via "[employer] messaging.

## 2022-04-11 NOTE — TELEPHONE ENCOUNTER
Chart reviewed with [facility] RN at time of encounter.    Apixaban NOT on [facility] protocol, please send RX, or let me know if you want to start at 10mg Apixaban BID first 7 days and then go to 5mg BID, or just start at 5mg BID, since not well defined in guidelines for LV thrombus.  CrCl 61ml/min.    Will send warfarin RX & place INR order, please verify if you want bridging started with Lovenox if Apixaban too expensive and patient will be using warfarin.    Pauly Guadalupe, PharmD BCACP  Anticoagulation Clinical Pharmacist

## 2022-04-11 NOTE — TELEPHONE ENCOUNTER
Spoke with patient and he states that the Eliquis is covered by insurance and so he will pick the medication up and get started.    Dosing instructions given to patient and advised to contact cardiology with any questions.    Allison Fuentes RN  [facility] Anticoagulation Clinic

## 2022-04-11 NOTE — TELEPHONE ENCOUNTER
Hu Coats,     Apixaban 10 mg BID for the first seven days and 5 mg BID thereafter is appropriate for this gentleman.     If apixaban is too expensive for the gentleman, then I would like to start with enoxaparin bridging until his warfarin is at a therapeutic INR of 2-3.       Thanks.       Amadeo       Apixaban ordered, along with Lovenox 100mg BID per CrCl 61ml/min and BMI 35g/m2    Pauly Guadalupe PharmD BCACP  Anticoagulation Clinical Pharmacist

## 2022-04-11 NOTE — TELEPHONE ENCOUNTER
Cardiology requesting that patient be started with Anticoagulation Clinic management ASAP/this week. Anticoagulation Referral order placed yesterday by Dr. Hardin. This call was received by the [facility] group, message routed to [place] per referral. Per Cherise, indifferent as to which group manages patient.     COLTEN GUSTAFSON RN-BC, [facility]  Anticoagulation Clinic  [phone]

## 2022-04-14 ENCOUNTER — NURSE TRIAGE (OUTPATIENT)
Dept: NURSING | Facility: CLINIC | Age: 67
End: 2022-04-14
Payer: COMMERCIAL

## 2022-04-18 ENCOUNTER — DOCUMENTATION ONLY (OUTPATIENT)
Dept: ANTICOAGULATION | Facility: CLINIC | Age: 67
End: 2022-04-18
Payer: COMMERCIAL

## 2022-04-18 DIAGNOSIS — I23.6 LV (LEFT VENTRICULAR) MURAL THROMBUS FOLLOWING MI (H): Primary | ICD-10-CM

## 2022-04-18 NOTE — PROGRESS NOTES
ANTICOAGULATION  MANAGEMENT    Santiago Hylton is being discharged from the [facility] Anticoagulation Management Program ([facility]).    Reason for discharge: Pt started Eliquis - see TE from 4/11    Anticoagulation episode resolved, [facility] referral closed and INR Standing order discontinued    If patient needs warfarin management in the future, please send a new referral    Aime Wyatt RN

## 2022-05-11 DIAGNOSIS — G60.9 IDIOPATHIC PERIPHERAL NEUROPATHY: ICD-10-CM

## 2022-05-11 DIAGNOSIS — G89.4 CHRONIC PAIN SYNDROME: ICD-10-CM

## 2022-05-11 RX ORDER — OXYCODONE HYDROCHLORIDE 5 MG/1
5 TABLET ORAL 2 TIMES DAILY PRN
Qty: 60 TABLET | Refills: 0 | Status: SHIPPED | OUTPATIENT
Start: 2022-05-11 | End: 2022-05-30

## 2022-05-11 NOTE — TELEPHONE ENCOUNTER
Refilled  Looks like he's overdue for GENARO  And PHQ 9 per chronic pain policy   Please send to patient to do  Thank you

## 2022-05-11 NOTE — TELEPHONE ENCOUNTER
.Reason for call:  Medication   If this is a refill request, has the caller requested the refill from the pharmacy already? No  Will the patient be using a [facility]? No  Name of [facility] [place]    Name of the medication requested: oxycodone    Other request: pt would like this filled. He just left his pain clinic [facility].     Phone number to reach patient:  Home number on file [phone] (home)    Best Time:  anytime    Can we leave a detailed message on this number?  YES    Travel screening: Negative

## 2022-05-11 NOTE — TELEPHONE ENCOUNTER
Routing refill request to provider for review/approval because:  Drug not on the [facility], [facility] or [facility] refill protocol or controlled substance

## 2022-05-12 ENCOUNTER — MYC MEDICAL ADVICE (OUTPATIENT)
Dept: FAMILY MEDICINE | Facility: CLINIC | Age: 67
End: 2022-05-12
Payer: COMMERCIAL

## 2022-05-12 ASSESSMENT — PATIENT HEALTH QUESTIONNAIRE - PHQ9
SUM OF ALL RESPONSES TO PHQ QUESTIONS 1-9: 4
SUM OF ALL RESPONSES TO PHQ QUESTIONS 1-9: 4
10. IF YOU CHECKED OFF ANY PROBLEMS, HOW DIFFICULT HAVE THESE PROBLEMS MADE IT FOR YOU TO DO YOUR WORK, TAKE CARE OF THINGS AT HOME, OR GET ALONG WITH OTHER PEOPLE: SOMEWHAT DIFFICULT

## 2022-05-12 ASSESSMENT — ANXIETY QUESTIONNAIRES
2. NOT BEING ABLE TO STOP OR CONTROL WORRYING: NOT AT ALL
GAD7 TOTAL SCORE: 1
1. FEELING NERVOUS, ANXIOUS, OR ON EDGE: NOT AT ALL
4. TROUBLE RELAXING: NOT AT ALL
6. BECOMING EASILY ANNOYED OR IRRITABLE: SEVERAL DAYS
7. FEELING AFRAID AS IF SOMETHING AWFUL MIGHT HAPPEN: NOT AT ALL
8. IF YOU CHECKED OFF ANY PROBLEMS, HOW DIFFICULT HAVE THESE MADE IT FOR YOU TO DO YOUR WORK, TAKE CARE OF THINGS AT HOME, OR GET ALONG WITH OTHER PEOPLE?: NOT DIFFICULT AT ALL
GAD7 TOTAL SCORE: 1
3. WORRYING TOO MUCH ABOUT DIFFERENT THINGS: NOT AT ALL
GAD7 TOTAL SCORE: 1
7. FEELING AFRAID AS IF SOMETHING AWFUL MIGHT HAPPEN: NOT AT ALL
5. BEING SO RESTLESS THAT IT IS HARD TO SIT STILL: NOT AT ALL

## 2022-05-12 NOTE — TELEPHONE ENCOUNTER
PHQ-9 score:    PHQ 5/12/2022   PHQ-9 Total Score 4   Q9: Thoughts of better off dead/self-harm past 2 weeks Not at all       GENARO-7 SCORE 3/4/2020 8/27/2020 5/12/2022   Total Score - - 1 (minimal anxiety)   Total Score 2 1 1     No further action required, encounter closed.     Valentine Gonzales, RN

## 2022-05-13 ASSESSMENT — ANXIETY QUESTIONNAIRES: GAD7 TOTAL SCORE: 1

## 2022-05-31 DIAGNOSIS — F33.0 MILD RECURRENT MAJOR DEPRESSION (H): ICD-10-CM

## 2022-05-31 DIAGNOSIS — I10 ESSENTIAL HYPERTENSION WITH GOAL BLOOD PRESSURE LESS THAN 140/90: ICD-10-CM

## 2022-06-01 DIAGNOSIS — G89.4 CHRONIC PAIN SYNDROME: ICD-10-CM

## 2022-06-01 DIAGNOSIS — G60.9 IDIOPATHIC PERIPHERAL NEUROPATHY: ICD-10-CM

## 2022-06-01 NOTE — TELEPHONE ENCOUNTER
.Reason for call:  Medication   If this is a refill request, has the caller requested the refill from the pharmacy already? No  Will the patient be using a [facility]? No  [facility] of [facility]    Name of the medication requested: oxycodone    Other request: fill script    Phone number to reach patient:  Home number on file [phone] (home)    Best Time:  anytime    Can we leave a detailed message on this number?  YES    Travel screening: Negative

## 2022-06-03 RX ORDER — CARVEDILOL 25 MG/1
TABLET ORAL
Qty: 180 TABLET | Refills: 3 | Status: SHIPPED | OUTPATIENT
Start: 2022-06-03 | End: 2023-03-22

## 2022-06-03 RX ORDER — VENLAFAXINE HYDROCHLORIDE 75 MG/1
CAPSULE, EXTENDED RELEASE ORAL
Qty: 90 CAPSULE | Refills: 0 | Status: SHIPPED | OUTPATIENT
Start: 2022-06-03 | End: 2022-07-21

## 2022-06-03 RX ORDER — OXYCODONE HYDROCHLORIDE 5 MG/1
5 TABLET ORAL 2 TIMES DAILY PRN
Qty: 60 TABLET | Refills: 0 | OUTPATIENT
Start: 2022-06-09

## 2022-06-03 NOTE — TELEPHONE ENCOUNTER
Routing refill request to provider for review/approval because:  Drug not on the [facility] refill protocol   Labs not current:  creatinine

## 2022-06-20 ENCOUNTER — TELEPHONE (OUTPATIENT)
Dept: FAMILY MEDICINE | Facility: CLINIC | Age: 67
End: 2022-06-20
Payer: COMMERCIAL

## 2022-06-20 NOTE — TELEPHONE ENCOUNTER
Patient Quality Outreach    Patient is due for the following:   Diabetes -  A1C, LDL (Fasting), Eye Exam, Microalbumin and Diabetic Follow-Up Visit    NEXT STEPS:   Patient was scheduled for an appointment.    Type of outreach:    Phone, spoke to patient/parent. Pt scheduled at 7/21/22 for diabetic f/u visit with Lurdes Roger    Questions for provider review:    None     Ward Aguilera

## 2022-07-01 ENCOUNTER — MYC REFILL (OUTPATIENT)
Dept: FAMILY MEDICINE | Facility: CLINIC | Age: 67
End: 2022-07-01

## 2022-07-01 DIAGNOSIS — G89.4 CHRONIC PAIN SYNDROME: ICD-10-CM

## 2022-07-01 DIAGNOSIS — G60.9 IDIOPATHIC PERIPHERAL NEUROPATHY: ICD-10-CM

## 2022-07-01 RX ORDER — OXYCODONE HYDROCHLORIDE 5 MG/1
5 TABLET ORAL 2 TIMES DAILY PRN
Qty: 60 TABLET | Refills: 0 | Status: SHIPPED | OUTPATIENT
Start: 2022-07-07 | End: 2022-08-06

## 2022-07-01 NOTE — TELEPHONE ENCOUNTER
Last fill 6/9 for #60  Refilled to start 7/7  Has visit this month. Will discuss early refill requests

## 2022-07-12 ENCOUNTER — HOSPITAL ENCOUNTER (OUTPATIENT)
Dept: MRI IMAGING | Facility: CLINIC | Age: 67
Discharge: HOME | End: 2022-07-12
Attending: STUDENT IN AN ORGANIZED HEALTH CARE EDUCATION/TRAINING PROGRAM | Admitting: STUDENT IN AN ORGANIZED HEALTH CARE EDUCATION/TRAINING PROGRAM
Payer: MEDICARE

## 2022-07-12 DIAGNOSIS — I23.6 LV (LEFT VENTRICULAR) MURAL THROMBUS FOLLOWING MI (H): ICD-10-CM

## 2022-07-12 PROCEDURE — G1004 CDSM NDSC: HCPCS

## 2022-07-12 PROCEDURE — A9585 GADOBUTROL INJECTION: HCPCS | Performed by: STUDENT IN AN ORGANIZED HEALTH CARE EDUCATION/TRAINING PROGRAM

## 2022-07-12 PROCEDURE — 255N000002 HC RX 255 OP 636: Performed by: STUDENT IN AN ORGANIZED HEALTH CARE EDUCATION/TRAINING PROGRAM

## 2022-07-12 PROCEDURE — G1004 CDSM NDSC: HCPCS | Performed by: STUDENT IN AN ORGANIZED HEALTH CARE EDUCATION/TRAINING PROGRAM

## 2022-07-12 PROCEDURE — 75561 CARDIAC MRI FOR MORPH W/DYE: CPT | Mod: 26 | Performed by: STUDENT IN AN ORGANIZED HEALTH CARE EDUCATION/TRAINING PROGRAM

## 2022-07-12 RX ORDER — GADOBUTROL 604.72 MG/ML
10 INJECTION INTRAVENOUS ONCE
Status: COMPLETED | OUTPATIENT
Start: 2022-07-12 | End: 2022-07-12

## 2022-07-12 RX ADMIN — GADOBUTROL 10 ML: 604.72 INJECTION INTRAVENOUS at 14:48

## 2022-07-21 ENCOUNTER — OFFICE VISIT (OUTPATIENT)
Dept: FAMILY MEDICINE | Facility: CLINIC | Age: 67
End: 2022-07-21
Payer: MEDICARE

## 2022-07-21 VITALS
SYSTOLIC BLOOD PRESSURE: 122 MMHG | RESPIRATION RATE: 16 BRPM | HEIGHT: 66 IN | OXYGEN SATURATION: 97 % | HEART RATE: 80 BPM | WEIGHT: 208.13 LBS | BODY MASS INDEX: 33.45 KG/M2 | TEMPERATURE: 97.6 F | DIASTOLIC BLOOD PRESSURE: 86 MMHG

## 2022-07-21 DIAGNOSIS — I25.118 CORONARY ARTERY DISEASE OF NATIVE ARTERY OF NATIVE HEART WITH STABLE ANGINA PECTORIS (H): ICD-10-CM

## 2022-07-21 DIAGNOSIS — Z00.00 ENCOUNTER FOR MEDICARE ANNUAL WELLNESS EXAM: Primary | ICD-10-CM

## 2022-07-21 DIAGNOSIS — I23.6 LV (LEFT VENTRICULAR) MURAL THROMBUS FOLLOWING MI (H): ICD-10-CM

## 2022-07-21 DIAGNOSIS — F43.21 GRIEF: ICD-10-CM

## 2022-07-21 DIAGNOSIS — R73.03 PRE-DIABETES: ICD-10-CM

## 2022-07-21 DIAGNOSIS — Z12.5 SCREENING FOR PROSTATE CANCER: ICD-10-CM

## 2022-07-21 DIAGNOSIS — I50.22 CHRONIC SYSTOLIC HEART FAILURE (H): Primary | ICD-10-CM

## 2022-07-21 DIAGNOSIS — I10 HYPERTENSION GOAL BP (BLOOD PRESSURE) < 140/90: ICD-10-CM

## 2022-07-21 DIAGNOSIS — E78.5 HYPERLIPIDEMIA LDL GOAL <100: ICD-10-CM

## 2022-07-21 DIAGNOSIS — E66.01 MORBID OBESITY (H): ICD-10-CM

## 2022-07-21 DIAGNOSIS — I50.43 ACUTE ON CHRONIC COMBINED SYSTOLIC AND DIASTOLIC CONGESTIVE HEART FAILURE (H): ICD-10-CM

## 2022-07-21 DIAGNOSIS — F33.0 MILD RECURRENT MAJOR DEPRESSION (H): ICD-10-CM

## 2022-07-21 LAB
ALT SERPL W P-5'-P-CCNC: 36 U/L (ref 0–70)
ANION GAP SERPL CALCULATED.3IONS-SCNC: 10 MMOL/L (ref 3–14)
BUN SERPL-MCNC: 21 MG/DL (ref 7–30)
CALCIUM SERPL-MCNC: 9.5 MG/DL (ref 8.5–10.1)
CHLORIDE BLD-SCNC: 100 MMOL/L (ref 94–109)
CHOLEST SERPL-MCNC: 167 MG/DL
CO2 SERPL-SCNC: 27 MMOL/L (ref 20–32)
CREAT SERPL-MCNC: 1 MG/DL (ref 0.66–1.25)
CREAT UR-MCNC: 20 MG/DL
FASTING STATUS PATIENT QL REPORTED: YES
GFR SERPL CREATININE-BSD FRML MDRD: 83 ML/MIN/1.73M2
GLUCOSE BLD-MCNC: 96 MG/DL (ref 70–99)
HBA1C MFR BLD: 5.9 % (ref 0–5.6)
HDLC SERPL-MCNC: 69 MG/DL
HGB BLD-MCNC: 15.8 G/DL (ref 13.3–17.7)
LDLC SERPL CALC-MCNC: 76 MG/DL
MICROALBUMIN UR-MCNC: 70 MG/L
MICROALBUMIN/CREAT UR: 350 MG/G CR (ref 0–17)
NONHDLC SERPL-MCNC: 98 MG/DL
POTASSIUM BLD-SCNC: 4.3 MMOL/L (ref 3.4–5.3)
PSA SERPL-MCNC: 0.39 UG/L (ref 0–4)
SODIUM SERPL-SCNC: 137 MMOL/L (ref 133–144)
TRIGL SERPL-MCNC: 108 MG/DL

## 2022-07-21 PROCEDURE — 80061 LIPID PANEL: CPT | Performed by: NURSE PRACTITIONER

## 2022-07-21 PROCEDURE — 82043 UR ALBUMIN QUANTITATIVE: CPT | Performed by: NURSE PRACTITIONER

## 2022-07-21 PROCEDURE — 84460 ALANINE AMINO (ALT) (SGPT): CPT | Performed by: NURSE PRACTITIONER

## 2022-07-21 PROCEDURE — 36415 COLL VENOUS BLD VENIPUNCTURE: CPT | Performed by: NURSE PRACTITIONER

## 2022-07-21 PROCEDURE — 80048 BASIC METABOLIC PNL TOTAL CA: CPT | Performed by: NURSE PRACTITIONER

## 2022-07-21 PROCEDURE — 99207 PR INITIAL PREVENTIVE EXAM STAT: CPT | Performed by: NURSE PRACTITIONER

## 2022-07-21 PROCEDURE — 85018 HEMOGLOBIN: CPT | Performed by: NURSE PRACTITIONER

## 2022-07-21 PROCEDURE — 99214 OFFICE O/P EST MOD 30 MIN: CPT | Mod: 25 | Performed by: NURSE PRACTITIONER

## 2022-07-21 PROCEDURE — 83036 HEMOGLOBIN GLYCOSYLATED A1C: CPT | Performed by: NURSE PRACTITIONER

## 2022-07-21 PROCEDURE — G0103 PSA SCREENING: HCPCS | Performed by: NURSE PRACTITIONER

## 2022-07-21 RX ORDER — VENLAFAXINE HYDROCHLORIDE 75 MG/1
75 CAPSULE, EXTENDED RELEASE ORAL DAILY
Qty: 90 CAPSULE | Refills: 1 | Status: SHIPPED | OUTPATIENT
Start: 2022-07-21 | End: 2022-07-25 | Stop reason: DRUGHIGH

## 2022-07-21 ASSESSMENT — ANXIETY QUESTIONNAIRES
2. NOT BEING ABLE TO STOP OR CONTROL WORRYING: MORE THAN HALF THE DAYS
1. FEELING NERVOUS, ANXIOUS, OR ON EDGE: MORE THAN HALF THE DAYS
3. WORRYING TOO MUCH ABOUT DIFFERENT THINGS: MORE THAN HALF THE DAYS
5. BEING SO RESTLESS THAT IT IS HARD TO SIT STILL: NOT AT ALL
7. FEELING AFRAID AS IF SOMETHING AWFUL MIGHT HAPPEN: NOT AT ALL
GAD7 TOTAL SCORE: 6
GAD7 TOTAL SCORE: 6
IF YOU CHECKED OFF ANY PROBLEMS ON THIS QUESTIONNAIRE, HOW DIFFICULT HAVE THESE PROBLEMS MADE IT FOR YOU TO DO YOUR WORK, TAKE CARE OF THINGS AT HOME, OR GET ALONG WITH OTHER PEOPLE: NOT DIFFICULT AT ALL
4. TROUBLE RELAXING: NOT AT ALL
7. FEELING AFRAID AS IF SOMETHING AWFUL MIGHT HAPPEN: NOT AT ALL
6. BECOMING EASILY ANNOYED OR IRRITABLE: NOT AT ALL
8. IF YOU CHECKED OFF ANY PROBLEMS, HOW DIFFICULT HAVE THESE MADE IT FOR YOU TO DO YOUR WORK, TAKE CARE OF THINGS AT HOME, OR GET ALONG WITH OTHER PEOPLE?: NOT DIFFICULT AT ALL
GAD7 TOTAL SCORE: 6

## 2022-07-21 ASSESSMENT — PAIN SCALES - GENERAL: PAINLEVEL: NO PAIN (0)

## 2022-07-21 ASSESSMENT — PATIENT HEALTH QUESTIONNAIRE - PHQ9
SUM OF ALL RESPONSES TO PHQ QUESTIONS 1-9: 5
SUM OF ALL RESPONSES TO PHQ QUESTIONS 1-9: 5
10. IF YOU CHECKED OFF ANY PROBLEMS, HOW DIFFICULT HAVE THESE PROBLEMS MADE IT FOR YOU TO DO YOUR WORK, TAKE CARE OF THINGS AT HOME, OR GET ALONG WITH OTHER PEOPLE: NOT DIFFICULT AT ALL

## 2022-07-21 ASSESSMENT — ASTHMA QUESTIONNAIRES: ACT_TOTALSCORE: 10

## 2022-07-21 NOTE — PATIENT INSTRUCTIONS
"  Patient Education   Personalized Prevention Plan  You are due for the preventive services outlined below.  Your care team is available to assist you in scheduling these services.  If you have already completed any of these items, please share that information with your care team to update in your medical record.  Health Maintenance Due   Topic Date Due     Zoster (Shingles) Vaccine (1 of 2) Never done     LUNG CANCER SCREENING  Never done     Eye Exam  12/09/2016     Heart Failure Action Plan  02/12/2017     Diabetic Foot Exam  05/17/2019     AORTIC ANEURYSM SCREENING (SYSTEM ASSIGNED)  Never done     A1C Lab  12/18/2021     COVID-19 Vaccine (4 - Booster for Pfizer series) 03/02/2022     Basic Metabolic Panel  04/08/2022     Liver Monitoring Lab  06/18/2022     Cholesterol Lab  06/18/2022     Kidney Microalbumin Urine Test  06/18/2022     Hemoglobin  10/08/2022       Depression and Suicide in Older Adults    Nearly 2 million older Americans have some type of depression. Some of them even take their own lives. Yet depression among older adults is often ignored. Learn the warning signs. You may help spare a loved one needless pain. You may also save a life.   What is depression?  Depression is a common and serious illness that affects the way you think and feel. It is not a normal part of aging, nor is it a sign of weakness, a character flaw, or something you can snap out of. Most people with depression need treatment to get better. The most common symptom is a feeling of deep sadness. People who are depressed also may seem tired and listless. And nothing seems to give them pleasure. It s normal to grieve or be sad sometimes. But sadness lessens or passes with time. Depression rarely goes away or improves on its own. A person with clinical depression can't \"snap out of it.\" Other symptoms of depression are:     Sleeping more or less than normal    Eating more or less than normal    Having headaches, stomachaches, or " other pains that don t go away    Feeling nervous,  empty,  or worthless    Crying a great deal    Thinking or talking about suicide or death    Loss of interest in activities previously enjoyed    Social isolation    Feeling confused or forgetful  What causes it?  The causes of depression aren t fully known. But it is thought to result from a complex blend of these factors:     Biochemistry. Certain chemicals in the brain play a role.    Genes. Depression does run in families.    Life stress. Life stresses can also trigger depression in some people. Older adults often face many stressors, such as death of friends or a spouse, health problems, and financial concerns.    Chronic conditions. This includes conditions such as diabetes, heart disease, or cancer. These can cause symptoms of depression. Medicine side effects can cause changes in thoughts and behaviors.  How you can help  Often, depressed people may not want to ask for help. When they do, they may be ignored. Or, they may receive the wrong treatment. You can help by showing parents and older friends love and support. If they seem depressed, don t lecture the person, ignore the symptoms, or discount the symptoms as a  normal  part of aging -which they are not. Get involved, listen, and show interest and support.   Help them understand that depression is a treatable illness. Tell them you can help them find the right treatment. Offer to go to their healthcare provider's appointment with them for support when the symptoms are discussed. With their approval, contact a local mental health center, social service agency, or hospital about services.   You can be an advocate for him or her at healthcare appointments. Many older adults have chronic illnesses that can cause symptoms of depression. Medicine side effects can change thoughts and behaviors. You can help make sure that the healthcare provider looks at all of these factors. He or she should refer your  family member or friend to a mental healthcare provider when needed. in some cases, untreated depression can lead to a misdiagnosis. A person may be diagnosed with a brain disorder such as dementia. If the healthcare provider does not take the issue of depression seriously, help your family member or friend to find another provider.   Don't be afraid to ask  If you think an older person you care about could be suicidal, ask,  Have you thought about suicide?  Most people will tell you the truth. If they say  yes,  they may already have a plan for how and when they will attempt it. Find out as much as you can. The more detailed the plan, and the easier it is to carry out, the more danger the person is in right now. Tell the person you are there for them and do not want them to harm him or herself. Don't wait to get help for the person. Call the person's healthcare provider, local hospital, or emergency services.   To learn more    National Suicide Prevention Lifeline (crisis hotline) 658-358-IHMV (455-419-6029)    National [place] of Mental Ilcqsg216-969-8795ufo.[facility].nih.gov    National [place] on Mental Ugeqsej280-367-8767pkb.yenifer.org    Mental Health Wtjxrye535-718-8589wdo.[facility].org    National Suicide Fwgzgbx830-VXRPXNF (454-609-1961)    Call 911  Never leave the person alone. A person who is actively suicidal needs psychiatric care right away. They will need constant supervision. Never leave the person out of sight. Call 911 or the national 24-hour suicide crisis hotline at 867-487-BZDM (687-385-7762). You can also take the person to the closest emergency room.   [employer] last reviewed this educational content on 5/1/2020 2000-2021 [employer]. All rights reserved. This information is not intended as a substitute for professional medical care. Always follow your healthcare professional's instructions.

## 2022-07-21 NOTE — PROGRESS NOTES
SUBJECTIVE:   Santiago Hylton is a 67 year old male who presents for Preventive Visit.      Patient has been advised of split billing requirements and indicates understanding: Yes  Are you in the first 12 months of your Medicare coverage?  eye exam in 2022 March at [facility]    History of Present Illness       Mental Health Follow-up:  Patient presents to follow-up on Depression & Anxiety.Patient's depression since last visit has been:  Better  The patient is having other symptoms associated with depression.  Patient's anxiety since last visit has been:  Better  The patient is having other symptoms associated with anxiety.  Any significant life events: grief or loss  Patient is not feeling anxious or having panic attacks.  Patient has no concerns about alcohol or drug use.    Diabetes:   He presents for follow up of diabetes.  He is not checking blood glucose. He has no concerns regarding his diabetes at this time.  He is having numbness in feet, burning in feet and blurry vision. The patient has had a diabetic eye exam in the last 12 months.         Heart Failure:  He presents for follow up of heart failure. He is experiencing shortness of breath with rest and activity, which is slightly worse. He is not experiencing any lower extremity edema.   He has orthopenea and is not coughing at night. Patient is not checking weight daily. He has recently had a None. He has no side effects from medications.  He has has a medical visit for heart failure 2 times since the last visit.    Hypertension: He presents for follow up of hypertension.  He does not check blood pressure  regularly outside of the clinic. Outside blood pressures have been over 140/90. He follows a low salt diet.     Vascular Disease:  He presents for follow up of vascular disease.  He takes daily aspirin.     Today's PHQ-9         PHQ-9 Total Score: 5    PHQ-9 Q9 Thoughts of better off dead/self-harm past 2 weeks :   Not at all    How difficult have these  problems made it for you to do your work, take care of things at home, or get along with other people: Not difficult at all  Today's GENARO-7 Score: 6    Do you feel safe in your environment? Yes    Have you ever done Advance Care Planning? (For example, a Health Directive, POLST, or a discussion with a medical provider or your loved ones about your wishes): Yes, patient states has an Advance Care Planning document and will bring a copy to the clinic.    no issues with hearing   Fall risk  No falls this past year  Fall Risk Assessment not completed.    Cognitive Screening   1) Repeat 3 items (Leader, Season, Table)    2) Clock draw: ABNORMAL clock hands not correct  3) 3 item recall: Recalls 1 object   Results: ABNORMAL clock, 1-2 items recalled: PROBABLE COGNITIVE IMPAIRMENT, **INFORM PROVIDER**    Mini-CogTM Copyright SATYA Wade. Licensed by the author for use in [facility]; reprinted with permission (sarahi@[facility]). All rights reserved.      Do you have sleep apnea, excessive snoring or daytime drowsiness?: no    Reviewed and updated as needed this visit by clinical staff   Tobacco  Allergies  Meds   Med Hx  Surg Hx  Fam Hx  Soc Hx          Reviewed and updated as needed this visit by Provider                   Social History     Tobacco Use     Smoking status: Former Smoker     Packs/day: 0.25     Years: 15.00     Pack years: 3.75     Types: Cigarettes     Quit date: 2016     Years since quittin.8     Smokeless tobacco: Never Used     Tobacco comment: 3-4 a day   Substance Use Topics     Alcohol use: Yes     Comment: weekend beer     If you drink alcohol do you typically have >3 drinks per day or >7 drinks per week? No    Alcohol Use 2022   Prescreen: >3 drinks/day or >7 drinks/week? -   Prescreen: >3 drinks/day or >7 drinks/week? No           [facility] [address] - 2022    Hyperlipidemia Follow-Up      Are you regularly taking any medication or supplement to lower your  cholesterol?   Yes- simvastatin    Are you having muscle aches or other side effects that you think could be caused by your cholesterol lowering medication?  No    Hypertension Follow-up      Do you check your blood pressure regularly outside of the clinic? Yes     Are you following a low salt diet? Yes    Are your blood pressures ever more than 140 on the top number (systolic) OR more   than 90 on the bottom number (diastolic), for example 140/90? No    Depression Followup    How are you doing with your depression since your last visit? No change    Are you having other symptoms that might be associated with depression? No    Have you had a significant life event?  Grief or Loss - wife passed 2022 from aneurysm    Are you feeling anxious or having panic attacks?   No    Do you have any concerns with your use of alcohol or other drugs? No    Social History     Tobacco Use     Smoking status: Former Smoker     Packs/day: 0.25     Years: 15.00     Pack years: 3.75     Types: Cigarettes     Quit date: 2016     Years since quittin.8     Smokeless tobacco: Never Used     Tobacco comment: 3-4 a day   Substance Use Topics     Alcohol use: Yes     Comment: weekend beer     Drug use: No     PHQ 10/8/2021 2022 2022   PHQ-9 Total Score 5 4 5   Q9: Thoughts of better off dead/self-harm past 2 weeks Not at all Not at all Not at all     GENARO-7 SCORE 2020   Total Score - 1 (minimal anxiety) 6 (mild anxiety)   Total Score 1 1 6     Last PHQ-9 2022   1.  Little interest or pleasure in doing things 1   2.  Feeling down, depressed, or hopeless 1   3.  Trouble falling or staying asleep, or sleeping too much 0   4.  Feeling tired or having little energy 1   5.  Poor appetite or overeating 1   6.  Feeling bad about yourself 0   7.  Trouble concentrating 1   8.  Moving slowly or restless 0   Q9: Thoughts of better off dead/self-harm past 2 weeks 0   PHQ-9 Total Score 5   Difficulty at  work, home, or with people -     GENARO-7  7/21/2022   1. Feeling nervous, anxious, or on edge 2   2. Not being able to stop or control worrying 2   3. Worrying too much about different things 2   4. Trouble relaxing 0   5. Being so restless that it is hard to sit still 0   6. Becoming easily annoyed or irritable 0   7. Feeling afraid, as if something awful might happen 0   GENARO-7 Total Score 6   If you checked any problems, how difficult have they made it for you to do your work, take care of things at home, or get along with other people? Not difficult at all       Suicide Assessment Five-step Evaluation and Treatment (SAFE-T)    Chronic/Recurring Back Pain Follow Up      Where is your back pain located? (Select all that apply) low back both    How would you describe your back pain?  sharp    Since your last clinic visit for back pain, how has your pain changed? unchanged    Since your last visit, have you tried any new treatment? No      Current providers sharing in care for this patient include:   Patient Care Team:  Lurdes Roger APRN CNP as PCP - General (Family Medicine)  Rosemary Prabhakar MD as MD (Cardiology)  Amadeo Hardin MD as Assigned Heart and Vascular Provider  Lurdes Roger APRN CNP as Assigned PCP    The following health maintenance items are reviewed in Epic and correct as of today:  Health Maintenance Due   Topic Date Due     ZOSTER IMMUNIZATION (1 of 2) Never done     LUNG CANCER SCREENING  Never done     EYE EXAM  12/09/2016     HF ACTION PLAN  02/12/2017     DIABETIC FOOT EXAM  05/17/2019     AORTIC ANEURYSM SCREENING (SYSTEM ASSIGNED)  Never done     COVID-19 Vaccine (4 - Booster for Pfizer series) 03/02/2022     BMP  04/08/2022     ALT  06/18/2022     LIPID  06/18/2022     MICROALBUMIN  06/18/2022     HEMOGLOBIN  10/08/2022     Lab work is in process  Labs reviewed in EPIC  BP Readings from Last 3 Encounters:   07/21/22 122/86   04/08/22 136/86   01/25/22 125/85    Wt  Readings from Last 3 Encounters:   22 94.4 kg (208 lb 2 oz)   22 98.2 kg (216 lb 9.6 oz)   10/08/21 99.6 kg (219 lb 9.6 oz)                  Patient Active Problem List   Diagnosis     Hypertension goal BP (blood pressure) < 140/90     Hyperlipidemia LDL goal <100     CHF (congestive heart failure) (H)     Mild recurrent major depression (H)     Gout     GERD (gastroesophageal reflux disease)     Chronic rhinitis     History of colonic polyps     Advanced directives, counseling/discussion     Chronic hepatitis C (H)     Coronary artery disease of native artery of native heart with stable angina pectoris (H)     Chronic obstructive airway disease with asthma (H)     Obesity (BMI 35.0-39.9) with comorbidity (H)     Chronic pain syndrome     CKD (chronic kidney disease) stage 3, GFR 30-59 ml/min (H)     Internal hemorrhoids     Chronic ischemic heart disease     Claudication of both lower extremities (H)     Rectal bleeding     Idiopathic peripheral neuropathy     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 (H)     Grief     Past Surgical History:   Procedure Laterality Date     CARDIAC SURGERY      stent     CATARACT IOL, RT/LT       COLONOSCOPY  2012    Procedure: COLONOSCOPY;  COLONOSCOPY, SCREEN;  Surgeon: Cl Johnson MD;  Location:  OR     ORTHOPEDIC SURGERY      ankle     PHACOEMULSIFICATION WITH STANDARD INTRAOCULAR LENS IMPLANT Right 2016    Procedure: PHACOEMULSIFICATION WITH STANDARD INTRAOCULAR LENS IMPLANT;  Surgeon: Fly Merrill MD;  Location:  OR     PHACOEMULSIFICATION WITH STANDARD INTRAOCULAR LENS IMPLANT Left 2016    Procedure: PHACOEMULSIFICATION WITH STANDARD INTRAOCULAR LENS IMPLANT;  Surgeon: Fly Merrill MD;  Location:  OR       Social History     Tobacco Use     Smoking status: Former Smoker     Packs/day: 0.25     Years: 15.00     Pack years: 3.75     Types: Cigarettes     Quit date: 2016     Years since quittin.8      Smokeless tobacco: Never Used     Tobacco comment: 3-4 a day   Substance Use Topics     Alcohol use: Yes     Comment: weekend beer     Family History   Problem Relation Age of Onset     Heart Disease Maternal Grandmother      Hypertension Maternal Grandmother      Hypertension Father      Hypertension Mother      Hypertension Sister      Thyroid Disease Sister      Cancer Brother      Diabetes Brother      Hypertension Brother      Hypertension Maternal Aunt      Cancer Maternal Uncle      Hypertension Maternal Uncle      Hypertension Paternal Aunt      Hypertension Paternal Uncle      Hypertension Maternal Grandfather      Hypertension Paternal Grandmother      Hypertension Paternal Grandfather      Cerebrovascular Disease No family hx of      Glaucoma No family hx of      Macular Degeneration No family hx of          Current Outpatient Medications   Medication Sig Dispense Refill     albuterol (PROAIR HFA/PROVENTIL HFA/VENTOLIN HFA) 108 (90 Base) MCG/ACT inhaler Inhale 2 puffs into the lungs every 4 hours as needed for shortness of breath / dyspnea 18 g 3     albuterol (PROVENTIL) (2.5 MG/3ML) 0.083% neb solution INHALE 3 ML BY NEBULIZATION METHOD EVERY 6 HOURS AS NEEDED FOR SHORTNESS OF BREATH 360 mL 1     allopurinol (ZYLOPRIM) 100 MG tablet TAKE 2 TABLETS BY MOUTH EVERY  tablet 3     apixaban ANTICOAGULANT (ELIQUIS) 5 MG tablet Take 1 tablet (5 mg) by mouth in the morning and 1 tablet (5 mg) in the evening. 180 tablet 1     aspirin (ASA) 325 MG EC tablet Take 1 tablet (325 mg) by mouth daily 90 tablet 3     budesonide-formoterol (SYMBICORT) 160-4.5 MCG/ACT Inhaler TAKE 2 PUFFS BY MOUTH TWICE A DAY 30.6 g 1     carvedilol (COREG) 25 MG tablet TAKE 1 TABLET BY MOUTH TWICE DAILY WITH MEALS 180 tablet 3     cetirizine (ZYRTEC) 10 MG tablet TAKE 1 TABLET BY MOUTH DAILY AT BEDTIME 90 tablet 1     furosemide (LASIX) 40 MG tablet Take 1 tablet (40 mg) by mouth daily 90 tablet 3     gabapentin (NEURONTIN) 300  MG capsule TAKE 1 CAPSULE (300 MG) BY MOUTH 3 TIMES DAILY PLEASE FOLLOW UP IN CLINIC FOR NEXT REFILL. 270 capsule 0     isosorbide mononitrate (IMDUR) 60 MG 24 hr tablet Take 1 tablet (60 mg) by mouth daily 90 tablet 3     losartan (COZAAR) 50 MG tablet Take 1 tablet (50 mg) by mouth daily 90 tablet 0     montelukast (SINGULAIR) 10 MG tablet TAKE 1 TABLET BY MOUTH EVERYDAY AT BEDTIME 90 tablet 3     nitroGLYcerin (NITROSTAT) 0.4 MG sublingual tablet Place 1 tablet (0.4 mg) under the tongue every 5 minutes as needed for chest pain 0.4 mg by Sublingual route every 5 (five) minutes as needed. 25 tablet 11     omeprazole (PRILOSEC) 20 MG DR capsule TAKE 1 CAPSULE BY MOUTH EVERY DAY 90 capsule 2     oxyCODONE (ROXICODONE) 5 MG tablet Take 1 tablet (5 mg) by mouth 2 times daily as needed for moderate to severe pain (up to 2 a day) 60 tablet 0     simvastatin (ZOCOR) 40 MG tablet Take 1 tablet (40 mg) by mouth At Bedtime 90 tablet 3     tamsulosin (FLOMAX) 0.4 MG capsule Take 1 capsule (0.4 mg) by mouth daily 90 capsule 3     traZODone (DESYREL) 100 MG tablet TAKE 2 TABLETS BY MOUTH AT BEDTIME 180 tablet 2     venlafaxine (EFFEXOR XR) 75 MG 24 hr capsule Take 1 capsule (75 mg) by mouth daily 90 capsule 1     enoxaparin ANTICOAGULANT (LOVENOX) 100 MG/ML syringe Inject 1 mL (100 mg) Subcutaneous in the morning and 1 mL (100 mg) in the evening. Until INR 2.3 or greater (Patient not taking: Reported on 7/21/2022) 20 mL 1     Allergies   Allergen Reactions     No Known Drug Allergies      Recent Labs   Lab Test 07/21/22  1007 10/08/21  1024 06/18/21  1522 08/27/20  0946 10/24/19  1001 01/15/19  0853 09/11/18  1208 05/17/18  1045   A1C 5.9*  --  5.8* 5.7* 5.3   < > 5.4 5.8*   LDL  --   --  139*  --  84  --  58 70   HDL  --   --  53  --  48  --  40 52   TRIG  --   --  196*  --  78  --  190* 118   ALT  --   --  30  --  42  --  42 25   CR  --  1.36* 1.38* 1.17 1.12   < > 1.20 1.44*   GFRESTIMATED  --  54* 53* 65 69   < > 61 50*  "  GFRESTBLACK  --   --  62 76 80   < > 74 60*   POTASSIUM  --  4.1 4.3 4.1 4.2  --  4.5 4.4   TSH  --   --   --   --   --   --  0.79 2.04    < > = values in this interval not displayed.              Review of Systems  Constitutional, HEENT, cardiovascular, pulmonary, GI, , musculoskeletal, neuro, skin, endocrine and psych systems are negative, except as otherwise noted.    OBJECTIVE:   /86 (BP Location: Left arm, Patient Position: Sitting, Cuff Size: Adult Large)   Pulse 80   Temp 97.6  F (36.4  C) (Tympanic)   Resp 16   Ht 1.675 m (5' 5.95\")   Wt 94.4 kg (208 lb 2 oz)   SpO2 97%   BMI 33.65 kg/m   Estimated body mass index is 33.65 kg/m  as calculated from the following:    Height as of this encounter: 1.675 m (5' 5.95\").    Weight as of this encounter: 94.4 kg (208 lb 2 oz).  Physical Exam  GENERAL: healthy, alert and no distress  EYES: Eyes grossly normal to inspection, PERRL and conjunctivae and sclerae normal  HENT: ear canals and TM's normal, nose and mouth without ulcers or lesions  NECK: no adenopathy  RESP: lungs clear to auscultation - no rales, rhonchi or wheezes  CV: regular rate and rhythm, normal S1 S2, no S3 or S4, no murmur, click or rub, no peripheral edema and peripheral pulses strong  ABDOMEN: soft, nontender, no hepatosplenomegaly, no masses and bowel sounds normal  MS: no gross musculoskeletal defects noted, no edema  SKIN: no suspicious lesions or rashes  NEURO: Normal strength and tone, mentation intact and speech normal  PSYCH: mentation appears normal, affect normal/bright    Diagnostic Test Results:  Labs reviewed in Epic  Results for orders placed or performed in visit on 07/21/22 (from the past 24 hour(s))   HEMOGLOBIN A1C   Result Value Ref Range    Hemoglobin A1C 5.9 (H) 0.0 - 5.6 %       ASSESSMENT / PLAN:   (Z00.00) Encounter for Medicare annual wellness exam  (primary encounter diagnosis)    (I10) Hypertension goal BP (blood pressure) < 140/90  Comment: controlled. " "  Plan: BASIC METABOLIC PANEL, Albumin Random Urine         Quantitative with Creat Ratio, Hemoglobin            (E78.5) Hyperlipidemia LDL goal <100  Comment: rechecking today  Plan: ALT           (R73.03) Pre-diabetes  Comment: rechecking today  Plan: HEMOGLOBIN A1C            (F33.0) Mild recurrent major depression (H)  Comment: refilled. Doing well. No thoughts of harm.  Plan: venlafaxine (EFFEXOR XR) 75 MG 24 hr capsule            (Z12.5) Screening for prostate cancer  Plan: PSA, screen            (I23.6) 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 (H)  Comment: follows with cardiology. He recently had more imaging and is awaiting plan    (F43.21) Grief  Comment: wife passed from [facility] 2022        COUNSELING:  Reviewed preventive health counseling, as reflected in patient instructions       Regular exercise       Healthy diet/nutrition    Estimated body mass index is 33.65 kg/m  as calculated from the following:    Height as of this encounter: 1.675 m (5' 5.95\").    Weight as of this encounter: 94.4 kg (208 lb 2 oz).    Weight management plan: Discussed healthy diet and exercise guidelines    He reports that he quit smoking about 5 years ago. His smoking use included cigarettes. He has a 3.75 pack-year smoking history. He has never used smokeless tobacco.      Appropriate preventive services were discussed with this patient, including applicable screening as appropriate for cardiovascular disease, diabetes, osteopenia/osteoporosis, and glaucoma.  As appropriate for age/gender, discussed screening for colorectal cancer, prostate cancer, breast cancer, and cervical cancer. Checklist reviewing preventive services available has been given to the patient.    Reviewed patients plan of care and provided an AVS. The Intermediate Care Plan ( asthma action plan, low back pain action plan, and migraine action plan) for Santiago meets the Care Plan requirement. This Care Plan has been established and reviewed " with the Patient.      The benefits, risks and potential side effects were discussed in detail. Black box warnings discussed as relevant. All patient questions were answered. The patient was instructed to follow up immediately if any adverse reactions develop.    Return precautions discussed, including when to seek urgent/emergent care.    Patient verbalizes understanding and agrees with plan of care. Patient stable for discharge.      Counseling Resources:  ATP IV Guidelines  Pooled Cohorts Equation Calculator  Breast Cancer Risk Calculator  Breast Cancer: Medication to Reduce Risk  FRAX Risk Assessment  ICSI Preventive Guidelines  Dietary Guidelines for Americans, 2010  [employer]'s MyPlate  ASA Prophylaxis  Lung CA Screening    FEDERICA SHAH CNP  [facility]    Identified Health Risks:    The patient's PHQ-9 score is consistent with mild depression. He was provided with information regarding depression and was advised to schedule a follow up appointment in 6 weeks to further address this issue.

## 2022-07-21 NOTE — RESULT ENCOUNTER NOTE
Cardiology    I personally reviewed the MRI results with Mr. Hylton over the phone. The LV apical thrombus persists. I also called Mr. Hylton to tell him that the LV function is also not ~45%, as was reported on his TTE in 2016, but probably closer to 30%. I reviewed his prior CMR from 2013 and felt that his LV function that examination was also ~30-35%. In view of this, I would like to refer him to [facility] to pursue neurohormonal drug titration. He was agreeable to this.       Amadeo Hardin [facility] [place]    Cardiovascular Division

## 2022-07-24 ENCOUNTER — E-VISIT (OUTPATIENT)
Dept: FAMILY MEDICINE | Facility: CLINIC | Age: 67
End: 2022-07-24
Payer: MEDICARE

## 2022-07-24 DIAGNOSIS — R35.1 NOCTURIA: ICD-10-CM

## 2022-07-24 DIAGNOSIS — R35.0 URINARY FREQUENCY: ICD-10-CM

## 2022-07-24 DIAGNOSIS — F33.0 MILD RECURRENT MAJOR DEPRESSION (H): Primary | ICD-10-CM

## 2022-07-24 DIAGNOSIS — I10 ESSENTIAL HYPERTENSION WITH GOAL BLOOD PRESSURE LESS THAN 140/90: ICD-10-CM

## 2022-07-24 PROCEDURE — 99207 PR NON-BILLABLE SERV PER CHARTING: CPT | Performed by: NURSE PRACTITIONER

## 2022-07-24 ASSESSMENT — PATIENT HEALTH QUESTIONNAIRE - PHQ9
SUM OF ALL RESPONSES TO PHQ QUESTIONS 1-9: 17
SUM OF ALL RESPONSES TO PHQ QUESTIONS 1-9: 17
10. IF YOU CHECKED OFF ANY PROBLEMS, HOW DIFFICULT HAVE THESE PROBLEMS MADE IT FOR YOU TO DO YOUR WORK, TAKE CARE OF THINGS AT HOME, OR GET ALONG WITH OTHER PEOPLE: SOMEWHAT DIFFICULT

## 2022-07-25 RX ORDER — VENLAFAXINE HYDROCHLORIDE 150 MG/1
150 CAPSULE, EXTENDED RELEASE ORAL DAILY
Qty: 30 CAPSULE | Refills: 1 | Status: SHIPPED | OUTPATIENT
Start: 2022-07-25 | End: 2022-09-26

## 2022-07-25 ASSESSMENT — PATIENT HEALTH QUESTIONNAIRE - PHQ9: SUM OF ALL RESPONSES TO PHQ QUESTIONS 1-9: 17

## 2022-07-25 NOTE — PATIENT INSTRUCTIONS
"    Recognizing Suicide Warning Signs in Yourself     People who are thinking about suicide may not know they are depressed. Certain thoughts, feelings, and actions can be signals that let you know you may need help. The best thing you can do is watch for signs that you may be at risk. Then, ask for help. You can talk with your regular healthcare provider or get help from a mental health provider.   Depression  Depression is a treatable illness, just like diabetes or heart disease. And like those illnesses, depression is not something that you can just \"snap out of.\" To feel better, treatment is needed before depression gets to a point that it can endanger your life. To know if depression is causing you to feel like ending your life, ask yourself:     Do I feel worthless, guilty, helpless, or hopeless?    Have I been feeling sad, down, or blue on most days?    Have I lost interest in my work or people I used to enjoy?    Do I have trouble sleeping or do I sleep too much?    Do I eat more or less than normal?    Do I feel tired, weak, and low on energy?    Do I feel restless and unable to sit still?    Do I have trouble thinking or making choices?    Do I cry more than normal?    Do I feel life isn't worth living?  Warning signs for suicide  Call your healthcare provider or get help right away if you have any of the warning signs below. You can also call a mental health clinic or a 24-hour suicide crisis hotline for help and support. Warning signs for suicide include:     Thinking often about taking your life    Planning how you may attempt it    Talking or writing about committing suicide    Feeling that death is the only solution to your problems    Feeling a pressing need to make out your will or arrange your     Giving away things you own    Taking part in risky behaviors, such as having sex with someone you don't know, or drinking and driving    Buying a lethal weapon, such as a gun, or hoarding medicines " that could be used in an overdose  Call 911  If you are in immediate risk of harming yourself, call 911  To learn more  For more information about depression and suicide prevention:     National [employer] of Mental Health 160-725-2516whj.[facility].nih.gov    National Suicide Prevention Lifeline 879-844-7824 (146-538-NIDW) www.suicidepreventionlifeline.org    National [place] on Mental Illness 762-941-0016nym.yenifer.org    Mental Health Tfskbxp711-851-1479ypn.[facility].org    National Suicide Pweymim687-308-4419 (800-SUICIDE)  StayWell last reviewed this educational content on 5/1/2020 2000-2021 [employer]. All rights reserved. This information is not intended as a substitute for professional medical care. Always follow your healthcare professional's instructions.          Using Antidepressants  Depression is a mood disorder that affects the way you think and feel. The most common symptom is a feeling of deep sadness. This feeling doesn't go away or get better on its own. But most types of depression can be helped with therapy and antidepressant medicines. (Note: This covers antidepressant use in adults only.)     What do antidepressants do?  Antidepressants restore the balance of certain chemicals in your brain to help ease your depression. You will likely feel better in 4 to 6 weeks. But you may continue taking antidepressants for a year or more to keep your symptoms from coming back. Some people with depression need to take antidepressants for life. There are several types of antidepressants. The main types are described below.   Selective serotonin reuptake inhibitors (SSRIs)  SSRIs are effective medicines for the treatment of depression. They tend to have fewer side effects than other antidepressants. Possible side effects include anxiety, trouble sleeping, nausea, diarrhea, sexual dysfunction, and headaches. In rare cases, they may make you more depressed. SSRIs shouldn t be mixed with certain other  medicines. Talk with your healthcare provider about all the medicines, herbs, and supplements you are taking.   Tricyclic antidepressants  Tricyclics help severe or long-term depression. They have been used for many years with good results. Possible side effects include blurred vision, dry mouth, and constipation.   Monoamine oxidase inhibitors (MAOIs)  If you don t respond to tricyclics or SSRIs, your healthcare provider may prescribe MAOIs. These medicines can be very effective. But people taking MAOIs must stay away from certain foods and medicines. Your healthcare provider can tell you more.   Lithium  If you have bipolar disorder, you may take a medicine called lithium. This medicine helps even out your mood. Possible side effects are weight gain, trembling, loose stool, and nausea. Lithium is also used:     For people who have unipolar depression and have not responded to other antidepressants    For people who have a sudden (acute) episode of unipolar depression    As a maintenance medicine to prevent unipolar depression from happening again  Things to stay away from if you are taking MAOIs   If you are taking MAOIs, don t have any of the following:     Beans    Aged cheese    Chocolate    Red wine    Most cold medicines    Certain medicines (ask your healthcare provider)  To reduce the risk of lithium poisoning  You can reduce the risk of lithium poisoning by following this advice:    Take only the prescribed amount of lithium. If your depressive symptoms get worse, contact your healthcare provider. Never increase or decrease your medicine on your own.    Drink plenty of fluids other than coffee, tea, and soda.    Limit salt in your diet.    Before using other prescribed medicines or over-the-counter medicines, check with your pharmacist. This is to be sure the medicines won t interact with the lithium.    Never share your medicines or use another person's medicines, even if it is the same medicine and  dose.    Keep follow-up appointments.    Have your lithium blood level checked as advised. You will need blood work more often when symptoms are not under control.  If you have side effects  The side effects of antidepressants are usually mild. But if you have troubling side effects, call your healthcare provider. Changing the dosage or type of medicine may help. Never stop taking medicines on your own.   [employer] last reviewed this educational content on 9/1/2019 2000-2021 [employer]. All rights reserved. This information is not intended as a substitute for professional medical care. Always follow your healthcare professional's instructions.          Depression: Tips to Help Yourself    As your healthcare providers help treat your depression, you can also help yourself. Keep in mind that your illness affects you emotionally, physically, mentally, and socially. So full recovery will take time. Take care of your body and your soul, and be patient with yourself as you get better.  Self-care    Educate yourself. Read about treatment and medicine options. If you have the energy, attend local conferences or support groups. Keep a list of useful websites and helpful books and use them as needed. This illness is not your fault. Don t blame yourself for your depression.    Manage early symptoms. If you notice symptoms returning, experience triggers, or identify other factors that may lead to a depressive episode, get help as soon as possible. Ask trusted friends and family to monitor your behavior and let you know if they see anything of concern.    Work with your provider. Find a provider you can trust. Communicate honestly with that person and share information on your treatment for depression and your reaction to medicines.    Be prepared for a crisis. Know what to do if you experience a crisis. Keep the phone number of a crisis hotline and know the location of your community's urgent care centers and  the closest emergency department.    Hold off on big decisions. Depression can cloud your judgment. So wait until you feel better before making major life decisions, such as changing jobs, moving, or getting  or .    Be patient. Recovering from depression is a process. Don t be discouraged if it takes some time to feel better.    Keep it simple. Depression saps your energy and concentration. So you won t be able to do all the things you used to do. Set small goals and do what you can.    Be with others. Don t isolate yourself--you ll only feel worse. Try to be with other people. And take part in fun activities when you can. Go to a movie, SPark!game, Temple service, or social event. Talk openly with people you can trust. And accept help when it s offered.    Take care of your body  People with depression often lose the desire to take care of themselves. That only makes their problems worse. During treatment and afterward, make a point to:    Exercise. It s a great way to take care of your body. And studies have shown that exercise helps fight depression. Aim for 30 minutes of moderate activity a day. Walking in small blocks of time (5-10 minutes) is a good way to start, but anything that gets you moving (gardening, house cleaning) counts.    Don't use drugs and alcohol. These may ease the pain in the short term. But they ll only make your problems worse in the long run.    Get relief from stress. Ask your healthcare provider for relaxation exercises and techniques to help relieve stress. Consider activities like meditation, yoga, or Denny Chi.    Eat right. A balanced and healthy diet helps keep your body healthy.    Get adequate sleep. Aim for 8 hours per night. Too much or too little sleep can cause other physical and emotional problems.  [employer] last reviewed this educational content on 12/1/2019 2000-2021 [employer]Well [employer]. All rights reserved. This information is not intended as a  substitute for professional medical care. Always follow your healthcare professional's instructions.

## 2022-07-28 RX ORDER — FUROSEMIDE 40 MG
40 TABLET ORAL DAILY
Qty: 90 TABLET | Refills: 3 | Status: SHIPPED | OUTPATIENT
Start: 2022-07-28 | End: 2022-08-26

## 2022-07-28 RX ORDER — TAMSULOSIN HYDROCHLORIDE 0.4 MG/1
0.4 CAPSULE ORAL DAILY
Qty: 90 CAPSULE | Refills: 3 | Status: SHIPPED | OUTPATIENT
Start: 2022-07-28 | End: 2023-09-06

## 2022-07-30 DIAGNOSIS — J44.89 CHRONIC OBSTRUCTIVE AIRWAY DISEASE WITH ASTHMA (H): ICD-10-CM

## 2022-08-01 RX ORDER — ALBUTEROL SULFATE 90 UG/1
2 AEROSOL, METERED RESPIRATORY (INHALATION) EVERY 4 HOURS PRN
Qty: 18 G | Refills: 3 | Status: SHIPPED | OUTPATIENT
Start: 2022-08-01

## 2022-08-06 ENCOUNTER — MYC REFILL (OUTPATIENT)
Dept: FAMILY MEDICINE | Facility: CLINIC | Age: 67
End: 2022-08-06

## 2022-08-06 DIAGNOSIS — G60.9 IDIOPATHIC PERIPHERAL NEUROPATHY: ICD-10-CM

## 2022-08-06 DIAGNOSIS — G89.4 CHRONIC PAIN SYNDROME: ICD-10-CM

## 2022-08-07 ENCOUNTER — MYC REFILL (OUTPATIENT)
Dept: FAMILY MEDICINE | Facility: CLINIC | Age: 67
End: 2022-08-07

## 2022-08-07 DIAGNOSIS — E11.42 DIABETIC POLYNEUROPATHY ASSOCIATED WITH TYPE 2 DIABETES MELLITUS (H): ICD-10-CM

## 2022-08-08 RX ORDER — GABAPENTIN 300 MG/1
300 CAPSULE ORAL 3 TIMES DAILY
Qty: 270 CAPSULE | Refills: 0 | Status: SHIPPED | OUTPATIENT
Start: 2022-08-08 | End: 2022-10-25

## 2022-08-08 RX ORDER — OXYCODONE HYDROCHLORIDE 5 MG/1
5 TABLET ORAL 2 TIMES DAILY PRN
Qty: 60 TABLET | Refills: 0 | Status: SHIPPED | OUTPATIENT
Start: 2022-08-08 | End: 2022-09-06

## 2022-08-11 ENCOUNTER — MYC MEDICAL ADVICE (OUTPATIENT)
Dept: FAMILY MEDICINE | Facility: CLINIC | Age: 67
End: 2022-08-11

## 2022-08-15 ENCOUNTER — TELEPHONE (OUTPATIENT)
Dept: CARDIOLOGY | Facility: CLINIC | Age: 67
End: 2022-08-15

## 2022-08-15 DIAGNOSIS — I50.22 CHRONIC SYSTOLIC HEART FAILURE (H): Primary | ICD-10-CM

## 2022-08-26 ENCOUNTER — LAB (OUTPATIENT)
Dept: LAB | Facility: CLINIC | Age: 67
End: 2022-08-26
Payer: MEDICARE

## 2022-08-26 ENCOUNTER — OFFICE VISIT (OUTPATIENT)
Dept: CARDIOLOGY | Facility: CLINIC | Age: 67
End: 2022-08-26
Attending: STUDENT IN AN ORGANIZED HEALTH CARE EDUCATION/TRAINING PROGRAM
Payer: MEDICARE

## 2022-08-26 ENCOUNTER — TELEPHONE (OUTPATIENT)
Dept: CARDIOLOGY | Facility: CLINIC | Age: 67
End: 2022-08-26

## 2022-08-26 VITALS
HEART RATE: 69 BPM | WEIGHT: 211.3 LBS | SYSTOLIC BLOOD PRESSURE: 144 MMHG | OXYGEN SATURATION: 96 % | BODY MASS INDEX: 33.96 KG/M2 | HEIGHT: 66 IN | DIASTOLIC BLOOD PRESSURE: 90 MMHG

## 2022-08-26 DIAGNOSIS — I50.22 CHRONIC SYSTOLIC HEART FAILURE (H): ICD-10-CM

## 2022-08-26 DIAGNOSIS — Z63.4 SPOUSE DECEASED: ICD-10-CM

## 2022-08-26 DIAGNOSIS — E11.9 DIABETES MELLITUS TYPE 2, NONINSULIN DEPENDENT (H): ICD-10-CM

## 2022-08-26 DIAGNOSIS — I25.118 CORONARY ARTERY DISEASE OF NATIVE ARTERY OF NATIVE HEART WITH STABLE ANGINA PECTORIS (H): ICD-10-CM

## 2022-08-26 DIAGNOSIS — I10 ESSENTIAL HYPERTENSION WITH GOAL BLOOD PRESSURE LESS THAN 140/90: ICD-10-CM

## 2022-08-26 DIAGNOSIS — I50.22 CHRONIC SYSTOLIC HEART FAILURE (H): Primary | ICD-10-CM

## 2022-08-26 DIAGNOSIS — I25.5 ISCHEMIC CARDIOMYOPATHY: ICD-10-CM

## 2022-08-26 LAB
ANION GAP SERPL CALCULATED.3IONS-SCNC: 8 MMOL/L (ref 3–14)
BUN SERPL-MCNC: 32 MG/DL (ref 7–30)
CALCIUM SERPL-MCNC: 8.9 MG/DL (ref 8.5–10.1)
CHLORIDE BLD-SCNC: 103 MMOL/L (ref 94–109)
CO2 SERPL-SCNC: 29 MMOL/L (ref 20–32)
CREAT SERPL-MCNC: 1.64 MG/DL (ref 0.66–1.25)
GFR SERPL CREATININE-BSD FRML MDRD: 46 ML/MIN/1.73M2
GLUCOSE BLD-MCNC: 97 MG/DL (ref 70–99)
NT-PROBNP SERPL-MCNC: 432 PG/ML (ref 0–900)
POTASSIUM BLD-SCNC: 5 MMOL/L (ref 3.4–5.3)
SODIUM SERPL-SCNC: 140 MMOL/L (ref 133–144)

## 2022-08-26 PROCEDURE — G0463 HOSPITAL OUTPT CLINIC VISIT: HCPCS

## 2022-08-26 PROCEDURE — 83880 ASSAY OF NATRIURETIC PEPTIDE: CPT | Performed by: PATHOLOGY

## 2022-08-26 PROCEDURE — 36415 COLL VENOUS BLD VENIPUNCTURE: CPT | Performed by: PATHOLOGY

## 2022-08-26 PROCEDURE — 99214 OFFICE O/P EST MOD 30 MIN: CPT | Performed by: NURSE PRACTITIONER

## 2022-08-26 PROCEDURE — 80048 BASIC METABOLIC PNL TOTAL CA: CPT | Performed by: PATHOLOGY

## 2022-08-26 RX ORDER — FUROSEMIDE 20 MG
20 TABLET ORAL DAILY
Qty: 30 TABLET | Refills: 3 | Status: SHIPPED | OUTPATIENT
Start: 2022-08-26 | End: 2022-09-23

## 2022-08-26 RX ORDER — HYDRALAZINE HYDROCHLORIDE 25 MG/1
25 TABLET, FILM COATED ORAL 3 TIMES DAILY
Qty: 90 TABLET | Refills: 3 | Status: SHIPPED | OUTPATIENT
Start: 2022-08-26 | End: 2022-09-23

## 2022-08-26 SDOH — SOCIAL STABILITY - SOCIAL INSECURITY: DISSAPEARANCE AND DEATH OF FAMILY MEMBER: Z63.4

## 2022-08-26 ASSESSMENT — PAIN SCALES - GENERAL: PAINLEVEL: NO PAIN (0)

## 2022-08-26 NOTE — PATIENT INSTRUCTIONS
Take your medicines every day, as directed    Changes made today:  Decrease Lasix to 20 mg daily.   Start Hydralazine 25 mg three times per day  Follow-up labs in 1 week      Monitor Your Weight and Symptoms    Contact us if you:    Gain 2 pounds in one day or 5 pounds in one week  Feel more short of breath  Notice more leg swelling  Feel lightheadeded   Change your lifestyle    Limit Salt or Sodium:  2000 mg  Limit Fluids:  2000 mL or approximately 64 ounces  Eat a Heart Healthy Diet  Low in saturated fats  Stay Active:  Aim to move at least 150 minutes every  week         To Contact us    During Business Hours:  [phone], option # 1      After hours, weekends or holidays:   [phone], Option #4  Ask to speak to the On-Call Cardiologist. Inform them you are a CORE/heart failure patient at the [facility].     Use [employer] allows you to communicate directly with your heart team through secure messaging.  [facility] can be accessed any time on your phone, computer, or tablet.  If you need assistance, we'd be happy to help!         Keep your Heart Appointments:    -Schedule Follow-up with Batul in [place] in 2 weeks  -Follow up with Kayla at [facility] on September 23rd at 8 am labs, 8:30 CORE appt.

## 2022-08-26 NOTE — NURSING NOTE
Labs: Patient was given results of the laboratory testing obtained today. Patient was instructed to return for the next laboratory testing in 1 week . Patient demonstrated understanding of this information and agreed to call with further questions or concerns.   New Medication:  o Decrease Lasix to 20 mg daily.   o Start Hydralazine 25 mg three times per day     Patient was educated regarding newly prescribed medication, including discussion of  the indication, administration, side effects, and when to report to MD or RN. Patient demonstrated understanding of this information and agreed to call with further questions or concerns.Please check BP daily, about 1 hour after taking AM medications.     Return Appointment:  -Follow-up with Batul in [place] in 2 weeks.   -Follow-up with Kayla at [facility] September 23rd.      Patient given instructions regarding scheduling next clinic visit. Patient demonstrated understanding of this information and agreed to call with further questions or concerns.    Cecilia Cook RN

## 2022-08-26 NOTE — PROGRESS NOTES
"  HPI: Santaigo is a 67 year old Black or  male with a past medical history  Ischemic cardiomyopathy with prior coronary angiography and intervention (status post PCI to LAD in 1995 for ACS), Hypertension, Dyslipidemia, Type II diabetes mellitus.    Referred to CORE by Dr. Hardin after MRI was noted to show decreased EF. Patient states he has CANO and denies SOB at rest. 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  He just lost his wife on Easter and has a brother in hospice in [facility] and he would like to see him. He doesn't have swelling anymore in the legs/feet/ankles.  He is eating well. At times he reports have some SOB when he lays flat. He likes to use a \"bunch\" of pillows.  He doesn't feel he has extra weight - he says he feels like he is losing weight.  He isn't on any medication for the DMII- its been 5 years. He takes the Furosemide 40 mg daily. He doesn't take any potassium pills.     Denies SOB, PND, edema, weight gain, chest pain, palpitations, lightheadedness, dizziness, near syncopal/syncopal episodes. Santiago has been following salt and fluid restrictions.      PAST MEDICAL HISTORY:  Past Medical History:   Diagnosis Date     CAD (coronary artery disease) 8/12/2012    S/P MI and stenting 2001, 2005 at [facility]      CHF (congestive heart failure) (H) 8/3/2012     Chronic hepatitis C (H) 11/21/2014     CKD (chronic kidney disease) stage 2, GFR 60-89 ml/min 12/3/2012     COPD (chronic obstructive pulmonary disease) (H) 11/21/2014     GERD (gastroesophageal reflux disease) 8/12/2012     Gout 8/12/2012     History of colonic polyps 9/30/2008     Hyperlipidemia LDL goal <100 8/3/2012     Hypertension goal BP (blood pressure) < 140/90 8/3/2012     Mild persistent asthma 5/29/2013    Patient does not have COPD      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     Nonsenile cataract      Tobacco abuse 9/13/2013     Type 2 diabetes, HbA1C goal < 8% (H) 8/3/2012       FAMILY HISTORY:  Family History "   Problem Relation Age of Onset     Heart Disease Maternal Grandmother      Hypertension Maternal Grandmother      Hypertension Father      Hypertension Mother      Hypertension Sister      Thyroid Disease Sister      Cancer Brother      Diabetes Brother      Hypertension Brother      Hypertension Maternal Aunt      Cancer Maternal Uncle      Hypertension Maternal Uncle      Hypertension Paternal Aunt      Hypertension Paternal Uncle      Hypertension Maternal Grandfather      Hypertension Paternal Grandmother      Hypertension Paternal Grandfather      Cerebrovascular Disease No family hx of      Glaucoma No family hx of      Macular Degeneration No family hx of        SOCIAL HISTORY:  Social History     Tobacco Use     Smoking status: Former Smoker     Packs/day: 0.25     Years: 15.00     Pack years: 3.75     Types: Cigarettes     Quit date: 2016     Years since quittin.9     Smokeless tobacco: Never Used     Tobacco comment: 3-4 a day   Substance Use Topics     Alcohol use: Yes     Comment: weekend beer     Drug use: No           CURRENT MEDICATIONS:  Current Outpatient Medications   Medication Sig Dispense Refill     albuterol (PROAIR HFA/PROVENTIL HFA/VENTOLIN HFA) 108 (90 Base) MCG/ACT inhaler Inhale 2 puffs into the lungs every 4 hours as needed for shortness of breath / dyspnea 18 g 3     albuterol (PROVENTIL) (2.5 MG/3ML) 0.083% neb solution INHALE 3 ML BY NEBULIZATION METHOD EVERY 6 HOURS AS NEEDED FOR SHORTNESS OF BREATH 360 mL 1     allopurinol (ZYLOPRIM) 100 MG tablet TAKE 2 TABLETS BY MOUTH EVERY  tablet 3     apixaban ANTICOAGULANT (ELIQUIS) 5 MG tablet Take 1 tablet (5 mg) by mouth in the morning and 1 tablet (5 mg) in the evening. 180 tablet 1     aspirin (ASA) 325 MG EC tablet Take 1 tablet (325 mg) by mouth daily 90 tablet 3     budesonide-formoterol (SYMBICORT) 160-4.5 MCG/ACT Inhaler TAKE 2 PUFFS BY MOUTH TWICE A DAY 30.6 g 1     carvedilol (COREG) 25 MG tablet TAKE 1 TABLET BY  MOUTH TWICE DAILY WITH MEALS 180 tablet 3     cetirizine (ZYRTEC) 10 MG tablet TAKE 1 TABLET BY MOUTH DAILY AT BEDTIME 90 tablet 1     furosemide (LASIX) 40 MG tablet Take 1 tablet (40 mg) by mouth daily 90 tablet 3     gabapentin (NEURONTIN) 300 MG capsule Take 1 capsule (300 mg) by mouth 3 times daily Please follow up in clinic for next refill. 270 capsule 0     isosorbide mononitrate (IMDUR) 60 MG 24 hr tablet Take 1 tablet (60 mg) by mouth daily 90 tablet 3     losartan (COZAAR) 50 MG tablet Take 1 tablet (50 mg) by mouth daily 90 tablet 0     montelukast (SINGULAIR) 10 MG tablet TAKE 1 TABLET BY MOUTH EVERYDAY AT BEDTIME 90 tablet 3     nitroGLYcerin (NITROSTAT) 0.4 MG sublingual tablet Place 1 tablet (0.4 mg) under the tongue every 5 minutes as needed for chest pain 0.4 mg by Sublingual route every 5 (five) minutes as needed. 25 tablet 11     omeprazole (PRILOSEC) 20 MG DR capsule TAKE 1 CAPSULE BY MOUTH EVERY DAY 90 capsule 2     oxyCODONE (ROXICODONE) 5 MG tablet Take 1 tablet (5 mg) by mouth 2 times daily as needed for moderate to severe pain (up to 2 a day) 60 tablet 0     simvastatin (ZOCOR) 40 MG tablet Take 1 tablet (40 mg) by mouth At Bedtime 90 tablet 3     tamsulosin (FLOMAX) 0.4 MG capsule Take 1 capsule (0.4 mg) by mouth daily 90 capsule 3     traZODone (DESYREL) 100 MG tablet TAKE 2 TABLETS BY MOUTH AT BEDTIME 180 tablet 2     venlafaxine (EFFEXOR XR) 150 MG 24 hr capsule Take 1 capsule (150 mg) by mouth daily 30 capsule 1     enoxaparin ANTICOAGULANT (LOVENOX) 100 MG/ML syringe Inject 1 mL (100 mg) Subcutaneous in the morning and 1 mL (100 mg) in the evening. Until INR 2.3 or greater (Patient not taking: Reported on 8/26/2022) 20 mL 1       ROS:  Review Of Systems  Skin: negative  Eyes: negative  Ears/Nose/Throat: negative  Respiratory: No shortness of breath, dyspnea on exertion, cough, or hemoptysis  Cardiovascular: negative  Gastrointestinal: negative  Genitourinary: negative  Musculoskeletal:  "negative  Neurologic: negative  Psychiatric: negative  Hematologic/Lymphatic/Immunologic: negative  Endocrine: negative      EXAM:  BP (!) 144/90 (BP Location: Right arm, Patient Position: Chair, Cuff Size: Adult Large)   Pulse 69   Ht 1.664 m (5' 5.51\")   Wt 95.8 kg (211 lb 4.8 oz)   SpO2 96%   BMI 34.61 kg/m    Home weight:  General: alert, articulate, and in no acute distress.  HEENT: normocephalic, atraumatic, anicteric sclera, EOMI, mucosa moist, no cyanosis.   Neck: neck supple.  No adenopathy, masses, or carotid bruits.  JVP not detected at 90 degrees  Heart: regular rhythm, normal S1/S2, no murmur, gallop, rub.  Precordium quiet with normal PMI.     Lungs: clear, no rales, ronchi, or wheezing.  No accessory muscle use, respirations unlabored.   Abdomen: soft, non-tender, bowel sounds present, no hepatomegaly  Extremities: no  pitting edema.   No cyanosis.   Neurological: alert and oriented x 3.  normal speech and affect, no gross motor deficits  Skin:  No ecchymoses, rashes, or clubbing.    Labs:  CBC RESULTS:  Lab Results   Component Value Date    WBC 8.5 10/08/2021    WBC 9.9 06/18/2021    RBC 4.93 10/08/2021    RBC 4.94 06/18/2021    HGB 15.8 07/21/2022    HGB 15.1 06/18/2021    HCT 46.1 10/08/2021    HCT 46.3 06/18/2021    MCV 94 10/08/2021    MCV 94 06/18/2021    MCH 30.4 10/08/2021    MCH 30.6 06/18/2021    MCHC 32.5 10/08/2021    MCHC 32.6 06/18/2021    RDW 13.0 10/08/2021    RDW 12.8 06/18/2021     10/08/2021     06/18/2021       CMP RESULTS:  Lab Results   Component Value Date     08/26/2022     06/18/2021    POTASSIUM 5.0 08/26/2022    POTASSIUM 4.3 06/18/2021    CHLORIDE 103 08/26/2022    CHLORIDE 102 06/18/2021    CO2 29 08/26/2022    CO2 33 (H) 06/18/2021    ANIONGAP 8 08/26/2022    ANIONGAP 4 06/18/2021    GLC 97 08/26/2022    GLC 94 06/18/2021    BUN 32 (H) 08/26/2022    BUN 24 06/18/2021    CR 1.64 (H) 08/26/2022    CR 1.38 (H) 06/18/2021    GFRESTIMATED 46 (L) " 08/26/2022    GFRESTIMATED 53 (L) 06/18/2021    GFRESTBLACK 62 06/18/2021    FELICITY 8.9 08/26/2022    FELICITY 9.0 06/18/2021    BILITOTAL 0.5 09/11/2018    ALBUMIN 3.9 09/11/2018    ALKPHOS 86 09/11/2018    ALT 36 07/21/2022    ALT 30 06/18/2021    AST 29 09/11/2018        INR RESULTS:  Lab Results   Component Value Date    INR 1.12 10/06/2014       No components found for: CK  Lab Results   Component Value Date    MAG 1.9 01/18/2010     Lab Results   Component Value Date    NTBNP 636 (H) 10/08/2021    NTBNP 544 (H) 06/18/2021     @BRIEFLABR (dig)@    Most recent echocardiogram:  No results found for this or any previous visit (from the past 8760 hour(s)).      Assessment and Plan:    In summary,Santiago  is a  67 year old male who is here for an initial CORE visit. He is very pleasant and is in favor of the proposed changes with his medications.  He would like to see his brother who is in hospice and is expected to pass away by Monday. His brother lives in [facility]. Will message Dr. Hardin for his thoughts on travel.  Will have patient return for labs only in one week and then see him back at [place] on 9/8/22.  Continue to monitor potassium level as well - patient will cut down on salt substitute consumption.      1.  Chronic systolic heart failure secondary to ICM.  Stage C  NYHA Class II  ACEi/ARB: yes- Losartan 50 mg and Imdur 60 mg - start hydralazine 25 mg   BB: yes  Aldosterone antagonist: deferred while other medical therapy is prioritized  SCD prophylaxis: does not meet criteria for implant  Fluid status: hypovolemic  Anticoagulation:   Antiplatelet:  ASA dose   Sleep apnea:  NSAID use:  Contraindicated.  Avoid use.  Remote Monitoring:  SGLT2- will initiate at next visit.     2.  Other comordbid conditions:    Atypical anginal chest pain- not present today - Imdur has helped   Coronary artery disease with prior proximal LAD stenting in 1995 at [facility]   Ischemic cardiomyopathy (ACC/AHA stage C, NYHA III)  Hypertension-  on Losartan , Imdur will start hydralazine   Dyslipidemia  COPD   Type II diabetes mellitus, managed only with conservative measures      3.  Follow-up   Decrease Lasix 20 mg - daily   Start Hydralazine 25 mg TID.   BMP - one week   [place] in 2 weeks CORE 9/8/22, 9/23 -9:00 [facility]       Kayla STALLWORTH, CNP  [facility]

## 2022-08-26 NOTE — NURSING NOTE
Chief Complaint   Patient presents with     New Patient     CORE Enrollment, 66 yo male with systolic heart failure presents for initial visit with labs prior. Referred by Dr. Hardin.       Vitals were taken and medications reconciled.    Randal Beyer, EMT  7:43 AM

## 2022-08-26 NOTE — TELEPHONE ENCOUNTER
Pt asking to travel to [place] to see his brother who is on Hospice and actively dying. Pt is new to [facility]. Reached out to [facility]NA[facility] for primary cardiologist Dr. Hardin. Dr. Hardin okay with pt traveling with reminders to take medication as prescribed and report to ER for any chest pain etc.     Writer called pt and relayed message. Reminded pt to reach out to clinic for any weight gain, swelling or increased SOB. Reviewed when to go to ER; pt verbalized understanding.     Cecilia Cook RN

## 2022-08-26 NOTE — LETTER
"8/26/2022      RE: Santiago Hylton  [address]  [facility] [address]       Dear Colleague,    Thank you for the opportunity to participate in the care of your patient, Santiago Hylton, at the [facility]. Please see a copy of my visit note below.      HPI: Santiago is a 67 year old Black or  male with a past medical history  Ischemic cardiomyopathy with prior coronary angiography and intervention (status post PCI to LAD in 1995 for ACS), Hypertension, Dyslipidemia, Type II diabetes mellitus.    Referred to CORE by Dr. Hardin after MRI was noted to show decreased EF. Patient states he has CANO and denies SOB at rest. 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  He just lost his wife on Easter and has a brother in hospice in [facility] and he would like to see him. He doesn't have swelling anymore in the legs/feet/ankles.  He is eating well. At times he reports have some SOB when he lays flat. He likes to use a \"bunch\" of pillows.  He doesn't feel he has extra weight - he says he feels like he is losing weight.  He isn't on any medication for the DMII- its been 5 years. He takes the Furosemide 40 mg daily. He doesn't take any potassium pills.     Denies SOB, PND, edema, weight gain, chest pain, palpitations, lightheadedness, dizziness, near syncopal/syncopal episodes. Santiago has been following salt and fluid restrictions.      PAST MEDICAL HISTORY:  Past Medical History:   Diagnosis Date     CAD (coronary artery disease) 8/12/2012    [place] [facility] [place]      CHF (congestive heart failure) (H) 8/3/2012     Chronic hepatitis C (H) 11/21/2014     CKD (chronic kidney disease) stage 2, GFR 60-89 ml/min 12/3/2012     COPD (chronic obstructive pulmonary disease) (H) 11/21/2014     GERD (gastroesophageal reflux disease) 8/12/2012     Gout 8/12/2012     History of colonic polyps 9/30/2008     Hyperlipidemia LDL " goal <100 8/3/2012     Hypertension goal BP (blood pressure) < 140/90 8/3/2012     Mild persistent asthma 2013    Patient does not have COPD      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     Nonsenile cataract      Tobacco abuse 2013     Type 2 diabetes, HbA1C goal < 8% (H) 8/3/2012       FAMILY HISTORY:  Family History   Problem Relation Age of Onset     Heart Disease Maternal Grandmother      Hypertension Maternal Grandmother      Hypertension Father      Hypertension Mother      Hypertension Sister      Thyroid Disease Sister      Cancer Brother      Diabetes Brother      Hypertension Brother      Hypertension Maternal Aunt      Cancer Maternal Uncle      Hypertension Maternal Uncle      Hypertension Paternal Aunt      Hypertension Paternal Uncle      Hypertension Maternal Grandfather      Hypertension Paternal Grandmother      Hypertension Paternal Grandfather      Cerebrovascular Disease No family hx of      Glaucoma No family hx of      Macular Degeneration No family hx of        SOCIAL HISTORY:  Social History     Tobacco Use     Smoking status: Former Smoker     Packs/day: 0.25     Years: 15.00     Pack years: 3.75     Types: Cigarettes     Quit date: 2016     Years since quittin.9     Smokeless tobacco: Never Used     Tobacco comment: 3-4 a day   Substance Use Topics     Alcohol use: Yes     Comment: weekend beer     Drug use: No           CURRENT MEDICATIONS:  Current Outpatient Medications   Medication Sig Dispense Refill     albuterol (PROAIR HFA/PROVENTIL HFA/VENTOLIN HFA) 108 (90 Base) MCG/ACT inhaler Inhale 2 puffs into the lungs every 4 hours as needed for shortness of breath / dyspnea 18 g 3     albuterol (PROVENTIL) (2.5 MG/3ML) 0.083% neb solution INHALE 3 ML BY NEBULIZATION METHOD EVERY 6 HOURS AS NEEDED FOR SHORTNESS OF BREATH 360 mL 1     allopurinol (ZYLOPRIM) 100 MG tablet TAKE 2 TABLETS BY MOUTH EVERY  tablet 3     apixaban ANTICOAGULANT (ELIQUIS) 5 MG tablet  Take 1 tablet (5 mg) by mouth in the morning and 1 tablet (5 mg) in the evening. 180 tablet 1     aspirin (ASA) 325 MG EC tablet Take 1 tablet (325 mg) by mouth daily 90 tablet 3     budesonide-formoterol (SYMBICORT) 160-4.5 MCG/ACT Inhaler TAKE 2 PUFFS BY MOUTH TWICE A DAY 30.6 g 1     carvedilol (COREG) 25 MG tablet TAKE 1 TABLET BY MOUTH TWICE DAILY WITH MEALS 180 tablet 3     cetirizine (ZYRTEC) 10 MG tablet TAKE 1 TABLET BY MOUTH DAILY AT BEDTIME 90 tablet 1     furosemide (LASIX) 40 MG tablet Take 1 tablet (40 mg) by mouth daily 90 tablet 3     gabapentin (NEURONTIN) 300 MG capsule Take 1 capsule (300 mg) by mouth 3 times daily Please follow up in clinic for next refill. 270 capsule 0     isosorbide mononitrate (IMDUR) 60 MG 24 hr tablet Take 1 tablet (60 mg) by mouth daily 90 tablet 3     losartan (COZAAR) 50 MG tablet Take 1 tablet (50 mg) by mouth daily 90 tablet 0     montelukast (SINGULAIR) 10 MG tablet TAKE 1 TABLET BY MOUTH EVERYDAY AT BEDTIME 90 tablet 3     nitroGLYcerin (NITROSTAT) 0.4 MG sublingual tablet Place 1 tablet (0.4 mg) under the tongue every 5 minutes as needed for chest pain 0.4 mg by Sublingual route every 5 (five) minutes as needed. 25 tablet 11     omeprazole (PRILOSEC) 20 MG DR capsule TAKE 1 CAPSULE BY MOUTH EVERY DAY 90 capsule 2     oxyCODONE (ROXICODONE) 5 MG tablet Take 1 tablet (5 mg) by mouth 2 times daily as needed for moderate to severe pain (up to 2 a day) 60 tablet 0     simvastatin (ZOCOR) 40 MG tablet Take 1 tablet (40 mg) by mouth At Bedtime 90 tablet 3     tamsulosin (FLOMAX) 0.4 MG capsule Take 1 capsule (0.4 mg) by mouth daily 90 capsule 3     traZODone (DESYREL) 100 MG tablet TAKE 2 TABLETS BY MOUTH AT BEDTIME 180 tablet 2     venlafaxine (EFFEXOR XR) 150 MG 24 hr capsule Take 1 capsule (150 mg) by mouth daily 30 capsule 1     enoxaparin ANTICOAGULANT (LOVENOX) 100 MG/ML syringe Inject 1 mL (100 mg) Subcutaneous in the morning and 1 mL (100 mg) in the evening. Until  "INR 2.3 or greater (Patient not taking: Reported on 8/26/2022) 20 mL 1       ROS:  Review Of Systems  Skin: negative  Eyes: negative  Ears/Nose/Throat: negative  Respiratory: No shortness of breath, dyspnea on exertion, cough, or hemoptysis  Cardiovascular: negative  Gastrointestinal: negative  Genitourinary: negative  Musculoskeletal: negative  Neurologic: negative  Psychiatric: negative  Hematologic/Lymphatic/Immunologic: negative  Endocrine: negative      EXAM:  BP (!) 144/90 (BP Location: Right arm, Patient Position: Chair, Cuff Size: Adult Large)   Pulse 69   Ht 1.664 m (5' 5.51\")   Wt 95.8 kg (211 lb 4.8 oz)   SpO2 96%   BMI 34.61 kg/m    Home weight:  General: alert, articulate, and in no acute distress.  HEENT: normocephalic, atraumatic, anicteric sclera, EOMI, mucosa moist, no cyanosis.   Neck: neck supple.  No adenopathy, masses, or carotid bruits.  JVP not detected at 90 degrees  Heart: regular rhythm, normal S1/S2, no murmur, gallop, rub.  Precordium quiet with normal PMI.     Lungs: clear, no rales, ronchi, or wheezing.  No accessory muscle use, respirations unlabored.   Abdomen: soft, non-tender, bowel sounds present, no hepatomegaly  Extremities: no  pitting edema.   No cyanosis.   Neurological: alert and oriented x 3.  normal speech and affect, no gross motor deficits  Skin:  No ecchymoses, rashes, or clubbing.    Labs:  CBC RESULTS:  Lab Results   Component Value Date    WBC 8.5 10/08/2021    WBC 9.9 06/18/2021    RBC 4.93 10/08/2021    RBC 4.94 06/18/2021    HGB 15.8 07/21/2022    HGB 15.1 06/18/2021    HCT 46.1 10/08/2021    HCT 46.3 06/18/2021    MCV 94 10/08/2021    MCV 94 06/18/2021    MCH 30.4 10/08/2021    MCH 30.6 06/18/2021    MCHC 32.5 10/08/2021    MCHC 32.6 06/18/2021    RDW 13.0 10/08/2021    RDW 12.8 06/18/2021     10/08/2021     06/18/2021       CMP RESULTS:  Lab Results   Component Value Date     08/26/2022     06/18/2021    POTASSIUM 5.0 08/26/2022    " POTASSIUM 4.3 06/18/2021    CHLORIDE 103 08/26/2022    CHLORIDE 102 06/18/2021    CO2 29 08/26/2022    CO2 33 (H) 06/18/2021    ANIONGAP 8 08/26/2022    ANIONGAP 4 06/18/2021    GLC 97 08/26/2022    GLC 94 06/18/2021    BUN 32 (H) 08/26/2022    BUN 24 06/18/2021    CR 1.64 (H) 08/26/2022    CR 1.38 (H) 06/18/2021    GFRESTIMATED 46 (L) 08/26/2022    GFRESTIMATED 53 (L) 06/18/2021    GFRESTBLACK 62 06/18/2021    FELICITY 8.9 08/26/2022    FELICITY 9.0 06/18/2021    BILITOTAL 0.5 09/11/2018    ALBUMIN 3.9 09/11/2018    ALKPHOS 86 09/11/2018    ALT 36 07/21/2022    ALT 30 06/18/2021    AST 29 09/11/2018        INR RESULTS:  Lab Results   Component Value Date    INR 1.12 10/06/2014       No components found for: CK  Lab Results   Component Value Date    MAG 1.9 01/18/2010     Lab Results   Component Value Date    NTBNP 636 (H) 10/08/2021    NTBNP 544 (H) 06/18/2021     @BRIEFLABR (dig)@    Most recent echocardiogram:  No results found for this or any previous visit (from the past 8760 hour(s)).      Assessment and Plan:    In summary,Santiago  is a  67 year old male who is here for an initial CORE visit. He is very pleasant and is in favor of the proposed changes with his medications.  He would like to see his brother who is in hospice and is expected to pass away by Monday. His brother lives in [facility]. Will message Dr. Hardin for his thoughts on travel.  Will have patient return for labs only in one week and then see him back at [facility] on 9/8/22.  Continue to monitor potassium level as well - patient will cut down on salt substitute consumption.      1.  Chronic systolic heart failure secondary to ICM.  Stage C  NYHA Class II  ACEi/ARB: yes- Losartan 50 mg and Imdur 60 mg - start hydralazine 25 mg   BB: yes  Aldosterone antagonist: deferred while other medical therapy is prioritized  SCD prophylaxis: does not meet criteria for implant  Fluid status: hypovolemic  Anticoagulation:   Antiplatelet:  ASA dose   Sleep apnea:  NSAID  use:  Contraindicated.  Avoid use.  Remote Monitoring:  SGLT2- will initiate at next visit.     2.  Other comordbid conditions:    Atypical anginal chest pain- not present today - Imdur has helped   Coronary artery disease [place] [facility] [place]   Ischemic cardiomyopathy (ACC/AHA stage C, NYHA III)  Hypertension- on Losartan , Imdur will start hydralazine   Dyslipidemia  COPD   Type II diabetes mellitus, managed only with conservative measures      3.  Follow-up   Decrease Lasix 20 mg - daily   Start Hydralazine 25 mg TID.   BMP - one week   [place] in 2 weeks CORE 9/8/22, 9/23 -9:00 JASON STALLWORTH, CNP  [facility]

## 2022-09-06 ENCOUNTER — MYC REFILL (OUTPATIENT)
Dept: FAMILY MEDICINE | Facility: CLINIC | Age: 67
End: 2022-09-06

## 2022-09-06 DIAGNOSIS — G89.4 CHRONIC PAIN SYNDROME: ICD-10-CM

## 2022-09-06 DIAGNOSIS — E78.5 HYPERLIPIDEMIA, UNSPECIFIED HYPERLIPIDEMIA TYPE: ICD-10-CM

## 2022-09-06 DIAGNOSIS — G60.9 IDIOPATHIC PERIPHERAL NEUROPATHY: ICD-10-CM

## 2022-09-06 DIAGNOSIS — M1A.3790 CHRONIC GOUT DUE TO RENAL IMPAIRMENT INVOLVING ANKLE WITHOUT TOPHUS, UNSPECIFIED LATERALITY: ICD-10-CM

## 2022-09-06 RX ORDER — OXYCODONE HYDROCHLORIDE 5 MG/1
5 TABLET ORAL 2 TIMES DAILY PRN
Qty: 60 TABLET | Refills: 0 | Status: SHIPPED | OUTPATIENT
Start: 2022-09-06 | End: 2022-09-30

## 2022-09-06 RX ORDER — ALLOPURINOL 100 MG/1
TABLET ORAL
Qty: 180 TABLET | Refills: 3 | Status: SHIPPED | OUTPATIENT
Start: 2022-09-06 | End: 2023-09-06

## 2022-09-06 RX ORDER — SIMVASTATIN 40 MG
40 TABLET ORAL AT BEDTIME
Qty: 90 TABLET | Refills: 3 | Status: SHIPPED | OUTPATIENT
Start: 2022-09-06 | End: 2022-12-21

## 2022-09-09 ENCOUNTER — LAB (OUTPATIENT)
Dept: LAB | Facility: CLINIC | Age: 67
End: 2022-09-09
Payer: MEDICARE

## 2022-09-09 DIAGNOSIS — I50.22 CHRONIC SYSTOLIC HEART FAILURE (H): ICD-10-CM

## 2022-09-09 LAB
ANION GAP SERPL CALCULATED.3IONS-SCNC: 13 MMOL/L (ref 7–15)
BUN SERPL-MCNC: 11.2 MG/DL (ref 8–23)
CALCIUM SERPL-MCNC: 8.8 MG/DL (ref 8.8–10.2)
CHLORIDE SERPL-SCNC: 105 MMOL/L (ref 98–107)
CREAT SERPL-MCNC: 1.18 MG/DL (ref 0.67–1.17)
DEPRECATED HCO3 PLAS-SCNC: 26 MMOL/L (ref 22–29)
GFR SERPL CREATININE-BSD FRML MDRD: 68 ML/MIN/1.73M2
GLUCOSE SERPL-MCNC: 110 MG/DL (ref 70–99)
POTASSIUM SERPL-SCNC: 4 MMOL/L (ref 3.4–5.3)
SODIUM SERPL-SCNC: 144 MMOL/L (ref 136–145)

## 2022-09-09 PROCEDURE — 80048 BASIC METABOLIC PNL TOTAL CA: CPT | Performed by: PATHOLOGY

## 2022-09-09 PROCEDURE — 36415 COLL VENOUS BLD VENIPUNCTURE: CPT | Performed by: PATHOLOGY

## 2022-09-13 ENCOUNTER — TELEPHONE (OUTPATIENT)
Dept: CARDIOLOGY | Facility: CLINIC | Age: 67
End: 2022-09-13

## 2022-09-13 NOTE — TELEPHONE ENCOUNTER
I called Santiago with lab results. Message left asking for a call back to discuss.    Rosy Byrd RN

## 2022-09-19 DIAGNOSIS — J45.31 MILD PERSISTENT ASTHMA WITH ACUTE EXACERBATION: ICD-10-CM

## 2022-09-19 DIAGNOSIS — I50.22 CHRONIC SYSTOLIC HEART FAILURE (H): Primary | ICD-10-CM

## 2022-09-19 RX ORDER — MONTELUKAST SODIUM 10 MG/1
TABLET ORAL
Qty: 90 TABLET | Refills: 3 | Status: SHIPPED | OUTPATIENT
Start: 2022-09-19 | End: 2023-09-20

## 2022-09-23 ENCOUNTER — OFFICE VISIT (OUTPATIENT)
Dept: CARDIOLOGY | Facility: CLINIC | Age: 67
End: 2022-09-23
Attending: NURSE PRACTITIONER
Payer: MEDICARE

## 2022-09-23 ENCOUNTER — LAB (OUTPATIENT)
Dept: LAB | Facility: CLINIC | Age: 67
End: 2022-09-23
Payer: MEDICARE

## 2022-09-23 VITALS
DIASTOLIC BLOOD PRESSURE: 98 MMHG | OXYGEN SATURATION: 95 % | HEIGHT: 65 IN | HEART RATE: 75 BPM | WEIGHT: 204.4 LBS | BODY MASS INDEX: 34.05 KG/M2 | SYSTOLIC BLOOD PRESSURE: 160 MMHG

## 2022-09-23 DIAGNOSIS — I25.5 ISCHEMIC CARDIOMYOPATHY: ICD-10-CM

## 2022-09-23 DIAGNOSIS — E11.9 DIABETES MELLITUS TYPE 2, NONINSULIN DEPENDENT (H): ICD-10-CM

## 2022-09-23 DIAGNOSIS — I50.22 CHRONIC SYSTOLIC HEART FAILURE (H): Primary | ICD-10-CM

## 2022-09-23 DIAGNOSIS — I10 ESSENTIAL HYPERTENSION WITH GOAL BLOOD PRESSURE LESS THAN 140/90: ICD-10-CM

## 2022-09-23 DIAGNOSIS — I25.118 CORONARY ARTERY DISEASE OF NATIVE ARTERY OF NATIVE HEART WITH STABLE ANGINA PECTORIS (H): ICD-10-CM

## 2022-09-23 DIAGNOSIS — Z63.4 SPOUSE DECEASED: ICD-10-CM

## 2022-09-23 DIAGNOSIS — I50.22 CHRONIC SYSTOLIC HEART FAILURE (H): ICD-10-CM

## 2022-09-23 LAB
ANION GAP SERPL CALCULATED.3IONS-SCNC: 5 MMOL/L (ref 7–15)
BUN SERPL-MCNC: 10.3 MG/DL (ref 8–23)
CALCIUM SERPL-MCNC: 8.5 MG/DL (ref 8.8–10.2)
CHLORIDE SERPL-SCNC: 102 MMOL/L (ref 98–107)
CREAT SERPL-MCNC: 0.99 MG/DL (ref 0.67–1.17)
DEPRECATED HCO3 PLAS-SCNC: 35 MMOL/L (ref 22–29)
GFR SERPL CREATININE-BSD FRML MDRD: 84 ML/MIN/1.73M2
GLUCOSE SERPL-MCNC: 102 MG/DL (ref 70–99)
POTASSIUM SERPL-SCNC: 3.7 MMOL/L (ref 3.4–5.3)
SODIUM SERPL-SCNC: 142 MMOL/L (ref 136–145)

## 2022-09-23 PROCEDURE — 99213 OFFICE O/P EST LOW 20 MIN: CPT | Performed by: NURSE PRACTITIONER

## 2022-09-23 PROCEDURE — 80048 BASIC METABOLIC PNL TOTAL CA: CPT | Performed by: PATHOLOGY

## 2022-09-23 PROCEDURE — 36415 COLL VENOUS BLD VENIPUNCTURE: CPT | Performed by: PATHOLOGY

## 2022-09-23 PROCEDURE — G0463 HOSPITAL OUTPT CLINIC VISIT: HCPCS

## 2022-09-23 RX ORDER — FUROSEMIDE 20 MG
40 TABLET ORAL DAILY
Qty: 90 TABLET | Refills: 3 | Status: SHIPPED | OUTPATIENT
Start: 2022-09-23 | End: 2022-11-09

## 2022-09-23 RX ORDER — HYDRALAZINE HYDROCHLORIDE 25 MG/1
50 TABLET, FILM COATED ORAL 3 TIMES DAILY
Qty: 180 TABLET | Refills: 3 | Status: SHIPPED | OUTPATIENT
Start: 2022-09-23 | End: 2023-01-27

## 2022-09-23 SDOH — SOCIAL STABILITY - SOCIAL INSECURITY: DISSAPEARANCE AND DEATH OF FAMILY MEMBER: Z63.4

## 2022-09-23 ASSESSMENT — PAIN SCALES - GENERAL: PAINLEVEL: NO PAIN (0)

## 2022-09-23 NOTE — NURSING NOTE
Chief Complaint   Patient presents with     Follow Up     Return CORE, 68 yo male with systolic heart failure presents for follow up visit with labs prior.     Vitals were taken and medications reconciled.    Milind Rouse, EMT  8:55 AM

## 2022-09-23 NOTE — LETTER
9/23/2022      RE: Santiago Hylton  [address]  [facility] [address]       Dear Colleague,    Thank you for the opportunity to participate in the care of your patient, Santiago Hylton, at the [facility] [place] [facility]. Please see a copy of my visit note below.      HPI: Santiago is a 67 year old Black or  male with a past medical history  Ischemic cardiomyopathy with prior coronary angiography and intervention (status post PCI to LAD in 1995 for ACS), Hypertension, Dyslipidemia, Type II diabetes mellitus.    His brother passed away recently. He has been noting more heavier breathing with the 20 mg of Lasix. He has more SOB and CANO. He says the weight 209-210 lbs. He has some more swelling in the legs/feet. He complains of hip and leg pain in the morning. He is eating well. He has taken his medication this morning.     Denies weight gain, chest pain, palpitations, lightheadedness, dizziness, near syncopal/syncopal episodes. Santiago has been following salt and fluid restrictions.      PAST MEDICAL HISTORY:  Past Medical History:   Diagnosis Date     CAD (coronary artery disease) 8/12/2012    S/P MI and stenting 2001, [place] at [facility] [place]      CHF (congestive heart failure) (H) 8/3/2012     Chronic hepatitis C (H) 11/21/2014     CKD (chronic kidney disease) stage 2, GFR 60-89 ml/min 12/3/2012     COPD (chronic obstructive pulmonary disease) (H) 11/21/2014     GERD (gastroesophageal reflux disease) 8/12/2012     Gout 8/12/2012     History of colonic polyps 9/30/2008     Hyperlipidemia LDL goal <100 8/3/2012     Hypertension goal BP (blood pressure) < 140/90 8/3/2012     Mild persistent asthma 5/29/2013    Patient does not have COPD      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     Nonsenile cataract      Tobacco abuse 9/13/2013     Type 2 diabetes, HbA1C goal < 8% (H) 8/3/2012       FAMILY HISTORY:  Family History   Problem Relation Age of  Onset     Heart Disease Maternal Grandmother      Hypertension Maternal Grandmother      Hypertension Father      Hypertension Mother      Hypertension Sister      Thyroid Disease Sister      Cancer Brother      Diabetes Brother      Hypertension Brother      Hypertension Maternal Aunt      Cancer Maternal Uncle      Hypertension Maternal Uncle      Hypertension Paternal Aunt      Hypertension Paternal Uncle      Hypertension Maternal Grandfather      Hypertension Paternal Grandmother      Hypertension Paternal Grandfather      Cerebrovascular Disease No family hx of      Glaucoma No family hx of      Macular Degeneration No family hx of        SOCIAL HISTORY:  Social History     Tobacco Use     Smoking status: Former Smoker     Packs/day: 0.25     Years: 15.00     Pack years: 3.75     Types: Cigarettes     Quit date: 2016     Years since quittin.0     Smokeless tobacco: Never Used     Tobacco comment: 3-4 a day   Substance Use Topics     Alcohol use: Yes     Comment: weekend beer     Drug use: No           CURRENT MEDICATIONS:  Current Outpatient Medications   Medication Sig Dispense Refill     albuterol (PROAIR HFA/PROVENTIL HFA/VENTOLIN HFA) 108 (90 Base) MCG/ACT inhaler Inhale 2 puffs into the lungs every 4 hours as needed for shortness of breath / dyspnea 18 g 3     albuterol (PROVENTIL) (2.5 MG/3ML) 0.083% neb solution INHALE 3 ML BY NEBULIZATION METHOD EVERY 6 HOURS AS NEEDED FOR SHORTNESS OF BREATH 360 mL 1     allopurinol (ZYLOPRIM) 100 MG tablet TAKE 2 TABLETS BY MOUTH EVERY  tablet 3     apixaban ANTICOAGULANT (ELIQUIS) 5 MG tablet Take 1 tablet (5 mg) by mouth in the morning and 1 tablet (5 mg) in the evening. 180 tablet 1     aspirin (ASA) 325 MG EC tablet Take 1 tablet (325 mg) by mouth daily 90 tablet 3     budesonide-formoterol (SYMBICORT) 160-4.5 MCG/ACT Inhaler TAKE 2 PUFFS BY MOUTH TWICE A DAY 30.6 g 1     carvedilol (COREG) 25 MG tablet TAKE 1 TABLET BY MOUTH TWICE DAILY WITH  MEALS 180 tablet 3     cetirizine (ZYRTEC) 10 MG tablet TAKE 1 TABLET BY MOUTH DAILY AT BEDTIME 90 tablet 1     furosemide (LASIX) 20 MG tablet Take 2 tablets (40 mg) by mouth daily 90 tablet 3     gabapentin (NEURONTIN) 300 MG capsule Take 1 capsule (300 mg) by mouth 3 times daily Please follow up in clinic for next refill. 270 capsule 0     hydrALAZINE (APRESOLINE) 25 MG tablet Take 2 tablets (50 mg) by mouth 3 times daily 180 tablet 3     isosorbide mononitrate (IMDUR) 60 MG 24 hr tablet Take 1 tablet (60 mg) by mouth daily 90 tablet 3     losartan (COZAAR) 50 MG tablet Take 1 tablet (50 mg) by mouth daily 90 tablet 0     montelukast (SINGULAIR) 10 MG tablet TAKE 1 TABLET BY MOUTH EVERYDAY AT BEDTIME 90 tablet 3     omeprazole (PRILOSEC) 20 MG DR capsule TAKE 1 CAPSULE BY MOUTH EVERY DAY 90 capsule 2     oxyCODONE (ROXICODONE) 5 MG tablet Take 1 tablet (5 mg) by mouth 2 times daily as needed for moderate to severe pain (up to 2 a day) 60 tablet 0     simvastatin (ZOCOR) 40 MG tablet Take 1 tablet (40 mg) by mouth At Bedtime 90 tablet 3     tamsulosin (FLOMAX) 0.4 MG capsule Take 1 capsule (0.4 mg) by mouth daily 90 capsule 3     traZODone (DESYREL) 100 MG tablet TAKE 2 TABLETS BY MOUTH AT BEDTIME 180 tablet 2     venlafaxine (EFFEXOR XR) 150 MG 24 hr capsule Take 1 capsule (150 mg) by mouth daily 30 capsule 1     enoxaparin ANTICOAGULANT (LOVENOX) 100 MG/ML syringe Inject 1 mL (100 mg) Subcutaneous in the morning and 1 mL (100 mg) in the evening. Until INR 2.3 or greater (Patient not taking: No sig reported) 20 mL 1     nitroGLYcerin (NITROSTAT) 0.4 MG sublingual tablet Place 1 tablet (0.4 mg) under the tongue every 5 minutes as needed for chest pain 0.4 mg by Sublingual route every 5 (five) minutes as needed. (Patient not taking: Reported on 9/23/2022) 25 tablet 11       ROS:  Review Of Systems  Skin: negative  Eyes: negative  Ears/Nose/Throat: negative  Respiratory: No shortness of breath, dyspnea on exertion,  "cough, or hemoptysis  Cardiovascular: negative  Gastrointestinal: negative  Genitourinary: negative  Musculoskeletal: negative  Neurologic: negative  Psychiatric: negative  Hematologic/Lymphatic/Immunologic: negative  Endocrine: negative      EXAM:  BP (!) 160/98   Pulse 75   Ht 1.662 m (5' 5.43\")   Wt 92.7 kg (204 lb 6.4 oz)   SpO2 95%   BMI 33.57 kg/m    Home weight:  General: alert, articulate, and in no acute distress.  HEENT: normocephalic, atraumatic, anicteric sclera, EOMI, mucosa moist, no cyanosis.   Neck: neck supple.  No adenopathy, masses, or carotid bruits.  JVP not detected at 90 degrees  Heart: regular rhythm, normal S1/S2, no murmur, gallop, rub.  Precordium quiet with normal PMI.     Lungs: clear, no rales, ronchi, or wheezing.  No accessory muscle use, respirations unlabored.   Abdomen: soft, non-tender, bowel sounds present, no hepatomegaly  Extremities: no  pitting edema.   No cyanosis.   Neurological: alert and oriented x 3.  normal speech and affect, no gross motor deficits  Skin:  No ecchymoses, rashes, or clubbing.    Labs:  CBC RESULTS:  Lab Results   Component Value Date    WBC 8.5 10/08/2021    WBC 9.9 06/18/2021    RBC 4.93 10/08/2021    RBC 4.94 06/18/2021    HGB 15.8 07/21/2022    HGB 15.1 06/18/2021    HCT 46.1 10/08/2021    HCT 46.3 06/18/2021    MCV 94 10/08/2021    MCV 94 06/18/2021    MCH 30.4 10/08/2021    MCH 30.6 06/18/2021    MCHC 32.5 10/08/2021    MCHC 32.6 06/18/2021    RDW 13.0 10/08/2021    RDW 12.8 06/18/2021     10/08/2021     06/18/2021       CMP RESULTS:  Lab Results   Component Value Date     09/23/2022     06/18/2021    POTASSIUM 3.7 09/23/2022    POTASSIUM 5.0 08/26/2022    POTASSIUM 4.3 06/18/2021    CHLORIDE 102 09/23/2022    CHLORIDE 103 08/26/2022    CHLORIDE 102 06/18/2021    CO2 35 (H) 09/23/2022    CO2 29 08/26/2022    CO2 33 (H) 06/18/2021    ANIONGAP 5 (L) 09/23/2022    ANIONGAP 8 08/26/2022    ANIONGAP 4 06/18/2021     " (H) 09/23/2022    GLC 97 08/26/2022    GLC 94 06/18/2021    BUN 10.3 09/23/2022    BUN 32 (H) 08/26/2022    BUN 24 06/18/2021    CR 0.99 09/23/2022    CR 1.38 (H) 06/18/2021    GFRESTIMATED 84 09/23/2022    GFRESTIMATED 53 (L) 06/18/2021    GFRESTBLACK 62 06/18/2021    FELICITY 8.5 (L) 09/23/2022    FELICITY 9.0 06/18/2021    BILITOTAL 0.5 09/11/2018    ALBUMIN 3.9 09/11/2018    ALKPHOS 86 09/11/2018    ALT 36 07/21/2022    ALT 30 06/18/2021    AST 29 09/11/2018        INR RESULTS:  Lab Results   Component Value Date    INR 1.12 10/06/2014       No components found for: CK  Lab Results   Component Value Date    MAG 1.9 01/18/2010     Lab Results   Component Value Date    NTBNP 432 08/26/2022    NTBNP 544 (H) 06/18/2021     @BRIEFLABR (dig)@    Most recent echocardiogram:  No results found for this or any previous visit (from the past 8760 hour(s)).      Assessment and Plan:    In summary,Santiago  is a  67 year old male who is here for an initial CORE visit. He is very pleasant and is in favor of the proposed changes with his medications. He is hypervolemic today. We will increase the diuretic back to 40 mg daily.  Due to his higher BP's we will increase the Hydralazine to 50 mg TID. He should return to clinic in 1-2 weeks for labs and 3 weeks for CORE.    1.  Chronic systolic heart failure secondary to ICM.  Stage C  NYHA Class II  ACEi/ARB: yes- Losartan 50 mg and Imdur 60 mg - increase hydralazine 50 mg TID  BB: yes- max dose   Aldosterone antagonist: deferred while other medical therapy is prioritized  SCD prophylaxis: does not meet criteria for implant  Fluid status: hypervolemic  Anticoagulation:   Antiplatelet:  ASA dose   Sleep apnea:  NSAID use:  Contraindicated.  Avoid use.  Remote Monitoring:  SGLT2- will initiate at next visit.     2.  Other comordbid conditions:    Atypical anginal chest pain- not present today - Imdur has helped   Coronary artery disease with prior proximal LAD stenting in 1995 at [facility] [place]   Ischemic  cardiomyopathy (ACC/AHA stage C, NYHA III)  Hypertension- on Losartan , Imdur will start hydralazine   Dyslipidemia  COPD   Type II diabetes mellitus, managed only with conservative measures      3.  Follow-up   Increase Furosemide 40 mg daily   Increase Hydralazine 50 mg TID  BMP -in 1-2 weeks   CORE 3 weeks         Kayla STALLWORTH, CNP  [facility]

## 2022-09-23 NOTE — PROGRESS NOTES
HPI: Santiago is a 67 year old Black or  male with a past medical history  Ischemic cardiomyopathy with prior coronary angiography and intervention (status post PCI to LAD in 1995 for ACS), Hypertension, Dyslipidemia, Type II diabetes mellitus.    His brother passed away recently. He has been noting more heavier breathing with the 20 mg of Lasix. He has more SOB and CANO. He says the weight 209-210 lbs. He has some more swelling in the legs/feet. He complains of hip and leg pain in the morning. He is eating well. He has taken his medication this morning.     Denies weight gain, chest pain, palpitations, lightheadedness, dizziness, near syncopal/syncopal episodes. Santiago has been following salt and fluid restrictions.      PAST MEDICAL HISTORY:  Past Medical History:   Diagnosis Date     CAD (coronary artery disease) 8/12/2012    S/[place] and [place] 2001, [place] [facility] [place]      CHF (congestive heart failure) (H) 8/3/2012     Chronic hepatitis C (H) 11/21/2014     CKD (chronic kidney disease) stage 2, GFR 60-89 ml/min 12/3/2012     COPD (chronic obstructive pulmonary disease) (H) 11/21/2014     GERD (gastroesophageal reflux disease) 8/12/2012     Gout 8/12/2012     History of colonic polyps 9/30/2008     Hyperlipidemia LDL goal <100 8/3/2012     Hypertension goal BP (blood pressure) < 140/90 8/3/2012     Mild persistent asthma 5/29/2013    Patient does not have COPD      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     Nonsenile cataract      Tobacco abuse 9/13/2013     Type 2 diabetes, HbA1C goal < 8% (H) 8/3/2012       FAMILY HISTORY:  Family History   Problem Relation Age of Onset     Heart Disease Maternal Grandmother      Hypertension Maternal Grandmother      Hypertension Father      Hypertension Mother      Hypertension Sister      Thyroid Disease Sister      Cancer Brother      Diabetes Brother      Hypertension Brother      Hypertension Maternal Aunt      Cancer Maternal Uncle      Hypertension  Maternal Uncle      Hypertension Paternal Aunt      Hypertension Paternal Uncle      Hypertension Maternal Grandfather      Hypertension Paternal Grandmother      Hypertension Paternal Grandfather      Cerebrovascular Disease No family hx of      Glaucoma No family hx of      Macular Degeneration No family hx of        SOCIAL HISTORY:  Social History     Tobacco Use     Smoking status: Former Smoker     Packs/day: 0.25     Years: 15.00     Pack years: 3.75     Types: Cigarettes     Quit date: 2016     Years since quittin.0     Smokeless tobacco: Never Used     Tobacco comment: 3-4 a day   Substance Use Topics     Alcohol use: Yes     Comment: weekend beer     Drug use: No           CURRENT MEDICATIONS:  Current Outpatient Medications   Medication Sig Dispense Refill     albuterol (PROAIR HFA/PROVENTIL HFA/VENTOLIN HFA) 108 (90 Base) MCG/ACT inhaler Inhale 2 puffs into the lungs every 4 hours as needed for shortness of breath / dyspnea 18 g 3     albuterol (PROVENTIL) (2.5 MG/3ML) 0.083% neb solution INHALE 3 ML BY NEBULIZATION METHOD EVERY 6 HOURS AS NEEDED FOR SHORTNESS OF BREATH 360 mL 1     allopurinol (ZYLOPRIM) 100 MG tablet TAKE 2 TABLETS BY MOUTH EVERY  tablet 3     apixaban ANTICOAGULANT (ELIQUIS) 5 MG tablet Take 1 tablet (5 mg) by mouth in the morning and 1 tablet (5 mg) in the evening. 180 tablet 1     aspirin (ASA) 325 MG EC tablet Take 1 tablet (325 mg) by mouth daily 90 tablet 3     budesonide-formoterol (SYMBICORT) 160-4.5 MCG/ACT Inhaler TAKE 2 PUFFS BY MOUTH TWICE A DAY 30.6 g 1     carvedilol (COREG) 25 MG tablet TAKE 1 TABLET BY MOUTH TWICE DAILY WITH MEALS 180 tablet 3     cetirizine (ZYRTEC) 10 MG tablet TAKE 1 TABLET BY MOUTH DAILY AT BEDTIME 90 tablet 1     furosemide (LASIX) 20 MG tablet Take 2 tablets (40 mg) by mouth daily 90 tablet 3     gabapentin (NEURONTIN) 300 MG capsule Take 1 capsule (300 mg) by mouth 3 times daily Please follow up in clinic for next refill. 270  "capsule 0     hydrALAZINE (APRESOLINE) 25 MG tablet Take 2 tablets (50 mg) by mouth 3 times daily 180 tablet 3     isosorbide mononitrate (IMDUR) 60 MG 24 hr tablet Take 1 tablet (60 mg) by mouth daily 90 tablet 3     losartan (COZAAR) 50 MG tablet Take 1 tablet (50 mg) by mouth daily 90 tablet 0     montelukast (SINGULAIR) 10 MG tablet TAKE 1 TABLET BY MOUTH EVERYDAY AT BEDTIME 90 tablet 3     omeprazole (PRILOSEC) 20 MG DR capsule TAKE 1 CAPSULE BY MOUTH EVERY DAY 90 capsule 2     oxyCODONE (ROXICODONE) 5 MG tablet Take 1 tablet (5 mg) by mouth 2 times daily as needed for moderate to severe pain (up to 2 a day) 60 tablet 0     simvastatin (ZOCOR) 40 MG tablet Take 1 tablet (40 mg) by mouth At Bedtime 90 tablet 3     tamsulosin (FLOMAX) 0.4 MG capsule Take 1 capsule (0.4 mg) by mouth daily 90 capsule 3     traZODone (DESYREL) 100 MG tablet TAKE 2 TABLETS BY MOUTH AT BEDTIME 180 tablet 2     venlafaxine (EFFEXOR XR) 150 MG 24 hr capsule Take 1 capsule (150 mg) by mouth daily 30 capsule 1     enoxaparin ANTICOAGULANT (LOVENOX) 100 MG/ML syringe Inject 1 mL (100 mg) Subcutaneous in the morning and 1 mL (100 mg) in the evening. Until INR 2.3 or greater (Patient not taking: No sig reported) 20 mL 1     nitroGLYcerin (NITROSTAT) 0.4 MG sublingual tablet Place 1 tablet (0.4 mg) under the tongue every 5 minutes as needed for chest pain 0.4 mg by Sublingual route every 5 (five) minutes as needed. (Patient not taking: Reported on 9/23/2022) 25 tablet 11       ROS:  Review Of Systems  Skin: negative  Eyes: negative  Ears/Nose/Throat: negative  Respiratory: No shortness of breath, dyspnea on exertion, cough, or hemoptysis  Cardiovascular: negative  Gastrointestinal: negative  Genitourinary: negative  Musculoskeletal: negative  Neurologic: negative  Psychiatric: negative  Hematologic/Lymphatic/Immunologic: negative  Endocrine: negative      EXAM:  BP (!) 160/98   Pulse 75   Ht 1.662 m (5' 5.43\")   Wt 92.7 kg (204 lb 6.4 oz)  "  SpO2 95%   BMI 33.57 kg/m    Home weight:  General: alert, articulate, and in no acute distress.  HEENT: normocephalic, atraumatic, anicteric sclera, EOMI, mucosa moist, no cyanosis.   Neck: neck supple.  No adenopathy, masses, or carotid bruits.  JVP not detected at 90 degrees  Heart: regular rhythm, normal S1/S2, no murmur, gallop, rub.  Precordium quiet with normal PMI.     Lungs: clear, no rales, ronchi, or wheezing.  No accessory muscle use, respirations unlabored.   Abdomen: soft, non-tender, bowel sounds present, no hepatomegaly  Extremities: no  pitting edema.   No cyanosis.   Neurological: alert and oriented x 3.  normal speech and affect, no gross motor deficits  Skin:  No ecchymoses, rashes, or clubbing.    Labs:  CBC RESULTS:  Lab Results   Component Value Date    WBC 8.5 10/08/2021    WBC 9.9 06/18/2021    RBC 4.93 10/08/2021    RBC 4.94 06/18/2021    HGB 15.8 07/21/2022    HGB 15.1 06/18/2021    HCT 46.1 10/08/2021    HCT 46.3 06/18/2021    MCV 94 10/08/2021    MCV 94 06/18/2021    MCH 30.4 10/08/2021    MCH 30.6 06/18/2021    MCHC 32.5 10/08/2021    MCHC 32.6 06/18/2021    RDW 13.0 10/08/2021    RDW 12.8 06/18/2021     10/08/2021     06/18/2021       CMP RESULTS:  Lab Results   Component Value Date     09/23/2022     06/18/2021    POTASSIUM 3.7 09/23/2022    POTASSIUM 5.0 08/26/2022    POTASSIUM 4.3 06/18/2021    CHLORIDE 102 09/23/2022    CHLORIDE 103 08/26/2022    CHLORIDE 102 06/18/2021    CO2 35 (H) 09/23/2022    CO2 29 08/26/2022    CO2 33 (H) 06/18/2021    ANIONGAP 5 (L) 09/23/2022    ANIONGAP 8 08/26/2022    ANIONGAP 4 06/18/2021     (H) 09/23/2022    GLC 97 08/26/2022    GLC 94 06/18/2021    BUN 10.3 09/23/2022    BUN 32 (H) 08/26/2022    BUN 24 06/18/2021    CR 0.99 09/23/2022    CR 1.38 (H) 06/18/2021    GFRESTIMATED 84 09/23/2022    GFRESTIMATED 53 (L) 06/18/2021    GFRESTBLACK 62 06/18/2021    FELICITY 8.5 (L) 09/23/2022    FELICITY 9.0 06/18/2021    BILITOTAL 0.5  09/11/2018    ALBUMIN 3.9 09/11/2018    ALKPHOS 86 09/11/2018    ALT 36 07/21/2022    ALT 30 06/18/2021    AST 29 09/11/2018        INR RESULTS:  Lab Results   Component Value Date    INR 1.12 10/06/2014       No components found for: CK  Lab Results   Component Value Date    MAG 1.9 01/18/2010     Lab Results   Component Value Date    NTBNP 432 08/26/2022    NTBNP 544 (H) 06/18/2021     @BRIEFLABR (dig)@    Most recent echocardiogram:  No results found for this or any previous visit (from the past 8760 hour(s)).      Assessment and Plan:    In summary,Santiago  is a  67 year old male who is here for an initial CORE visit. He is very pleasant and is in favor of the proposed changes with his medications. He is hypervolemic today. We will increase the diuretic back to 40 mg daily.  Due to his higher BP's we will increase the Hydralazine to 50 mg TID. He should return to clinic in 1-2 weeks for labs and 3 weeks for CORE.    1.  Chronic systolic heart failure secondary to ICM.  Stage C  NYHA Class II  ACEi/ARB: yes- Losartan 50 mg and Imdur 60 mg - increase hydralazine 50 mg TID  BB: yes- max dose   Aldosterone antagonist: deferred while other medical therapy is prioritized  SCD prophylaxis: does not meet criteria for implant  Fluid status: hypervolemic  Anticoagulation:   Antiplatelet:  ASA dose   Sleep apnea:  NSAID use:  Contraindicated.  Avoid use.  Remote Monitoring:  SGLT2- will initiate at next visit.     2.  Other comordbid conditions:    Atypical anginal chest pain- not present today - Imdur has helped   [place]ary artery [place] [facility] [place]   Ischemic cardiomyopathy (ACC/AHA stage C, NYHA III)  Hypertension- on Losartan , Imdur will start hydralazine   Dyslipidemia  COPD   Type II diabetes mellitus, managed only with conservative measures      3.  Follow-up   Increase Furosemide 40 mg daily   Increase Hydralazine 50 mg TID  BMP -in 1-2 weeks   CORE 3 weeks         Kayla Burnett   APRN, [facility]  CORE Clinic

## 2022-09-23 NOTE — PATIENT INSTRUCTIONS
Take your medicines every day, as directed    Changes made today:  Increase furosemide to 40 mg daily  Increase hydralazine to 50 mg daily     Monitor Your Weight and Symptoms    Contact us if you:    Gain 2 pounds in one day or 5 pounds in one week  Feel more short of breath  Notice more leg swelling  Feel lightheadeded   Change your lifestyle    Limit Salt or Sodium:  2000 mg  Limit Fluids:  2000 mL or approximately 64 ounces  Eat a Heart Healthy Diet  Low in saturated fats  Stay Active:  Aim to move at least 150 minutes every  week         To Contact us    During Business Hours:  [phone], option # 1      After hours, weekends or holidays:   [phone], Option #4  Ask to speak to the On-Call Cardiologist. Inform them you are a CORE/heart failure patient at the [facility].     Use [employer] allows you to communicate directly with your heart team through secure messaging.  [employer] can be accessed any time on your phone, computer, or tablet.  If you need assistance, we'd be happy to help!         Keep your Heart Appointments:    Labs in 1-2 weeks    CORE on 10/21

## 2022-09-23 NOTE — NURSING NOTE
Labs: Patient was given results of the laboratory testing obtained today. Patient was instructed to return for the next laboratory testing in 1 week. Patient demonstrated understanding of this information and agreed to call with further questions or concerns.     Med Reconcile: Reviewed and verified all current medications with the patient. The updated medication list was printed and given to the patient.    Return Appointment: Patient given instructions regarding scheduling next clinic visit. Patient demonstrated understanding of this information and agreed to call with further questions or concerns. CORE in 3 weeks.    Medication Change: Patient was educated regarding prescribed medication change, including discussion of the indication, administration, side effects, and when to report to MD or RN. Patient demonstrated understanding of this information and agreed to call with further questions or concerns. Increase lasix to 40 mg daily, increase hydralazine to 50 mg tid.    Patient stated he understood all health information given and agreed to call with further questions or concerns.    Rosy Byrd RN

## 2022-09-24 DIAGNOSIS — F33.0 MILD RECURRENT MAJOR DEPRESSION (H): ICD-10-CM

## 2022-09-24 DIAGNOSIS — I10 HYPERTENSION GOAL BP (BLOOD PRESSURE) < 140/90: ICD-10-CM

## 2022-09-26 RX ORDER — LOSARTAN POTASSIUM 50 MG/1
50 TABLET ORAL DAILY
Qty: 90 TABLET | Refills: 1 | Status: SHIPPED | OUTPATIENT
Start: 2022-09-26 | End: 2022-10-14

## 2022-09-26 RX ORDER — VENLAFAXINE HYDROCHLORIDE 150 MG/1
150 CAPSULE, EXTENDED RELEASE ORAL DAILY
Qty: 90 CAPSULE | Refills: 1 | Status: SHIPPED | OUTPATIENT
Start: 2022-09-26 | End: 2023-03-20

## 2022-09-27 ENCOUNTER — MYC MEDICAL ADVICE (OUTPATIENT)
Dept: FAMILY MEDICINE | Facility: CLINIC | Age: 67
End: 2022-09-27

## 2022-09-29 ENCOUNTER — ALLIED HEALTH/NURSE VISIT (OUTPATIENT)
Dept: FAMILY MEDICINE | Facility: CLINIC | Age: 67
End: 2022-09-29
Payer: MEDICARE

## 2022-09-29 DIAGNOSIS — I10 HTN (HYPERTENSION): Primary | ICD-10-CM

## 2022-09-29 PROCEDURE — 99207 PR NO CHARGE NURSE ONLY: CPT

## 2022-09-29 NOTE — PROGRESS NOTES
Pt came into clinic with concerns of high blood pressure, and tingling in his arms and feet.     RN triaged pt for the above symptoms.    Pt states he checks his blood pressure every day at the pharmacy and his blood pressure yesterday morning was 165/106 and this morning 190/116.    Writer took pts blood pressure- 178/111 Additional vitals: P 83 R 20 O2 98%    Pt reports recent stressful situations with recent deaths in his family.    Pt reports tingling and pain in his hands and feet. He does report a history of neuropathy.     Pt reports yesterday he had a little headache, but today this has resolved.     He reports he has some baseline shortness of breath due to his current diagnosis. He is speaking in full clear sentences and breathing without difficulty.    Pt states he has been taking his medications daily, has not missed doses of his medication.     Pt denies chest pain, dizziness, blurred vision, difficulty walking.     Writer huddled with  provider MARY Melara regarding the above pt and symptoms. Provider advised pt to be seen in the emergency room for evaluation for further cardiac workup/stat labs.     Informed pt of this, he is agreeable to plan and states he will have his son take him to [facility] for evaluation.    Samantha Walters RN  [facility]

## 2022-09-30 ENCOUNTER — MYC REFILL (OUTPATIENT)
Dept: FAMILY MEDICINE | Facility: CLINIC | Age: 67
End: 2022-09-30

## 2022-09-30 DIAGNOSIS — G89.4 CHRONIC PAIN SYNDROME: ICD-10-CM

## 2022-09-30 DIAGNOSIS — G60.9 IDIOPATHIC PERIPHERAL NEUROPATHY: ICD-10-CM

## 2022-09-30 RX ORDER — OXYCODONE HYDROCHLORIDE 5 MG/1
5 TABLET ORAL 2 TIMES DAILY PRN
Qty: 60 TABLET | Refills: 0 | Status: SHIPPED | OUTPATIENT
Start: 2022-09-30 | End: 2022-10-25

## 2022-10-14 ENCOUNTER — OFFICE VISIT (OUTPATIENT)
Dept: CARDIOLOGY | Facility: CLINIC | Age: 67
End: 2022-10-14
Attending: NURSE PRACTITIONER
Payer: MEDICARE

## 2022-10-14 ENCOUNTER — LAB (OUTPATIENT)
Dept: LAB | Facility: CLINIC | Age: 67
End: 2022-10-14
Payer: MEDICARE

## 2022-10-14 VITALS
WEIGHT: 205.4 LBS | BODY MASS INDEX: 33.01 KG/M2 | DIASTOLIC BLOOD PRESSURE: 89 MMHG | SYSTOLIC BLOOD PRESSURE: 139 MMHG | HEART RATE: 79 BPM | HEIGHT: 66 IN

## 2022-10-14 DIAGNOSIS — I50.22 CHRONIC SYSTOLIC HEART FAILURE (H): Primary | ICD-10-CM

## 2022-10-14 DIAGNOSIS — E11.9 DIABETES MELLITUS TYPE 2, NONINSULIN DEPENDENT (H): ICD-10-CM

## 2022-10-14 DIAGNOSIS — I50.9 CHF (CONGESTIVE HEART FAILURE) (H): Primary | ICD-10-CM

## 2022-10-14 DIAGNOSIS — I25.5 ISCHEMIC CARDIOMYOPATHY: ICD-10-CM

## 2022-10-14 DIAGNOSIS — I50.22 CHRONIC SYSTOLIC HEART FAILURE (H): ICD-10-CM

## 2022-10-14 DIAGNOSIS — Z63.4 SPOUSE DECEASED: ICD-10-CM

## 2022-10-14 DIAGNOSIS — I10 HYPERTENSION GOAL BP (BLOOD PRESSURE) < 140/90: ICD-10-CM

## 2022-10-14 LAB
ANION GAP SERPL CALCULATED.3IONS-SCNC: 12 MMOL/L (ref 7–15)
BUN SERPL-MCNC: 25 MG/DL (ref 8–23)
CALCIUM SERPL-MCNC: 9.6 MG/DL (ref 8.8–10.2)
CHLORIDE SERPL-SCNC: 103 MMOL/L (ref 98–107)
CREAT SERPL-MCNC: 1.1 MG/DL (ref 0.67–1.17)
DEPRECATED HCO3 PLAS-SCNC: 27 MMOL/L (ref 22–29)
ERYTHROCYTE [DISTWIDTH] IN BLOOD BY AUTOMATED COUNT: 13.5 % (ref 10–15)
GFR SERPL CREATININE-BSD FRML MDRD: 74 ML/MIN/1.73M2
GLUCOSE SERPL-MCNC: 113 MG/DL (ref 70–99)
HCT VFR BLD AUTO: 45.7 % (ref 40–53)
HGB BLD-MCNC: 14.8 G/DL (ref 13.3–17.7)
MCH RBC QN AUTO: 30.9 PG (ref 26.5–33)
MCHC RBC AUTO-ENTMCNC: 32.4 G/DL (ref 31.5–36.5)
MCV RBC AUTO: 95 FL (ref 78–100)
PLATELET # BLD AUTO: 220 10E3/UL (ref 150–450)
POTASSIUM SERPL-SCNC: 4.2 MMOL/L (ref 3.4–5.3)
RBC # BLD AUTO: 4.79 10E6/UL (ref 4.4–5.9)
SODIUM SERPL-SCNC: 142 MMOL/L (ref 136–145)
WBC # BLD AUTO: 9.3 10E3/UL (ref 4–11)

## 2022-10-14 PROCEDURE — 36415 COLL VENOUS BLD VENIPUNCTURE: CPT | Performed by: PATHOLOGY

## 2022-10-14 PROCEDURE — 80048 BASIC METABOLIC PNL TOTAL CA: CPT | Performed by: PATHOLOGY

## 2022-10-14 PROCEDURE — 99213 OFFICE O/P EST LOW 20 MIN: CPT | Performed by: NURSE PRACTITIONER

## 2022-10-14 PROCEDURE — 85027 COMPLETE CBC AUTOMATED: CPT | Performed by: PATHOLOGY

## 2022-10-14 PROCEDURE — G0463 HOSPITAL OUTPT CLINIC VISIT: HCPCS

## 2022-10-14 RX ORDER — LOSARTAN POTASSIUM 100 MG/1
100 TABLET ORAL DAILY
Qty: 90 TABLET | Refills: 1 | Status: SHIPPED | OUTPATIENT
Start: 2022-10-14 | End: 2023-03-01 | Stop reason: ALTCHOICE

## 2022-10-14 SDOH — SOCIAL STABILITY - SOCIAL INSECURITY: DISSAPEARANCE AND DEATH OF FAMILY MEMBER: Z63.4

## 2022-10-14 ASSESSMENT — PAIN SCALES - GENERAL: PAINLEVEL: NO PAIN (0)

## 2022-10-14 NOTE — NURSING NOTE
Chief Complaint   Patient presents with     Follow Up     Return CORE, 68 yo male with systolic heart failure presents for follow up visit with labs prior.          Vitals were taken and medications reconciled.     Nir Noel, EMT   8:50 AM

## 2022-10-14 NOTE — NURSING NOTE
Labs: Patient was given results of the laboratory testing obtained today. Patient was instructed to return for the next laboratory testing in 2 weeks. Patient demonstrated understanding of this information and agreed to call with further questions or concerns.     Med Reconcile: Reviewed and verified all current medications with the patient. The updated medication list was printed and given to the patient.    Return Appointment: Patient given instructions regarding scheduling next clinic visit. Patient demonstrated understanding of this information and agreed to call with further questions or concerns. CORE in 2 weeks.    Medication Change: Patient was educated regarding prescribed medication change, including discussion of the indication, administration, side effects, and when to report to MD or RN. Patient demonstrated understanding of this information and agreed to call with further questions or concerns. Increase losartan to 100 mg daily.    Patient stated he understood all health information given and agreed to call with further questions or concerns.    Rosy Byrd, HERNAN

## 2022-10-14 NOTE — LETTER
"10/14/2022      RE: Santiago Hylton  [address]  [facility] [address]       Dear Colleague,    Thank you for the opportunity to participate in the care of your patient, Santiago Hylton, at the [facility] HEART [facility] at [facility]. Please see a copy of my visit note below.      HPI: Santiago is a 67 year old Black or  male with a past medical history  Ischemic cardiomyopathy with prior coronary angiography and intervention (status post PCI to LAD in 1995 for ACS), Hypertension, Dyslipidemia, Type II diabetes mellitus.    His brother passed away recently. He went to the ER on 10/10- for a high blood pressure reading (166/73) on his machine.  Now he has been 145/90- on average,  He has been drinking \"a lot of pickle juice\".  He has been noting more heavier breathing with the 40 mg of Lasix. He has more SOB and CANO. He says the weight 203 lbs- he says he needs a scale. He has no swelling in the legs/feet.     Denies weight gain, chest pain, palpitations, lightheadedness, dizziness, near syncopal/syncopal episodes. Santiago has been following salt and fluid restrictions.      PAST MEDICAL HISTORY:  Past Medical History:   Diagnosis Date     CAD (coronary artery disease) 8/12/2012    S/P MI and stenting 2001, 2005 at [facility]      CHF (congestive heart failure) (H) 8/3/2012     Chronic hepatitis C (H) 11/21/2014     CKD (chronic kidney disease) stage 2, GFR 60-89 ml/min 12/3/2012     COPD (chronic obstructive pulmonary disease) (H) 11/21/2014     GERD (gastroesophageal reflux disease) 8/12/2012     Gout 8/12/2012     History of colonic polyps 9/30/2008     Hyperlipidemia LDL goal <100 8/3/2012     Hypertension goal BP (blood pressure) < 140/90 8/3/2012     Mild persistent asthma 5/29/2013    Patient does not have COPD      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     Nonsenile cataract      Tobacco abuse 9/13/2013     Type 2 diabetes, HbA1C goal < 8% " (H) 8/3/2012       FAMILY HISTORY:  Family History   Problem Relation Age of Onset     Heart Disease Maternal Grandmother      Hypertension Maternal Grandmother      Hypertension Father      Hypertension Mother      Hypertension Sister      Thyroid Disease Sister      Cancer Brother      Diabetes Brother      Hypertension Brother      Hypertension Maternal Aunt      Cancer Maternal Uncle      Hypertension Maternal Uncle      Hypertension Paternal Aunt      Hypertension Paternal Uncle      Hypertension Maternal Grandfather      Hypertension Paternal Grandmother      Hypertension Paternal Grandfather      Cerebrovascular Disease No family hx of      Glaucoma No family hx of      Macular Degeneration No family hx of        SOCIAL HISTORY:  Social History     Tobacco Use     Smoking status: Former     Packs/day: 0.25     Years: 15.00     Pack years: 3.75     Types: Cigarettes     Quit date: 2016     Years since quittin.0     Smokeless tobacco: Never     Tobacco comments:     3-4 a day   Substance Use Topics     Alcohol use: Yes     Comment: weekend beer     Drug use: No           CURRENT MEDICATIONS:  Current Outpatient Medications   Medication Sig Dispense Refill     albuterol (PROAIR HFA/PROVENTIL HFA/VENTOLIN HFA) 108 (90 Base) MCG/ACT inhaler Inhale 2 puffs into the lungs every 4 hours as needed for shortness of breath / dyspnea 18 g 3     albuterol (PROVENTIL) (2.5 MG/3ML) 0.083% neb solution INHALE 3 ML BY NEBULIZATION METHOD EVERY 6 HOURS AS NEEDED FOR SHORTNESS OF BREATH 360 mL 1     allopurinol (ZYLOPRIM) 100 MG tablet TAKE 2 TABLETS BY MOUTH EVERY  tablet 3     apixaban ANTICOAGULANT (ELIQUIS) 5 MG tablet Take 1 tablet (5 mg) by mouth in the morning and 1 tablet (5 mg) in the evening. 180 tablet 1     aspirin (ASA) 325 MG EC tablet Take 1 tablet (325 mg) by mouth daily 90 tablet 3     budesonide-formoterol (SYMBICORT) 160-4.5 MCG/ACT Inhaler TAKE 2 PUFFS BY MOUTH TWICE A DAY 30.6 g 1      carvedilol (COREG) 25 MG tablet TAKE 1 TABLET BY MOUTH TWICE DAILY WITH MEALS 180 tablet 3     furosemide (LASIX) 20 MG tablet Take 2 tablets (40 mg) by mouth daily 90 tablet 3     gabapentin (NEURONTIN) 300 MG capsule Take 1 capsule (300 mg) by mouth 3 times daily Please follow up in clinic for next refill. 270 capsule 0     hydrALAZINE (APRESOLINE) 25 MG tablet Take 2 tablets (50 mg) by mouth 3 times daily 180 tablet 3     isosorbide mononitrate (IMDUR) 60 MG 24 hr tablet Take 1 tablet (60 mg) by mouth daily 90 tablet 3     losartan (COZAAR) 100 MG tablet Take 1 tablet (100 mg) by mouth daily 90 tablet 1     montelukast (SINGULAIR) 10 MG tablet TAKE 1 TABLET BY MOUTH EVERYDAY AT BEDTIME 90 tablet 3     nitroGLYcerin (NITROSTAT) 0.4 MG sublingual tablet Place 1 tablet (0.4 mg) under the tongue every 5 minutes as needed for chest pain 0.4 mg by Sublingual route every 5 (five) minutes as needed. 25 tablet 11     omeprazole (PRILOSEC) 20 MG DR capsule TAKE 1 CAPSULE BY MOUTH EVERY DAY 90 capsule 2     oxyCODONE (ROXICODONE) 5 MG tablet Take 1 tablet (5 mg) by mouth 2 times daily as needed for moderate to severe pain (up to 2 a day) 60 tablet 0     simvastatin (ZOCOR) 40 MG tablet Take 1 tablet (40 mg) by mouth At Bedtime 90 tablet 3     tamsulosin (FLOMAX) 0.4 MG capsule Take 1 capsule (0.4 mg) by mouth daily 90 capsule 3     traZODone (DESYREL) 100 MG tablet TAKE 2 TABLETS BY MOUTH AT BEDTIME 180 tablet 2     venlafaxine (EFFEXOR XR) 150 MG 24 hr capsule Take 1 capsule (150 mg) by mouth daily 90 capsule 1     cetirizine (ZYRTEC) 10 MG tablet TAKE 1 TABLET BY MOUTH DAILY AT BEDTIME (Patient not taking: Reported on 10/14/2022) 90 tablet 1     enoxaparin ANTICOAGULANT (LOVENOX) 100 MG/ML syringe Inject 1 mL (100 mg) Subcutaneous in the morning and 1 mL (100 mg) in the evening. Until INR 2.3 or greater (Patient not taking: No sig reported) 20 mL 1       ROS:  Review Of Systems  Skin: negative  Eyes:  "negative  Ears/Nose/Throat: negative  Respiratory: No shortness of breath, dyspnea on exertion, cough, or hemoptysis  Cardiovascular: negative  Gastrointestinal: negative  Genitourinary: negative  Musculoskeletal: negative  Neurologic: negative  Psychiatric: negative  Hematologic/Lymphatic/Immunologic: negative  Endocrine: negative      EXAM:  /89 (BP Location: Right arm, Patient Position: Chair, Cuff Size: Adult Regular)   Pulse 79   Ht 1.674 m (5' 5.91\")   Wt 93.2 kg (205 lb 6.4 oz)   BMI 33.25 kg/m    Home weight:  General: alert, articulate, and in no acute distress.  HEENT: normocephalic, atraumatic, anicteric sclera, EOMI, mucosa moist, no cyanosis.   Neck: neck supple.  No adenopathy, masses, or carotid bruits.  JVP not detected at 90 degrees  Heart: regular rhythm, normal S1/S2, no murmur, gallop, rub.  Precordium quiet with normal PMI.     Lungs: clear, no rales, ronchi, or wheezing.  No accessory muscle use, respirations unlabored.   Abdomen: soft, non-tender, bowel sounds present, no hepatomegaly  Extremities: no  pitting edema.   No cyanosis.   Neurological: alert and oriented x 3.  normal speech and affect, no gross motor deficits  Skin:  No ecchymoses, rashes, or clubbing.    Labs:  CBC RESULTS:  Lab Results   Component Value Date    WBC 9.3 10/14/2022    WBC 9.9 06/18/2021    RBC 4.79 10/14/2022    RBC 4.94 06/18/2021    HGB 14.8 10/14/2022    HGB 15.1 06/18/2021    HCT 45.7 10/14/2022    HCT 46.3 06/18/2021    MCV 95 10/14/2022    MCV 94 06/18/2021    MCH 30.9 10/14/2022    MCH 30.6 06/18/2021    MCHC 32.4 10/14/2022    MCHC 32.6 06/18/2021    RDW 13.5 10/14/2022    RDW 12.8 06/18/2021     10/14/2022     06/18/2021       CMP RESULTS:  Lab Results   Component Value Date     10/14/2022     06/18/2021    POTASSIUM 4.2 10/14/2022    POTASSIUM 5.0 08/26/2022    POTASSIUM 4.3 06/18/2021    CHLORIDE 103 10/14/2022    CHLORIDE 103 08/26/2022    CHLORIDE 102 06/18/2021    " CO2 27 10/14/2022    CO2 29 08/26/2022    CO2 33 (H) 06/18/2021    ANIONGAP 12 10/14/2022    ANIONGAP 8 08/26/2022    ANIONGAP 4 06/18/2021     (H) 10/14/2022    GLC 97 08/26/2022    GLC 94 06/18/2021    BUN 25.0 (H) 10/14/2022    BUN 32 (H) 08/26/2022    BUN 24 06/18/2021    CR 1.10 10/14/2022    CR 1.38 (H) 06/18/2021    GFRESTIMATED 74 10/14/2022    GFRESTIMATED 53 (L) 06/18/2021    GFRESTBLACK 62 06/18/2021    FELICITY 9.6 10/14/2022    FELICITY 9.0 06/18/2021    BILITOTAL 0.5 09/11/2018    ALBUMIN 3.9 09/11/2018    ALKPHOS 86 09/11/2018    ALT 36 07/21/2022    ALT 30 06/18/2021    AST 29 09/11/2018        INR RESULTS:  Lab Results   Component Value Date    INR 1.12 10/06/2014       No components found for: CK  Lab Results   Component Value Date    MAG 1.9 01/18/2010     Lab Results   Component Value Date    NTBNP 432 08/26/2022    NTBNP 544 (H) 06/18/2021     @BRIEFLABR (dig)@    Most recent echocardiogram:  No results found for this or any previous visit (from the past 8760 hour(s)).      Assessment and Plan:    In summary,Santiago  is a  67 year old male who is here for a CORE visit. He is very pleasant and is in favor of the proposed changes with his medications. He is euvolemic today. Increase the Losartan to 100 mg daily. He should return to clinic in 2 weeks for labs and CORE.    1.  Chronic systolic heart failure secondary to ICM.  Stage C  NYHA Class II  ACEi/ARB: yes- increase Losartan 100 mg and continue Imdur 60 mg -  hydralazine 50 mg TID  BB: yes- max dose   Aldosterone antagonist: deferred while other medical therapy is prioritized  SCD prophylaxis: does not meet criteria for implant  Fluid status: euvolemic  Anticoagulation:   Antiplatelet:  ASA dose   Sleep apnea:  NSAID use:  Contraindicated.  Avoid use.  Remote Monitoring:  SGLT2- will initiate at next visit.     2.  Other comordbid conditions:    Atypical anginal chest pain- not present today - Imdur has helped   Coronary artery disease with prior  [place] [facility] [place]   Ischemic cardiomyopathy (ACC/AHA stage C, NYHA III)  Hypertension- on Losartan , Imdur will start hydralazine ( recent ER visit for HTN)  Dyslipidemia  COPD   Type II diabetes mellitus, managed only with conservative measures      3.  Follow-up   Increase Losartan 100 mg daily   CORE on 10/26- BMP prior  Dr. Hardin next available at [facility]( last seen 8/21)           Kayla STALLWORTH, CNP  [facility]

## 2022-10-14 NOTE — PATIENT INSTRUCTIONS
Take your medicines every day, as directed    Changes made today:  Please remove pickle juice from diet.   Increase losartan to 100 mg daily.   Monitor Your Weight and Symptoms    Contact us if you:    Gain 2 pounds in one day or 5 pounds in one week  Feel more short of breath  Notice more leg swelling  Feel lightheadeded   Change your lifestyle    Limit Salt or Sodium:  2000 mg  Limit Fluids:  2000 mL or approximately 64 ounces  Eat a Heart Healthy Diet  Low in saturated fats  Stay Active:  Aim to move at least 150 minutes every  week         To Contact us    During Business Hours:  [phone], option # 1      After hours, weekends or holidays:   [phone], Option #4  Ask to speak to the On-Call Cardiologist. Inform them you are a CORE/heart failure patient at the [place].     Use [employer] allows you to communicate directly with your heart team through secure messaging.  Energy can be accessed any time on your phone, computer, or tablet.  If you need assistance, we'd be happy to help!         Keep your Heart Appointments:    CORE 10/26 with Kayla in [place] (labs prior)    Dr. Hardin next available in [place]

## 2022-10-14 NOTE — PROGRESS NOTES
"  HPI: Santiago is a 67 year old Black or  male with a past medical history  Ischemic cardiomyopathy with prior coronary angiography and intervention (status post PCI to LAD in 1995 for ACS), Hypertension, Dyslipidemia, Type II diabetes mellitus.    His brother passed away recently. He went to the ER on 10/10- for a high blood pressure reading (166/73) on his machine.  Now he has been 145/90- on average,  He has been drinking \"a lot of pickle juice\".  He has been noting more heavier breathing with the 40 mg of Lasix. He has more SOB and CANO. He says the weight 203 lbs- he says he needs a scale. He has no swelling in the legs/feet.     Denies weight gain, chest pain, palpitations, lightheadedness, dizziness, near syncopal/syncopal episodes. Santiago has been following salt and fluid restrictions.      PAST MEDICAL HISTORY:  Past Medical History:   Diagnosis Date     CAD (coronary artery disease) 8/12/2012    S/P MI and stenting 2001, 2005 at [facility] [place]      CHF (congestive heart failure) (H) 8/3/2012     Chronic hepatitis C (H) 11/21/2014     CKD (chronic kidney disease) stage 2, GFR 60-89 ml/min 12/3/2012     COPD (chronic obstructive pulmonary disease) (H) 11/21/2014     GERD (gastroesophageal reflux disease) 8/12/2012     Gout 8/12/2012     History of colonic polyps 9/30/2008     Hyperlipidemia LDL goal <100 8/3/2012     Hypertension goal BP (blood pressure) < 140/90 8/3/2012     Mild persistent asthma 5/29/2013    Patient does not have COPD      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     Nonsenile cataract      Tobacco abuse 9/13/2013     Type 2 diabetes, HbA1C goal < 8% (H) 8/3/2012       FAMILY HISTORY:  Family History   Problem Relation Age of Onset     Heart Disease Maternal Grandmother      Hypertension Maternal Grandmother      Hypertension Father      Hypertension Mother      Hypertension Sister      Thyroid Disease Sister      Cancer Brother      Diabetes Brother      Hypertension Brother      " Hypertension Maternal Aunt      Cancer Maternal Uncle      Hypertension Maternal Uncle      Hypertension Paternal Aunt      Hypertension Paternal Uncle      Hypertension Maternal Grandfather      Hypertension Paternal Grandmother      Hypertension Paternal Grandfather      Cerebrovascular Disease No family hx of      Glaucoma No family hx of      Macular Degeneration No family hx of        SOCIAL HISTORY:  Social History     Tobacco Use     Smoking status: Former     Packs/day: 0.25     Years: 15.00     Pack years: 3.75     Types: Cigarettes     Quit date: 2016     Years since quittin.0     Smokeless tobacco: Never     Tobacco comments:     3-4 a day   Substance Use Topics     Alcohol use: Yes     Comment: weekend beer     Drug use: No           CURRENT MEDICATIONS:  Current Outpatient Medications   Medication Sig Dispense Refill     albuterol (PROAIR HFA/PROVENTIL HFA/VENTOLIN HFA) 108 (90 Base) MCG/ACT inhaler Inhale 2 puffs into the lungs every 4 hours as needed for shortness of breath / dyspnea 18 g 3     albuterol (PROVENTIL) (2.5 MG/3ML) 0.083% neb solution INHALE 3 ML BY NEBULIZATION METHOD EVERY 6 HOURS AS NEEDED FOR SHORTNESS OF BREATH 360 mL 1     allopurinol (ZYLOPRIM) 100 MG tablet TAKE 2 TABLETS BY MOUTH EVERY  tablet 3     apixaban ANTICOAGULANT (ELIQUIS) 5 MG tablet Take 1 tablet (5 mg) by mouth in the morning and 1 tablet (5 mg) in the evening. 180 tablet 1     aspirin (ASA) 325 MG EC tablet Take 1 tablet (325 mg) by mouth daily 90 tablet 3     budesonide-formoterol (SYMBICORT) 160-4.5 MCG/ACT Inhaler TAKE 2 PUFFS BY MOUTH TWICE A DAY 30.6 g 1     carvedilol (COREG) 25 MG tablet TAKE 1 TABLET BY MOUTH TWICE DAILY WITH MEALS 180 tablet 3     furosemide (LASIX) 20 MG tablet Take 2 tablets (40 mg) by mouth daily 90 tablet 3     gabapentin (NEURONTIN) 300 MG capsule Take 1 capsule (300 mg) by mouth 3 times daily Please follow up in clinic for next refill. 270 capsule 0     hydrALAZINE  (APRESOLINE) 25 MG tablet Take 2 tablets (50 mg) by mouth 3 times daily 180 tablet 3     isosorbide mononitrate (IMDUR) 60 MG 24 hr tablet Take 1 tablet (60 mg) by mouth daily 90 tablet 3     losartan (COZAAR) 100 MG tablet Take 1 tablet (100 mg) by mouth daily 90 tablet 1     montelukast (SINGULAIR) 10 MG tablet TAKE 1 TABLET BY MOUTH EVERYDAY AT BEDTIME 90 tablet 3     nitroGLYcerin (NITROSTAT) 0.4 MG sublingual tablet Place 1 tablet (0.4 mg) under the tongue every 5 minutes as needed for chest pain 0.4 mg by Sublingual route every 5 (five) minutes as needed. 25 tablet 11     omeprazole (PRILOSEC) 20 MG DR capsule TAKE 1 CAPSULE BY MOUTH EVERY DAY 90 capsule 2     oxyCODONE (ROXICODONE) 5 MG tablet Take 1 tablet (5 mg) by mouth 2 times daily as needed for moderate to severe pain (up to 2 a day) 60 tablet 0     simvastatin (ZOCOR) 40 MG tablet Take 1 tablet (40 mg) by mouth At Bedtime 90 tablet 3     tamsulosin (FLOMAX) 0.4 MG capsule Take 1 capsule (0.4 mg) by mouth daily 90 capsule 3     traZODone (DESYREL) 100 MG tablet TAKE 2 TABLETS BY MOUTH AT BEDTIME 180 tablet 2     venlafaxine (EFFEXOR XR) 150 MG 24 hr capsule Take 1 capsule (150 mg) by mouth daily 90 capsule 1     cetirizine (ZYRTEC) 10 MG tablet TAKE 1 TABLET BY MOUTH DAILY AT BEDTIME (Patient not taking: Reported on 10/14/2022) 90 tablet 1     enoxaparin ANTICOAGULANT (LOVENOX) 100 MG/ML syringe Inject 1 mL (100 mg) Subcutaneous in the morning and 1 mL (100 mg) in the evening. Until INR 2.3 or greater (Patient not taking: No sig reported) 20 mL 1       ROS:  Review Of Systems  Skin: negative  Eyes: negative  Ears/Nose/Throat: negative  Respiratory: No shortness of breath, dyspnea on exertion, cough, or hemoptysis  Cardiovascular: negative  Gastrointestinal: negative  Genitourinary: negative  Musculoskeletal: negative  Neurologic: negative  Psychiatric: negative  Hematologic/Lymphatic/Immunologic: negative  Endocrine: negative      EXAM:  /89 (BP  "Location: Right arm, Patient Position: Chair, Cuff Size: Adult Regular)   Pulse 79   Ht 1.674 m (5' 5.91\")   Wt 93.2 kg (205 lb 6.4 oz)   BMI 33.25 kg/m    Home weight:  General: alert, articulate, and in no acute distress.  HEENT: normocephalic, atraumatic, anicteric sclera, EOMI, mucosa moist, no cyanosis.   Neck: neck supple.  No adenopathy, masses, or carotid bruits.  JVP not detected at 90 degrees  Heart: regular rhythm, normal S1/S2, no murmur, gallop, rub.  Precordium quiet with normal PMI.     Lungs: clear, no rales, ronchi, or wheezing.  No accessory muscle use, respirations unlabored.   Abdomen: soft, non-tender, bowel sounds present, no hepatomegaly  Extremities: no  pitting edema.   No cyanosis.   Neurological: alert and oriented x 3.  normal speech and affect, no gross motor deficits  Skin:  No ecchymoses, rashes, or clubbing.    Labs:  CBC RESULTS:  Lab Results   Component Value Date    WBC 9.3 10/14/2022    WBC 9.9 06/18/2021    RBC 4.79 10/14/2022    RBC 4.94 06/18/2021    HGB 14.8 10/14/2022    HGB 15.1 06/18/2021    HCT 45.7 10/14/2022    HCT 46.3 06/18/2021    MCV 95 10/14/2022    MCV 94 06/18/2021    MCH 30.9 10/14/2022    MCH 30.6 06/18/2021    MCHC 32.4 10/14/2022    MCHC 32.6 06/18/2021    RDW 13.5 10/14/2022    RDW 12.8 06/18/2021     10/14/2022     06/18/2021       CMP RESULTS:  Lab Results   Component Value Date     10/14/2022     06/18/2021    POTASSIUM 4.2 10/14/2022    POTASSIUM 5.0 08/26/2022    POTASSIUM 4.3 06/18/2021    CHLORIDE 103 10/14/2022    CHLORIDE 103 08/26/2022    CHLORIDE 102 06/18/2021    CO2 27 10/14/2022    CO2 29 08/26/2022    CO2 33 (H) 06/18/2021    ANIONGAP 12 10/14/2022    ANIONGAP 8 08/26/2022    ANIONGAP 4 06/18/2021     (H) 10/14/2022    GLC 97 08/26/2022    GLC 94 06/18/2021    BUN 25.0 (H) 10/14/2022    BUN 32 (H) 08/26/2022    BUN 24 06/18/2021    CR 1.10 10/14/2022    CR 1.38 (H) 06/18/2021    GFRESTIMATED 74 10/14/2022    " GFRESTIMATED 53 (L) 06/18/2021    GFRESTBLACK 62 06/18/2021    FELICITY 9.6 10/14/2022    FELICITY 9.0 06/18/2021    BILITOTAL 0.5 09/11/2018    ALBUMIN 3.9 09/11/2018    ALKPHOS 86 09/11/2018    ALT 36 07/21/2022    ALT 30 06/18/2021    AST 29 09/11/2018        INR RESULTS:  Lab Results   Component Value Date    INR 1.12 10/06/2014       No components found for: CK  Lab Results   Component Value Date    MAG 1.9 01/18/2010     Lab Results   Component Value Date    NTBNP 432 08/26/2022    NTBNP 544 (H) 06/18/2021     @BRIEFLABR (dig)@    Most recent echocardiogram:  No results found for this or any previous visit (from the past 8760 hour(s)).      Assessment and Plan:    In summary,Santiago  is a  67 year old male who is here for a CORE visit. He is very pleasant and is in favor of the proposed changes with his medications. He is euvolemic today. Increase the Losartan to 100 mg daily. He should return to clinic in 2 weeks for labs and CORE.    1.  Chronic systolic heart failure secondary to ICM.  Stage C  NYHA Class II  ACEi/ARB: yes- increase Losartan 100 mg and continue Imdur 60 mg -  hydralazine 50 mg TID  BB: yes- max dose   Aldosterone antagonist: deferred while other medical therapy is prioritized  SCD prophylaxis: does not meet criteria for implant  Fluid status: euvolemic  Anticoagulation:   Antiplatelet:  ASA dose   Sleep apnea:  NSAID use:  Contraindicated.  Avoid use.  Remote Monitoring:  SGLT2- will initiate at next visit.     2.  Other comordbid conditions:    Atypical anginal chest pain- not present today - Imdur has helped   Coronary artery disease with prior proximal LAD stenting in 1995 at [facility] [place]   Ischemic cardiomyopathy (ACC/AHA stage C, NYHA III)  Hypertension- on Losartan , Imdur will start hydralazine ( recent ER visit for HTN)  Dyslipidemia  COPD   Type II diabetes mellitus, managed only with conservative measures      3.  Follow-up   Increase Losartan 100 mg daily   CORE on 10/26- BMP prior  Dr. Wilfred hughes  available at ( last seen 8/21)           Kayla STALLWORTH, CNP  [facility]

## 2022-10-18 DIAGNOSIS — I50.22 CHRONIC SYSTOLIC HEART FAILURE (H): ICD-10-CM

## 2022-10-22 RX ORDER — HYDRALAZINE HYDROCHLORIDE 25 MG/1
50 TABLET, FILM COATED ORAL 3 TIMES DAILY
Qty: 180 TABLET | Refills: 3 | OUTPATIENT
Start: 2022-10-22

## 2022-10-25 ENCOUNTER — MYC REFILL (OUTPATIENT)
Dept: FAMILY MEDICINE | Facility: CLINIC | Age: 67
End: 2022-10-25

## 2022-10-25 DIAGNOSIS — G89.4 CHRONIC PAIN SYNDROME: ICD-10-CM

## 2022-10-25 DIAGNOSIS — G60.9 IDIOPATHIC PERIPHERAL NEUROPATHY: ICD-10-CM

## 2022-10-25 DIAGNOSIS — E11.42 DIABETIC POLYNEUROPATHY ASSOCIATED WITH TYPE 2 DIABETES MELLITUS (H): ICD-10-CM

## 2022-10-25 RX ORDER — OXYCODONE HYDROCHLORIDE 5 MG/1
5 TABLET ORAL 2 TIMES DAILY PRN
Qty: 60 TABLET | Refills: 0 | Status: SHIPPED | OUTPATIENT
Start: 2022-11-02 | End: 2022-12-07

## 2022-10-25 RX ORDER — GABAPENTIN 300 MG/1
300 CAPSULE ORAL 3 TIMES DAILY
Qty: 270 CAPSULE | Refills: 0 | Status: SHIPPED | OUTPATIENT
Start: 2022-10-25 | End: 2022-12-22

## 2022-10-26 ENCOUNTER — OFFICE VISIT (OUTPATIENT)
Dept: CARDIOLOGY | Facility: CLINIC | Age: 67
End: 2022-10-26
Payer: MEDICARE

## 2022-10-26 ENCOUNTER — LAB (OUTPATIENT)
Dept: LAB | Facility: CLINIC | Age: 67
End: 2022-10-26
Payer: MEDICARE

## 2022-10-26 VITALS
DIASTOLIC BLOOD PRESSURE: 95 MMHG | OXYGEN SATURATION: 99 % | WEIGHT: 205.9 LBS | HEART RATE: 88 BPM | SYSTOLIC BLOOD PRESSURE: 152 MMHG | BODY MASS INDEX: 33.33 KG/M2

## 2022-10-26 DIAGNOSIS — M79.673 PAIN OF FOOT, UNSPECIFIED LATERALITY: ICD-10-CM

## 2022-10-26 DIAGNOSIS — I25.5 ISCHEMIC CARDIOMYOPATHY: ICD-10-CM

## 2022-10-26 DIAGNOSIS — I10 HYPERTENSION GOAL BP (BLOOD PRESSURE) < 140/90: ICD-10-CM

## 2022-10-26 DIAGNOSIS — Z63.4 SPOUSE DECEASED: ICD-10-CM

## 2022-10-26 DIAGNOSIS — I50.22 CHRONIC SYSTOLIC HEART FAILURE (H): ICD-10-CM

## 2022-10-26 DIAGNOSIS — E11.9 DIABETES MELLITUS TYPE 2, NONINSULIN DEPENDENT (H): ICD-10-CM

## 2022-10-26 DIAGNOSIS — I50.22 CHRONIC SYSTOLIC HEART FAILURE (H): Primary | ICD-10-CM

## 2022-10-26 LAB
ANION GAP SERPL CALCULATED.3IONS-SCNC: 6 MMOL/L (ref 3–14)
BUN SERPL-MCNC: 30 MG/DL (ref 7–30)
CALCIUM SERPL-MCNC: 9.6 MG/DL (ref 8.5–10.1)
CHLORIDE BLD-SCNC: 105 MMOL/L (ref 94–109)
CO2 SERPL-SCNC: 27 MMOL/L (ref 20–32)
CREAT SERPL-MCNC: 1.11 MG/DL (ref 0.66–1.25)
GFR SERPL CREATININE-BSD FRML MDRD: 73 ML/MIN/1.73M2
GLUCOSE BLD-MCNC: 123 MG/DL (ref 70–99)
POTASSIUM BLD-SCNC: 4.1 MMOL/L (ref 3.4–5.3)
SODIUM SERPL-SCNC: 138 MMOL/L (ref 133–144)

## 2022-10-26 PROCEDURE — 80048 BASIC METABOLIC PNL TOTAL CA: CPT

## 2022-10-26 PROCEDURE — 99213 OFFICE O/P EST LOW 20 MIN: CPT | Performed by: NURSE PRACTITIONER

## 2022-10-26 PROCEDURE — 36415 COLL VENOUS BLD VENIPUNCTURE: CPT

## 2022-10-26 SDOH — SOCIAL STABILITY - SOCIAL INSECURITY: DISSAPEARANCE AND DEATH OF FAMILY MEMBER: Z63.4

## 2022-10-26 NOTE — PATIENT INSTRUCTIONS
Take your medicines every day, as directed    Changes made today:  Start SGLT 2 - labs pending      Monitor Your Weight and Symptoms    Contact us if you:    Gain 2 pounds in one day or 5 pounds in one week  Feel more short of breath  Notice more leg swelling  Feel lightheadeded   Change your lifestyle    Limit Salt or Sodium:  2000 mg  Limit Fluids:  2000 mL or approximately 64 ounces  Eat a Heart Healthy Diet  Low in saturated fats  Stay Active:  Aim to move at least 150 minutes every  week         To Contact us    During Business Hours:  [phone], option # 1      After hours, weekends or holidays:   [phone], Option #4  Ask to speak to the On-Call Cardiologist. Inform them you are a CORE/heart failure patient at the [facility].     Use [employer] allows you to communicate directly with your heart team through secure messaging.  [employer] can be accessed any time on your phone, computer, or tablet.  If you need assistance, we'd be happy to help!         Keep your Heart Appointments:    CORE 11/9- with BMP       Your provider has started you on a medication called empagliflozin (Jardiance)     This medication is used to help treat your heart but is also used to treat diabetes as it can help lower blood sugar. * If you are prescribed this medication, it does not necessarily mean that you have diabetes*    Below are some side effects to be aware of and some information on when to HOLD (i.e. not take) this medication.     If you notice any of the following symptoms, please contact your provider right away:   -If you develop a fever greater than 100.4 degrees F.   -If you develop nausea, vomiting, diarrhea or have limited intake of food or fluids for any reason.   -If you notice any itching, swelling or redness in your genital or anal area.   -If you notice a sweet, fruity smell to your breath, increased thirst, or confusion this could be a symptom of a serious condition called diabetic  ketoacidosis; let your provider know right away.   -While taking empagliflozin you should limit alcohol intake.   -If you have a planned surgery you should HOLD (i.e. not take) this medication for 3 days prior to surgery.     Please let us know if you have any questions. We can be reached via 5gig or by calling the clinic at [phone]. Ask to speak with one of the heart failure nurses.                                                     Please consider attending our virtual support group which is held monthly. Please reach out to Ezequiel at [phone] for more information if you are interested in attending.     2022 dates:   - Monday, November 7th, 1-2pm: topic: What's new in heart failure research and treatment?  - Monday, December 5th, 1-2pm: topic: How to thrive during winter and the holiday season with heart failure    2023 dates:  Monday, January 2nd , 1-2pm  Monday, February 6th , 1-2pm  Monday, March 6th , 1-2pm  Monday, April 3rd, 1-2pm  Monday, May 1st, 1-2pm  Monday, June 5th, 1-2pm  Monday, July 3rd, 1-2pm  Monday, August 7th, 1-2pm  Monday, September 11th, 1-2pm  Monday, October 2nd, 1-2pm  Monday, November 6th, 1-2pm  Monday, December 4th, 1-2pm

## 2022-10-26 NOTE — PROGRESS NOTES
"  HPI: Santiago is a 67 year old Black or  male with a past medical history  Ischemic cardiomyopathy with prior coronary angiography and intervention (status post PCI to LAD in 1995 for ACS), Hypertension, Dyslipidemia, Type II diabetes mellitus.    His brother passed away recently. He says he has been doing well \" much better than before\"- with regards to his health.   Now he has been 135/80- on average with blood pressure- which is an improvment,   He has been breathing better.Takes the 40 mg of Lasix. He has less SOB and CANO. - he feels stronger and better he says. He says the weight 205 lbs- he says he needs a scale. He has no swelling in the legs/feet.     Denies weight gain, chest pain, palpitations, lightheadedness, dizziness, near syncopal/syncopal episodes. Santiago has been following salt and fluid restrictions.      PAST MEDICAL HISTORY:  Past Medical History:   Diagnosis Date     CAD (coronary artery disease) 8/12/2012    [place] [facility] [place]      CHF (congestive heart failure) (H) 8/3/2012     Chronic hepatitis C (H) 11/21/2014     CKD (chronic kidney disease) stage 2, GFR 60-89 ml/min 12/3/2012     COPD (chronic obstructive pulmonary disease) (H) 11/21/2014     GERD (gastroesophageal reflux disease) 8/12/2012     Gout 8/12/2012     History of colonic polyps 9/30/2008     Hyperlipidemia LDL goal <100 8/3/2012     Hypertension goal BP (blood pressure) < 140/90 8/3/2012     Mild persistent asthma 5/29/2013    Patient does not have COPD      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     Nonsenile cataract      Tobacco abuse 9/13/2013     Type 2 diabetes, HbA1C goal < 8% (H) 8/3/2012       FAMILY HISTORY:  Family History   Problem Relation Age of Onset     Heart Disease Maternal Grandmother      Hypertension Maternal Grandmother      Hypertension Father      Hypertension Mother      Hypertension Sister      Thyroid Disease Sister      Cancer Brother      Diabetes Brother      " Hypertension Brother      Hypertension Maternal Aunt      Cancer Maternal Uncle      Hypertension Maternal Uncle      Hypertension Paternal Aunt      Hypertension Paternal Uncle      Hypertension Maternal Grandfather      Hypertension Paternal Grandmother      Hypertension Paternal Grandfather      Cerebrovascular Disease No family hx of      Glaucoma No family hx of      Macular Degeneration No family hx of        SOCIAL HISTORY:  Social History     Tobacco Use     Smoking status: Former     Packs/day: 0.25     Years: 15.00     Pack years: 3.75     Types: Cigarettes     Quit date: 2016     Years since quittin.0     Smokeless tobacco: Never     Tobacco comments:     3-4 a day   Substance Use Topics     Alcohol use: Yes     Comment: weekend beer     Drug use: No           CURRENT MEDICATIONS:  Current Outpatient Medications   Medication Sig Dispense Refill     albuterol (PROAIR HFA/PROVENTIL HFA/VENTOLIN HFA) 108 (90 Base) MCG/ACT inhaler Inhale 2 puffs into the lungs every 4 hours as needed for shortness of breath / dyspnea 18 g 3     albuterol (PROVENTIL) (2.5 MG/3ML) 0.083% neb solution INHALE 3 ML BY NEBULIZATION METHOD EVERY 6 HOURS AS NEEDED FOR SHORTNESS OF BREATH 360 mL 1     allopurinol (ZYLOPRIM) 100 MG tablet TAKE 2 TABLETS BY MOUTH EVERY  tablet 3     apixaban ANTICOAGULANT (ELIQUIS) 5 MG tablet Take 1 tablet (5 mg) by mouth in the morning and 1 tablet (5 mg) in the evening. 180 tablet 1     aspirin (ASA) 325 MG EC tablet Take 1 tablet (325 mg) by mouth daily 90 tablet 3     budesonide-formoterol (SYMBICORT) 160-4.5 MCG/ACT Inhaler TAKE 2 PUFFS BY MOUTH TWICE A DAY 30.6 g 1     carvedilol (COREG) 25 MG tablet TAKE 1 TABLET BY MOUTH TWICE DAILY WITH MEALS 180 tablet 3     furosemide (LASIX) 20 MG tablet Take 2 tablets (40 mg) by mouth daily 90 tablet 3     gabapentin (NEURONTIN) 300 MG capsule Take 1 capsule (300 mg) by mouth 3 times daily Please follow up in clinic for next refill. 270  capsule 0     hydrALAZINE (APRESOLINE) 25 MG tablet Take 2 tablets (50 mg) by mouth 3 times daily 180 tablet 3     isosorbide mononitrate (IMDUR) 60 MG 24 hr tablet Take 1 tablet (60 mg) by mouth daily 90 tablet 3     losartan (COZAAR) 100 MG tablet Take 1 tablet (100 mg) by mouth daily 90 tablet 1     montelukast (SINGULAIR) 10 MG tablet TAKE 1 TABLET BY MOUTH EVERYDAY AT BEDTIME 90 tablet 3     nitroGLYcerin (NITROSTAT) 0.4 MG sublingual tablet Place 1 tablet (0.4 mg) under the tongue every 5 minutes as needed for chest pain 0.4 mg by Sublingual route every 5 (five) minutes as needed. 25 tablet 11     omeprazole (PRILOSEC) 20 MG DR capsule TAKE 1 CAPSULE BY MOUTH EVERY DAY 90 capsule 2     [START ON 11/2/2022] oxyCODONE (ROXICODONE) 5 MG tablet Take 1 tablet (5 mg) by mouth 2 times daily as needed for moderate to severe pain (up to 2 a day) 60 tablet 0     simvastatin (ZOCOR) 40 MG tablet Take 1 tablet (40 mg) by mouth At Bedtime 90 tablet 3     tamsulosin (FLOMAX) 0.4 MG capsule Take 1 capsule (0.4 mg) by mouth daily 90 capsule 3     traZODone (DESYREL) 100 MG tablet TAKE 2 TABLETS BY MOUTH AT BEDTIME 180 tablet 2     venlafaxine (EFFEXOR XR) 150 MG 24 hr capsule Take 1 capsule (150 mg) by mouth daily 90 capsule 1     cetirizine (ZYRTEC) 10 MG tablet TAKE 1 TABLET BY MOUTH DAILY AT BEDTIME (Patient not taking: Reported on 10/14/2022) 90 tablet 1     enoxaparin ANTICOAGULANT (LOVENOX) 100 MG/ML syringe Inject 1 mL (100 mg) Subcutaneous in the morning and 1 mL (100 mg) in the evening. Until INR 2.3 or greater (Patient not taking: Reported on 8/26/2022) 20 mL 1       ROS:  Review Of Systems  Skin: negative  Eyes: negative  Ears/Nose/Throat: negative  Respiratory: No shortness of breath, dyspnea on exertion, cough, or hemoptysis  Cardiovascular: negative  Gastrointestinal: negative  Genitourinary: negative  Musculoskeletal: negative  Neurologic: negative  Psychiatric: negative  Hematologic/Lymphatic/Immunologic:  negative  Endocrine: negative      EXAM:  BP (!) 152/95 (BP Location: Left arm, Patient Position: Sitting, Cuff Size: Adult Regular)   Pulse 88   Wt 93.4 kg (205 lb 14.4 oz)   SpO2 99%   BMI 33.33 kg/m    Home weight:  General: alert, articulate, and in no acute distress.  HEENT: normocephalic, atraumatic, anicteric sclera, EOMI, mucosa moist, no cyanosis.   Neck: neck supple.  No adenopathy, masses, or carotid bruits.  JVP not detected at 90 degrees  Heart: regular rhythm, normal S1/S2, no murmur, gallop, rub.  Precordium quiet with normal PMI.     Lungs: clear, no rales, ronchi, or wheezing.  No accessory muscle use, respirations unlabored.   Abdomen: soft, non-tender, bowel sounds present, no hepatomegaly  Extremities: no  pitting edema.   No cyanosis.   Neurological: alert and oriented x 3.  normal speech and affect, no gross motor deficits  Skin:  No ecchymoses, rashes, or clubbing.    Labs:  CBC RESULTS:  Lab Results   Component Value Date    WBC 9.3 10/14/2022    WBC 9.9 06/18/2021    RBC 4.79 10/14/2022    RBC 4.94 06/18/2021    HGB 14.8 10/14/2022    HGB 15.1 06/18/2021    HCT 45.7 10/14/2022    HCT 46.3 06/18/2021    MCV 95 10/14/2022    MCV 94 06/18/2021    MCH 30.9 10/14/2022    MCH 30.6 06/18/2021    MCHC 32.4 10/14/2022    MCHC 32.6 06/18/2021    RDW 13.5 10/14/2022    RDW 12.8 06/18/2021     10/14/2022     06/18/2021       CMP RESULTS:  Lab Results   Component Value Date     10/14/2022     06/18/2021    POTASSIUM 4.2 10/14/2022    POTASSIUM 5.0 08/26/2022    POTASSIUM 4.3 06/18/2021    CHLORIDE 103 10/14/2022    CHLORIDE 103 08/26/2022    CHLORIDE 102 06/18/2021    CO2 27 10/14/2022    CO2 29 08/26/2022    CO2 33 (H) 06/18/2021    ANIONGAP 12 10/14/2022    ANIONGAP 8 08/26/2022    ANIONGAP 4 06/18/2021     (H) 10/14/2022    GLC 97 08/26/2022    GLC 94 06/18/2021    BUN 25.0 (H) 10/14/2022    BUN 32 (H) 08/26/2022    BUN 24 06/18/2021    CR 1.10 10/14/2022    CR  1.38 (H) 06/18/2021    GFRESTIMATED 74 10/14/2022    GFRESTIMATED 53 (L) 06/18/2021    GFRESTBLACK 62 06/18/2021    FELICITY 9.6 10/14/2022    FELICITY 9.0 06/18/2021    BILITOTAL 0.5 09/11/2018    ALBUMIN 3.9 09/11/2018    ALKPHOS 86 09/11/2018    ALT 36 07/21/2022    ALT 30 06/18/2021    AST 29 09/11/2018        INR RESULTS:  Lab Results   Component Value Date    INR 1.12 10/06/2014       No components found for: CK  Lab Results   Component Value Date    MAG 1.9 01/18/2010     Lab Results   Component Value Date    NTBNP 432 08/26/2022    NTBNP 544 (H) 06/18/2021     @BRIEFLABR (dig)@    Most recent echocardiogram:  No results found for this or any previous visit (from the past 8760 hour(s)).      Assessment and Plan:    In summary,Santiago  is a  67 year old male who is here for a CORE visit. He is very pleasant and is in favor of the proposed changes with his medications. He is euvolemic today. Blood pressures are higher in clinic he says as he always gets nervous being here.    He should return to clinic in 2 weeks for labs and CORE.    1.  Chronic systolic heart failure secondary to ICM.  Stage C  NYHA Class II  ACEi/ARB: yes-  Losartan 100 mg and continue Imdur 60 mg -  hydralazine 50 mg TID  BB: yes- max dose   Aldosterone antagonist: deferred while other medical therapy is prioritized  SCD prophylaxis: does not meet criteria for implant  Fluid status: euvolemic  Anticoagulation:   Antiplatelet:  ASA dose   Sleep apnea:  NSAID use:  Contraindicated.  Avoid use.  Remote Monitoring:  SGLT2- 10 mg - initiate    2.  Other comordbid conditions:    Atypical anginal chest pain- not present today - Imdur has helped   Coronary artery disease with prior proximal LAD stenting in 1995 at [facility] [place]   Ischemic cardiomyopathy (ACC/AHA stage C, NYHA III)  Hypertension- on Losartan , Imdur will start hydralazine no ER visits since last CORE visit.  Dyslipidemia  COPD   Type II diabetes mellitus, managed only with conservative measures      3.   Follow-up   Start SGLT 2 -10 mg - labs from today - WNL   CORE 11/9- with SAMMIE Burnett  APRN, CNP  [facility]

## 2022-10-26 NOTE — PROGRESS NOTES
Santiago Hylton's goals for this visit include:     He requests these members of his care team be copied on today's visit information: PCP    PCP: Lurdes Roger    Referring Provider:  FEDERICA Barba [facility]  [address] [place] [address]    BP (!) 152/95 (BP Location: Left arm, Patient Position: Sitting, Cuff Size: Adult Regular)   Pulse 88   Wt 93.4 kg (205 lb 14.4 oz)   SpO2 99%   BMI 33.33 kg/m      Do you need any medication refills at today's visit? No.    Baldo Glover, EMT  Clinic Support  [facility]    [phone]    Employed by [facility] [employer]

## 2022-10-26 NOTE — NURSING NOTE
New Medication: Patient was educated regarding newly prescribed medication, including discussion of  the indication, administration, side effects, and when to report to MD or RN. Patient demonstrated understanding of this information and agreed to call with further questions or concerns.    I spoke to the pharmacy regarding patient's Jardiance cost, it is $4 for a 90 day supply. Called patient and left message with this information.      Patient will follow up in 2 weeks with labs prior.     Patient stated he understood all health information given and agreed to call with further questions or concerns.    Diana Parsons RN   Cardiology Care Coordinator  [facility]   Phone: [phone]  Fax: [phone]

## 2022-11-03 ENCOUNTER — OFFICE VISIT (OUTPATIENT)
Dept: FAMILY MEDICINE | Facility: CLINIC | Age: 67
End: 2022-11-03
Payer: MEDICARE

## 2022-11-03 ENCOUNTER — TELEPHONE (OUTPATIENT)
Dept: FAMILY MEDICINE | Facility: CLINIC | Age: 67
End: 2022-11-03

## 2022-11-03 VITALS
HEART RATE: 82 BPM | RESPIRATION RATE: 20 BRPM | SYSTOLIC BLOOD PRESSURE: 122 MMHG | DIASTOLIC BLOOD PRESSURE: 83 MMHG | TEMPERATURE: 98.3 F | HEIGHT: 66 IN | OXYGEN SATURATION: 95 % | BODY MASS INDEX: 32.82 KG/M2 | WEIGHT: 204.2 LBS

## 2022-11-03 DIAGNOSIS — M79.671 BILATERAL FOOT PAIN: Primary | ICD-10-CM

## 2022-11-03 DIAGNOSIS — E66.01 MORBID OBESITY (H): ICD-10-CM

## 2022-11-03 DIAGNOSIS — I73.9 CLAUDICATION OF BOTH LOWER EXTREMITIES (H): ICD-10-CM

## 2022-11-03 DIAGNOSIS — Z79.899 ENCOUNTER FOR LONG-TERM (CURRENT) USE OF MEDICATIONS: ICD-10-CM

## 2022-11-03 DIAGNOSIS — M79.672 BILATERAL FOOT PAIN: Primary | ICD-10-CM

## 2022-11-03 LAB
AMPHETAMINES UR QL: NOT DETECTED
BARBITURATES UR QL SCN: NOT DETECTED
BENZODIAZ UR QL SCN: NOT DETECTED
BUPRENORPHINE UR QL: NOT DETECTED
CANNABINOIDS UR QL: DETECTED
COCAINE UR QL SCN: NOT DETECTED
D-METHAMPHET UR QL: NOT DETECTED
METHADONE UR QL SCN: NOT DETECTED
OPIATES UR QL SCN: NOT DETECTED
OXYCODONE UR QL SCN: NOT DETECTED
PCP UR QL SCN: NOT DETECTED
PROPOXYPH UR QL: NOT DETECTED
TRICYCLICS UR QL SCN: NOT DETECTED

## 2022-11-03 PROCEDURE — 80306 DRUG TEST PRSMV INSTRMNT: CPT | Performed by: NURSE PRACTITIONER

## 2022-11-03 PROCEDURE — 99214 OFFICE O/P EST MOD 30 MIN: CPT | Performed by: NURSE PRACTITIONER

## 2022-11-03 ASSESSMENT — PATIENT HEALTH QUESTIONNAIRE - PHQ9
SUM OF ALL RESPONSES TO PHQ QUESTIONS 1-9: 6
10. IF YOU CHECKED OFF ANY PROBLEMS, HOW DIFFICULT HAVE THESE PROBLEMS MADE IT FOR YOU TO DO YOUR WORK, TAKE CARE OF THINGS AT HOME, OR GET ALONG WITH OTHER PEOPLE: NOT DIFFICULT AT ALL
5. POOR APPETITE OR OVEREATING: NOT AT ALL
SUM OF ALL RESPONSES TO PHQ QUESTIONS 1-9: 6

## 2022-11-03 ASSESSMENT — ENCOUNTER SYMPTOMS
HEMATURIA: 0
DIZZINESS: 0
PALPITATIONS: 0
EYE PAIN: 0
DIARRHEA: 0
ARTHRALGIAS: 1
PARESTHESIAS: 0
FEVER: 0
MYALGIAS: 1
HEARTBURN: 0
HEMATOCHEZIA: 0
JOINT SWELLING: 1
WEAKNESS: 0
ABDOMINAL PAIN: 0
DYSURIA: 0
CHILLS: 0
CONSTIPATION: 0
COUGH: 0
FREQUENCY: 0
SHORTNESS OF BREATH: 1
HEADACHES: 0
NAUSEA: 0
SORE THROAT: 0
NERVOUS/ANXIOUS: 1

## 2022-11-03 ASSESSMENT — ANXIETY QUESTIONNAIRES
5. BEING SO RESTLESS THAT IT IS HARD TO SIT STILL: NOT AT ALL
7. FEELING AFRAID AS IF SOMETHING AWFUL MIGHT HAPPEN: NOT AT ALL
IF YOU CHECKED OFF ANY PROBLEMS ON THIS QUESTIONNAIRE, HOW DIFFICULT HAVE THESE PROBLEMS MADE IT FOR YOU TO DO YOUR WORK, TAKE CARE OF THINGS AT HOME, OR GET ALONG WITH OTHER PEOPLE: NOT DIFFICULT AT ALL
6. BECOMING EASILY ANNOYED OR IRRITABLE: NOT AT ALL
2. NOT BEING ABLE TO STOP OR CONTROL WORRYING: NOT AT ALL
GAD7 TOTAL SCORE: 3
3. WORRYING TOO MUCH ABOUT DIFFERENT THINGS: NOT AT ALL
1. FEELING NERVOUS, ANXIOUS, OR ON EDGE: NEARLY EVERY DAY
GAD7 TOTAL SCORE: 3

## 2022-11-03 ASSESSMENT — ACTIVITIES OF DAILY LIVING (ADL): CURRENT_FUNCTION: NO ASSISTANCE NEEDED

## 2022-11-03 ASSESSMENT — PAIN SCALES - GENERAL: PAINLEVEL: WORST PAIN (10)

## 2022-11-03 NOTE — PROGRESS NOTES
"  Assessment & Plan     Bilateral foot pain  Ongoing for several years. Suspect r/t the below but he also complains on \"knots\" on the bottom of his feet. Appreciate their recommendations.  - Orthopedic  Referral; Future    Claudication of both lower extremities (H)  Normal YVONNE but did not yet have abdominal aorta US so we will reorder that today.   - US Abdominal Aorta Imaging; Future    Encounter for long-term (current) use of medications  Updated CSA, UDS, PHQ9 and GENARO 7 today.  - Drug Abuse Screen Panel 13, Urine (Pain Care Package) - lab collect; Future  - Drug Abuse Screen Panel 13, Urine (Pain Care Package) - lab collect    Morbid obesity (H)  Diet/exercise.    Of note, his mood is improved today from last visit.    Finally, he reports he has been going to the CORE clinic and really feels like he's getting a lot out of it. He's very appreciative of the care he's received there thus far.           See Patient Instructions    Return in about 4 weeks (around 12/1/2022), or if symptoms worsen or fail to improve.     The benefits, risks and potential side effects were discussed in detail. Black box warnings discussed as relevant. All patient questions were answered. The patient was instructed to follow up immediately if any adverse reactions develop.    Return precautions discussed, including when to seek urgent/emergent care.    Patient verbalizes understanding and agrees with plan of care. Patient stable for discharge.      FEDERICA SHAH CNP  [facility]    Gopal Henderson is a 67 year old, presenting for the following health issues:  Recheck Medication and Musculoskeletal Problem      Healthy Habits:     In general, how would you rate your overall health?  Fair    Frequency of exercise:  2-3 days/week    Duration of exercise:  15-30 minutes    Do you usually eat at least 4 servings of fruit and vegetables a day, include whole grains    & fiber and avoid regularly " "eating high fat or \"junk\" foods?  Yes    Taking medications regularly:  Yes    Medication side effects:  Not applicable and Muscle aches    Ability to successfully perform activities of daily living:  No assistance needed    Home Safety:  Lack of grab bars in the bathroom    Hearing Impairment:  No hearing concerns    In the past 6 months, have you been bothered by leaking of urine?  No    In general, how would you rate your overall mental or emotional health?  Fair      PHQ-2 Total Score: 1    Additional concerns today:  Yes     Bilateral foot pain \"feels like I'm walking on knots\"  Toes tingling  Reports this has been going on for several years   Has previous had YVONNE which was normal. Didn't have the abdominal aortic US yet  No swelling or erythema              Review of Systems   Constitutional: Negative for chills and fever.   HENT: Negative for congestion, ear pain, hearing loss and sore throat.    Eyes: Positive for visual disturbance. Negative for pain.   Respiratory: Positive for shortness of breath. Negative for cough.    Cardiovascular: Positive for peripheral edema. Negative for chest pain and palpitations.   Gastrointestinal: Negative for abdominal pain, constipation, diarrhea, heartburn, hematochezia and nausea.   Genitourinary: Negative for dysuria, frequency, genital sores, hematuria, impotence, penile discharge and urgency.   Musculoskeletal: Positive for arthralgias, joint swelling and myalgias.   Skin: Negative for rash.   Neurological: Negative for dizziness, weakness, headaches and paresthesias.   Psychiatric/Behavioral: Negative for mood changes. The patient is nervous/anxious.             Objective    /83 (BP Location: Right arm, Patient Position: Sitting, Cuff Size: Adult Large)   Pulse 82   Temp 98.3  F (36.8  C) (Tympanic)   Resp 20   Ht 1.674 m (5' 5.91\")   Wt 92.6 kg (204 lb 3.2 oz)   SpO2 95%   BMI 33.05 kg/m    Body mass index is 33.05 kg/m .  Physical Exam   GENERAL: " healthy, alert and no distress  NECK: no adenopathy, no asymmetry, masses, or scars and thyroid normal to palpation  RESP: lungs clear to auscultation - no rales, rhonchi or wheezes  CV: regular rate and rhythm, normal S1 S2, no S3 or S4, no murmur, click or rub, no peripheral edema and peripheral pulses strong  ABDOMEN: soft, nontender, no hepatosplenomegaly, no masses and bowel sounds normal  MS: no gross musculoskeletal defects noted, no edema  PSYCH: mentation appears normal, affect normal/bright  Diabetic foot exam: normal DP and PT pulses, reduced sensation and onychomycosis - great toe    No results found for this or any previous visit (from the past 24 hour(s)).                Answers for HPI/ROS submitted by the patient on 11/3/2022  If you checked off any problems, how difficult have these problems made it for you to do your work, take care of things at home, or get along with other people?: Not difficult at all  PHQ9 TOTAL SCORE: 6

## 2022-11-03 NOTE — LETTER
Opioid / Opioid Plus Controlled Substance Agreement    This is an agreement between you and your provider about the safe and appropriate use of controlled substance/opioids prescribed by your care team. Controlled substances are medicines that can cause physical and mental dependence (abuse).    There are strict laws about having and using these medicines. We here at [facility] are committing to working with you in your efforts to get better. To support you in this work, we ll help you schedule regular office appointments for medicine refills. If we must cancel or change your appointment for any reason, we ll make sure you have enough medicine to last until your next appointment.     As a Provider, I will:    Listen carefully to your concerns and treat you with respect.     Recommend a treatment plan that I believe is in your best interest. This plan may involve therapies other than opioid pain medication.     Talk with you often about the possible benefits, and the risk of harm of any medicine that we prescribe for you.     Provide a plan on how to taper (discontinue or go off) using this medicine if the decision is made to stop its use.    As a Patient, I understand that opioid(s):     Are a controlled substance prescribed by my care team to help me function or work and manage my condition(s).     Are strong medicines and can cause serious side effects such as:    Drowsiness, which can seriously affect my driving ability    A lower breathing rate, enough to cause death    Harm to my thinking ability     Depression     Abuse of and addiction to this medicine    Need to be taken exactly as prescribed. Combining opioids with certain medicines or chemicals (such as illegal drugs, sedatives, sleeping pills, and benzodiazepines) can be dangerous or even fatal. If I stop opioids suddenly, I may have severe withdrawal symptoms.    Do not work for all types of pain nor for all patients. If they re not helpful, I may  be asked to stop them.        The risks, benefits and side effects of these medicine(s) were explained to me. I agree that:  1. I will take part in other treatments as advised by my care team. This may be psychiatry or counseling, physical therapy, behavioral therapy, group treatment or a referral to a specialist.     2. I will keep all my appointments. I understand that this is part of the monitoring of opioids. My care team may require an office visit for EVERY opioid/controlled substance refill. If I miss appointments or don t follow instructions, my care team may stop my medicine.    3. I will take my medicines as prescribed. I will not change the dose or schedule unless my care team tells me to. There will be no refills if I run out early.     4. I may be asked to come to the clinic and complete a urine drug test or complete a pill count at any time. If I don t give a urine sample or participate in a pill count, the care team may stop my medicine.    5. I will only receive prescriptions from this clinic for chronic pain. If I am treated by another provider for acute pain issues, I will tell them that I am taking opioid pain medication for chronic pain and that I have a treatment agreement with this provider. I will inform my [facility] care team within one business day if I am given a prescription for any pain medication by another healthcare provider. My [facility] care team can contact other providers and pharmacists about my use of any medicines.    6. It is up to me to make sure that I don t run out of my medicines on weekends or holidays. If my care team is willing to refill my opioid prescription without a visit, I must request refills only during office hours. Refills may take up to 3 business days to process. I will use one pharmacy to fill all my opioid and other controlled substance prescriptions. I will notify the clinic about any changes to my insurance or medication  availability.    7. I am responsible for my prescriptions. If the medicine/prescription is lost, stolen or destroyed, it will not be replaced. I also agree not to share controlled substance medicines with anyone.    8. I am aware I should not use any illegal or recreational drugs. I agree not to drink alcohol unless my care team says I can.       9. If I enroll in the [place] [facility] program, I will tell my care team prior to my next refill.     10. I will tell my care team right away if I become pregnant, have a new medical problem treated outside of my regular clinic, or have a change in my medications.    11. I understand that this medicine can affect my thinking, judgment and reaction time. Alcohol and drugs affect the brain and body, which can affect the safety of my driving. Being under the influence of alcohol or drugs can affect my decision-making, behaviors, personal safety, and the safety of others. Driving while impaired (DWI) can occur if a person is driving, operating, or in physical control of a car, motorcycle, boat, snowmobile, ATV, motorbike, off-road vehicle, or any other motor vehicle ([place] Statute 169A.20). I understand the risk if I choose to drive or operate any vehicle or machinery.    I understand that if I do not follow any of the conditions above, my prescriptions or treatment may be stopped or changed.          Opioids  What You Need to Know    What are opioids?   Opioids are pain medicines that must be prescribed by a doctor. They are also known as narcotics.     Examples are:   1. morphine (MS Contin, Traci)  2. oxycodone (Oxycontin)  3. oxycodone and acetaminophen (Percocet)  4. hydrocodone and acetaminophen (Vicodin, Norco)   5. fentanyl patch (Duragesic)   6. hydromorphone (Dilaudid)   7. methadone  8. codeine (Tylenol #3)     What do opioids do well?   Opioids are best for severe short-term pain such as after a surgery or injury. They may work well for cancer pain. They may  help some people with long-lasting (chronic) pain.     What do opioids NOT do well?   Opioids never get rid of pain entirely, and they don t work well for most patients with chronic pain. Opioids don t reduce swelling, one of the causes of pain.                                    Other ways to manage chronic pain and improve function include:       Treat the health problem that may be causing pain    Anti-inflammation medicines, which reduce swelling and tenderness, such as ibuprofen (Advil, Motrin) or naproxen (Aleve)    Acetaminophen (Tylenol)    Antidepressants and anti-seizure medicines, especially for nerve pain    Topical treatments such as patches or creams    Injections or nerve blocks    Chiropractic or osteopathic treatment    Acupuncture, massage, deep breathing, meditation, visual imagery, aromatherapy    Use heat or ice at the pain site    Physical therapy     Exercise    Stop smoking    Take part in therapy       Risks and side effects     Talk to your doctor before you start or decide to keep taking opioids. Possible side effects include:      Lowering your breathing rate enough to cause death    Overdose, including death, especially if taking higher than prescribed doses    Worse depression symptoms; less pleasure in things you usually enjoy    Feeling tired or sluggish    Slower thoughts or cloudy thinking    Being more sensitive to pain over time; pain is harder to control    Trouble sleeping or restless sleep    Changes in hormone levels (for example, less testosterone)    Changes in sex drive or ability to have sex    Constipation    Unsafe driving    Itching and sweating    Dizziness    Nausea, throwing up and dry mouth    What else should I know about opioids?    Opioids may lead to dependence, tolerance, or addiction.      Dependence means that if you stop or reduce the medicine too quickly, you will have withdrawal symptoms. These include loose poop (diarrhea), jitters, flu-like symptoms,  nervousness and tremors. Dependence is not the same as addiction.                       Tolerance means needing higher doses over time to get the same effect. This may increase the chance of serious side effects.      Addiction is when people improperly use a substance that harms their body, their mind or their relations with others. Use of opiates can cause a relapse of addiction if you have a history of drug or alcohol abuse.      People who have used opioids for a long time may have a lower quality of life, worse depression, higher levels of pain and more visits to doctors.    You can overdose on opioids. Take these steps to lower your risk of overdose:    1. Recognize the signs:  Signs of overdose include decrease or loss of consciousness (blackout), slowed breathing, trouble waking up and blue lips. If someone is worried about overdose, they should call 911.    2. Talk to your doctor about Narcan (naloxone).   If you are at risk for overdose, you may be given a prescription for Narcan. This medicine very quickly reverses the effects of opioids.   If you overdose, a friend or family member can give you Narcan while waiting for the ambulance. They need to know the signs of overdose and how to give Narcan.     3. Don't use alcohol or street drugs.   Taking them with opioids can cause death.    4. Do not take any of these medicines unless your doctor says it s OK. Taking these with opioids can cause death:    Benzodiazepines, such as lorazepam (Ativan), alprazolam (Xanax) or diazepam (Valium)    Muscle relaxers, such as cyclobenzaprine (Flexeril)    Sleeping pills like zolpidem (Ambien)     Other opioids      How to keep you and other people safe while taking opioids:    1. Never share your opioids with others.  Opioid medicines are regulated by the Drug Enforcement Agency (DAE). Selling or sharing medications is a criminal act.    2. Be sure to store opioids in a secure place, locked up if possible. Young children  can easily swallow them and overdose.    3. When you are traveling with your medicines, keep them in the original bottles. If you use a pill box, be sure you also carry a copy of your medicine list from your clinic or pharmacy.    4. Safe disposal of opioids    Most pharmacies have places to get rid of medicine, called disposal kiosks. Medicine disposal options are also available in every [facility]. Search your county and  medication disposal  to find more options. You can find more details at:  https://www.[facility].Replaced by [facility].[email or web address]./living-green/managing-unwanted-medications     I agree that my provider, clinic care team, and pharmacy may work with any city, state or federal law enforcement agency that investigates the misuse, sale, or other diversion of my controlled medicine. I will allow my provider to discuss my care with, or share a copy of, this agreement with any other treating provider, pharmacy or emergency room where I receive care.    I have read this agreement and have asked questions about anything I did not understand.    _______________________________________________________  Patient Signature - Santiago Hylton _____________________                   Date     _______________________________________________________  Provider Signature - FEDERICA SHAH CNP   _____________________                   Date     _______________________________________________________  Witness Signature (required if provider not present while patient signing)   _____________________                   Date

## 2022-11-03 NOTE — PATIENT INSTRUCTIONS
To schedule your imaging exam at any of the [facility] imaging locations, please call [phone]          Patient Education      At your visit today, we discussed your risk for falls and preventive options.    Fall Prevention  Falls often occur due to slipping, tripping or losing your balance. Millions of people fall every year and injure themselves. Here are ways to reduce your risk of falling again.   Think about your fall, was there anything that caused your fall that can be fixed, removed, or replaced?  Make your home safe by keeping walkways clear of objects you may trip over, such as electric cords.  Use non-slip pads under rugs. Don't use area rugs or small throw rugs.  Use non-slip mats in bathtubs and showers.  Install handrails and lights on staircases. The handrails should be on both sides of the stairs.  Don't walk in poorly lit areas.  Don't stand on chairs or wobbly ladders.  Use caution when reaching overhead or looking upward. This position can cause a loss of balance.  Be sure your shoes fit properly, have non-slip bottoms and are in good condition.   Wear shoes both inside and out. Don't go barefoot or wear slippers.  Be cautious when going up and down stairs, curbs, and when walking on uneven sidewalks.  If your balance is poor, consider using a cane or walker.  If your fall was related to alcohol use, stop or limit alcohol intake.   If your fall was related to use of sleeping medicines, talk to your healthcare provider about this. You may need to reduce your dosage at bedtime if you awaken during the night to go to the bathroom.    To reduce the need for nighttime bathroom trips:  Don't drink fluids for several hours before going to bed  Empty your bladder before going to bed  Men can keep a urinal at the bedside  Stay as active as you can. Balance, flexibility, strength, and endurance all come from exercise. They all play a role in preventing falls. Ask your healthcare provider  which types of activity are right for you.  Get your vision checked on a regular basis.  If you have pets, know where they are before you stand up or walk so you don't trip over them.  Use night lights.  Go over all your medicines with a pharmacist or other healthcare provider to see if any of them could make you more likely to fall.  Adele last reviewed this educational content on 4/1/2018 2000-2021 [employer]. All rights reserved. This information is not intended as a substitute for professional medical care. Always follow your healthcare professional's instructions.

## 2022-11-03 NOTE — TELEPHONE ENCOUNTER
Patient Quality Outreach    Patient is due for the following:   Diabetes -  Foot Exam    Next Steps:   Patient has upcoming appointment, these items will be addressed at that time. Patient has appointment with PCP on 11/3/2022.    Type of outreach:    Chart review performed, no outreach needed.      Questions for provider review:    None     Ana Yi

## 2022-11-09 ENCOUNTER — TELEPHONE (OUTPATIENT)
Dept: FAMILY MEDICINE | Facility: CLINIC | Age: 67
End: 2022-11-09

## 2022-11-09 ENCOUNTER — OFFICE VISIT (OUTPATIENT)
Dept: CARDIOLOGY | Facility: CLINIC | Age: 67
End: 2022-11-09
Payer: MEDICARE

## 2022-11-09 ENCOUNTER — LAB (OUTPATIENT)
Dept: LAB | Facility: CLINIC | Age: 67
End: 2022-11-09
Payer: MEDICARE

## 2022-11-09 VITALS
DIASTOLIC BLOOD PRESSURE: 100 MMHG | HEART RATE: 79 BPM | OXYGEN SATURATION: 95 % | WEIGHT: 201.1 LBS | SYSTOLIC BLOOD PRESSURE: 166 MMHG | BODY MASS INDEX: 32.55 KG/M2

## 2022-11-09 DIAGNOSIS — I25.5 ISCHEMIC CARDIOMYOPATHY: ICD-10-CM

## 2022-11-09 DIAGNOSIS — I25.118 CORONARY ARTERY DISEASE OF NATIVE ARTERY OF NATIVE HEART WITH STABLE ANGINA PECTORIS (H): ICD-10-CM

## 2022-11-09 DIAGNOSIS — Z63.4 SPOUSE DECEASED: ICD-10-CM

## 2022-11-09 DIAGNOSIS — I50.22 CHRONIC SYSTOLIC HEART FAILURE (H): Primary | ICD-10-CM

## 2022-11-09 DIAGNOSIS — E11.9 DIABETES MELLITUS TYPE 2, NONINSULIN DEPENDENT (H): ICD-10-CM

## 2022-11-09 DIAGNOSIS — I10 ESSENTIAL HYPERTENSION WITH GOAL BLOOD PRESSURE LESS THAN 140/90: ICD-10-CM

## 2022-11-09 DIAGNOSIS — I50.22 CHRONIC SYSTOLIC HEART FAILURE (H): ICD-10-CM

## 2022-11-09 LAB
ANION GAP SERPL CALCULATED.3IONS-SCNC: 3 MMOL/L (ref 3–14)
BUN SERPL-MCNC: 24 MG/DL (ref 7–30)
CALCIUM SERPL-MCNC: 9 MG/DL (ref 8.5–10.1)
CHLORIDE BLD-SCNC: 105 MMOL/L (ref 94–109)
CO2 SERPL-SCNC: 32 MMOL/L (ref 20–32)
CREAT SERPL-MCNC: 1.4 MG/DL (ref 0.66–1.25)
GFR SERPL CREATININE-BSD FRML MDRD: 55 ML/MIN/1.73M2
GLUCOSE BLD-MCNC: 113 MG/DL (ref 70–99)
POTASSIUM BLD-SCNC: 4.4 MMOL/L (ref 3.4–5.3)
SODIUM SERPL-SCNC: 140 MMOL/L (ref 133–144)

## 2022-11-09 PROCEDURE — 36415 COLL VENOUS BLD VENIPUNCTURE: CPT | Performed by: NURSE PRACTITIONER

## 2022-11-09 PROCEDURE — 99214 OFFICE O/P EST MOD 30 MIN: CPT | Performed by: NURSE PRACTITIONER

## 2022-11-09 PROCEDURE — 80048 BASIC METABOLIC PNL TOTAL CA: CPT | Performed by: NURSE PRACTITIONER

## 2022-11-09 RX ORDER — FUROSEMIDE 20 MG
20 TABLET ORAL DAILY
Qty: 90 TABLET | Refills: 3 | Status: SHIPPED | OUTPATIENT
Start: 2022-11-09 | End: 2023-09-29

## 2022-11-09 SDOH — SOCIAL STABILITY - SOCIAL INSECURITY: DISSAPEARANCE AND DEATH OF FAMILY MEMBER: Z63.4

## 2022-11-09 NOTE — PROGRESS NOTES
HPI: Santiago is a 67 year old Black or  male with a past medical history  Ischemic cardiomyopathy with prior coronary angiography and intervention (status post PCI to LAD in 1995 for ACS), Hypertension, Dyslipidemia, Type II diabetes mellitus.    His brother passed away recently. He says he has some problems at home and is moving out. He will call his PCP to call the housing office to help get him out sooner.  He hasn't taken his medications yet today.  Now he has been 135/80- on average with blood pressure- which is an improvment,   He has been breathing better.Takes the 40 mg of Lasix. He has less SOB and CANO. - he feels stronger and better he says. He says the weight 201 lbs. He is still trying to get the scale. He has no swelling in the legs/feet. He says he takes Lasix 20 mg daily an not 40 mg daily. Last night he took his BP med incorrectly.  He hasn't had anything to drink today.     Denies weight gain, chest pain, palpitations, lightheadedness, dizziness, near syncopal/syncopal episodes. Santiago has been following salt and fluid restrictions.      PAST MEDICAL HISTORY:  Past Medical History:   Diagnosis Date     CAD (coronary artery disease) 8/12/2012    S/P MI and stenting 2001, 2005 at [facility] [place]      CHF (congestive heart failure) (H) 8/3/2012     Chronic hepatitis C (H) 11/21/2014     CKD (chronic kidney disease) stage 2, GFR 60-89 ml/min 12/3/2012     COPD (chronic obstructive pulmonary disease) (H) 11/21/2014     GERD (gastroesophageal reflux disease) 8/12/2012     Gout 8/12/2012     History of colonic polyps 9/30/2008     Hyperlipidemia LDL goal <100 8/3/2012     Hypertension goal BP (blood pressure) < 140/90 8/3/2012     Mild persistent asthma 5/29/2013    Patient does not have COPD      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     Nonsenile cataract      Tobacco abuse 9/13/2013     Type 2 diabetes, HbA1C goal < 8% (H) 8/3/2012       FAMILY HISTORY:  Family History   Problem Relation Age  of Onset     Heart Disease Maternal Grandmother      Hypertension Maternal Grandmother      Hypertension Father      Hypertension Mother      Hypertension Sister      Thyroid Disease Sister      Cancer Brother      Diabetes Brother      Hypertension Brother      Hypertension Maternal Aunt      Cancer Maternal Uncle      Hypertension Maternal Uncle      Hypertension Paternal Aunt      Hypertension Paternal Uncle      Hypertension Maternal Grandfather      Hypertension Paternal Grandmother      Hypertension Paternal Grandfather      Cerebrovascular Disease No family hx of      Glaucoma No family hx of      Macular Degeneration No family hx of        SOCIAL HISTORY:  Social History     Tobacco Use     Smoking status: Former     Packs/day: 0.25     Years: 15.00     Pack years: 3.75     Types: Cigarettes     Quit date: 2016     Years since quittin.1     Smokeless tobacco: Never     Tobacco comments:     3-4 a day   Substance Use Topics     Alcohol use: Yes     Comment: weekend beer     Drug use: No           CURRENT MEDICATIONS:  Current Outpatient Medications   Medication Sig Dispense Refill     albuterol (PROAIR HFA/PROVENTIL HFA/VENTOLIN HFA) 108 (90 Base) MCG/ACT inhaler Inhale 2 puffs into the lungs every 4 hours as needed for shortness of breath / dyspnea 18 g 3     albuterol (PROVENTIL) (2.5 MG/3ML) 0.083% neb solution INHALE 3 ML BY NEBULIZATION METHOD EVERY 6 HOURS AS NEEDED FOR SHORTNESS OF BREATH 360 mL 1     allopurinol (ZYLOPRIM) 100 MG tablet TAKE 2 TABLETS BY MOUTH EVERY  tablet 3     apixaban ANTICOAGULANT (ELIQUIS) 5 MG tablet Take 1 tablet (5 mg) by mouth in the morning and 1 tablet (5 mg) in the evening. 180 tablet 1     aspirin (ASA) 325 MG EC tablet Take 1 tablet (325 mg) by mouth daily 90 tablet 3     budesonide-formoterol (SYMBICORT) 160-4.5 MCG/ACT Inhaler TAKE 2 PUFFS BY MOUTH TWICE A DAY 30.6 g 1     carvedilol (COREG) 25 MG tablet TAKE 1 TABLET BY MOUTH TWICE DAILY WITH MEALS  180 tablet 3     empagliflozin (JARDIANCE) 10 MG TABS tablet Take 1 tablet (10 mg) by mouth daily 90 tablet 3     furosemide (LASIX) 20 MG tablet Take 1 tablet (20 mg) by mouth daily 90 tablet 3     gabapentin (NEURONTIN) 300 MG capsule Take 1 capsule (300 mg) by mouth 3 times daily Please follow up in clinic for next refill. 270 capsule 0     hydrALAZINE (APRESOLINE) 25 MG tablet Take 2 tablets (50 mg) by mouth 3 times daily 180 tablet 3     isosorbide mononitrate (IMDUR) 60 MG 24 hr tablet Take 1 tablet (60 mg) by mouth daily 90 tablet 3     losartan (COZAAR) 100 MG tablet Take 1 tablet (100 mg) by mouth daily 90 tablet 1     montelukast (SINGULAIR) 10 MG tablet TAKE 1 TABLET BY MOUTH EVERYDAY AT BEDTIME 90 tablet 3     nitroGLYcerin (NITROSTAT) 0.4 MG sublingual tablet Place 1 tablet (0.4 mg) under the tongue every 5 minutes as needed for chest pain 0.4 mg by Sublingual route every 5 (five) minutes as needed. 25 tablet 11     omeprazole (PRILOSEC) 20 MG DR capsule TAKE 1 CAPSULE BY MOUTH EVERY DAY 90 capsule 2     oxyCODONE (ROXICODONE) 5 MG tablet Take 1 tablet (5 mg) by mouth 2 times daily as needed for moderate to severe pain (up to 2 a day) 60 tablet 0     simvastatin (ZOCOR) 40 MG tablet Take 1 tablet (40 mg) by mouth At Bedtime 90 tablet 3     tamsulosin (FLOMAX) 0.4 MG capsule Take 1 capsule (0.4 mg) by mouth daily 90 capsule 3     traZODone (DESYREL) 100 MG tablet TAKE 2 TABLETS BY MOUTH AT BEDTIME 180 tablet 2     venlafaxine (EFFEXOR XR) 150 MG 24 hr capsule Take 1 capsule (150 mg) by mouth daily 90 capsule 1     cetirizine (ZYRTEC) 10 MG tablet TAKE 1 TABLET BY MOUTH DAILY AT BEDTIME (Patient not taking: Reported on 10/14/2022) 90 tablet 1     enoxaparin ANTICOAGULANT (LOVENOX) 100 MG/ML syringe Inject 1 mL (100 mg) Subcutaneous in the morning and 1 mL (100 mg) in the evening. Until INR 2.3 or greater (Patient not taking: Reported on 8/26/2022) 20 mL 1       ROS:  Review Of Systems  Skin:  negative  Eyes: negative  Ears/Nose/Throat: negative  Respiratory: No shortness of breath, dyspnea on exertion, cough, or hemoptysis  Cardiovascular: negative  Gastrointestinal: negative  Genitourinary: negative  Musculoskeletal: negative  Neurologic: negative  Psychiatric: negative  Hematologic/Lymphatic/Immunologic: negative  Endocrine: negative      EXAM:  BP (!) 166/100 (BP Location: Left arm, Patient Position: Chair, Cuff Size: Adult Regular)   Pulse 79   Wt 91.2 kg (201 lb 1.6 oz)   SpO2 95%   BMI 32.55 kg/m    Home weight:  General: alert, articulate, and in no acute distress.  HEENT: normocephalic, atraumatic, anicteric sclera, EOMI, mucosa moist, no cyanosis.   Neck: neck supple.  No adenopathy, masses, or carotid bruits.  JVP not detected at 90 degrees  Heart: regular rhythm, normal S1/S2, no murmur, gallop, rub.  Precordium quiet with normal PMI.     Lungs: clear, no rales, ronchi, or wheezing.  No accessory muscle use, respirations unlabored.   Abdomen: soft, non-tender, bowel sounds present, no hepatomegaly  Extremities: no  pitting edema.   No cyanosis.   Neurological: alert and oriented x 3.  normal speech and affect, no gross motor deficits  Skin:  No ecchymoses, rashes, or clubbing.    Labs:  CBC RESULTS:  Lab Results   Component Value Date    WBC 9.3 10/14/2022    WBC 9.9 06/18/2021    RBC 4.79 10/14/2022    RBC 4.94 06/18/2021    HGB 14.8 10/14/2022    HGB 15.1 06/18/2021    HCT 45.7 10/14/2022    HCT 46.3 06/18/2021    MCV 95 10/14/2022    MCV 94 06/18/2021    MCH 30.9 10/14/2022    MCH 30.6 06/18/2021    MCHC 32.4 10/14/2022    MCHC 32.6 06/18/2021    RDW 13.5 10/14/2022    RDW 12.8 06/18/2021     10/14/2022     06/18/2021       CMP RESULTS:  Lab Results   Component Value Date     11/09/2022     06/18/2021    POTASSIUM 4.4 11/09/2022    POTASSIUM 4.3 06/18/2021    CHLORIDE 105 11/09/2022    CHLORIDE 102 06/18/2021    CO2 32 11/09/2022    CO2 33 (H) 06/18/2021     ANIONGAP 3 11/09/2022    ANIONGAP 4 06/18/2021     (H) 11/09/2022    GLC 94 06/18/2021    BUN 24 11/09/2022    BUN 24 06/18/2021    CR 1.40 (H) 11/09/2022    CR 1.38 (H) 06/18/2021    GFRESTIMATED 55 (L) 11/09/2022    GFRESTIMATED 53 (L) 06/18/2021    GFRESTBLACK 62 06/18/2021    FELICITY 9.0 11/09/2022    FELICITY 9.0 06/18/2021    BILITOTAL 0.5 09/11/2018    ALBUMIN 3.9 09/11/2018    ALKPHOS 86 09/11/2018    ALT 36 07/21/2022    ALT 30 06/18/2021    AST 29 09/11/2018        INR RESULTS:  Lab Results   Component Value Date    INR 1.12 10/06/2014       No components found for: CK  Lab Results   Component Value Date    MAG 1.9 01/18/2010     Lab Results   Component Value Date    NTBNP 432 08/26/2022    NTBNP 544 (H) 06/18/2021     @BRIEFLABR (dig)@    Most recent echocardiogram:  No results found for this or any previous visit (from the past 8760 hour(s)).      Assessment and Plan:    In summary,Santiago  is a  67 year old male who is here for a CORE visit. He is stressed about the hosuing situation and his relationship with his son. He will ask his PCP to reach out to the [facility] association to speed the process along.  He is euvolemic today. Blood pressures are higher in clinic he says as he always gets nervous being here.    He should return to clinic in 2 weeks for labs and CORE.    1.  Chronic systolic heart failure secondary to ICM.  Stage C  NYHA Class II  ACEi/ARB: yes-  Losartan 100 mg and continue Imdur 60 mg -  hydralazine 50 mg TID  BB: yes- max dose   Aldosterone antagonist: deferred while other medical therapy is prioritized  SCD prophylaxis: does not meet criteria for implant  Fluid status: euvolemic  Anticoagulation:   Antiplatelet:  ASA dose   Sleep apnea:  NSAID use:  Contraindicated.  Avoid use.  Remote Monitoring:  SGLT2- 10 mg - initiate    2.  Other comordbid conditions:    Atypical anginal chest pain- not present today - Imdur has helped   Coronary artery disease with prior proximal LAD stenting  in 1995 at [facility] [place]   Ischemic cardiomyopathy (ACC/AHA stage C, NYHA III)  Hypertension- on Losartan , Imdur will start hydralazine no ER visits since last CORE visit.  Dyslipidemia  COPD   Type II diabetes mellitus, managed only with conservative measures      3.  Follow-up-  No med changes today   CORE 12/7- Labs         Kayla STALLWORTH, CNP  [facility]
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
## 2022-11-09 NOTE — PATIENT INSTRUCTIONS
Take your medicines every day, as directed    Changes made today:  No med changes     Monitor Your Weight and Symptoms    Contact us if you:    Gain 2 pounds in one day or 5 pounds in one week  Feel more short of breath  Notice more leg swelling  Feel lightheadeded   Change your lifestyle    Limit Salt or Sodium:  2000 mg  Limit Fluids:  2000 mL or approximately 64 ounces  Eat a Heart Healthy Diet  Low in saturated fats  Stay Active:  Aim to move at least 150 minutes every  week         To Contact us    During Business Hours:  [phone], option # 1      After hours, weekends or holidays:   [phone], Option #4  Ask to speak to the On-Call Cardiologist. Inform them you are a CORE/heart failure patient at the [facility].     Use [employer] allows you to communicate directly with your heart team through secure messaging.  [employer] can be accessed any time on your phone, computer, or tablet.  If you need assistance, we'd be happy to help!         Keep your Heart Appointments:    Continue to watch BP    CORE on 12/7/22     Please consider attending our virtual support group which is held monthly. Please reach out to Ezequiel at [phone] for more information if you are interested in attending.     2022 dates:   - Monday, December 5th, 1-2pm: topic: How to thrive during winter and the holiday season with heart failure    2023 dates:  Monday, January 2nd , 1-2pm  Monday, February 6th , 1-2pm  Monday, March 6th , 1-2pm  Monday, April 3rd, 1-2pm  Monday, May 1st, 1-2pm  Monday, June 5th, 1-2pm  Monday, July 3rd, 1-2pm  Monday, August 7th, 1-2pm  Monday, September 11th, 1-2pm  Monday, October 2nd, 1-2pm  Monday, November 6th, 1-2pm  Monday, December 4th, 1-2pm

## 2022-11-09 NOTE — TELEPHONE ENCOUNTER
Routing to  to contact pt and get more info regarding message below.  Samantha Walters RN  [facility]
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
## 2022-11-09 NOTE — TELEPHONE ENCOUNTER
I called [facility] which was closed. Hours are 8am-4 pm  I will try again tomorrow  FEDERICA Daniels CNP

## 2022-11-09 NOTE — NURSING NOTE
Santiago Hylton's goals for this visit include:   Chief Complaint   Patient presents with     Follow Up     2 week follow up        He requests these members of his care team be copied on today's visit information: yes     PCP: Lurdes Roger    Referring Provider:  No referring provider defined for this encounter.    BP (!) 181/100 (BP Location: Left arm, Patient Position: Chair, Cuff Size: Adult Regular)   Pulse 86   Wt 91.2 kg (201 lb 1.6 oz)   SpO2 95%   BMI 32.55 kg/m      Do you need any medication refills at today's visit? No       LATOYA Arcos   Cardiology Team  [facility]

## 2022-11-10 ENCOUNTER — ANCILLARY PROCEDURE (OUTPATIENT)
Dept: ULTRASOUND IMAGING | Facility: CLINIC | Age: 67
End: 2022-11-10
Attending: NURSE PRACTITIONER
Payer: MEDICARE

## 2022-11-10 DIAGNOSIS — I73.9 CLAUDICATION OF BOTH LOWER EXTREMITIES (H): ICD-10-CM

## 2022-11-10 PROCEDURE — 76775 US EXAM ABDO BACK WALL LIM: CPT | Performed by: RADIOLOGY

## 2022-11-10 NOTE — TELEPHONE ENCOUNTER
Called pt and he states he will visit the living facility tomorrow and get a copy of the paperwork and the information of who the provider should be speaking to. Pt will drop this information off at the clinic tomorrow.

## 2022-11-10 NOTE — TELEPHONE ENCOUNTER
I called [facility] but I need to know who I need to speak with and I need more info. If he could get me paperwork to help with this situation it would probably be best so I can effectively get them the info they need

## 2022-11-11 ENCOUNTER — TELEPHONE (OUTPATIENT)
Dept: FAMILY MEDICINE | Facility: CLINIC | Age: 67
End: 2022-11-11

## 2022-11-17 DIAGNOSIS — R20.0 NUMBNESS AND TINGLING OF BOTH LOWER EXTREMITIES: ICD-10-CM

## 2022-11-17 DIAGNOSIS — R20.2 NUMBNESS AND TINGLING OF BOTH LOWER EXTREMITIES: ICD-10-CM

## 2022-11-17 DIAGNOSIS — I73.9 CLAUDICATION OF BOTH LOWER EXTREMITIES (H): Primary | ICD-10-CM

## 2022-11-18 ENCOUNTER — OFFICE VISIT (OUTPATIENT)
Dept: PODIATRY | Facility: CLINIC | Age: 67
End: 2022-11-18
Attending: NURSE PRACTITIONER
Payer: MEDICARE

## 2022-11-18 VITALS
HEART RATE: 87 BPM | DIASTOLIC BLOOD PRESSURE: 78 MMHG | SYSTOLIC BLOOD PRESSURE: 116 MMHG | WEIGHT: 205 LBS | BODY MASS INDEX: 33.18 KG/M2

## 2022-11-18 DIAGNOSIS — G89.4 CHRONIC PAIN SYNDROME: ICD-10-CM

## 2022-11-18 DIAGNOSIS — M79.671 BILATERAL FOOT PAIN: ICD-10-CM

## 2022-11-18 DIAGNOSIS — I50.43 ACUTE ON CHRONIC COMBINED SYSTOLIC AND DIASTOLIC CONGESTIVE HEART FAILURE (H): ICD-10-CM

## 2022-11-18 DIAGNOSIS — G60.9 IDIOPATHIC PERIPHERAL NEUROPATHY: Primary | ICD-10-CM

## 2022-11-18 DIAGNOSIS — M79.672 BILATERAL FOOT PAIN: ICD-10-CM

## 2022-11-18 PROCEDURE — 99203 OFFICE O/P NEW LOW 30 MIN: CPT | Performed by: PODIATRIST

## 2022-11-18 ASSESSMENT — PAIN SCALES - GENERAL: PAINLEVEL: WORST PAIN (10)

## 2022-11-18 NOTE — PROGRESS NOTES
Assessment:      ICD-10-CM    1. Idiopathic peripheral neuropathy  G60.9 Orthotics and Prosthetics DME Orthotic; Diabetic Shoe(s)/Insert(s); 3 pair; Progress note must support the need for shoes/orthotics. Use .diabeticfootexam or diabetic foot exam picklist in SOAPO.      2. Chronic pain syndrome  G89.4 Orthotics and Prosthetics DME Orthotic; Diabetic Shoe(s)/Insert(s); 3 pair; Progress note must support the need for shoes/orthotics. Use .diabeticfootexam or diabetic foot exam picklist in SOAPO.      3. Acute on chronic combined systolic and diastolic congestive heart failure (H)  I50.43 Orthotics and Prosthetics DME Orthotic; Diabetic Shoe(s)/Insert(s); 3 pair; Progress note must support the need for shoes/orthotics. Use .diabeticfootexam or diabetic foot exam picklist in SOAPO.      4. Bilateral foot pain  M79.671 Orthopedic  Referral    M79.672              Plan:    Just wants diabetic shoes.  Not Diabetic, but has neuropathy    PCP can prescribe also      PCP for neuropathy Tx               Dayanna Lawson DPM                              Chief Complaint:     Patient presents with:  Left Foot - Pain  Right Foot - Pain       HPI:  Santiago Hylton is a 67 year old year old male who presents for foot concern.    Great Toe pain for years, burning, numbness, tingling, denies cold.      Past Medical & Surgical History:  Past Medical History:   Diagnosis Date     CAD (coronary artery disease) 8/12/2012    S/P MI and stenting 2001, 2005 at [facility] [place]      CHF (congestive heart failure) (H) 8/3/2012     Chronic hepatitis C (H) 11/21/2014     CKD (chronic kidney disease) stage 2, GFR 60-89 ml/min 12/3/2012     COPD (chronic obstructive pulmonary disease) (H) 11/21/2014     GERD (gastroesophageal reflux disease) 8/12/2012     Gout 8/12/2012     History of colonic polyps 9/30/2008     Hyperlipidemia LDL goal <100 8/3/2012     Hypertension goal BP (blood pressure) < 140/90 8/3/2012     Mild persistent asthma 5/29/2013     Patient does not have COPD      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     Nonsenile cataract      Tobacco abuse 9/13/2013     Type 2 diabetes, HbA1C goal < 8% (H) 8/3/2012      Past Surgical History:   Procedure Laterality Date     CARDIAC SURGERY      stent     CATARACT IOL, RT/LT       COLONOSCOPY  9/18/2012    Procedure: COLONOSCOPY;  COLONOSCOPY, SCREEN;  Surgeon: Cl Johnson MD;  Location: [facility] OR     ORTHOPEDIC SURGERY      ankle     PHACOEMULSIFICATION WITH STANDARD INTRAOCULAR LENS IMPLANT Right 1/28/2016    Procedure: PHACOEMULSIFICATION WITH STANDARD INTRAOCULAR LENS IMPLANT;  Surgeon: Fly Merrill MD;  Location: [facility] OR     PHACOEMULSIFICATION WITH STANDARD INTRAOCULAR LENS IMPLANT Left 2/11/2016    Procedure: PHACOEMULSIFICATION WITH STANDARD INTRAOCULAR LENS IMPLANT;  Surgeon: Fly Merrill MD;  Location: [facility] OR      Family History   Problem Relation Age of Onset     Heart Disease Maternal Grandmother      Hypertension Maternal Grandmother      Hypertension Father      Hypertension Mother      Hypertension Sister      Thyroid Disease Sister      Cancer Brother      Diabetes Brother      Hypertension Brother      Hypertension Maternal Aunt      Cancer Maternal Uncle      Hypertension Maternal Uncle      Hypertension Paternal Aunt      Hypertension Paternal Uncle      Hypertension Maternal Grandfather      Hypertension Paternal Grandmother      Hypertension Paternal Grandfather      Cerebrovascular Disease No family hx of      Glaucoma No family hx of      Macular Degeneration No family hx of         Social History:  ?  History   Smoking Status     Former     Packs/day: 0.25     Years: 15.00     Types: Cigarettes     Quit date: 9/21/2016   Smokeless Tobacco     Never     History   Drug Use No     Social History    Substance and Sexual Activity      Alcohol use: Yes        Comment: weekend beer      Allergies:  ?   Allergies   Allergen Reactions     No Known Drug  Allergies         Medications:    Current Outpatient Medications   Medication     albuterol (PROAIR HFA/PROVENTIL HFA/VENTOLIN HFA) 108 (90 Base) MCG/ACT inhaler     albuterol (PROVENTIL) (2.5 MG/3ML) 0.083% neb solution     allopurinol (ZYLOPRIM) 100 MG tablet     apixaban ANTICOAGULANT (ELIQUIS) 5 MG tablet     aspirin (ASA) 325 MG EC tablet     budesonide-formoterol (SYMBICORT) 160-4.5 MCG/ACT Inhaler     carvedilol (COREG) 25 MG tablet     cetirizine (ZYRTEC) 10 MG tablet     empagliflozin (JARDIANCE) 10 MG TABS tablet     enoxaparin ANTICOAGULANT (LOVENOX) 100 MG/ML syringe     furosemide (LASIX) 20 MG tablet     gabapentin (NEURONTIN) 300 MG capsule     hydrALAZINE (APRESOLINE) 25 MG tablet     isosorbide mononitrate (IMDUR) 60 MG 24 hr tablet     losartan (COZAAR) 100 MG tablet     montelukast (SINGULAIR) 10 MG tablet     nitroGLYcerin (NITROSTAT) 0.4 MG sublingual tablet     omeprazole (PRILOSEC) 20 MG DR capsule     oxyCODONE (ROXICODONE) 5 MG tablet     simvastatin (ZOCOR) 40 MG tablet     tamsulosin (FLOMAX) 0.4 MG capsule     traZODone (DESYREL) 100 MG tablet     venlafaxine (EFFEXOR XR) 150 MG 24 hr capsule     No current facility-administered medications for this visit.       Physical Exam:    Vitals:  [unfilled]  General:  WD/WN, in NAD.  A&O x3.  Dermatologic:  Skin is intact, open lesions absent.   Skin texture, turgor is normal.  Vascular:  Pulses palpable bilateral.  Digital capillary refill time normal bilateral.  Skin temperature is normal bilateral.  Generalized edema- trace bilateral.  Neurologic:    Gross sensation abnormal, diminished.  Gait and balance normal.  Musculoskeletal:  aROM digits, ankle intact.  Muscle strength intact to foot & ankle.  Deformity present - none.

## 2022-11-18 NOTE — LETTER
11/18/2022         RE: Santaigo Hylton  [address]  [facility] [address]        Dear Colleague,    Thank you for referring your patient, Santiago Hylton, to the [facility]. Please see a copy of my visit note below.    Assessment:      ICD-10-CM    1. Idiopathic peripheral neuropathy  G60.9 Orthotics and Prosthetics DME Orthotic; Diabetic Shoe(s)/Insert(s); 3 pair; Progress note must support the need for shoes/orthotics. Use .diabeticfootexam or diabetic foot exam picklist in SOAPO.      2. Chronic pain syndrome  G89.4 Orthotics and Prosthetics DME Orthotic; Diabetic Shoe(s)/Insert(s); 3 pair; Progress note must support the need for shoes/orthotics. Use .diabeticfootexam or diabetic foot exam picklist in SOAPO.      3. Acute on chronic combined systolic and diastolic congestive heart failure (H)  I50.43 Orthotics and Prosthetics DME Orthotic; Diabetic Shoe(s)/Insert(s); 3 pair; Progress note must support the need for shoes/orthotics. Use .diabeticfootexam or diabetic foot exam picklist in SOAPO.      4. Bilateral foot pain  M79.671 Orthopedic  Referral    M79.672              Plan:    Just wants diabetic shoes.  Not Diabetic, but has neuropathy    PCP can prescribe also      PCP for neuropathy Tx               Dayanna Lawson DPM                              Chief Complaint:     Patient presents with:  Left Foot - Pain  Right Foot - Pain       HPI:  Santiago Hylton is a 67 year old year old male who presents for foot concern.    Great Toe pain for years, burning, numbness, tingling, denies cold.      Past Medical & Surgical History:  Past Medical History:   Diagnosis Date     CAD (coronary artery disease) 8/12/2012    S/P MI and stenting 2001, 2005 at [facility]      CHF (congestive heart failure) (H) 8/3/2012     Chronic hepatitis C (H) 11/21/2014     CKD (chronic kidney disease) stage 2, GFR 60-89 ml/min 12/3/2012     COPD (chronic obstructive pulmonary disease) (H) 11/21/2014     GERD  (gastroesophageal reflux disease) 8/12/2012     Gout 8/12/2012     History of colonic polyps 9/30/2008     Hyperlipidemia LDL goal <100 8/3/2012     Hypertension goal BP (blood pressure) < 140/90 8/3/2012     Mild persistent asthma 5/29/2013    Patient does not have COPD      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     Nonsenile cataract      Tobacco abuse 9/13/2013     Type 2 diabetes, HbA1C goal < 8% (H) 8/3/2012      Past Surgical History:   Procedure Laterality Date     CARDIAC SURGERY      stent     CATARACT IOL, RT/LT       COLONOSCOPY  9/18/2012    Procedure: COLONOSCOPY;  COLONOSCOPY, SCREEN;  Surgeon: Cl Johnson MD;  Location: [facility] OR     ORTHOPEDIC SURGERY      ankle     PHACOEMULSIFICATION WITH STANDARD INTRAOCULAR LENS IMPLANT Right 1/28/2016    Procedure: PHACOEMULSIFICATION WITH STANDARD INTRAOCULAR LENS IMPLANT;  Surgeon: Fly Merrill MD;  Location: [facility] OR     PHACOEMULSIFICATION WITH STANDARD INTRAOCULAR LENS IMPLANT Left 2/11/2016    Procedure: PHACOEMULSIFICATION WITH STANDARD INTRAOCULAR LENS IMPLANT;  Surgeon: Fly Merrill MD;  Location: [facility] OR      Family History   Problem Relation Age of Onset     Heart Disease Maternal Grandmother      Hypertension Maternal Grandmother      Hypertension Father      Hypertension Mother      Hypertension Sister      Thyroid Disease Sister      Cancer Brother      Diabetes Brother      Hypertension Brother      Hypertension Maternal Aunt      Cancer Maternal Uncle      Hypertension Maternal Uncle      Hypertension Paternal Aunt      Hypertension Paternal Uncle      Hypertension Maternal Grandfather      Hypertension Paternal Grandmother      Hypertension Paternal Grandfather      Cerebrovascular Disease No family hx of      Glaucoma No family hx of      Macular Degeneration No family hx of         Social History:  ?  History   Smoking Status     Former     Packs/day: 0.25     Years: 15.00     Types: Cigarettes     Quit date:  9/21/2016   Smokeless Tobacco     Never     History   Drug Use No     Social History    Substance and Sexual Activity      Alcohol use: Yes        Comment: weekend beer      Allergies:  ?   Allergies   Allergen Reactions     No Known Drug Allergies         Medications:    Current Outpatient Medications   Medication     albuterol (PROAIR HFA/PROVENTIL HFA/VENTOLIN HFA) 108 (90 Base) MCG/ACT inhaler     albuterol (PROVENTIL) (2.5 MG/3ML) 0.083% neb solution     allopurinol (ZYLOPRIM) 100 MG tablet     apixaban ANTICOAGULANT (ELIQUIS) 5 MG tablet     aspirin (ASA) 325 MG EC tablet     budesonide-formoterol (SYMBICORT) 160-4.5 MCG/ACT Inhaler     carvedilol (COREG) 25 MG tablet     cetirizine (ZYRTEC) 10 MG tablet     empagliflozin (JARDIANCE) 10 MG TABS tablet     enoxaparin ANTICOAGULANT (LOVENOX) 100 MG/ML syringe     furosemide (LASIX) 20 MG tablet     gabapentin (NEURONTIN) 300 MG capsule     hydrALAZINE (APRESOLINE) 25 MG tablet     isosorbide mononitrate (IMDUR) 60 MG 24 hr tablet     losartan (COZAAR) 100 MG tablet     montelukast (SINGULAIR) 10 MG tablet     nitroGLYcerin (NITROSTAT) 0.4 MG sublingual tablet     omeprazole (PRILOSEC) 20 MG DR capsule     oxyCODONE (ROXICODONE) 5 MG tablet     simvastatin (ZOCOR) 40 MG tablet     tamsulosin (FLOMAX) 0.4 MG capsule     traZODone (DESYREL) 100 MG tablet     venlafaxine (EFFEXOR XR) 150 MG 24 hr capsule     No current facility-administered medications for this visit.       Physical Exam:    Vitals:  [unfilled]  General:  WD/WN, in NAD.  A&O x3.  Dermatologic:  Skin is intact, open lesions absent.   Skin texture, turgor is normal.  Vascular:  Pulses palpable bilateral.  Digital capillary refill time normal bilateral.  Skin temperature is normal bilateral.  Generalized edema- trace bilateral.  Neurologic:    Gross sensation abnormal, diminished.  Gait and balance normal.  Musculoskeletal:  aROM digits, ankle intact.  Muscle strength intact to foot & ankle.  Deformity  present - none.            Again, thank you for allowing me to participate in the care of your patient.        Sincerely,        Dayanna Lawson DPM

## 2022-11-18 NOTE — PATIENT INSTRUCTIONS
PATIENT INSTRUCTIONS - Podiatry / Foot & Ankle Surgery    [place] [facility] LOCATIONS  [facility]  [address]  [place] [address]  Phone: [phone]  Fax: [phone] [facility]  [address]  [place] [address]  Phone: [phone]   Fax: [phone]   [facility]  [address]  [place] [address]  Phone: [phone]  Fax: [phone] [facility]  [address]  [place] [address]  Phone: [phone]   Fax: [phone]   [facility]   [address]  [place] [address]  Phone: [phone]  Fax: [phone] * Please call any location listed to make an appointment for a casting/fitting. Your referral was sent to their central office and they will all have the order on file.

## 2022-11-19 ENCOUNTER — HEALTH MAINTENANCE LETTER (OUTPATIENT)
Age: 67
End: 2022-11-19

## 2022-12-05 NOTE — TELEPHONE ENCOUNTER
Alternative requested. Drug not covered.          Koby Faarax  [facility]   balance training/bed mobility training/gait training/strengthening/transfer training

## 2022-12-07 ENCOUNTER — LAB (OUTPATIENT)
Dept: LAB | Facility: CLINIC | Age: 67
End: 2022-12-07
Payer: MEDICARE

## 2022-12-07 ENCOUNTER — MYC REFILL (OUTPATIENT)
Dept: FAMILY MEDICINE | Facility: CLINIC | Age: 67
End: 2022-12-07

## 2022-12-07 ENCOUNTER — OFFICE VISIT (OUTPATIENT)
Dept: CARDIOLOGY | Facility: CLINIC | Age: 67
End: 2022-12-07
Payer: MEDICARE

## 2022-12-07 VITALS
HEART RATE: 85 BPM | SYSTOLIC BLOOD PRESSURE: 157 MMHG | DIASTOLIC BLOOD PRESSURE: 94 MMHG | WEIGHT: 198.8 LBS | OXYGEN SATURATION: 95 % | BODY MASS INDEX: 32.17 KG/M2

## 2022-12-07 DIAGNOSIS — I50.22 CHRONIC SYSTOLIC HEART FAILURE (H): Primary | ICD-10-CM

## 2022-12-07 DIAGNOSIS — Z63.4 SPOUSE DECEASED: ICD-10-CM

## 2022-12-07 DIAGNOSIS — G60.9 IDIOPATHIC PERIPHERAL NEUROPATHY: ICD-10-CM

## 2022-12-07 DIAGNOSIS — I50.22 CHRONIC SYSTOLIC HEART FAILURE (H): ICD-10-CM

## 2022-12-07 DIAGNOSIS — I25.118 CORONARY ARTERY DISEASE OF NATIVE ARTERY OF NATIVE HEART WITH STABLE ANGINA PECTORIS (H): ICD-10-CM

## 2022-12-07 DIAGNOSIS — G89.4 CHRONIC PAIN SYNDROME: ICD-10-CM

## 2022-12-07 DIAGNOSIS — M79.673 PAIN OF FOOT, UNSPECIFIED LATERALITY: ICD-10-CM

## 2022-12-07 DIAGNOSIS — E11.9 DIABETES MELLITUS TYPE 2, NONINSULIN DEPENDENT (H): ICD-10-CM

## 2022-12-07 DIAGNOSIS — I25.5 ISCHEMIC CARDIOMYOPATHY: ICD-10-CM

## 2022-12-07 DIAGNOSIS — I10 ESSENTIAL HYPERTENSION WITH GOAL BLOOD PRESSURE LESS THAN 140/90: ICD-10-CM

## 2022-12-07 LAB
ANION GAP SERPL CALCULATED.3IONS-SCNC: 6 MMOL/L (ref 3–14)
BUN SERPL-MCNC: 21 MG/DL (ref 7–30)
CALCIUM SERPL-MCNC: 9.7 MG/DL (ref 8.5–10.1)
CHLORIDE BLD-SCNC: 106 MMOL/L (ref 94–109)
CO2 SERPL-SCNC: 29 MMOL/L (ref 20–32)
CREAT SERPL-MCNC: 1.18 MG/DL (ref 0.66–1.25)
GFR SERPL CREATININE-BSD FRML MDRD: 68 ML/MIN/1.73M2
GLUCOSE BLD-MCNC: 138 MG/DL (ref 70–99)
POTASSIUM BLD-SCNC: 3.9 MMOL/L (ref 3.4–5.3)
SODIUM SERPL-SCNC: 141 MMOL/L (ref 133–144)

## 2022-12-07 PROCEDURE — 99213 OFFICE O/P EST LOW 20 MIN: CPT | Performed by: NURSE PRACTITIONER

## 2022-12-07 PROCEDURE — 80048 BASIC METABOLIC PNL TOTAL CA: CPT

## 2022-12-07 PROCEDURE — 36415 COLL VENOUS BLD VENIPUNCTURE: CPT

## 2022-12-07 RX ORDER — SPIRONOLACTONE 25 MG/1
25 TABLET ORAL DAILY
Qty: 90 TABLET | Refills: 1 | Status: SHIPPED | OUTPATIENT
Start: 2022-12-07 | End: 2023-06-12

## 2022-12-07 RX ORDER — OXYCODONE HYDROCHLORIDE 5 MG/1
5 TABLET ORAL 2 TIMES DAILY PRN
Qty: 60 TABLET | Refills: 0 | Status: SHIPPED | OUTPATIENT
Start: 2022-12-07 | End: 2023-01-09

## 2022-12-07 SDOH — SOCIAL STABILITY - SOCIAL INSECURITY: DISSAPEARANCE AND DEATH OF FAMILY MEMBER: Z63.4

## 2022-12-07 NOTE — PROGRESS NOTES
Santiago Hylton's goals for this visit include:     He requests these members of his care team be copied on today's visit information: PCP    PCP: Lurdes Roger    Referring Provider:  No referring provider defined for this encounter.    BP (!) 157/94 (BP Location: Left arm, Patient Position: Sitting, Cuff Size: Adult Regular)   Pulse 96   Wt 90.2 kg (198 lb 12.8 oz)   SpO2 95%   BMI 32.17 kg/m      Do you need any medication refills at today's visit? No.    Baldo Glover, EMT  Clinic Support  [facility]    [phone]    Employed by [facility] [employer]

## 2022-12-07 NOTE — PROGRESS NOTES
HPI: Santiago is a 67 year old Black or  male with a past medical history  Ischemic cardiomyopathy with prior coronary angiography and intervention (status post PCI to LAD in 1995 for ACS), Hypertension, Dyslipidemia, Type II diabetes mellitus.    His brother passed away recently. He says he has some problems at home and is moving out.  He didn't eat last night.  Now he has been 131/85- on average with blood pressure- which is an improvment,   He has been breathing better. Takes the 20 mg of Lasix. He has less SOB and CANO. - he feels stronger and better he says. He says the weight 198 lbs.  He has no swelling in the legs/feet.      Denies weight gain, chest pain, palpitations, lightheadedness, dizziness, near syncopal/syncopal episodes. Santiago has been following salt and fluid restrictions.      PAST MEDICAL HISTORY:  Past Medical History:   Diagnosis Date     CAD (coronary artery disease) 8/12/2012    S/P MI and stenting 2001, 2005 at [facility]      CHF (congestive heart failure) (H) 8/3/2012     Chronic hepatitis C (H) 11/21/2014     CKD (chronic kidney disease) stage 2, GFR 60-89 ml/min 12/3/2012     COPD (chronic obstructive pulmonary disease) (H) 11/21/2014     GERD (gastroesophageal reflux disease) 8/12/2012     Gout 8/12/2012     History of colonic polyps 9/30/2008     Hyperlipidemia LDL goal <100 8/3/2012     Hypertension goal BP (blood pressure) < 140/90 8/3/2012     Mild persistent asthma 5/29/2013    Patient does not have COPD      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     Nonsenile cataract      Tobacco abuse 9/13/2013     Type 2 diabetes, HbA1C goal < 8% (H) 8/3/2012       FAMILY HISTORY:  Family History   Problem Relation Age of Onset     Heart Disease Maternal Grandmother      Hypertension Maternal Grandmother      Hypertension Father      Hypertension Mother      Hypertension Sister      Thyroid Disease Sister      Cancer Brother      Diabetes Brother      Hypertension Brother       Hypertension Maternal Aunt      Cancer Maternal Uncle      Hypertension Maternal Uncle      Hypertension Paternal Aunt      Hypertension Paternal Uncle      Hypertension Maternal Grandfather      Hypertension Paternal Grandmother      Hypertension Paternal Grandfather      Cerebrovascular Disease No family hx of      Glaucoma No family hx of      Macular Degeneration No family hx of        SOCIAL HISTORY:  Social History     Tobacco Use     Smoking status: Former     Packs/day: 0.25     Years: 15.00     Pack years: 3.75     Types: Cigarettes     Quit date: 2016     Years since quittin.2     Smokeless tobacco: Never     Tobacco comments:     3-4 a day   Substance Use Topics     Alcohol use: Yes     Comment: weekend beer     Drug use: No           CURRENT MEDICATIONS:  Current Outpatient Medications   Medication Sig Dispense Refill     albuterol (PROAIR HFA/PROVENTIL HFA/VENTOLIN HFA) 108 (90 Base) MCG/ACT inhaler Inhale 2 puffs into the lungs every 4 hours as needed for shortness of breath / dyspnea 18 g 3     albuterol (PROVENTIL) (2.5 MG/3ML) 0.083% neb solution INHALE 3 ML BY NEBULIZATION METHOD EVERY 6 HOURS AS NEEDED FOR SHORTNESS OF BREATH 360 mL 1     allopurinol (ZYLOPRIM) 100 MG tablet TAKE 2 TABLETS BY MOUTH EVERY  tablet 3     apixaban ANTICOAGULANT (ELIQUIS) 5 MG tablet Take 1 tablet (5 mg) by mouth in the morning and 1 tablet (5 mg) in the evening. 180 tablet 1     aspirin (ASA) 325 MG EC tablet Take 1 tablet (325 mg) by mouth daily 90 tablet 3     budesonide-formoterol (SYMBICORT) 160-4.5 MCG/ACT Inhaler TAKE 2 PUFFS BY MOUTH TWICE A DAY 30.6 g 1     carvedilol (COREG) 25 MG tablet TAKE 1 TABLET BY MOUTH TWICE DAILY WITH MEALS 180 tablet 3     empagliflozin (JARDIANCE) 10 MG TABS tablet Take 1 tablet (10 mg) by mouth daily 90 tablet 3     furosemide (LASIX) 20 MG tablet Take 1 tablet (20 mg) by mouth daily 90 tablet 3     gabapentin (NEURONTIN) 300 MG capsule Take 1 capsule (300 mg) by  mouth 3 times daily Please follow up in clinic for next refill. 270 capsule 0     hydrALAZINE (APRESOLINE) 25 MG tablet Take 2 tablets (50 mg) by mouth 3 times daily 180 tablet 3     isosorbide mononitrate (IMDUR) 60 MG 24 hr tablet Take 1 tablet (60 mg) by mouth daily 90 tablet 3     losartan (COZAAR) 100 MG tablet Take 1 tablet (100 mg) by mouth daily 90 tablet 1     montelukast (SINGULAIR) 10 MG tablet TAKE 1 TABLET BY MOUTH EVERYDAY AT BEDTIME 90 tablet 3     nitroGLYcerin (NITROSTAT) 0.4 MG sublingual tablet Place 1 tablet (0.4 mg) under the tongue every 5 minutes as needed for chest pain 0.4 mg by Sublingual route every 5 (five) minutes as needed. 25 tablet 11     omeprazole (PRILOSEC) 20 MG DR capsule TAKE 1 CAPSULE BY MOUTH EVERY DAY 90 capsule 2     oxyCODONE (ROXICODONE) 5 MG tablet Take 1 tablet (5 mg) by mouth 2 times daily as needed for moderate to severe pain (up to 2 a day) 60 tablet 0     simvastatin (ZOCOR) 40 MG tablet Take 1 tablet (40 mg) by mouth At Bedtime 90 tablet 3     tamsulosin (FLOMAX) 0.4 MG capsule Take 1 capsule (0.4 mg) by mouth daily 90 capsule 3     traZODone (DESYREL) 100 MG tablet TAKE 2 TABLETS BY MOUTH AT BEDTIME 180 tablet 2     venlafaxine (EFFEXOR XR) 150 MG 24 hr capsule Take 1 capsule (150 mg) by mouth daily 90 capsule 1     cetirizine (ZYRTEC) 10 MG tablet TAKE 1 TABLET BY MOUTH DAILY AT BEDTIME (Patient not taking: Reported on 10/14/2022) 90 tablet 1     enoxaparin ANTICOAGULANT (LOVENOX) 100 MG/ML syringe Inject 1 mL (100 mg) Subcutaneous in the morning and 1 mL (100 mg) in the evening. Until INR 2.3 or greater (Patient not taking: Reported on 8/26/2022) 20 mL 1       ROS:  Review Of Systems  Skin: negative  Eyes: negative  Ears/Nose/Throat: negative  Respiratory: No shortness of breath, dyspnea on exertion, cough, or hemoptysis  Cardiovascular: negative  Gastrointestinal: negative  Genitourinary: negative  Musculoskeletal: negative  Neurologic: negative  Psychiatric:  negative  Hematologic/Lymphatic/Immunologic: negative  Endocrine: negative      EXAM:  BP (!) 157/94 (BP Location: Left arm, Patient Position: Sitting, Cuff Size: Adult Regular)   Pulse 85   Wt 90.2 kg (198 lb 12.8 oz)   SpO2 95%   BMI 32.17 kg/m    Home weight:  General: alert, articulate, and in no acute distress.  HEENT: normocephalic, atraumatic, anicteric sclera, EOMI, mucosa moist, no cyanosis.   Neck: neck supple.  No adenopathy, masses, or carotid bruits.  JVP not detected at 90 degrees  Heart: regular rhythm, normal S1/S2, no murmur, gallop, rub.  Precordium quiet with normal PMI.     Lungs: clear, no rales, ronchi, or wheezing.  No accessory muscle use, respirations unlabored.   Abdomen: soft, non-tender, bowel sounds present, no hepatomegaly  Extremities: no  pitting edema.   No cyanosis.   Neurological: alert and oriented x 3.  normal speech and affect, no gross motor deficits  Skin:  No ecchymoses, rashes, or clubbing.    Labs:  CBC RESULTS:  Lab Results   Component Value Date    WBC 9.3 10/14/2022    WBC 9.9 06/18/2021    RBC 4.79 10/14/2022    RBC 4.94 06/18/2021    HGB 14.8 10/14/2022    HGB 15.1 06/18/2021    HCT 45.7 10/14/2022    HCT 46.3 06/18/2021    MCV 95 10/14/2022    MCV 94 06/18/2021    MCH 30.9 10/14/2022    MCH 30.6 06/18/2021    MCHC 32.4 10/14/2022    MCHC 32.6 06/18/2021    RDW 13.5 10/14/2022    RDW 12.8 06/18/2021     10/14/2022     06/18/2021       CMP RESULTS:  Lab Results   Component Value Date     12/07/2022     06/18/2021    POTASSIUM 3.9 12/07/2022    POTASSIUM 4.3 06/18/2021    CHLORIDE 106 12/07/2022    CHLORIDE 102 06/18/2021    CO2 29 12/07/2022    CO2 33 (H) 06/18/2021    ANIONGAP 6 12/07/2022    ANIONGAP 4 06/18/2021     (H) 12/07/2022    GLC 94 06/18/2021    BUN 21 12/07/2022    BUN 24 06/18/2021    CR 1.18 12/07/2022    CR 1.38 (H) 06/18/2021    GFRESTIMATED 68 12/07/2022    GFRESTIMATED 53 (L) 06/18/2021    GFRESTBLACK 62 06/18/2021     FELICITY 9.7 12/07/2022    FELICITY 9.0 06/18/2021    BILITOTAL 0.5 09/11/2018    ALBUMIN 3.9 09/11/2018    ALKPHOS 86 09/11/2018    ALT 36 07/21/2022    ALT 30 06/18/2021    AST 29 09/11/2018        INR RESULTS:  Lab Results   Component Value Date    INR 1.12 10/06/2014       No components found for: CK  Lab Results   Component Value Date    MAG 1.9 01/18/2010     Lab Results   Component Value Date    NTBNP 432 08/26/2022    NTBNP 544 (H) 06/18/2021     @BRIEFLABR (dig)@    Most recent echocardiogram:  No results found for this or any previous visit (from the past 8760 hour(s)).      Assessment and Plan:    In summary,Santiago  is a  67 year old male who is here for a CORE visit. He is euvolemic today. Blood pressures are higher in clinic he says as he always gets nervous being here.  We will start Spironolactone 25 mg daily - BMP in 2 weeks with a visit.    1.  Chronic systolic heart failure secondary to ICM.  Stage C  NYHA Class II  ACEi/ARB: yes-  Losartan 100 mg and continue Imdur 60 mg -  hydralazine 50 mg TID  BB: yes- max dose   Aldosterone antagonist: Spironolactone 25 mg   SCD prophylaxis: does not meet criteria for implant  Fluid status: euvolemic  Anticoagulation:   Antiplatelet:  ASA dose   Sleep apnea:  NSAID use:  Contraindicated.  Avoid use.  Remote Monitoring:  SGLT2- 10 mg - initiated    2.  Other comordbid conditions:    Atypical anginal chest pain- not present today - Imdur has helped   Coronary artery disease with [place] [facility]   Ischemic cardiomyopathy (ACC/AHA stage C, NYHA III)  Hypertension- on Losartan , Imdur will start hydralazine no ER visits since last CORE visit.  Dyslipidemia  COPD   Type II diabetes mellitus, managed only with conservative measures      3.  Follow-up-  No med changes today   CORE 12/21- Labs         Kayla Burnett APRN, CNP  [facility]

## 2022-12-07 NOTE — PATIENT INSTRUCTIONS
Take your medicines every day, as directed    Changes made today:  Spironolactone 25 mg daily      Monitor Your Weight and Symptoms    Contact us if you:    Gain 2 pounds in one day or 5 pounds in one week  Feel more short of breath  Notice more leg swelling  Feel lightheadeded   Change your lifestyle    Limit Salt or Sodium:  2000 mg  Limit Fluids:  2000 mL or approximately 64 ounces  Eat a Heart Healthy Diet  Low in saturated fats  Stay Active:  Aim to move at least 150 minutes every  week         To Contact us    During Business Hours:  [phone], option # 1      After hours, weekends or holidays:   [phone], Option #4  Ask to speak to the On-Call Cardiologist. Inform them you are a CORE/heart failure patient at the [facility].     Use [employer] allows you to communicate directly with your heart team through secure messaging.  [employer] can be accessed any time on your phone, computer, or tablet.  If you need assistance, we'd be happy to help!         Keep your Heart Appointments:    12/21- Labs and CORE visit     Please consider attending our virtual support group which is held monthly. Please reach out to Ezequiel at [phone] for more information if you are interested in attending.       2023 dates:  Monday, January 2nd , 1-2pm: Heart Failure and Electrophysiology  Monday, February 6th , 1-2pm  Monday, March 6th , 1-2pm  Monday, April 3rd, 1-2pm  Monday, May 1st, 1-2pm  Monday, June 5th, 1-2pm  Monday, July 3rd, 1-2pm  Monday, August 7th, 1-2pm  Monday, September 11th, 1-2pm  Monday, October 2nd, 1-2pm  Monday, November 6th, 1-2pm  Monday, December 4th, 1-2pm

## 2022-12-08 ENCOUNTER — VIRTUAL VISIT (OUTPATIENT)
Dept: PSYCHOLOGY | Facility: CLINIC | Age: 67
End: 2022-12-08
Attending: NURSE PRACTITIONER
Payer: COMMERCIAL

## 2022-12-08 DIAGNOSIS — Z91.199 NO-SHOW FOR APPOINTMENT: Primary | ICD-10-CM

## 2022-12-08 DIAGNOSIS — F33.0 MILD RECURRENT MAJOR DEPRESSION (H): ICD-10-CM

## 2022-12-09 DIAGNOSIS — J45.30 MILD PERSISTENT ASTHMA WITHOUT COMPLICATION: ICD-10-CM

## 2022-12-09 RX ORDER — BUDESONIDE AND FORMOTEROL FUMARATE DIHYDRATE 160; 4.5 UG/1; UG/1
AEROSOL RESPIRATORY (INHALATION)
Qty: 30.6 G | Refills: 1 | Status: SHIPPED | OUTPATIENT
Start: 2022-12-09 | End: 2023-06-26

## 2022-12-12 DIAGNOSIS — I50.22 CHRONIC SYSTOLIC HEART FAILURE (H): Primary | ICD-10-CM

## 2022-12-13 DIAGNOSIS — K21.00 GASTROESOPHAGEAL REFLUX DISEASE WITH ESOPHAGITIS: ICD-10-CM

## 2022-12-15 ENCOUNTER — VIRTUAL VISIT (OUTPATIENT)
Dept: PSYCHOLOGY | Facility: CLINIC | Age: 67
End: 2022-12-15
Payer: COMMERCIAL

## 2022-12-15 DIAGNOSIS — Z91.199 NO-SHOW FOR APPOINTMENT: Primary | ICD-10-CM

## 2022-12-16 ENCOUNTER — DOCUMENTATION ONLY (OUTPATIENT)
Dept: PODIATRY | Facility: CLINIC | Age: 67
End: 2022-12-16

## 2022-12-16 DIAGNOSIS — M79.671 BILATERAL FOOT PAIN: ICD-10-CM

## 2022-12-16 DIAGNOSIS — G60.9 IDIOPATHIC PERIPHERAL NEUROPATHY: Primary | ICD-10-CM

## 2022-12-16 DIAGNOSIS — G89.4 CHRONIC PAIN SYNDROME: ICD-10-CM

## 2022-12-16 DIAGNOSIS — M79.672 BILATERAL FOOT PAIN: ICD-10-CM

## 2022-12-16 DIAGNOSIS — I50.43 ACUTE ON CHRONIC COMBINED SYSTOLIC AND DIASTOLIC CONGESTIVE HEART FAILURE (H): ICD-10-CM

## 2022-12-19 ENCOUNTER — TELEPHONE (OUTPATIENT)
Dept: FAMILY MEDICINE | Facility: CLINIC | Age: 67
End: 2022-12-19

## 2022-12-19 DIAGNOSIS — E11.42 DIABETIC POLYNEUROPATHY ASSOCIATED WITH TYPE 2 DIABETES MELLITUS (H): ICD-10-CM

## 2022-12-20 RX ORDER — GABAPENTIN 300 MG/1
300 CAPSULE ORAL 3 TIMES DAILY
Qty: 270 CAPSULE | Refills: 0 | OUTPATIENT
Start: 2022-12-20

## 2022-12-20 NOTE — TELEPHONE ENCOUNTER
It appears he has received a script from   [facility]    for 600 mg and then he switched back to our clinic and we have been giving 300 mg according to  and our records.     Please ask him to check to see what he has been taking

## 2022-12-20 NOTE — TELEPHONE ENCOUNTER
This writer attempted to contact patient on 12/20/22      Reason for call relay provider message and left message.      If patient calls back:   Registered Nurse called. Initially writer was able to get a hold of patient, but call disconnected before patient could respond with dosing. Voicemail was left when patient was called back. Relay provider message below from Lurdes STALLWORTH CNP:    It appears he has received a script from   [facility]    for 600 mg and then he switched back to our clinic and we have been giving 300 mg according to  and our records.      Please ask him to check to see what he has been taking    _______________________________    RAFAEL HolleyN, RN  [facility]
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
## 2022-12-21 ENCOUNTER — OFFICE VISIT (OUTPATIENT)
Dept: CARDIOLOGY | Facility: CLINIC | Age: 67
End: 2022-12-21
Payer: MEDICARE

## 2022-12-21 ENCOUNTER — MYC MEDICAL ADVICE (OUTPATIENT)
Dept: FAMILY MEDICINE | Facility: CLINIC | Age: 67
End: 2022-12-21

## 2022-12-21 ENCOUNTER — LAB (OUTPATIENT)
Dept: LAB | Facility: CLINIC | Age: 67
End: 2022-12-21
Payer: MEDICARE

## 2022-12-21 VITALS
OXYGEN SATURATION: 96 % | WEIGHT: 201.4 LBS | DIASTOLIC BLOOD PRESSURE: 74 MMHG | BODY MASS INDEX: 32.6 KG/M2 | SYSTOLIC BLOOD PRESSURE: 108 MMHG | HEART RATE: 87 BPM

## 2022-12-21 DIAGNOSIS — I25.118 CORONARY ARTERY DISEASE OF NATIVE ARTERY OF NATIVE HEART WITH STABLE ANGINA PECTORIS (H): Primary | ICD-10-CM

## 2022-12-21 DIAGNOSIS — I50.22 CHRONIC SYSTOLIC HEART FAILURE (H): ICD-10-CM

## 2022-12-21 DIAGNOSIS — E11.42 DIABETIC POLYNEUROPATHY ASSOCIATED WITH TYPE 2 DIABETES MELLITUS (H): Primary | ICD-10-CM

## 2022-12-21 LAB
ANION GAP SERPL CALCULATED.3IONS-SCNC: 5 MMOL/L (ref 3–14)
BUN SERPL-MCNC: 17 MG/DL (ref 7–30)
CALCIUM SERPL-MCNC: 10 MG/DL (ref 8.5–10.1)
CHLORIDE BLD-SCNC: 103 MMOL/L (ref 94–109)
CO2 SERPL-SCNC: 30 MMOL/L (ref 20–32)
CREAT SERPL-MCNC: 1.3 MG/DL (ref 0.66–1.25)
GFR SERPL CREATININE-BSD FRML MDRD: 61 ML/MIN/1.73M2
GLUCOSE BLD-MCNC: 114 MG/DL (ref 70–99)
POTASSIUM BLD-SCNC: 4.1 MMOL/L (ref 3.4–5.3)
SODIUM SERPL-SCNC: 138 MMOL/L (ref 133–144)

## 2022-12-21 PROCEDURE — 36415 COLL VENOUS BLD VENIPUNCTURE: CPT

## 2022-12-21 PROCEDURE — 80048 BASIC METABOLIC PNL TOTAL CA: CPT

## 2022-12-21 PROCEDURE — 99215 OFFICE O/P EST HI 40 MIN: CPT | Performed by: STUDENT IN AN ORGANIZED HEALTH CARE EDUCATION/TRAINING PROGRAM

## 2022-12-21 RX ORDER — ATORVASTATIN CALCIUM 40 MG/1
40 TABLET, FILM COATED ORAL DAILY
Qty: 90 TABLET | Refills: 3 | Status: SHIPPED | OUTPATIENT
Start: 2022-12-21 | End: 2023-03-21

## 2022-12-21 NOTE — TELEPHONE ENCOUNTER
See telephone encounter from 12/19/2022 needing to clarify patient's Gabapentin dose.     Malia HALLN, RN

## 2022-12-21 NOTE — TELEPHONE ENCOUNTER
Patient active on MyChart, noted to be active yesterday. RN sent MyChart message to patient to clarify medication dose.    Malia FARRELL, RN

## 2022-12-21 NOTE — NURSING NOTE
Santiago Hylton's goals for this visit include:   Chief Complaint   Patient presents with     Follow Up       He requests these members of his care team be copied on today's visit information: yes     PCP: Lurdes Roger    Referring Provider:  FEDERICA Barba CNP  [address] [place] [address]    /74 (BP Location: Left arm, Patient Position: Chair, Cuff Size: Adult Large)   Pulse 87   Wt 91.4 kg (201 lb 6.4 oz)   SpO2 96%   BMI 32.60 kg/m      Do you need any medication refills at today's visit? No       LATOYA Arcos   Cardiology Team  [facility]

## 2022-12-21 NOTE — PATIENT INSTRUCTIONS
You were seen at the [facility]iology clinic today.   You saw Dr. Amadeo Hardin, [facility], [place].    The following is a summary of your office visit today:    Stop aspirin 325 mg because you are already on the other blood thinner (Eliquis)  Stop simvastatin   Start taking atorvastatin 40 mg once a day (you can take it at night or in the morning)  We will check the price of the new medication (Entresto or sacubutril-valsartan)   Please try and pursue moderate-intensity exercise for 30 minutes at least five times weekly.  Please try and maintain a diet rich in fruit and vegetables and low in saturated fats and sugars.  Follow-up with me in three months     Nurse contact information:    Cardiology Care Coordinators: Sarah Faustin RN   Phone: [phone]  Fax: [phone]      HOW TO CHECK YOUR BLOOD PRESSURE AT HOME:     Avoid eating, smoking, and exercising for at least 30 minutes before taking a reading.    Be sure you have taken your BP medication at least 2-3 hours before you check it.     Sit quietly for 10 minutes before a reading.     Sit in a chair with your feet flat on the floor. Rest your  arm on a table so that the arm cuff is at the same level as your heart.    Remain still during the reading.    Record your blood pressure and pulse in a log and bring to your next appointment.     If you have had any blood work, imaging or other testing completed we will be in touch within 1-2 weeks regarding the results. If you have any questions, concerns or need to schedule a follow up, please contact us at [phone]. If you are needing refills please contact your pharmacy. For urgent after hour care please call the [facility] at [phone] or the [facility] at [phone] and ask to speak to the on-call Cardiologist.    It was a pleasure meeting with you today. Please let us know if there is anything else we can do for you so that we can be sure  you are leaving completely satisfied with your care experience.     Your Cardiology Team at [facility]  RN Care Coordinator: Sarah  CMA: Manpreet

## 2022-12-22 DIAGNOSIS — I50.22 CHRONIC SYSTOLIC HEART FAILURE (H): Primary | ICD-10-CM

## 2022-12-22 RX ORDER — GABAPENTIN 600 MG/1
600 TABLET ORAL 3 TIMES DAILY
Qty: 90 TABLET | Refills: 1 | Status: SHIPPED | OUTPATIENT
Start: 2022-12-22 | End: 2022-12-26

## 2022-12-22 NOTE — TELEPHONE ENCOUNTER
Please see telephone encounter from 12/19/2022. Medication request has been completed. Closing encounter.    Reyna Salas RN    [facility]

## 2022-12-22 NOTE — TELEPHONE ENCOUNTER
Noted. Will increase to 600 mg tablet and send to pharmacy  Creatinine clearance is 72 so max dose is 1800 mg in 24 hours so he is at max dose.

## 2022-12-22 NOTE — TELEPHONE ENCOUNTER
RN called patient to verify Gabapentin dose. Patient states he is currently taking 600 mg.     Routing to provider to review and advise.    Malia HALLN, RN     Alternatives Discussed Intro (Do Not Add Period): I discussed alternative treatments to Mohs surgery and specifically discussed the risks and benefits of

## 2022-12-22 NOTE — TELEPHONE ENCOUNTER
Called patient to relay provider note below, patient verbalized understanding. Aware he is at max dose of Gabapentin. No further questions or concerns at this time.      RAFAEL HolleyN, RN  [facility]

## 2022-12-26 ENCOUNTER — VIRTUAL VISIT (OUTPATIENT)
Dept: FAMILY MEDICINE | Facility: CLINIC | Age: 67
End: 2022-12-26
Payer: MEDICARE

## 2022-12-26 DIAGNOSIS — E11.42 DIABETIC POLYNEUROPATHY ASSOCIATED WITH TYPE 2 DIABETES MELLITUS (H): ICD-10-CM

## 2022-12-26 PROCEDURE — 99214 OFFICE O/P EST MOD 30 MIN: CPT | Mod: 95 | Performed by: PHYSICIAN ASSISTANT

## 2022-12-26 RX ORDER — GABAPENTIN 300 MG/1
300 CAPSULE ORAL 3 TIMES DAILY
Qty: 90 CAPSULE | Refills: 1 | Status: SHIPPED | OUTPATIENT
Start: 2022-12-26 | End: 2023-04-17

## 2022-12-26 RX ORDER — GABAPENTIN 300 MG/1
300 CAPSULE ORAL 3 TIMES DAILY
Qty: 270 CAPSULE | Refills: 0 | Status: SHIPPED | OUTPATIENT
Start: 2022-12-26 | End: 2023-04-17

## 2022-12-26 NOTE — PROGRESS NOTES
Santiago is a 67 year old who is being evaluated via a billable video visit.      How would you like to obtain your AVS? MyChart  If the video visit is dropped, the invitation should be resent by: Text to cell phone: [phone]  Will anyone else be joining your video visit? No           Assessment & Plan     Diabetic polyneuropathy associated with type 2 diabetes mellitus (H)    Pain is currently worse in his feet. Patient felt that 300 mg was more effective for his pain and would like to decrease dose back to that. Sent in new script. He has a follow-up with his primary care provider in 1 month.     - gabapentin (NEURONTIN) 300 MG capsule; Take 1 capsule (300 mg) by mouth 3 times daily                 No follow-ups on file.    Clint Bull PA-C  [facility]   Santiago is a 67 year old, presenting for the following health issues:  Recheck Medication (600mg  current, previous 300mg  no pain) and Foot Pain (Hurting to walk)    Was previously on 300 mg of gabapentin and now taking 600 mg which is worsening pain. Feels like his toes are throbbing and it is painful to walk. Would like to decrease dose back down to 300 mg. Takes 3 times a day.      Review of Systems   Constitutional, HEENT, cardiovascular, pulmonary, gi and gu systems are negative, except as otherwise noted.      Objective           Vitals:  No vitals were obtained today due to virtual visit.    Physical Exam   GENERAL: Healthy, alert and no distress  EYES: Eyes grossly normal to inspection.  No discharge or erythema, or obvious scleral/conjunctival abnormalities.  HENT: Normal cephalic/atraumatic.  External ears, nose and mouth without ulcers or lesions.  No nasal drainage visible.  NECK: No asymmetry, visible masses or scars  RESP: No audible wheeze, cough, or visible cyanosis.  No visible retractions or increased work of breathing.    SKIN: Visible skin clear. No significant rash, abnormal pigmentation or  lesions.  NEURO: Cranial nerves grossly intact.  Mentation and speech appropriate for age.  PSYCH: Mentation appears normal, affect normal/bright, judgement and insight intact, normal speech and appearance well-groomed.                Video-Visit Details    Type of service:  Video Visit   Video Start Time: 2:23 PM  Video End Time:2:29 PM    Originating Location (pt. Location): Home    Distant Location (provider location):  On-site  Platform used for Video Visit: Luz

## 2022-12-29 ENCOUNTER — MYC MEDICAL ADVICE (OUTPATIENT)
Dept: CARDIOLOGY | Facility: CLINIC | Age: 67
End: 2022-12-29

## 2023-01-09 DIAGNOSIS — G60.9 IDIOPATHIC PERIPHERAL NEUROPATHY: ICD-10-CM

## 2023-01-09 DIAGNOSIS — G89.4 CHRONIC PAIN SYNDROME: ICD-10-CM

## 2023-01-09 DIAGNOSIS — F33.0 MAJOR DEPRESSIVE DISORDER, RECURRENT EPISODE, MILD (H): ICD-10-CM

## 2023-01-09 RX ORDER — TRAZODONE HYDROCHLORIDE 100 MG/1
200 TABLET ORAL AT BEDTIME
Qty: 180 TABLET | Refills: 2 | OUTPATIENT
Start: 2023-01-09

## 2023-01-09 RX ORDER — OXYCODONE HYDROCHLORIDE 5 MG/1
5 TABLET ORAL 2 TIMES DAILY PRN
Qty: 60 TABLET | Refills: 0 | Status: SHIPPED | OUTPATIENT
Start: 2023-01-09 | End: 2023-02-07

## 2023-01-09 NOTE — TELEPHONE ENCOUNTER
Called and spoke to the patient and gave him Lurdes Roger's message. Patient understands.  Sandra Moss [facility]  2nd Floor  Primary Care

## 2023-01-09 NOTE — TELEPHONE ENCOUNTER
.Reason for call:  Medication   If this is a refill request, has the caller requested the refill from the pharmacy already? No  Will the patient be using a [facility]? No  Name of [facility]    Name of the medication requested: oxycodone and trazadone    Other request: fill script    Phone number to reach patient:  Home number on file [phone] (home)    Best Time:  anytime    Can we leave a detailed message on this number?  YES    Travel screening: Negative

## 2023-01-24 DIAGNOSIS — I50.22 CHRONIC SYSTOLIC HEART FAILURE (H): ICD-10-CM

## 2023-01-27 RX ORDER — HYDRALAZINE HYDROCHLORIDE 25 MG/1
50 TABLET, FILM COATED ORAL 3 TIMES DAILY
Qty: 180 TABLET | Refills: 11 | Status: SHIPPED | OUTPATIENT
Start: 2023-01-27 | End: 2023-03-01 | Stop reason: ALTCHOICE

## 2023-01-27 NOTE — TELEPHONE ENCOUNTER
hydrALAZINE (APRESOLINE) 25 MG tablet  Last Written Prescription Date:  9/23/22  Last Fill Quantity: 180,   # refills: 3  Last Office Visit : 12/21/22  Future Office visit: 3/29/23

## 2023-02-06 DIAGNOSIS — G60.9 IDIOPATHIC PERIPHERAL NEUROPATHY: ICD-10-CM

## 2023-02-06 DIAGNOSIS — G89.4 CHRONIC PAIN SYNDROME: ICD-10-CM

## 2023-02-06 NOTE — TELEPHONE ENCOUNTER
Medication Question or Refill    Contacts       Type Contact Phone/Fax    02/06/2023 08:53 AM CST Phone (Incoming) Santiago Hylton (Self) [phone] (M)          What medication are you calling about (include dose and sig)?: see below     Preferred Pharmacy:   [id number] - [facility], [place] - [address]  [address]  [facility] [address]  Phone: [phone] Fax: [phone]    Controlled Substance Agreement on file:   CSA -- Patient Level:     [Media Unavailable] Controlled Substance Agreement - Opioid - Scan on 11/3/2022  9:36 AM: 11/3/22   [Media Unavailable] Controlled Substance Agreement - Opioid - Scan on 11/13/2021  4:10 PM   [Media Unavailable] Controlled Substance Agreement - Opioid - Scan on 9/19/2020  8:30 PM   [Media Unavailable] Controlled Substance Agreement - Opioid - Scan on 3/27/2019  2:34 PM       Who prescribed the medication?: Sheeley     Do you need a refill? Yes    When did you use the medication last? n/a    Patient offered an appointment? No    Do you have any questions or concerns?  No      Could we send this information to you in [employer] or would you prefer to receive a phone call?:   Patient would like to be contacted via [employer]

## 2023-02-07 RX ORDER — OXYCODONE HYDROCHLORIDE 5 MG/1
5 TABLET ORAL 2 TIMES DAILY PRN
Qty: 60 TABLET | Refills: 0 | Status: SHIPPED | OUTPATIENT
Start: 2023-02-07 | End: 2023-03-06

## 2023-02-28 DIAGNOSIS — I50.22 CHRONIC SYSTOLIC HEART FAILURE (H): Primary | ICD-10-CM

## 2023-03-01 ENCOUNTER — OFFICE VISIT (OUTPATIENT)
Dept: CARDIOLOGY | Facility: CLINIC | Age: 68
End: 2023-03-01
Payer: MEDICARE

## 2023-03-01 ENCOUNTER — LAB (OUTPATIENT)
Dept: LAB | Facility: CLINIC | Age: 68
End: 2023-03-01
Payer: MEDICARE

## 2023-03-01 VITALS
WEIGHT: 191.5 LBS | SYSTOLIC BLOOD PRESSURE: 118 MMHG | OXYGEN SATURATION: 96 % | BODY MASS INDEX: 30.99 KG/M2 | DIASTOLIC BLOOD PRESSURE: 79 MMHG | HEART RATE: 94 BPM

## 2023-03-01 DIAGNOSIS — I50.22 CHRONIC SYSTOLIC HEART FAILURE (H): Primary | ICD-10-CM

## 2023-03-01 DIAGNOSIS — Z63.4 SPOUSE DECEASED: ICD-10-CM

## 2023-03-01 DIAGNOSIS — E11.42 DIABETIC POLYNEUROPATHY ASSOCIATED WITH TYPE 2 DIABETES MELLITUS (H): ICD-10-CM

## 2023-03-01 DIAGNOSIS — I25.9 CHRONIC ISCHEMIC HEART DISEASE: ICD-10-CM

## 2023-03-01 DIAGNOSIS — I10 ESSENTIAL HYPERTENSION WITH GOAL BLOOD PRESSURE LESS THAN 140/90: ICD-10-CM

## 2023-03-01 DIAGNOSIS — E66.01 MORBID OBESITY (H): ICD-10-CM

## 2023-03-01 DIAGNOSIS — M79.673 PAIN OF FOOT, UNSPECIFIED LATERALITY: ICD-10-CM

## 2023-03-01 DIAGNOSIS — E11.9 DIABETES MELLITUS TYPE 2, NONINSULIN DEPENDENT (H): ICD-10-CM

## 2023-03-01 DIAGNOSIS — I50.22 CHRONIC SYSTOLIC HEART FAILURE (H): ICD-10-CM

## 2023-03-01 DIAGNOSIS — I25.5 ISCHEMIC CARDIOMYOPATHY: ICD-10-CM

## 2023-03-01 LAB
ANION GAP SERPL CALCULATED.3IONS-SCNC: 5 MMOL/L (ref 3–14)
BUN SERPL-MCNC: 26 MG/DL (ref 7–30)
CALCIUM SERPL-MCNC: 9.5 MG/DL (ref 8.5–10.1)
CHLORIDE BLD-SCNC: 106 MMOL/L (ref 94–109)
CO2 SERPL-SCNC: 26 MMOL/L (ref 20–32)
CREAT SERPL-MCNC: 1.08 MG/DL (ref 0.66–1.25)
GFR SERPL CREATININE-BSD FRML MDRD: 75 ML/MIN/1.73M2
GLUCOSE BLD-MCNC: 128 MG/DL (ref 70–99)
HBA1C MFR BLD: 6 % (ref 0–5.6)
POTASSIUM BLD-SCNC: 4.5 MMOL/L (ref 3.4–5.3)
SODIUM SERPL-SCNC: 137 MMOL/L (ref 133–144)
TSH SERPL DL<=0.005 MIU/L-ACNC: 0.86 MU/L (ref 0.4–4)

## 2023-03-01 PROCEDURE — 36415 COLL VENOUS BLD VENIPUNCTURE: CPT

## 2023-03-01 PROCEDURE — 99215 OFFICE O/P EST HI 40 MIN: CPT | Performed by: NURSE PRACTITIONER

## 2023-03-01 PROCEDURE — 84443 ASSAY THYROID STIM HORMONE: CPT

## 2023-03-01 PROCEDURE — 80048 BASIC METABOLIC PNL TOTAL CA: CPT

## 2023-03-01 PROCEDURE — 83036 HEMOGLOBIN GLYCOSYLATED A1C: CPT

## 2023-03-01 RX ORDER — SACUBITRIL AND VALSARTAN 49; 51 MG/1; MG/1
1 TABLET, FILM COATED ORAL 2 TIMES DAILY
Qty: 60 TABLET | Refills: 1 | Status: SHIPPED | OUTPATIENT
Start: 2023-03-01 | End: 2023-03-16 | Stop reason: ALTCHOICE

## 2023-03-01 RX ORDER — ISOSORBIDE MONONITRATE 30 MG/1
30 TABLET, EXTENDED RELEASE ORAL DAILY
Qty: 30 TABLET | Refills: 3 | Status: SHIPPED | OUTPATIENT
Start: 2023-03-01 | End: 2023-03-16 | Stop reason: DRUGHIGH

## 2023-03-01 SDOH — SOCIAL STABILITY - SOCIAL INSECURITY: DISSAPEARANCE AND DEATH OF FAMILY MEMBER: Z63.4

## 2023-03-01 NOTE — PROGRESS NOTES
HPI: Santiago is a 68 year old Black or  male with a past medical history  Ischemic cardiomyopathy with prior coronary angiography and intervention (status post PCI to LAD in 1995 for ACS), Hypertension, Dyslipidemia, Type II diabetes mellitus.    He has less stress and that has helped.  Now he has been 118/79- on average with blood pressure- which is an improvment,   He has been breathing better. Takes the 20 mg of Lasix. He has less SOB and CANO. - he feels stronger and better he says. He says the weight 191 lbs ( lost weight) .  He has no swelling in the legs/feet.      Denies weight gain, chest pain, palpitations, lightheadedness, dizziness, near syncopal/syncopal episodes. Santiago has been following salt and fluid restrictions.      PAST MEDICAL HISTORY:  Past Medical History:   Diagnosis Date     CAD (coronary artery disease) 8/12/2012    S/P MI and stenting 2001, 2005 at [facility]      CHF (congestive heart failure) (H) 8/3/2012     Chronic hepatitis C (H) 11/21/2014     CKD (chronic kidney disease) stage 2, GFR 60-89 ml/min 12/3/2012     COPD (chronic obstructive pulmonary disease) (H) 11/21/2014     GERD (gastroesophageal reflux disease) 8/12/2012     Gout 8/12/2012     History of colonic polyps 9/30/2008     Hyperlipidemia LDL goal <100 8/3/2012     Hypertension goal BP (blood pressure) < 140/90 8/3/2012     Mild persistent asthma 5/29/2013    Patient does not have COPD      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     Nonsenile cataract      Tobacco abuse 9/13/2013     Type 2 diabetes, HbA1C goal < 8% (H) 8/3/2012       FAMILY HISTORY:  Family History   Problem Relation Age of Onset     Heart Disease Maternal Grandmother      Hypertension Maternal Grandmother      Hypertension Father      Hypertension Mother      Hypertension Sister      Thyroid Disease Sister      Cancer Brother      Diabetes Brother      Hypertension Brother      Hypertension Maternal Aunt      Cancer Maternal Uncle       Hypertension Maternal Uncle      Hypertension Paternal Aunt      Hypertension Paternal Uncle      Hypertension Maternal Grandfather      Hypertension Paternal Grandmother      Hypertension Paternal Grandfather      Cerebrovascular Disease No family hx of      Glaucoma No family hx of      Macular Degeneration No family hx of        SOCIAL HISTORY:  Social History     Tobacco Use     Smoking status: Former     Packs/day: 0.25     Years: 15.00     Pack years: 3.75     Types: Cigarettes     Quit date: 2016     Years since quittin.4     Smokeless tobacco: Never     Tobacco comments:     3-4 a day   Substance Use Topics     Alcohol use: Yes     Comment: weekend beer     Drug use: No           CURRENT MEDICATIONS:  Current Outpatient Medications   Medication Sig Dispense Refill     albuterol (PROAIR HFA/PROVENTIL HFA/VENTOLIN HFA) 108 (90 Base) MCG/ACT inhaler Inhale 2 puffs into the lungs every 4 hours as needed for shortness of breath / dyspnea 18 g 3     albuterol (PROVENTIL) (2.5 MG/3ML) 0.083% neb solution INHALE 3 ML BY NEBULIZATION METHOD EVERY 6 HOURS AS NEEDED FOR SHORTNESS OF BREATH 360 mL 1     allopurinol (ZYLOPRIM) 100 MG tablet TAKE 2 TABLETS BY MOUTH EVERY  tablet 3     apixaban ANTICOAGULANT (ELIQUIS) 5 MG tablet Take 1 tablet (5 mg) by mouth in the morning and 1 tablet (5 mg) in the evening. 180 tablet 1     atorvastatin (LIPITOR) 40 MG tablet Take 1 tablet (40 mg) by mouth daily for 90 days 90 tablet 3     budesonide-formoterol (SYMBICORT) 160-4.5 MCG/ACT Inhaler TAKE 2 PUFFS BY MOUTH TWICE A DAY 30.6 g 1     carvedilol (COREG) 25 MG tablet TAKE 1 TABLET BY MOUTH TWICE DAILY WITH MEALS 180 tablet 3     empagliflozin (JARDIANCE) 10 MG TABS tablet Take 1 tablet (10 mg) by mouth daily 90 tablet 3     furosemide (LASIX) 20 MG tablet Take 1 tablet (20 mg) by mouth daily 90 tablet 3     gabapentin (NEURONTIN) 300 MG capsule Take 1 capsule (300 mg) by mouth 3 times daily 270 capsule 0      hydrALAZINE (APRESOLINE) 25 MG tablet Take 2 tablets (50 mg) by mouth 3 times daily 180 tablet 11     isosorbide mononitrate (IMDUR) 60 MG 24 hr tablet Take 1 tablet (60 mg) by mouth daily 90 tablet 3     losartan (COZAAR) 100 MG tablet Take 1 tablet (100 mg) by mouth daily 90 tablet 1     montelukast (SINGULAIR) 10 MG tablet TAKE 1 TABLET BY MOUTH EVERYDAY AT BEDTIME 90 tablet 3     nitroGLYcerin (NITROSTAT) 0.4 MG sublingual tablet Place 1 tablet (0.4 mg) under the tongue every 5 minutes as needed for chest pain 0.4 mg by Sublingual route every 5 (five) minutes as needed. 25 tablet 11     omeprazole (PRILOSEC) 20 MG DR capsule TAKE 1 CAPSULE BY MOUTH EVERY DAY 90 capsule 0     oxyCODONE (ROXICODONE) 5 MG tablet Take 1 tablet (5 mg) by mouth 2 times daily as needed for moderate to severe pain (up to 2 a day) 60 tablet 0     sacubitril-valsartan (ENTRESTO) 24-26 MG per tablet Take 1 tablet by mouth 2 times daily for 90 days 180 tablet 3     spironolactone (ALDACTONE) 25 MG tablet Take 1 tablet (25 mg) by mouth daily 90 tablet 1     tamsulosin (FLOMAX) 0.4 MG capsule Take 1 capsule (0.4 mg) by mouth daily 90 capsule 3     traZODone (DESYREL) 100 MG tablet TAKE 2 TABLETS BY MOUTH AT BEDTIME 180 tablet 2     venlafaxine (EFFEXOR XR) 150 MG 24 hr capsule Take 1 capsule (150 mg) by mouth daily 90 capsule 1     cetirizine (ZYRTEC) 10 MG tablet TAKE 1 TABLET BY MOUTH DAILY AT BEDTIME (Patient not taking: Reported on 3/1/2023) 90 tablet 1     enoxaparin ANTICOAGULANT (LOVENOX) 100 MG/ML syringe Inject 1 mL (100 mg) Subcutaneous in the morning and 1 mL (100 mg) in the evening. Until INR 2.3 or greater (Patient not taking: Reported on 3/1/2023) 20 mL 1     gabapentin (NEURONTIN) 300 MG capsule Take 1 capsule (300 mg) by mouth 3 times daily 90 capsule 1       ROS:  Review Of Systems  Skin: negative  Eyes: negative  Ears/Nose/Throat: negative  Respiratory: No shortness of breath, dyspnea on exertion, cough, or  hemoptysis  Cardiovascular: negative  Gastrointestinal: negative  Genitourinary: negative  Musculoskeletal: negative  Neurologic: negative  Psychiatric: negative  Hematologic/Lymphatic/Immunologic: negative  Endocrine: negative      EXAM:  /79 (BP Location: Left arm, Patient Position: Sitting, Cuff Size: Adult Regular)   Pulse 94   Wt 86.9 kg (191 lb 8 oz)   SpO2 96%   BMI 30.99 kg/m    Home weight:  General: alert, articulate, and in no acute distress.  HEENT: normocephalic, atraumatic, anicteric sclera, EOMI, mucosa moist, no cyanosis.   Neck: neck supple.  No adenopathy, masses, or carotid bruits.  JVP not detected at 90 degrees  Heart: regular rhythm, normal S1/S2, no murmur, gallop, rub.  Precordium quiet with normal PMI.     Lungs: clear, no rales, ronchi, or wheezing.  No accessory muscle use, respirations unlabored.   Abdomen: soft, non-tender, bowel sounds present, no hepatomegaly  Extremities: no  pitting edema.   No cyanosis.   Neurological: alert and oriented x 3.  normal speech and affect, no gross motor deficits  Skin:  No ecchymoses, rashes, or clubbing.    Labs:  CBC RESULTS:  Lab Results   Component Value Date    WBC 9.3 10/14/2022    WBC 9.9 06/18/2021    RBC 4.79 10/14/2022    RBC 4.94 06/18/2021    HGB 14.8 10/14/2022    HGB 15.1 06/18/2021    HCT 45.7 10/14/2022    HCT 46.3 06/18/2021    MCV 95 10/14/2022    MCV 94 06/18/2021    MCH 30.9 10/14/2022    MCH 30.6 06/18/2021    MCHC 32.4 10/14/2022    MCHC 32.6 06/18/2021    RDW 13.5 10/14/2022    RDW 12.8 06/18/2021     10/14/2022     06/18/2021       CMP RESULTS:  Lab Results   Component Value Date     03/01/2023     06/18/2021    POTASSIUM 4.5 03/01/2023    POTASSIUM 4.3 06/18/2021    CHLORIDE 106 03/01/2023    CHLORIDE 102 06/18/2021    CO2 26 03/01/2023    CO2 33 (H) 06/18/2021    ANIONGAP 5 03/01/2023    ANIONGAP 4 06/18/2021     (H) 03/01/2023    GLC 94 06/18/2021    BUN 26 03/01/2023    BUN 24  06/18/2021    CR 1.08 03/01/2023    CR 1.38 (H) 06/18/2021    GFRESTIMATED 75 03/01/2023    GFRESTIMATED 53 (L) 06/18/2021    GFRESTBLACK 62 06/18/2021    FELICITY 9.5 03/01/2023    FELICITY 9.0 06/18/2021    BILITOTAL 0.5 09/11/2018    ALBUMIN 3.9 09/11/2018    ALKPHOS 86 09/11/2018    ALT 36 07/21/2022    ALT 30 06/18/2021    AST 29 09/11/2018        INR RESULTS:  Lab Results   Component Value Date    INR 1.12 10/06/2014       No components found for: CK  Lab Results   Component Value Date    MAG 1.9 01/18/2010     Lab Results   Component Value Date    NTBNP 432 08/26/2022    NTBNP 544 (H) 06/18/2021     @BRIEFLABR (dig)@    Most recent echocardiogram:  No results found for this or any previous visit (from the past 8760 hour(s)).      Assessment and Plan:    In summary,Santiago  is a  68 year old male who is here for a CORE visit. He is euvolemic today. Blood pressures are higher in clinic he says as he always gets nervous being here.  Med changes see below under plan. He is euvolemic on exam. PCP - appt needed for leg/pain in the feet- chronic    1.  Chronic systolic heart failure secondary to ICM.  Stage C  NYHA Class II  ACEi/ARB/ARNI/ start Entresto 49/51 mg BID Stop Losartan and start Entresto next day.   BB: yes- max dose   Aldosterone antagonist: Spironolactone 25 mg   SCD prophylaxis: does not meet criteria for implant  Fluid status: euvolemic  Anticoagulation:   Antiplatelet:  ASA dose   Sleep apnea:  NSAID use:  Contraindicated.  Avoid use.  Remote Monitoring:  SGLT2- 10 mg - initiated    2.  Other comordbid conditions:    Atypical anginal chest pain- not present today - Imdur has helped   Coronary artery disease with prior proximal LAD stenting in 1995 at [facility]   Ischemic cardiomyopathy (ACC/AHA stage C, NYHA III)  Hypertension- start Entresto stop Losartan  Dyslipidemia  COPD   Type II diabetes mellitus, managed only with conservative measures      3.  Follow-up-  Stop Losartan   Start Entresto 49/51 mg  BID  Decrease Imdur to 30 mg daily   Stop Hydralazine   [facility] in 2 weeks 3/16- Jamey STALLWORTH, CNP  [facility]

## 2023-03-01 NOTE — NURSING NOTE
"Chief Complaint   Patient presents with     Follow Up     Pt reports pain in feet that makes it hard to walk, Reason for visit: Return CORE, 68 yo male with systolic heart failure presents for follow up visit with labs prior.       Initial /79 (BP Location: Left arm, Patient Position: Sitting, Cuff Size: Adult Regular)   Pulse 94   Wt 86.9 kg (191 lb 8 oz)   SpO2 96%   BMI 30.99 kg/m   Estimated body mass index is 30.99 kg/m  as calculated from the following:    Height as of 11/3/22: 1.674 m (5' 5.91\").    Weight as of this encounter: 86.9 kg (191 lb 8 oz)..  BP completed using cuff size: regular    SASHA Quijano    "

## 2023-03-01 NOTE — PATIENT INSTRUCTIONS
Take your medicines every day, as directed    Changes made today:  Stop Losartan   Start Entresto 49/51 mg twice a day  Decrease Imdur to 30 mg daily   Stop Hydralazine   [facility] in 2 weeks 3/16- [place]      Monitor Your Weight and Symptoms    Contact us if you:    Gain 2 pounds in one day or 5 pounds in one week  Feel more short of breath  Notice more leg swelling  Feel lightheadeded   Change your lifestyle    Limit Salt or Sodium:  2000 mg  Limit Fluids:  2000 mL or approximately 64 ounces  Eat a Heart Healthy Diet  Low in saturated fats  Stay Active:  Aim to move at least 150 minutes every  week         To Contact us    During Business Hours:  [phone], option # 1      After hours, weekends or holidays:   [phone], Option #4  Ask to speak to the On-Call Cardiologist. Inform them you are a CORE/heart failure patient at the [facility].     Use [employer] allows you to communicate directly with your heart team through secure messaging.  [employer] can be accessed any time on your phone, computer, or tablet.  If you need assistance, we'd be happy to help!         Keep your Heart Appointments:    [facility] in 2 weeks 3/16- [place]      Please consider attending our virtual support group which is held monthly. Please reach out to Ezequiel at [phone] for more information if you are interested in attending.       2023 dates:    Monday, March 6th , 1-2pm (Topic: Medications Review)  Monday, April 3rd, 1-2pm (Topic: Remote monitoring)  Monday, May 1st, 1-2pm   Monday, June 5th, 1-2pm  Monday, July 3rd, 1-2pm  Monday, August 7th, 1-2pm  Monday, September 11th, 1-2pm  Monday, October 2nd, 1-2pm  Monday, November 6th, 1-2pm  Monday, December 4th, 1-2pm

## 2023-03-06 ENCOUNTER — MYC REFILL (OUTPATIENT)
Dept: FAMILY MEDICINE | Facility: CLINIC | Age: 68
End: 2023-03-06
Payer: COMMERCIAL

## 2023-03-06 DIAGNOSIS — G60.9 IDIOPATHIC PERIPHERAL NEUROPATHY: ICD-10-CM

## 2023-03-06 DIAGNOSIS — G89.4 CHRONIC PAIN SYNDROME: ICD-10-CM

## 2023-03-06 RX ORDER — OXYCODONE HYDROCHLORIDE 5 MG/1
5 TABLET ORAL 2 TIMES DAILY PRN
Qty: 60 TABLET | Refills: 0 | Status: SHIPPED | OUTPATIENT
Start: 2023-03-06 | End: 2023-04-06

## 2023-03-14 ENCOUNTER — LAB (OUTPATIENT)
Dept: LAB | Facility: CLINIC | Age: 68
End: 2023-03-14
Payer: MEDICARE

## 2023-03-14 DIAGNOSIS — I50.22 CHRONIC SYSTOLIC HEART FAILURE (H): ICD-10-CM

## 2023-03-14 LAB
ANION GAP SERPL CALCULATED.3IONS-SCNC: 3 MMOL/L (ref 3–14)
BUN SERPL-MCNC: 20 MG/DL (ref 7–30)
CALCIUM SERPL-MCNC: 9.5 MG/DL (ref 8.5–10.1)
CHLORIDE BLD-SCNC: 104 MMOL/L (ref 94–109)
CO2 SERPL-SCNC: 31 MMOL/L (ref 20–32)
CREAT SERPL-MCNC: 1.19 MG/DL (ref 0.66–1.25)
GFR SERPL CREATININE-BSD FRML MDRD: 67 ML/MIN/1.73M2
GLUCOSE BLD-MCNC: 187 MG/DL (ref 70–99)
POTASSIUM BLD-SCNC: 4.3 MMOL/L (ref 3.4–5.3)
SODIUM SERPL-SCNC: 138 MMOL/L (ref 133–144)

## 2023-03-14 PROCEDURE — 36415 COLL VENOUS BLD VENIPUNCTURE: CPT

## 2023-03-14 PROCEDURE — 80048 BASIC METABOLIC PNL TOTAL CA: CPT

## 2023-03-16 ENCOUNTER — OFFICE VISIT (OUTPATIENT)
Dept: CARDIOLOGY | Facility: CLINIC | Age: 68
End: 2023-03-16
Payer: MEDICARE

## 2023-03-16 VITALS
BODY MASS INDEX: 31.53 KG/M2 | SYSTOLIC BLOOD PRESSURE: 74 MMHG | WEIGHT: 194.8 LBS | HEART RATE: 84 BPM | DIASTOLIC BLOOD PRESSURE: 44 MMHG | OXYGEN SATURATION: 94 %

## 2023-03-16 DIAGNOSIS — I25.9 CHRONIC ISCHEMIC HEART DISEASE: ICD-10-CM

## 2023-03-16 DIAGNOSIS — I10 ESSENTIAL HYPERTENSION WITH GOAL BLOOD PRESSURE LESS THAN 140/90: ICD-10-CM

## 2023-03-16 DIAGNOSIS — Z63.4 SPOUSE DECEASED: ICD-10-CM

## 2023-03-16 DIAGNOSIS — E11.9 DIABETES MELLITUS TYPE 2, NONINSULIN DEPENDENT (H): ICD-10-CM

## 2023-03-16 DIAGNOSIS — I50.22 CHRONIC SYSTOLIC HEART FAILURE (H): Primary | ICD-10-CM

## 2023-03-16 PROCEDURE — 99214 OFFICE O/P EST MOD 30 MIN: CPT | Performed by: NURSE PRACTITIONER

## 2023-03-16 RX ORDER — SACUBITRIL AND VALSARTAN 24; 26 MG/1; MG/1
1 TABLET, FILM COATED ORAL 2 TIMES DAILY
Qty: 60 TABLET | Refills: 0 | Status: SHIPPED | OUTPATIENT
Start: 2023-03-16 | End: 2023-06-12

## 2023-03-16 SDOH — SOCIAL STABILITY - SOCIAL INSECURITY: DISSAPEARANCE AND DEATH OF FAMILY MEMBER: Z63.4

## 2023-03-16 NOTE — PROGRESS NOTES
HPI: Santiago is a 68 year old Black or  male with a past medical history  Ischemic cardiomyopathy with prior coronary angiography and intervention (status post PCI to LAD in 1995 for ACS), Hypertension, Dyslipidemia, Type II diabetes mellitus.    He has less stress and that has helped.  Now he has been 118/80- on average with blood pressure- which is an improvement,   He has been breathing better. Takes the 20 mg of Lasix. He has less SOB and CANO. - he feels stronger and better he says. He says the weight 192 lbs .  He has no swelling in the legs/feet.  He says he has been taking 24/26 mg BID of Entresto and not the 49/51 mg - he didn't realize his mistake.      Denies weight gain, chest pain, palpitations, lightheadedness, dizziness, near syncopal/syncopal episodes. Santiago has been following salt and fluid restrictions.      PAST MEDICAL HISTORY:  Past Medical History:   Diagnosis Date     CAD (coronary artery disease) 8/12/2012    S[place] 2001[place] [facility]      CHF (congestive heart failure) (H) 8/3/2012     Chronic hepatitis C (H) 11/21/2014     CKD (chronic kidney disease) stage 2, GFR 60-89 ml/min 12/3/2012     COPD (chronic obstructive pulmonary disease) (H) 11/21/2014     GERD (gastroesophageal reflux disease) 8/12/2012     Gout 8/12/2012     History of colonic polyps 9/30/2008     Hyperlipidemia LDL goal <100 8/3/2012     Hypertension goal BP (blood pressure) < 140/90 8/3/2012     Mild persistent asthma 5/29/2013    Patient does not have COPD      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     Nonsenile cataract      Tobacco abuse 9/13/2013     Type 2 diabetes, HbA1C goal < 8% (H) 8/3/2012       FAMILY HISTORY:  Family History   Problem Relation Age of Onset     Heart Disease Maternal Grandmother      Hypertension Maternal Grandmother      Hypertension Father      Hypertension Mother      Hypertension Sister      Thyroid Disease Sister      Cancer Brother      Diabetes Brother       Hypertension Brother      Hypertension Maternal Aunt      Cancer Maternal Uncle      Hypertension Maternal Uncle      Hypertension Paternal Aunt      Hypertension Paternal Uncle      Hypertension Maternal Grandfather      Hypertension Paternal Grandmother      Hypertension Paternal Grandfather      Cerebrovascular Disease No family hx of      Glaucoma No family hx of      Macular Degeneration No family hx of        SOCIAL HISTORY:  Social History     Tobacco Use     Smoking status: Former     Packs/day: 0.25     Years: 15.00     Pack years: 3.75     Types: Cigarettes     Quit date: 2016     Years since quittin.4     Smokeless tobacco: Never     Tobacco comments:     3-4 a day   Substance Use Topics     Alcohol use: Yes     Comment: weekend beer     Drug use: No           CURRENT MEDICATIONS:  Current Outpatient Medications   Medication Sig Dispense Refill     albuterol (PROAIR HFA/PROVENTIL HFA/VENTOLIN HFA) 108 (90 Base) MCG/ACT inhaler Inhale 2 puffs into the lungs every 4 hours as needed for shortness of breath / dyspnea 18 g 3     albuterol (PROVENTIL) (2.5 MG/3ML) 0.083% neb solution INHALE 3 ML BY NEBULIZATION METHOD EVERY 6 HOURS AS NEEDED FOR SHORTNESS OF BREATH 360 mL 1     allopurinol (ZYLOPRIM) 100 MG tablet TAKE 2 TABLETS BY MOUTH EVERY  tablet 3     apixaban ANTICOAGULANT (ELIQUIS) 5 MG tablet Take 1 tablet (5 mg) by mouth in the morning and 1 tablet (5 mg) in the evening. 180 tablet 1     atorvastatin (LIPITOR) 40 MG tablet Take 1 tablet (40 mg) by mouth daily for 90 days 90 tablet 3     budesonide-formoterol (SYMBICORT) 160-4.5 MCG/ACT Inhaler TAKE 2 PUFFS BY MOUTH TWICE A DAY 30.6 g 1     carvedilol (COREG) 25 MG tablet TAKE 1 TABLET BY MOUTH TWICE DAILY WITH MEALS 180 tablet 3     empagliflozin (JARDIANCE) 10 MG TABS tablet Take 1 tablet (10 mg) by mouth daily 90 tablet 3     furosemide (LASIX) 20 MG tablet Take 1 tablet (20 mg) by mouth daily 90 tablet 3     gabapentin  (NEURONTIN) 300 MG capsule Take 1 capsule (300 mg) by mouth 3 times daily 270 capsule 0     gabapentin (NEURONTIN) 300 MG capsule Take 1 capsule (300 mg) by mouth 3 times daily 90 capsule 1     montelukast (SINGULAIR) 10 MG tablet TAKE 1 TABLET BY MOUTH EVERYDAY AT BEDTIME 90 tablet 3     nitroGLYcerin (NITROSTAT) 0.4 MG sublingual tablet Place 1 tablet (0.4 mg) under the tongue every 5 minutes as needed for chest pain 0.4 mg by Sublingual route every 5 (five) minutes as needed. 25 tablet 11     omeprazole (PRILOSEC) 20 MG DR capsule TAKE 1 CAPSULE BY MOUTH EVERY DAY 90 capsule 0     oxyCODONE (ROXICODONE) 5 MG tablet Take 1 tablet (5 mg) by mouth 2 times daily as needed for moderate to severe pain (up to 2 a day) 60 tablet 0     sacubitril-valsartan (ENTRESTO) 24-26 MG per tablet Take 1 tablet by mouth 2 times daily 60 tablet 0     spironolactone (ALDACTONE) 25 MG tablet Take 1 tablet (25 mg) by mouth daily 90 tablet 1     tamsulosin (FLOMAX) 0.4 MG capsule Take 1 capsule (0.4 mg) by mouth daily 90 capsule 3     traZODone (DESYREL) 100 MG tablet TAKE 2 TABLETS BY MOUTH AT BEDTIME 180 tablet 2     venlafaxine (EFFEXOR XR) 150 MG 24 hr capsule Take 1 capsule (150 mg) by mouth daily 90 capsule 1     cetirizine (ZYRTEC) 10 MG tablet TAKE 1 TABLET BY MOUTH DAILY AT BEDTIME (Patient not taking: Reported on 3/1/2023) 90 tablet 1     enoxaparin ANTICOAGULANT (LOVENOX) 100 MG/ML syringe Inject 1 mL (100 mg) Subcutaneous in the morning and 1 mL (100 mg) in the evening. Until INR 2.3 or greater (Patient not taking: Reported on 3/1/2023) 20 mL 1       ROS:  Review Of Systems  Skin: negative  Eyes: negative  Ears/Nose/Throat: negative  Respiratory: No shortness of breath, dyspnea on exertion, cough, or hemoptysis  Cardiovascular: negative  Gastrointestinal: negative  Genitourinary: negative  Musculoskeletal: negative  Neurologic: negative  Psychiatric: negative  Hematologic/Lymphatic/Immunologic: negative  Endocrine:  negative      EXAM:  BP (!) 82/51 (BP Location: Left arm, Patient Position: Sitting, Cuff Size: Adult Regular)   Pulse 90   Wt 88.4 kg (194 lb 12.8 oz)   SpO2 94%   BMI 31.53 kg/m    Home weight:  General: alert, articulate, and in no acute distress.  HEENT: normocephalic, atraumatic, anicteric sclera, EOMI, mucosa moist, no cyanosis.   Neck: neck supple.  No adenopathy, masses, or carotid bruits.  JVP not detected at 90 degrees  Heart: regular rhythm, normal S1/S2, no murmur, gallop, rub.  Precordium quiet with normal PMI.     Lungs: clear, no rales, ronchi, or wheezing.  No accessory muscle use, respirations unlabored.   Abdomen: soft, non-tender, bowel sounds present, no hepatomegaly  Extremities: no  pitting edema.   No cyanosis.   Neurological: alert and oriented x 3.  normal speech and affect, no gross motor deficits  Skin:  No ecchymoses, rashes, or clubbing.    Labs:  CBC RESULTS:  Lab Results   Component Value Date    WBC 9.3 10/14/2022    WBC 9.9 06/18/2021    RBC 4.79 10/14/2022    RBC 4.94 06/18/2021    HGB 14.8 10/14/2022    HGB 15.1 06/18/2021    HCT 45.7 10/14/2022    HCT 46.3 06/18/2021    MCV 95 10/14/2022    MCV 94 06/18/2021    MCH 30.9 10/14/2022    MCH 30.6 06/18/2021    MCHC 32.4 10/14/2022    MCHC 32.6 06/18/2021    RDW 13.5 10/14/2022    RDW 12.8 06/18/2021     10/14/2022     06/18/2021       CMP RESULTS:  Lab Results   Component Value Date     03/14/2023     06/18/2021    POTASSIUM 4.3 03/14/2023    POTASSIUM 4.3 06/18/2021    CHLORIDE 104 03/14/2023    CHLORIDE 102 06/18/2021    CO2 31 03/14/2023    CO2 33 (H) 06/18/2021    ANIONGAP 3 03/14/2023    ANIONGAP 4 06/18/2021     (H) 03/14/2023    GLC 94 06/18/2021    BUN 20 03/14/2023    BUN 24 06/18/2021    CR 1.19 03/14/2023    CR 1.38 (H) 06/18/2021    GFRESTIMATED 67 03/14/2023    GFRESTIMATED 53 (L) 06/18/2021    GFRESTBLACK 62 06/18/2021    FELICITY 9.5 03/14/2023    FELICITY 9.0 06/18/2021    BILITOTAL 0.5  09/11/2018    ALBUMIN 3.9 09/11/2018    ALKPHOS 86 09/11/2018    ALT 36 07/21/2022    ALT 30 06/18/2021    AST 29 09/11/2018        INR RESULTS:  Lab Results   Component Value Date    INR 1.12 10/06/2014       No components found for: CK  Lab Results   Component Value Date    MAG 1.9 01/18/2010     Lab Results   Component Value Date    NTBNP 432 08/26/2022    NTBNP 544 (H) 06/18/2021     @BRIEFLABR (dig)@    Most recent echocardiogram:  No results found for this or any previous visit (from the past 8760 hour(s)).      Assessment and Plan:    In summary,Santiago  is a  68 year old male who is here for a CORE visit. He is euvolemic today. Low blood pressure in clinic so we wont increase the Entresto.  Med changes see below under plan.     1.  Chronic systolic heart failure secondary to ICM.  Stage C  NYHA Class II  ACEi/ARB/ARNI/ -stay on Entresto 24/26 mg for now - hypotension  BB: yes- max dose   Aldosterone antagonist: Spironolactone 25 mg   SCD prophylaxis: does not meet criteria for implant  Fluid status: euvolemic  Anticoagulation:   Antiplatelet:  ASA dose   Sleep apnea:  NSAID use:  Contraindicated.  Avoid use.  Remote Monitoring:  SGLT2- 10 mg - initiated    2.  Other comordbid conditions:    Atypical anginal chest pain- not present today - Imdur has helped   Coronary artery disease with prior proximal LAD stenting in 1995 at [facility] [place]   Ischemic cardiomyopathy (ACC/AHA stage C, NYHA III)  Hypertension- start Entresto stop Losartan  Dyslipidemia  COPD   Type II diabetes mellitus, managed only with conservative measures      3.  Follow-up-  Continue Entresto 24/26 mg BID   Stop Imdur  CORE April 27 th with labs prior        Kayla STALLWORTH, CNP  [facility]

## 2023-03-16 NOTE — NURSING NOTE
"Chief Complaint   Patient presents with     Follow Up     Reason for visit: Return CORE, 67 yo male with systolic heart failure presents for follow up visit with labs prior.       Initial BP (!) 82/51 (BP Location: Left arm, Patient Position: Sitting, Cuff Size: Adult Regular)   Pulse 90   Wt 88.4 kg (194 lb 12.8 oz)   SpO2 94%   BMI 31.53 kg/m   Estimated body mass index is 31.53 kg/m  as calculated from the following:    Height as of 11/3/22: 1.674 m (5' 5.91\").    Weight as of this encounter: 88.4 kg (194 lb 12.8 oz)..  BP completed using cuff size: regular    SASHA Quijano    "

## 2023-03-16 NOTE — PATIENT INSTRUCTIONS
Take your medicines every day, as directed    Changes made today:  STOP taking Imdur  Continue taking Entresto 24-26 mg twice daily     Monitor Your Weight and Symptoms    Contact us if you:    Gain 2 pounds in one day or 5 pounds in one week  Feel more short of breath  Notice more leg swelling  Feel lightheadeded   Change your lifestyle    Limit Salt or Sodium:  2000 mg  Limit Fluids:  2000 mL or approximately 64 ounces  Eat a Heart Healthy Diet  Low in saturated fats  Stay Active:  Aim to move at least 150 minutes every  week         To Contact us    During Business Hours:  [phone], option # 1      After hours, weekends or holidays:   [phone], Option #4  Ask to speak to the On-Call Cardiologist. Inform them you are a CORE/heart failure patient at the [facility].     Use [employer] for the rest of us allows you to communicate directly with your heart team through secure messaging.  [employer] can be accessed any time on your phone, computer, or tablet.  If you need assistance, we'd be happy to help!         Keep your Heart Appointments:    Follow up with Dr. Hardin as scheduled    CORE end of April     Please consider attending our virtual support group which is held monthly. Please reach out to Ezequiel at [phone] for more information if you are interested in attending.       2023 dates:    Monday, April 3rd, 1-2pm (Topic: Remote monitoring)  Monday, May 1st, 1-2pm (Topic: CORE clinic)  Monday, June 5th, 1-2pm  Monday, July 3rd, 1-2pm  Monday, August 7th, 1-2pm  Monday, September 11th, 1-2pm  Monday, October 2nd, 1-2pm  Monday, November 6th, 1-2pm  Monday, December 4th, 1-2pm

## 2023-03-16 NOTE — LETTER
3/16/2023      RE: Santiago Hylton  [address]  [facility] [address]       Dear Colleague,    Thank you for the opportunity to participate in the care of your patient, Santiago Hylton, at the [facility] HEART CLINIC [facility]. Please see a copy of my visit note below.      HPI: Santiago is a 68 year old Black or  male with a past medical history  Ischemic cardiomyopathy with prior coronary angiography and intervention (status post PCI to LAD in 1995 for ACS), Hypertension, Dyslipidemia, Type II diabetes mellitus.    He has less stress and that has helped.  Now he has been 118/80- on average with blood pressure- which is an improvement,   He has been breathing better. Takes the 20 mg of Lasix. He has less SOB and CANO. - he feels stronger and better he says. He says the weight 192 lbs .  He has no swelling in the legs/feet.  He says he has been taking 24/26 mg BID of Entresto and not the 49/51 mg - he didn't realize his mistake.      Denies weight gain, chest pain, palpitations, lightheadedness, dizziness, near syncopal/syncopal episodes. Santiago has been following salt and fluid restrictions.      PAST MEDICAL HISTORY:  Past Medical History:   Diagnosis Date     CAD (coronary artery disease) 8/12/2012    S/P MI and stenting 2001, 2005 at [facility]      CHF (congestive heart failure) (H) 8/3/2012     Chronic hepatitis C (H) 11/21/2014     CKD (chronic kidney disease) stage 2, GFR 60-89 ml/min 12/3/2012     COPD (chronic obstructive pulmonary disease) (H) 11/21/2014     GERD (gastroesophageal reflux disease) 8/12/2012     Gout 8/12/2012     History of colonic polyps 9/30/2008     Hyperlipidemia LDL goal <100 8/3/2012     Hypertension goal BP (blood pressure) < 140/90 8/3/2012     Mild persistent asthma 5/29/2013    Patient does not have COPD      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     Nonsenile cataract      Tobacco abuse 9/13/2013     Type  2 diabetes, HbA1C goal < 8% (H) 8/3/2012       FAMILY HISTORY:  Family History   Problem Relation Age of Onset     Heart Disease Maternal Grandmother      Hypertension Maternal Grandmother      Hypertension Father      Hypertension Mother      Hypertension Sister      Thyroid Disease Sister      Cancer Brother      Diabetes Brother      Hypertension Brother      Hypertension Maternal Aunt      Cancer Maternal Uncle      Hypertension Maternal Uncle      Hypertension Paternal Aunt      Hypertension Paternal Uncle      Hypertension Maternal Grandfather      Hypertension Paternal Grandmother      Hypertension Paternal Grandfather      Cerebrovascular Disease No family hx of      Glaucoma No family hx of      Macular Degeneration No family hx of        SOCIAL HISTORY:  Social History     Tobacco Use     Smoking status: Former     Packs/day: 0.25     Years: 15.00     Pack years: 3.75     Types: Cigarettes     Quit date: 2016     Years since quittin.4     Smokeless tobacco: Never     Tobacco comments:     3-4 a day   Substance Use Topics     Alcohol use: Yes     Comment: weekend beer     Drug use: No           CURRENT MEDICATIONS:  Current Outpatient Medications   Medication Sig Dispense Refill     albuterol (PROAIR HFA/PROVENTIL HFA/VENTOLIN HFA) 108 (90 Base) MCG/ACT inhaler Inhale 2 puffs into the lungs every 4 hours as needed for shortness of breath / dyspnea 18 g 3     albuterol (PROVENTIL) (2.5 MG/3ML) 0.083% neb solution INHALE 3 ML BY NEBULIZATION METHOD EVERY 6 HOURS AS NEEDED FOR SHORTNESS OF BREATH 360 mL 1     allopurinol (ZYLOPRIM) 100 MG tablet TAKE 2 TABLETS BY MOUTH EVERY  tablet 3     apixaban ANTICOAGULANT (ELIQUIS) 5 MG tablet Take 1 tablet (5 mg) by mouth in the morning and 1 tablet (5 mg) in the evening. 180 tablet 1     atorvastatin (LIPITOR) 40 MG tablet Take 1 tablet (40 mg) by mouth daily for 90 days 90 tablet 3     budesonide-formoterol (SYMBICORT) 160-4.5 MCG/ACT Inhaler TAKE 2  PUFFS BY MOUTH TWICE A DAY 30.6 g 1     carvedilol (COREG) 25 MG tablet TAKE 1 TABLET BY MOUTH TWICE DAILY WITH MEALS 180 tablet 3     empagliflozin (JARDIANCE) 10 MG TABS tablet Take 1 tablet (10 mg) by mouth daily 90 tablet 3     furosemide (LASIX) 20 MG tablet Take 1 tablet (20 mg) by mouth daily 90 tablet 3     gabapentin (NEURONTIN) 300 MG capsule Take 1 capsule (300 mg) by mouth 3 times daily 270 capsule 0     gabapentin (NEURONTIN) 300 MG capsule Take 1 capsule (300 mg) by mouth 3 times daily 90 capsule 1     montelukast (SINGULAIR) 10 MG tablet TAKE 1 TABLET BY MOUTH EVERYDAY AT BEDTIME 90 tablet 3     nitroGLYcerin (NITROSTAT) 0.4 MG sublingual tablet Place 1 tablet (0.4 mg) under the tongue every 5 minutes as needed for chest pain 0.4 mg by Sublingual route every 5 (five) minutes as needed. 25 tablet 11     omeprazole (PRILOSEC) 20 MG DR capsule TAKE 1 CAPSULE BY MOUTH EVERY DAY 90 capsule 0     oxyCODONE (ROXICODONE) 5 MG tablet Take 1 tablet (5 mg) by mouth 2 times daily as needed for moderate to severe pain (up to 2 a day) 60 tablet 0     sacubitril-valsartan (ENTRESTO) 24-26 MG per tablet Take 1 tablet by mouth 2 times daily 60 tablet 0     spironolactone (ALDACTONE) 25 MG tablet Take 1 tablet (25 mg) by mouth daily 90 tablet 1     tamsulosin (FLOMAX) 0.4 MG capsule Take 1 capsule (0.4 mg) by mouth daily 90 capsule 3     traZODone (DESYREL) 100 MG tablet TAKE 2 TABLETS BY MOUTH AT BEDTIME 180 tablet 2     venlafaxine (EFFEXOR XR) 150 MG 24 hr capsule Take 1 capsule (150 mg) by mouth daily 90 capsule 1     cetirizine (ZYRTEC) 10 MG tablet TAKE 1 TABLET BY MOUTH DAILY AT BEDTIME (Patient not taking: Reported on 3/1/2023) 90 tablet 1     enoxaparin ANTICOAGULANT (LOVENOX) 100 MG/ML syringe Inject 1 mL (100 mg) Subcutaneous in the morning and 1 mL (100 mg) in the evening. Until INR 2.3 or greater (Patient not taking: Reported on 3/1/2023) 20 mL 1       ROS:  Review Of Systems  Skin: negative  Eyes:  negative  Ears/Nose/Throat: negative  Respiratory: No shortness of breath, dyspnea on exertion, cough, or hemoptysis  Cardiovascular: negative  Gastrointestinal: negative  Genitourinary: negative  Musculoskeletal: negative  Neurologic: negative  Psychiatric: negative  Hematologic/Lymphatic/Immunologic: negative  Endocrine: negative      EXAM:  BP (!) 82/51 (BP Location: Left arm, Patient Position: Sitting, Cuff Size: Adult Regular)   Pulse 90   Wt 88.4 kg (194 lb 12.8 oz)   SpO2 94%   BMI 31.53 kg/m    Home weight:  General: alert, articulate, and in no acute distress.  HEENT: normocephalic, atraumatic, anicteric sclera, EOMI, mucosa moist, no cyanosis.   Neck: neck supple.  No adenopathy, masses, or carotid bruits.  JVP not detected at 90 degrees  Heart: regular rhythm, normal S1/S2, no murmur, gallop, rub.  Precordium quiet with normal PMI.     Lungs: clear, no rales, ronchi, or wheezing.  No accessory muscle use, respirations unlabored.   Abdomen: soft, non-tender, bowel sounds present, no hepatomegaly  Extremities: no  pitting edema.   No cyanosis.   Neurological: alert and oriented x 3.  normal speech and affect, no gross motor deficits  Skin:  No ecchymoses, rashes, or clubbing.    Labs:  CBC RESULTS:  Lab Results   Component Value Date    WBC 9.3 10/14/2022    WBC 9.9 06/18/2021    RBC 4.79 10/14/2022    RBC 4.94 06/18/2021    HGB 14.8 10/14/2022    HGB 15.1 06/18/2021    HCT 45.7 10/14/2022    HCT 46.3 06/18/2021    MCV 95 10/14/2022    MCV 94 06/18/2021    MCH 30.9 10/14/2022    MCH 30.6 06/18/2021    MCHC 32.4 10/14/2022    MCHC 32.6 06/18/2021    RDW 13.5 10/14/2022    RDW 12.8 06/18/2021     10/14/2022     06/18/2021       CMP RESULTS:  Lab Results   Component Value Date     03/14/2023     06/18/2021    POTASSIUM 4.3 03/14/2023    POTASSIUM 4.3 06/18/2021    CHLORIDE 104 03/14/2023    CHLORIDE 102 06/18/2021    CO2 31 03/14/2023    CO2 33 (H) 06/18/2021    ANIONGAP 3  03/14/2023    ANIONGAP 4 06/18/2021     (H) 03/14/2023    GLC 94 06/18/2021    BUN 20 03/14/2023    BUN 24 06/18/2021    CR 1.19 03/14/2023    CR 1.38 (H) 06/18/2021    GFRESTIMATED 67 03/14/2023    GFRESTIMATED 53 (L) 06/18/2021    GFRESTBLACK 62 06/18/2021    FELICITY 9.5 03/14/2023    FELICITY 9.0 06/18/2021    BILITOTAL 0.5 09/11/2018    ALBUMIN 3.9 09/11/2018    ALKPHOS 86 09/11/2018    ALT 36 07/21/2022    ALT 30 06/18/2021    AST 29 09/11/2018        INR RESULTS:  Lab Results   Component Value Date    INR 1.12 10/06/2014       No components found for: CK  Lab Results   Component Value Date    MAG 1.9 01/18/2010     Lab Results   Component Value Date    NTBNP 432 08/26/2022    NTBNP 544 (H) 06/18/2021     @BRIEFLABR (dig)@    Most recent echocardiogram:  No results found for this or any previous visit (from the past 8760 hour(s)).      Assessment and Plan:    In summary,Santiago  is a  68 year old male who is here for a CORE visit. He is euvolemic today. Low blood pressure in clinic so we wont increase the Entresto.  Med changes see below under plan.     1.  Chronic systolic heart failure secondary to ICM.  Stage C  NYHA Class II  ACEi/ARB/ARNI/ -stay on Entresto 24/26 mg for now - hypotension  BB: yes- max dose   Aldosterone antagonist: Spironolactone 25 mg   SCD prophylaxis: does not meet criteria for implant  Fluid status: euvolemic  Anticoagulation:   Antiplatelet:  ASA dose   Sleep apnea:  NSAID use:  Contraindicated.  Avoid use.  Remote Monitoring:  SGLT2- 10 mg - initiated    2.  Other comordbid conditions:    Atypical anginal chest pain- not present today - Imdur has helped   Coronary artery disease with prior proximal LAD stenting in 1995 at [facility]   Ischemic cardiomyopathy (ACC/AHA stage C, NYHA III)  Hypertension- start Entresto stop Losartan  Dyslipidemia  COPD   Type II diabetes mellitus, managed only with conservative measures      3.  Follow-up-  Continue Entresto 24/26 mg BID   Stop Imdur  CORE April  27 th with labs prior        Kayla STALLWORTH, CNP  CORE Clinic                          No

## 2023-03-16 NOTE — NURSING NOTE
Labs: Patient was given results of the laboratory testing obtained today. Patient demonstrated understanding of this information and agreed to call with further questions or concerns.     Med Reconcile: Reviewed and verified all current medications with the patient. The updated medication list was printed and given to the patient.    Return Appointment: Patient given instructions regarding scheduling next clinic visit. Patient demonstrated understanding of this information and agreed to call with further questions or concerns. CORE in 6 weeks.    Medication Change: Patient was educated regarding prescribed medication change, including discussion of the indication, administration, side effects, and when to report to MD or RN. Patient demonstrated understanding of this information and agreed to call with further questions or concerns. Stop taking imdur.    Patient stated he understood all health information given and agreed to call with further questions or concerns.    Rosy Byrd RN

## 2023-03-17 DIAGNOSIS — F33.0 MILD RECURRENT MAJOR DEPRESSION (H): ICD-10-CM

## 2023-03-20 DIAGNOSIS — K21.00 GASTROESOPHAGEAL REFLUX DISEASE WITH ESOPHAGITIS: ICD-10-CM

## 2023-03-20 RX ORDER — VENLAFAXINE HYDROCHLORIDE 150 MG/1
150 CAPSULE, EXTENDED RELEASE ORAL DAILY
Qty: 90 CAPSULE | Refills: 0 | Status: SHIPPED | OUTPATIENT
Start: 2023-03-20 | End: 2023-04-17 | Stop reason: DRUGHIGH

## 2023-03-21 DIAGNOSIS — I10 ESSENTIAL HYPERTENSION WITH GOAL BLOOD PRESSURE LESS THAN 140/90: ICD-10-CM

## 2023-03-22 RX ORDER — CARVEDILOL 25 MG/1
TABLET ORAL
Qty: 180 TABLET | Refills: 3 | Status: SHIPPED | OUTPATIENT
Start: 2023-03-22 | End: 2024-03-28

## 2023-03-23 DIAGNOSIS — I50.22 CHRONIC SYSTOLIC HEART FAILURE (H): ICD-10-CM

## 2023-03-29 ENCOUNTER — OFFICE VISIT (OUTPATIENT)
Dept: CARDIOLOGY | Facility: CLINIC | Age: 68
End: 2023-03-29
Payer: MEDICARE

## 2023-03-29 VITALS
DIASTOLIC BLOOD PRESSURE: 78 MMHG | OXYGEN SATURATION: 97 % | HEART RATE: 85 BPM | WEIGHT: 192 LBS | BODY MASS INDEX: 31.07 KG/M2 | SYSTOLIC BLOOD PRESSURE: 122 MMHG

## 2023-03-29 DIAGNOSIS — I25.9 CHRONIC ISCHEMIC HEART DISEASE: Primary | ICD-10-CM

## 2023-03-29 DIAGNOSIS — I25.118 CORONARY ARTERY DISEASE OF NATIVE ARTERY OF NATIVE HEART WITH STABLE ANGINA PECTORIS (H): ICD-10-CM

## 2023-03-29 DIAGNOSIS — I25.5 ISCHEMIC CARDIOMYOPATHY: ICD-10-CM

## 2023-03-29 DIAGNOSIS — I10 ESSENTIAL HYPERTENSION WITH GOAL BLOOD PRESSURE LESS THAN 140/90: ICD-10-CM

## 2023-03-29 DIAGNOSIS — I10 HYPERTENSION GOAL BP (BLOOD PRESSURE) < 140/90: ICD-10-CM

## 2023-03-29 DIAGNOSIS — E11.9 DIABETES MELLITUS TYPE 2, NONINSULIN DEPENDENT (H): ICD-10-CM

## 2023-03-29 DIAGNOSIS — I50.22 CHRONIC SYSTOLIC HEART FAILURE (H): ICD-10-CM

## 2023-03-29 PROCEDURE — 99214 OFFICE O/P EST MOD 30 MIN: CPT | Performed by: STUDENT IN AN ORGANIZED HEALTH CARE EDUCATION/TRAINING PROGRAM

## 2023-03-29 RX ORDER — SPIRONOLACTONE 25 MG/1
25 TABLET ORAL DAILY
Qty: 90 TABLET | Refills: 1 | OUTPATIENT
Start: 2023-03-29

## 2023-03-29 NOTE — NURSING NOTE
Santiago Hylton's goals for this visit include:   Chief Complaint   Patient presents with     Follow Up     3 month follow up       He requests these members of his care team be copied on today's visit information: yes     PCP: Lurdes Roger    Referring Provider:  No referring provider defined for this encounter.    /78 (BP Location: Left arm, Patient Position: Chair, Cuff Size: Adult Regular)   Pulse 85   Wt 87.1 kg (192 lb)   SpO2 97%   BMI 31.07 kg/m      Do you need any medication refills at today's visit? No       LATOYA Arcos   Cardiology Team  [facility]

## 2023-03-29 NOTE — PATIENT INSTRUCTIONS
Take your medicines every day, as directed     Changes made today:  No changes to medications   Ask your primary care team for foot x-rays    Cardiology Care Coordinators:      Sarah Faustin, RN  Monika Solis, RN     Linda Mota, HERNAN     Cardiology Rooming staff:  ASHELY Golden    Phone  [phone]      Fax [phone]    To Contact us     During Business Hours:  [phone]     If you are needing refills please contact your pharmacy.     For urgent after hour care please call the [facility] at [phone] or the [facility] at [phone] and ask to speak to the cardiologist on call.            HOW TO CHECK YOUR BLOOD PRESSURE AT HOME:     Avoid eating, smoking, and exercising for at least 30 minutes before taking a reading.     Be sure you have taken your BP medication at least 2-3 hours before you check it.      Sit quietly for 10 minutes before a reading.      Sit in a chair with your feet flat on the floor. Rest your  arm on a table so that the arm cuff is at the same level as your heart.     Remain still during the reading.  Record your blood pressure and pulse in a log and bring to your next appointment.       Use [employer] allows you to communicate directly with your heart team through secure messaging.  [employer] can be accessed any time on your phone, computer, or tablet.  If you need assistance, we'd be happy to help!             Keep your Heart Appointments:     Follow up with me in six months

## 2023-03-30 ENCOUNTER — TELEPHONE (OUTPATIENT)
Dept: CARDIOLOGY | Facility: CLINIC | Age: 68
End: 2023-03-30
Payer: COMMERCIAL

## 2023-03-30 NOTE — TELEPHONE ENCOUNTER
Patient Contacted for the patient to call back and schedule the following:    Appointment type: return cardiology  Provider:   Return date: 09/29/2023  Specialty phone number: [phone]  Additional appointment(s) needed: none  Additonal Notes: Return in about 6 months (around 9/29/2023 03/30 Called patient, 1st attempt. Patient declined scheduling at this time and stated that he will call back to schedule appointment. Provided patient with [phone] via [employer] message for scheduling when he would like to do so.       Gabriella lopez Procedure   Cardiology, Nephrology, Rheumatology, GI, Pulmonology, Infectious Disease Specialties   [facility]

## 2023-04-06 ENCOUNTER — MYC REFILL (OUTPATIENT)
Dept: FAMILY MEDICINE | Facility: CLINIC | Age: 68
End: 2023-04-06
Payer: COMMERCIAL

## 2023-04-06 DIAGNOSIS — G60.9 IDIOPATHIC PERIPHERAL NEUROPATHY: ICD-10-CM

## 2023-04-06 DIAGNOSIS — G89.4 CHRONIC PAIN SYNDROME: ICD-10-CM

## 2023-04-07 RX ORDER — OXYCODONE HYDROCHLORIDE 5 MG/1
5 TABLET ORAL 2 TIMES DAILY PRN
Qty: 60 TABLET | Refills: 0 | Status: SHIPPED | OUTPATIENT
Start: 2023-04-07 | End: 2023-05-04

## 2023-04-10 DIAGNOSIS — I50.22 CHRONIC SYSTOLIC HEART FAILURE (H): Primary | ICD-10-CM

## 2023-04-17 ENCOUNTER — OFFICE VISIT (OUTPATIENT)
Dept: FAMILY MEDICINE | Facility: CLINIC | Age: 68
End: 2023-04-17
Payer: MEDICARE

## 2023-04-17 VITALS
WEIGHT: 187 LBS | TEMPERATURE: 98 F | RESPIRATION RATE: 16 BRPM | HEART RATE: 77 BPM | BODY MASS INDEX: 30.05 KG/M2 | DIASTOLIC BLOOD PRESSURE: 73 MMHG | OXYGEN SATURATION: 91 % | HEIGHT: 66 IN | SYSTOLIC BLOOD PRESSURE: 107 MMHG

## 2023-04-17 DIAGNOSIS — E11.42 DIABETIC POLYNEUROPATHY ASSOCIATED WITH TYPE 2 DIABETES MELLITUS (H): Primary | ICD-10-CM

## 2023-04-17 DIAGNOSIS — I73.9 CLAUDICATION OF BOTH LOWER EXTREMITIES (H): ICD-10-CM

## 2023-04-17 DIAGNOSIS — B18.2 CHRONIC HEPATITIS C WITHOUT HEPATIC COMA (H): ICD-10-CM

## 2023-04-17 DIAGNOSIS — E78.5 HYPERLIPIDEMIA LDL GOAL <100: ICD-10-CM

## 2023-04-17 DIAGNOSIS — N18.31 STAGE 3A CHRONIC KIDNEY DISEASE (H): ICD-10-CM

## 2023-04-17 DIAGNOSIS — E66.01 MORBID OBESITY (H): ICD-10-CM

## 2023-04-17 DIAGNOSIS — F11.90 CHRONIC, CONTINUOUS USE OF OPIOIDS: ICD-10-CM

## 2023-04-17 DIAGNOSIS — F33.0 MAJOR DEPRESSIVE DISORDER, RECURRENT EPISODE, MILD (H): ICD-10-CM

## 2023-04-17 PROCEDURE — 36415 COLL VENOUS BLD VENIPUNCTURE: CPT | Performed by: NURSE PRACTITIONER

## 2023-04-17 PROCEDURE — 99214 OFFICE O/P EST MOD 30 MIN: CPT | Performed by: NURSE PRACTITIONER

## 2023-04-17 PROCEDURE — 80061 LIPID PANEL: CPT | Performed by: NURSE PRACTITIONER

## 2023-04-17 RX ORDER — GABAPENTIN 300 MG/1
300 CAPSULE ORAL 3 TIMES DAILY
Qty: 270 CAPSULE | Refills: 1 | Status: SHIPPED | OUTPATIENT
Start: 2023-04-17 | End: 2023-12-05

## 2023-04-17 RX ORDER — VENLAFAXINE HYDROCHLORIDE 75 MG/1
75 CAPSULE, EXTENDED RELEASE ORAL DAILY
Qty: 90 CAPSULE | Refills: 1 | Status: SHIPPED | OUTPATIENT
Start: 2023-04-17 | End: 2023-09-29

## 2023-04-17 RX ORDER — TRAZODONE HYDROCHLORIDE 100 MG/1
100 TABLET ORAL AT BEDTIME
Qty: 30 TABLET | Refills: 1 | Status: SHIPPED | OUTPATIENT
Start: 2023-04-17 | End: 2023-06-28

## 2023-04-17 ASSESSMENT — PATIENT HEALTH QUESTIONNAIRE - PHQ9
SUM OF ALL RESPONSES TO PHQ QUESTIONS 1-9: 1
10. IF YOU CHECKED OFF ANY PROBLEMS, HOW DIFFICULT HAVE THESE PROBLEMS MADE IT FOR YOU TO DO YOUR WORK, TAKE CARE OF THINGS AT HOME, OR GET ALONG WITH OTHER PEOPLE: SOMEWHAT DIFFICULT
SUM OF ALL RESPONSES TO PHQ QUESTIONS 1-9: 1

## 2023-04-17 ASSESSMENT — ANXIETY QUESTIONNAIRES
GAD7 TOTAL SCORE: 0
4. TROUBLE RELAXING: NOT AT ALL
8. IF YOU CHECKED OFF ANY PROBLEMS, HOW DIFFICULT HAVE THESE MADE IT FOR YOU TO DO YOUR WORK, TAKE CARE OF THINGS AT HOME, OR GET ALONG WITH OTHER PEOPLE?: NOT DIFFICULT AT ALL
5. BEING SO RESTLESS THAT IT IS HARD TO SIT STILL: NOT AT ALL
7. FEELING AFRAID AS IF SOMETHING AWFUL MIGHT HAPPEN: NOT AT ALL
1. FEELING NERVOUS, ANXIOUS, OR ON EDGE: NOT AT ALL
7. FEELING AFRAID AS IF SOMETHING AWFUL MIGHT HAPPEN: NOT AT ALL
6. BECOMING EASILY ANNOYED OR IRRITABLE: NOT AT ALL
GAD7 TOTAL SCORE: 0
3. WORRYING TOO MUCH ABOUT DIFFERENT THINGS: NOT AT ALL
GAD7 TOTAL SCORE: 0
2. NOT BEING ABLE TO STOP OR CONTROL WORRYING: NOT AT ALL
IF YOU CHECKED OFF ANY PROBLEMS ON THIS QUESTIONNAIRE, HOW DIFFICULT HAVE THESE PROBLEMS MADE IT FOR YOU TO DO YOUR WORK, TAKE CARE OF THINGS AT HOME, OR GET ALONG WITH OTHER PEOPLE: NOT DIFFICULT AT ALL

## 2023-04-17 ASSESSMENT — PAIN SCALES - GENERAL: PAINLEVEL: WORST PAIN (10)

## 2023-04-17 NOTE — PATIENT INSTRUCTIONS
Decrease venlafaxine to 75 mg daily (new script sent to pharmacy)  Refilled gabapentin and trazodone. Remember not to take trazodone, gabapentin, oxycodone together       Patient Education    At [facility], we strive to deliver an exceptional experience to you, every time we see you. If you receive a survey, please complete it as we do value your feedback.  If you have MyChart, you can expect to receive results automatically within 24 hours of their completion.  Your provider will send a note interpreting your results as well.   If you do not have MyChart, you should receive your results in about a week by mail.    Your care team:                            Family Medicine Internal Medicine   MD Robb Teague MD Shantel Branch-Fleming, MD Srinivasa Vaka, MD Katya Belousova, PAMELVA HowardHillFEDERICA Slater CNP, MD Pediatrics   Tyler Morgan, MD Brenda Chew MD Amelia Massimini APRN CNP Kim Thein, APRN CNP Bethany Templen, MD Charanya Pasupathi, MD          Clinic hours: Monday - Thursday 7 am-6 pm; Fridays 7 am-5 pm.   Urgent care: Monday - Friday 10 am- 8 pm; Saturday and Sunday 9 am-5 pm.    Clinic: [phone]       [facility]: Monday - Thursday 8 am - 7 pm; Friday 8 am - 6 pm  [facility]: [phone]

## 2023-04-17 NOTE — PROGRESS NOTES
"  Assessment & Plan     Diabetic polyneuropathy associated with type 2 diabetes mellitus (H)  Refilled. Doing well.   - gabapentin (NEURONTIN) 300 MG capsule; Take 1 capsule (300 mg) by mouth 3 times daily    Major depressive disorder, recurrent episode, mild (H)  Decreasing venlafaxine to 75 mg as patient has been doing well since living with sister in [place]. Refilled trazodone which he uses very sparingly. Last rx from 2020. Understands not to combine trazodone with gabapentin or oxycodone.  - traZODone (DESYREL) 100 MG tablet; Take 1 tablet (100 mg) by mouth At Bedtime  - venlafaxine (EFFEXOR XR) 75 MG 24 hr capsule; Take 1 capsule (75 mg) by mouth daily    Hyperlipidemia LDL goal <100  Rechecking today  - Lipid panel reflex to direct LDL Fasting; Future  - Lipid panel reflex to direct LDL Fasting    F11.9 - Chronic, continuous use of opioids  On oxycodone BID.     Claudication of both lower extremities (H)  Had US and CTA which were normal. Follows with cardiology.    Morbid obesity (H)  Discussed diet/exercise.    Chronic hepatitis C without hepatic coma (H)  Previously treated.     Stage 3a chronic kidney disease (H)  Avoid NSAIDs. Monitor.          BMI:   Estimated body mass index is 30.65 kg/m  as calculated from the following:    Height as of this encounter: 1.664 m (5' 5.5\").    Weight as of this encounter: 84.8 kg (187 lb).   Weight management plan: Discussed healthy diet and exercise guidelines    See Patient Instructions    The benefits, risks and potential side effects were discussed in detail. Black box warnings discussed as relevant. All patient questions were answered. The patient was instructed to follow up immediately if any adverse reactions develop.    Return precautions discussed, including when to seek urgent/emergent care.    Patient verbalizes understanding and agrees with plan of care. Patient stable for discharge.      FEDERICA SHAH [facility] " RADHA Henderson is a 68 year old, presenting for the following health issues:  Recheck Medication and COPD        4/17/2023    10:46 AM   Additional Questions   Roomed by rosamaria pritchett   Accompanied by none         4/17/2023    10:46 AM   Patient Reported Additional Medications   Patient reports taking the following new medications none     History of Present Illness       COPD:  He presents for follow up of COPD.  Overall, COPD symptoms are better since last visit. He has same as usual fatigue or shortness of breath with exertion and same as usual shortness of breath at rest.  He rarely coughs and does not have change in sputum. No recent fever. He can walk less than 1 block without stopping to rest. He can not walk a flight of stairs without resting. The patient has had no ED, urgent care, or hospital admissions because of COPD since the last visit.     Mental Health Follow-up:  Patient presents to follow-up on Depression & Anxiety.Patient's depression since last visit has been:  Better  The patient is not having other symptoms associated with depression.  Patient's anxiety since last visit has been:  Better  The patient is not having other symptoms associated with anxiety.  Any significant life events: housing concerns, grief or loss and health concerns  Patient is not feeling anxious or having panic attacks.  Patient has no concerns about alcohol or drug use.    Heart Failure:  He presents for follow up of heart failure. He is not experiencing shortness of breath at night, with rest or with activity He is not experiencing any lower extremity edema.   He has orthopenea and is not coughing at night. Patient is checking weight daily. He has recently had a None. He has no side effects from medications.  He has had no other medical visits for heart failure since the last visit.    Hyperlipidemia:  He presents for follow up of hyperlipidemia.  He is taking medication to lower cholesterol. He is not having  "myalgia or other side effects to statin medications.    Hypertension: He presents for follow up of hypertension.  He does check blood pressure  regularly outside of the clinic. Outpatient blood pressures have not been over 140/90. He follows a low salt diet.     Vascular Disease:  He presents for follow up of vascular disease.  He is not taking daily aspirin.    He eats 0-1 servings of fruits and vegetables daily.He consumes 3 sweetened beverage(s) daily.He exercises with enough effort to increase his heart rate 20 to 29 minutes per day.  He exercises with enough effort to increase his heart rate 4 days per week.   He is taking medications regularly.    Today's PHQ-9         PHQ-9 Total Score: 1    PHQ-9 Q9 Thoughts of better off dead/self-harm past 2 weeks :   Not at all    How difficult have these problems made it for you to do your work, take care of things at home, or get along with other people: Somewhat difficult  Today's GENARO-7 Score: 0     No longer living with son and now living with sister. On wait list for housing. Thinks should be up next soon. Mental health doing much better. Would like to discuss decreasing venlafaxine back to 75 mg. Also needs refill of trazodone 100 mg. Takes 1 tablet prn. Last rx from 2020 and just now out. Uses sparingly.   On oxycodone for chronic pain. Understands not to take with trazodone.           Review of Systems   Constitutional, HEENT, cardiovascular, pulmonary, GI, , musculoskeletal, neuro, skin, endocrine and psych systems are negative, except as otherwise noted.      Objective    /73   Pulse 77   Temp 98  F (36.7  C) (Tympanic)   Resp 16   Ht 1.664 m (5' 5.5\")   Wt 84.8 kg (187 lb)   SpO2 91%   BMI 30.65 kg/m    Body mass index is 30.65 kg/m .  Physical Exam   GENERAL: healthy, alert and no distress  EYES: Eyes grossly normal to inspection, PERRL and conjunctivae and sclerae normal  HENT: ear canals and TM's normal, nose and mouth without ulcers or " lesions  NECK: no adenopathy, no asymmetry, masses, or scars and thyroid normal to palpation  RESP: lungs clear to auscultation - no rales, rhonchi or wheezes  CV: regular rate and rhythm, normal S1 S2, no S3 or S4, no murmur, click or rub, no peripheral edema and peripheral pulses strong  MS: no gross musculoskeletal defects noted, no edema  PSYCH: mentation appears normal, affect normal/bright    No results found for this or any previous visit (from the past 24 hour(s)).

## 2023-04-18 LAB
CHOLEST SERPL-MCNC: 191 MG/DL
FASTING STATUS PATIENT QL REPORTED: YES
HDLC SERPL-MCNC: 62 MG/DL
LDLC SERPL CALC-MCNC: 111 MG/DL
NONHDLC SERPL-MCNC: 129 MG/DL
TRIGL SERPL-MCNC: 88 MG/DL

## 2023-04-26 ENCOUNTER — LAB (OUTPATIENT)
Dept: LAB | Facility: CLINIC | Age: 68
End: 2023-04-26
Payer: MEDICARE

## 2023-04-26 ENCOUNTER — OFFICE VISIT (OUTPATIENT)
Dept: CARDIOLOGY | Facility: CLINIC | Age: 68
End: 2023-04-26
Payer: MEDICARE

## 2023-04-26 VITALS
DIASTOLIC BLOOD PRESSURE: 89 MMHG | HEART RATE: 72 BPM | BODY MASS INDEX: 32.61 KG/M2 | WEIGHT: 199 LBS | OXYGEN SATURATION: 95 % | SYSTOLIC BLOOD PRESSURE: 149 MMHG

## 2023-04-26 DIAGNOSIS — Z63.4 SPOUSE DECEASED: ICD-10-CM

## 2023-04-26 DIAGNOSIS — I25.9 CHRONIC ISCHEMIC HEART DISEASE: ICD-10-CM

## 2023-04-26 DIAGNOSIS — E11.9 DIABETES MELLITUS TYPE 2, NONINSULIN DEPENDENT (H): ICD-10-CM

## 2023-04-26 DIAGNOSIS — I50.22 CHRONIC SYSTOLIC HEART FAILURE (H): Primary | ICD-10-CM

## 2023-04-26 DIAGNOSIS — M79.673 PAIN OF FOOT, UNSPECIFIED LATERALITY: ICD-10-CM

## 2023-04-26 DIAGNOSIS — I10 ESSENTIAL HYPERTENSION WITH GOAL BLOOD PRESSURE LESS THAN 140/90: ICD-10-CM

## 2023-04-26 DIAGNOSIS — I50.22 CHRONIC SYSTOLIC HEART FAILURE (H): ICD-10-CM

## 2023-04-26 LAB
ANION GAP SERPL CALCULATED.3IONS-SCNC: 7 MMOL/L (ref 3–14)
BUN SERPL-MCNC: 18 MG/DL (ref 7–30)
CALCIUM SERPL-MCNC: 8.7 MG/DL (ref 8.5–10.1)
CHLORIDE BLD-SCNC: 109 MMOL/L (ref 94–109)
CO2 SERPL-SCNC: 27 MMOL/L (ref 20–32)
CREAT SERPL-MCNC: 1.03 MG/DL (ref 0.66–1.25)
GFR SERPL CREATININE-BSD FRML MDRD: 80 ML/MIN/1.73M2
GLUCOSE BLD-MCNC: 132 MG/DL (ref 70–99)
POTASSIUM BLD-SCNC: 4 MMOL/L (ref 3.4–5.3)
SODIUM SERPL-SCNC: 143 MMOL/L (ref 133–144)

## 2023-04-26 PROCEDURE — 80048 BASIC METABOLIC PNL TOTAL CA: CPT

## 2023-04-26 PROCEDURE — 36415 COLL VENOUS BLD VENIPUNCTURE: CPT

## 2023-04-26 PROCEDURE — 99213 OFFICE O/P EST LOW 20 MIN: CPT | Performed by: NURSE PRACTITIONER

## 2023-04-26 SDOH — SOCIAL STABILITY - SOCIAL INSECURITY: DISSAPEARANCE AND DEATH OF FAMILY MEMBER: Z63.4

## 2023-04-26 NOTE — PATIENT INSTRUCTIONS
Take your medicines every day, as directed    Changes made today:  none     Monitor Your Weight and Symptoms    Contact us if you:    Gain 2 pounds in one day or 5 pounds in one week  Feel more short of breath  Notice more leg swelling  Feel lightheadeded   Change your lifestyle    Limit Salt or Sodium:  2000 mg  Limit Fluids:  2000 mL or approximately 64 ounces  Eat a Heart Healthy Diet  Low in saturated fats  Stay Active:  Aim to move at least 150 minutes every  week         To Contact us    During Business Hours:  [phone], option # 1      After hours, weekends or holidays:   [phone], Option #4  Ask to speak to the On-Call Cardiologist. Inform them you are a CORE/heart failure patient at the [facility].     Use [employer] allows you to communicate directly with your heart team through secure messaging.  [employer] can be accessed any time on your phone, computer, or tablet.  If you need assistance, we'd be happy to help!         Keep your Heart Appointments:    CORE in 4 months     Please consider attending our virtual support group which is held monthly. Please reach out to Ezequiel at [phone] for more information if you are interested in attending.       2023 dates:    Monday, April 3rd, 1-2pm (Topic: Remote monitoring)  Monday, May 1st, 1-2pm (Topic: CORE clinic)  Monday, June 5th, 1-2pm  Monday, July 3rd, 1-2pm  Monday, August 7th, 1-2pm  Monday, September 11th, 1-2pm  Monday, October 2nd, 1-2pm  Monday, November 6th, 1-2pm  Monday, December 4th, 1-2pm

## 2023-04-26 NOTE — PROGRESS NOTES
"  HPI: Santiago is a 68 year old Black or  male with a past medical history  Ischemic cardiomyopathy with prior coronary angiography and intervention (status post PCI to LAD in 1995 for ACS), Hypertension, Dyslipidemia, Type II diabetes mellitus.    He has less stress and that has helped. His feet pain is pretty bad. \" no one is doing anything about it.\"  BP is higher in clinic due to pain. At home 118-127 systolic.   He has been breathing better. Takes the 20 mg of Lasix. He has less SOB and CANO. - he feels stronger and better he says. He says the weight 192 lbs .  He has no swelling in the legs/feet.  He says he has been taking 24/26 mg BID of Entresto and not the 49/51 mg - due to low BP's and being symptomatic.    Denies weight gain, chest pain, palpitations, lightheadedness, dizziness, near syncopal/syncopal episodes. Santiago has been following salt and fluid restrictions.      PAST MEDICAL HISTORY:  Past Medical History:   Diagnosis Date     CAD (coronary artery disease) 8/12/2012    [place] [facility] [place]      CHF (congestive heart failure) (H) 8/3/2012     Chronic hepatitis C (H) 11/21/2014     CKD (chronic kidney disease) stage 2, GFR 60-89 ml/min 12/3/2012     COPD (chronic obstructive pulmonary disease) (H) 11/21/2014     GERD (gastroesophageal reflux disease) 8/12/2012     Gout 8/12/2012     History of colonic polyps 9/30/2008     Hyperlipidemia LDL goal <100 8/3/2012     Hypertension goal BP (blood pressure) < 140/90 8/3/2012     Mild persistent asthma 5/29/2013    Patient does not have COPD      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     Nonsenile cataract      Tobacco abuse 9/13/2013     Type 2 diabetes, HbA1C goal < 8% (H) 8/3/2012       FAMILY HISTORY:  Family History   Problem Relation Age of Onset     Heart Disease Maternal Grandmother      Hypertension Maternal Grandmother      Hypertension Father      Hypertension Mother      Hypertension Sister      Thyroid " Disease Sister      Cancer Brother      Diabetes Brother      Hypertension Brother      Hypertension Maternal Aunt      Cancer Maternal Uncle      Hypertension Maternal Uncle      Hypertension Paternal Aunt      Hypertension Paternal Uncle      Hypertension Maternal Grandfather      Hypertension Paternal Grandmother      Hypertension Paternal Grandfather      Cerebrovascular Disease No family hx of      Glaucoma No family hx of      Macular Degeneration No family hx of        SOCIAL HISTORY:  Social History     Tobacco Use     Smoking status: Former     Packs/day: 0.25     Years: 15.00     Pack years: 3.75     Types: Cigarettes     Quit date: 2016     Years since quittin.5     Smokeless tobacco: Never     Tobacco comments:     3-4 a day   Substance Use Topics     Alcohol use: Yes     Comment: weekend beer     Drug use: No           CURRENT MEDICATIONS:  Current Outpatient Medications   Medication Sig Dispense Refill     albuterol (PROAIR HFA/PROVENTIL HFA/VENTOLIN HFA) 108 (90 Base) MCG/ACT inhaler Inhale 2 puffs into the lungs every 4 hours as needed for shortness of breath / dyspnea 18 g 3     albuterol (PROVENTIL) (2.5 MG/3ML) 0.083% neb solution INHALE 3 ML BY NEBULIZATION METHOD EVERY 6 HOURS AS NEEDED FOR SHORTNESS OF BREATH 360 mL 1     allopurinol (ZYLOPRIM) 100 MG tablet TAKE 2 TABLETS BY MOUTH EVERY  tablet 3     apixaban ANTICOAGULANT (ELIQUIS) 5 MG tablet Take 1 tablet (5 mg) by mouth in the morning and 1 tablet (5 mg) in the evening. 180 tablet 1     budesonide-formoterol (SYMBICORT) 160-4.5 MCG/ACT Inhaler TAKE 2 PUFFS BY MOUTH TWICE A DAY 30.6 g 1     carvedilol (COREG) 25 MG tablet TAKE 1 TABLET BY MOUTH TWICE A DAY WITH MEALS 180 tablet 3     empagliflozin (JARDIANCE) 10 MG TABS tablet Take 1 tablet (10 mg) by mouth daily 90 tablet 3     furosemide (LASIX) 20 MG tablet Take 1 tablet (20 mg) by mouth daily 90 tablet 3     gabapentin (NEURONTIN) 300 MG capsule Take 1 capsule (300 mg)  by mouth 3 times daily 270 capsule 1     montelukast (SINGULAIR) 10 MG tablet TAKE 1 TABLET BY MOUTH EVERYDAY AT BEDTIME 90 tablet 3     nitroGLYcerin (NITROSTAT) 0.4 MG sublingual tablet Place 1 tablet (0.4 mg) under the tongue every 5 minutes as needed for chest pain 0.4 mg by Sublingual route every 5 (five) minutes as needed. 25 tablet 11     omeprazole (PRILOSEC) 20 MG DR capsule TAKE 1 CAPSULE BY MOUTH EVERY DAY 90 capsule 0     oxyCODONE (ROXICODONE) 5 MG tablet Take 1 tablet (5 mg) by mouth 2 times daily as needed for moderate to severe pain (up to 2 a day) 60 tablet 0     sacubitril-valsartan (ENTRESTO) 24-26 MG per tablet Take 1 tablet by mouth 2 times daily 60 tablet 0     spironolactone (ALDACTONE) 25 MG tablet Take 1 tablet (25 mg) by mouth daily 90 tablet 1     tamsulosin (FLOMAX) 0.4 MG capsule Take 1 capsule (0.4 mg) by mouth daily 90 capsule 3     traZODone (DESYREL) 100 MG tablet Take 1 tablet (100 mg) by mouth At Bedtime 30 tablet 1     venlafaxine (EFFEXOR XR) 75 MG 24 hr capsule Take 1 capsule (75 mg) by mouth daily 90 capsule 1     atorvastatin (LIPITOR) 40 MG tablet Take 1 tablet (40 mg) by mouth daily for 90 days 90 tablet 3     cetirizine (ZYRTEC) 10 MG tablet TAKE 1 TABLET BY MOUTH DAILY AT BEDTIME (Patient not taking: Reported on 4/26/2023) 90 tablet 1     enoxaparin ANTICOAGULANT (LOVENOX) 100 MG/ML syringe Inject 1 mL (100 mg) Subcutaneous in the morning and 1 mL (100 mg) in the evening. Until INR 2.3 or greater (Patient not taking: Reported on 4/26/2023) 20 mL 1       ROS:  Review Of Systems  Skin: negative  Eyes: negative  Ears/Nose/Throat: negative  Respiratory: No shortness of breath, dyspnea on exertion, cough, or hemoptysis  Cardiovascular: negative  Gastrointestinal: negative  Genitourinary: negative  Musculoskeletal: negative  Neurologic: negative  Psychiatric: negative  Hematologic/Lymphatic/Immunologic: negative  Endocrine: negative      EXAM:  BP (!) 149/89 (BP Location: Left  arm, Patient Position: Sitting, Cuff Size: Adult Regular)   Pulse 72   Wt 90.3 kg (199 lb)   SpO2 95%   BMI 32.61 kg/m    Home weight:  General: alert, articulate, and in no acute distress.  HEENT: normocephalic, atraumatic, anicteric sclera, EOMI, mucosa moist, no cyanosis.   Neck: neck supple.  No adenopathy, masses, or carotid bruits.  JVP not detected at 90 degrees  Heart: regular rhythm, normal S1/S2, no murmur, gallop, rub.  Precordium quiet with normal PMI.     Lungs: clear, no rales, ronchi, or wheezing.  No accessory muscle use, respirations unlabored.   Abdomen: soft, non-tender, bowel sounds present, no hepatomegaly  Extremities: no  pitting edema.   No cyanosis.   Neurological: alert and oriented x 3.  normal speech and affect, no gross motor deficits  Skin:  No ecchymoses, rashes, or clubbing.    Labs:  CBC RESULTS:  Lab Results   Component Value Date    WBC 9.3 10/14/2022    WBC 9.9 06/18/2021    RBC 4.79 10/14/2022    RBC 4.94 06/18/2021    HGB 14.8 10/14/2022    HGB 15.1 06/18/2021    HCT 45.7 10/14/2022    HCT 46.3 06/18/2021    MCV 95 10/14/2022    MCV 94 06/18/2021    MCH 30.9 10/14/2022    MCH 30.6 06/18/2021    MCHC 32.4 10/14/2022    MCHC 32.6 06/18/2021    RDW 13.5 10/14/2022    RDW 12.8 06/18/2021     10/14/2022     06/18/2021       CMP RESULTS:  Lab Results   Component Value Date     04/26/2023     06/18/2021    POTASSIUM 4.0 04/26/2023    POTASSIUM 4.3 06/18/2021    CHLORIDE 109 04/26/2023    CHLORIDE 102 06/18/2021    CO2 31 03/14/2023    CO2 33 (H) 06/18/2021    ANIONGAP 3 03/14/2023    ANIONGAP 4 06/18/2021     (H) 03/14/2023    GLC 94 06/18/2021    BUN 20 03/14/2023    BUN 24 06/18/2021    CR 1.19 03/14/2023    CR 1.38 (H) 06/18/2021    GFRESTIMATED 67 03/14/2023    GFRESTIMATED 53 (L) 06/18/2021    GFRESTBLACK 62 06/18/2021    FELICITY 9.5 03/14/2023    FELICITY 9.0 06/18/2021    BILITOTAL 0.5 09/11/2018    ALBUMIN 3.9 09/11/2018    ALKPHOS 86 09/11/2018     ALT 36 07/21/2022    ALT 30 06/18/2021    AST 29 09/11/2018        INR RESULTS:  Lab Results   Component Value Date    INR 1.12 10/06/2014       No components found for: CK  Lab Results   Component Value Date    MAG 1.9 01/18/2010     Lab Results   Component Value Date    NTBNP 432 08/26/2022    NTBNP 544 (H) 06/18/2021     @BRIEFLABR (dig)@    Most recent echocardiogram:  No results found for this or any previous visit (from the past 8760 hour(s)).      Assessment and Plan:    In summary,Santiago  is a  68 year old male who is here for a CORE visit. He is euvolemic today.Will keep the Entresto dose as is due to low BP with higher doses and patient had symptoms with it. Labs reviewed.     1.  Chronic systolic heart failure secondary to ICM.  Stage C  NYHA Class II  ACEi/ARB/ARNI/ -stay on Entresto 24/26 mg for now- hypotensive with higher doses  BB: yes- max dose   Aldosterone antagonist: Spironolactone 25 mg   SCD prophylaxis: does not meet criteria for implant  Fluid status: euvolemic  Anticoagulation:   Antiplatelet:  ASA dose   Sleep apnea:  NSAID use:  Contraindicated.  Avoid use.  Remote Monitoring:  SGLT2- 10 mg - daily    2.  Other comordbid conditions:    Atypical anginal chest pain- not present today - Imdur has helped   Coronary artery disease with [place] [facility] [place]   Ischemic cardiomyopathy (ACC/AHA stage C, NYHA III)  Hypertension- start Entresto stop Losartan  Dyslipidemia  COPD   Type II diabetes mellitus, managed only with conservative measures      3.  Follow-up-  CORE in 4 months         Kayla STALLWORTH, CNP  [facility]

## 2023-04-26 NOTE — PROGRESS NOTES
Santiago Hylton's goals for this visit include:     He requests these members of his care team be copied on today's visit information: PCP    PCP: Lurdes Roger    Referring Provider:  No referring provider defined for this encounter.    BP (!) 149/89 (BP Location: Left arm, Patient Position: Sitting, Cuff Size: Adult Regular)   Pulse 72   Wt 90.3 kg (199 lb)   SpO2 95%   BMI 32.61 kg/m      Do you need any medication refills at today's visit? No. Gabapentin is waiting on that from his primary.    Baldo Glover, EMT  Clinic Support  [facility]    [phone]    Employed by [facility] [employer]

## 2023-05-03 ENCOUNTER — OFFICE VISIT (OUTPATIENT)
Dept: PODIATRY | Facility: CLINIC | Age: 68
End: 2023-05-03
Payer: COMMERCIAL

## 2023-05-03 VITALS — SYSTOLIC BLOOD PRESSURE: 136 MMHG | HEART RATE: 83 BPM | DIASTOLIC BLOOD PRESSURE: 90 MMHG

## 2023-05-03 DIAGNOSIS — L84 CALLUS: ICD-10-CM

## 2023-05-03 DIAGNOSIS — M20.42 HAMMER TOES OF BOTH FEET: ICD-10-CM

## 2023-05-03 DIAGNOSIS — M20.41 HAMMER TOES OF BOTH FEET: ICD-10-CM

## 2023-05-03 DIAGNOSIS — G60.9 IDIOPATHIC PERIPHERAL NEUROPATHY: Primary | ICD-10-CM

## 2023-05-03 DIAGNOSIS — B35.1 ONYCHOMYCOSIS: ICD-10-CM

## 2023-05-03 DIAGNOSIS — N18.31 STAGE 3A CHRONIC KIDNEY DISEASE (H): ICD-10-CM

## 2023-05-03 PROCEDURE — 11056 PARNG/CUTG B9 HYPRKR LES 2-4: CPT | Mod: Q9 | Performed by: PODIATRIST

## 2023-05-03 PROCEDURE — 11721 DEBRIDE NAIL 6 OR MORE: CPT | Mod: XS | Performed by: PODIATRIST

## 2023-05-03 PROCEDURE — 99203 OFFICE O/P NEW LOW 30 MIN: CPT | Mod: 25 | Performed by: PODIATRIST

## 2023-05-03 NOTE — PROGRESS NOTES
Subjective:    Pt is seen today in consult from Lurdes Palencia for bilateral foot pain right is greater than left.  Has been ongoing for years.  Points to the fourth toes.  Pain aggravated by activity and relieved by rest.  Describes as a burning pain.  It is gotten worse over the last 6 months.  Patient has peripheral neuropathy.  Denies past history of ulcers..  Denies erythema ecchymosis drainage or blistering.  Has CKD stage 3.   Primary care is above last seen 4/17/23.      ROS:  see above         Allergies   Allergen Reactions     No Known Drug Allergy        Current Outpatient Medications   Medication Sig Dispense Refill     albuterol (PROAIR HFA/PROVENTIL HFA/VENTOLIN HFA) 108 (90 Base) MCG/ACT inhaler Inhale 2 puffs into the lungs every 4 hours as needed for shortness of breath / dyspnea 18 g 3     albuterol (PROVENTIL) (2.5 MG/3ML) 0.083% neb solution INHALE 3 ML BY NEBULIZATION METHOD EVERY 6 HOURS AS NEEDED FOR SHORTNESS OF BREATH 360 mL 1     allopurinol (ZYLOPRIM) 100 MG tablet TAKE 2 TABLETS BY MOUTH EVERY  tablet 3     apixaban ANTICOAGULANT (ELIQUIS) 5 MG tablet Take 1 tablet (5 mg) by mouth in the morning and 1 tablet (5 mg) in the evening. 180 tablet 1     atorvastatin (LIPITOR) 40 MG tablet Take 1 tablet (40 mg) by mouth daily for 90 days 90 tablet 3     budesonide-formoterol (SYMBICORT) 160-4.5 MCG/ACT Inhaler TAKE 2 PUFFS BY MOUTH TWICE A DAY 30.6 g 1     carvedilol (COREG) 25 MG tablet TAKE 1 TABLET BY MOUTH TWICE A DAY WITH MEALS 180 tablet 3     cetirizine (ZYRTEC) 10 MG tablet TAKE 1 TABLET BY MOUTH DAILY AT BEDTIME (Patient not taking: Reported on 4/26/2023) 90 tablet 1     empagliflozin (JARDIANCE) 10 MG TABS tablet Take 1 tablet (10 mg) by mouth daily 90 tablet 3     enoxaparin ANTICOAGULANT (LOVENOX) 100 MG/ML syringe Inject 1 mL (100 mg) Subcutaneous in the morning and 1 mL (100 mg) in the evening. Until INR 2.3 or greater (Patient not taking: Reported on 4/26/2023) 20 mL 1      furosemide (LASIX) 20 MG tablet Take 1 tablet (20 mg) by mouth daily 90 tablet 3     gabapentin (NEURONTIN) 300 MG capsule Take 1 capsule (300 mg) by mouth 3 times daily 270 capsule 1     montelukast (SINGULAIR) 10 MG tablet TAKE 1 TABLET BY MOUTH EVERYDAY AT BEDTIME 90 tablet 3     nitroGLYcerin (NITROSTAT) 0.4 MG sublingual tablet Place 1 tablet (0.4 mg) under the tongue every 5 minutes as needed for chest pain 0.4 mg by Sublingual route every 5 (five) minutes as needed. 25 tablet 11     omeprazole (PRILOSEC) 20 MG DR capsule TAKE 1 CAPSULE BY MOUTH EVERY DAY 90 capsule 0     oxyCODONE (ROXICODONE) 5 MG tablet Take 1 tablet (5 mg) by mouth 2 times daily as needed for moderate to severe pain (up to 2 a day) 60 tablet 0     sacubitril-valsartan (ENTRESTO) 24-26 MG per tablet Take 1 tablet by mouth 2 times daily 60 tablet 0     spironolactone (ALDACTONE) 25 MG tablet Take 1 tablet (25 mg) by mouth daily 90 tablet 1     tamsulosin (FLOMAX) 0.4 MG capsule Take 1 capsule (0.4 mg) by mouth daily 90 capsule 3     traZODone (DESYREL) 100 MG tablet Take 1 tablet (100 mg) by mouth At Bedtime 30 tablet 1     venlafaxine (EFFEXOR XR) 75 MG 24 hr capsule Take 1 capsule (75 mg) by mouth daily 90 capsule 1       Patient Active Problem List   Diagnosis     Hypertension goal BP (blood pressure) < 140/90     Hyperlipidemia LDL goal <100     CHF (congestive heart failure) (H)     Mild recurrent major depression (H)     Gout     GERD (gastroesophageal reflux disease)     Chronic rhinitis     History of colonic polyps     Advanced directives, counseling/discussion     Chronic hepatitis C (H)     Coronary artery disease of native artery of native heart with stable angina pectoris (H)     Chronic obstructive airway disease with asthma (H)     Obesity (BMI 35.0-39.9) with comorbidity (H)     Chronic pain syndrome     CKD (chronic kidney disease) stage 3, GFR 30-59 ml/min (H)     Internal hemorrhoids     Chronic ischemic heart disease      Claudication of both lower extremities (H)     Rectal bleeding     Idiopathic peripheral neuropathy     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 (H)     Grief     F11.9 - Chronic, continuous use of opioids       Past Medical History:   Diagnosis Date     CAD (coronary artery disease) 8/12/2012    S/P MI and stenting 2001, 2005 at [facility] [place]      CHF (congestive heart failure) (H) 8/3/2012     Chronic hepatitis C (H) 11/21/2014     CKD (chronic kidney disease) stage 2, GFR 60-89 ml/min 12/3/2012     COPD (chronic obstructive pulmonary disease) (H) 11/21/2014     GERD (gastroesophageal reflux disease) 8/12/2012     Gout 8/12/2012     History of colonic polyps 9/30/2008     Hyperlipidemia LDL goal <100 8/3/2012     Hypertension goal BP (blood pressure) < 140/90 8/3/2012     Mild persistent asthma 5/29/2013    Patient does not have COPD      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     Nonsenile cataract      Tobacco abuse 9/13/2013     Type 2 diabetes, HbA1C goal < 8% (H) 8/3/2012       Past Surgical History:   Procedure Laterality Date     CARDIAC SURGERY      stent     CATARACT IOL, RT/LT       COLONOSCOPY  9/18/2012    Procedure: COLONOSCOPY;  COLONOSCOPY, SCREEN;  Surgeon: Cl Johnson MD;  Location:  OR     ORTHOPEDIC SURGERY      ankle     PHACOEMULSIFICATION WITH STANDARD INTRAOCULAR LENS IMPLANT Right 1/28/2016    Procedure: PHACOEMULSIFICATION WITH STANDARD INTRAOCULAR LENS IMPLANT;  Surgeon: Fly Merrill MD;  Location: [facility] OR     PHACOEMULSIFICATION WITH STANDARD INTRAOCULAR LENS IMPLANT Left 2/11/2016    Procedure: PHACOEMULSIFICATION WITH STANDARD INTRAOCULAR LENS IMPLANT;  Surgeon: Fly Merrill MD;  Location: [facility] OR       Family History   Problem Relation Age of Onset     Heart Disease Maternal Grandmother      Hypertension Maternal Grandmother      Hypertension Father      Hypertension Mother      Hypertension Sister      Thyroid Disease Sister      Cancer Brother       Diabetes Brother      Hypertension Brother      Hypertension Maternal Aunt      Cancer Maternal Uncle      Hypertension Maternal Uncle      Hypertension Paternal Aunt      Hypertension Paternal Uncle      Hypertension Maternal Grandfather      Hypertension Paternal Grandmother      Hypertension Paternal Grandfather      Cerebrovascular Disease No family hx of      Glaucoma No family hx of      Macular Degeneration No family hx of        Social History     Tobacco Use     Smoking status: Former     Packs/day: 0.25     Years: 15.00     Pack years: 3.75     Types: Cigarettes     Quit date: 2016     Years since quittin.6     Smokeless tobacco: Never     Tobacco comments:     3-4 a day   Vaping Use     Vaping status: Not on file   Substance Use Topics     Alcohol use: Yes     Comment: weekend beer         Exam:    Vitals: BP (!) 136/90   Pulse 83   BMI: There is no height or weight on file to calculate BMI.  Height: Data Unavailable    Constitutional/ general:  Pt is in no apparent distress, appears well-nourished.  Cooperative with history and physical exam.     Psych:  The patient answered questions appropriately.  Normal affect.  Seems to have reasonable expectations, in terms of treatment.     Lungs:  Non labored breathing, non labored speech. No cough.  No audible wheezing. Even, quiet breathing.       Vascular:  positive PT arteries.  DP 2/4.  CFT < 3 sec.   Ankle edema and varicosities noted.  No pedal hair growth.    Neuro:  Alert and oriented x 3.  Monofilament absent to ankles bilaterally.    Derm: skin thin, shiny, atrophic.  No erythema, ecchymosis, or cyanosis.      Musculoskeletal:    Lower extremity muscle strength is normal.  Patient is ambulatory without an assistive device or brace.  Both fourth toes hammered.  Normal arch.   MS 5/5 all compartments.  Normal ROM all fore foot and rearfoot joints.  All nails are thickened, elongated, discolored with subungual debris.  Patient has calluses  on bilateral fourth toes distal lateral with the right much worse than the left..  No masses or breakdown in the skin bilaterally.  No erythema or ecchymosis noted bilateral.     A/P  CKD stage 3  Peripheral neuropathy with loss of protective sensation  Onychomycosis  Calluses x 2  Bilateral fourth hammertoes    Mycotic nails manually debrided with a .  Calluses debrided with a fifteen blade.   Explained to patient how his hammered fourth toe deformities are causing pain.  Dispensed crest pads to offload this.  Explained he is lost the protective sensation in his feet.  He will watch these carefully every day for any signs of skin breakdown or infection.  He will make sure shoes are not too tight.  We will refer him to foot care nurse for further foot care.  RTC as needed.  Thank you for allowing me participate in the care of this patient.        Gary Vo, CLARICE, FACFAS

## 2023-05-03 NOTE — PATIENT INSTRUCTIONS
We wish you continued good healing. If you have any questions or concerns, please do not hesitate to contact us at  [phone]    [employer] (secure e-mail communication and access to your chart) to send a message or to make an appointment.    Please remember to call and schedule a follow up appointment if one was recommended at your earliest convenience.     PODIATRY CLINIC HOURS  TELEPHONE NUMBER    Dr. Gary ARBOLEDAPUSHA [facility]        Clinics:  Johnathan Concepcion  [facility], KATINA Dodson, POP Jain  Tuesday 1PM-6PM  [place]  Wednesday 745AM-330PM  [place]  Thursday/Friday 745AM-230PM  [place]   1st and 3rd Mondays  845-430 PM   [place] APPOINTMENTS  [phone]    [place] APPOINTMENTS  [phone]    [place] APPOINTMENTS  [phone]        If you need a medication refill, please contact us you may need lab work and/or a follow up visit prior to your refill (i.e. Antifungal medications).  If MRI needed please call Imaging at [phone]   HOW DO I GET MY KNEE SCOOTER? Knee scooters can be picked up at ANY Medical Supply stores with your knee scooter Prescription.  OR  Bring your signed prescription to an [facility] Medical Equipment showroom.  Call or bring in your Orthotics order to any Orthotics locations. Or call [phone]         [facility](out of pocket)........................[phone]    They travel to the following community/[facility].   Need to call [facility] to set up appointments.     Affordable Foot Care (in Home)    Ana Stark [phone]    The Foot Care Nurse    Kaylee Khoury RN, BSN, -[phone]    [facility]....[phone]    [facility]:    Negin Gupta.......[phone]    [place]................[phone]    [place]-.....[phone]    [place]........................[phone]    [facility].................[phone]      ** YOU MUST  "CALL FOR INFORMATION ON DAYS, TIMES AND COST OF SERVICE**      For up to date list of Foot Care Rn's. Visit the website    American Foot Care Nurses Association website    Afcna.org    Click on the \"Find a foot care provider\" Link      Makayla CohenRN,[facility] [phone] (Text or call) Has limited home visit.    Marge RodriguezRN,[facility] [phone]    Sisi [phone]    Jenniffer Candelaria RN,BSN,[facility] [phone]    Pauly Peralta [phone]    Claudine Taylor [phone]    Brenda Torres [phone]    Sandra [phone]    **THIS IS A PRIVATE PAY SERVICE- NOT BILLABLE TO MEDICARE OR INSURANCE** ROUTINE FOOT CARE (NAIL TRIMMING / CALLUSES)    Go to afcna.org (American Foot Care Nurses Association) and search for providers near you.  Otherwise, this is a list we have gathered of  recommended locations/providers in [place].    [facility]   [phone]   [facility]  [phone]  [email or web address]   [facility] [employer]  [phone]  [place] + 15 mile radius [facility]  [phone]  [email or web address]   [facility]   [place] [address]  [phone] Ezequiel Khalil DPM  [address] [place] [address] [phone]   [facility]  [phone] [address] [place] [address]  [facility]  Dr. Loc Benitez  [phone]  [facility]  [phone]  [place] Locations  (does not take BCBS) FYI:  *Some providers accept insurance while others are out of pocket. Please contact them for details*      "

## 2023-05-03 NOTE — LETTER
5/3/2023         RE: Santiago Hylton  [address]  [facility] [address]        Dear Colleague,    Thank you for referring your patient, Santiago Hylton, to the [facility]. Please see a copy of my visit note below.    Subjective:    Pt is seen today in consult from Lur[facility] Palencia for bilateral foot pain right is greater than left.  Has been ongoing for years.  Points to the fourth toes.  Pain aggravated by activity and relieved by rest.  Describes as a burning pain.  It is gotten worse over the last 6 months.  Patient has peripheral neuropathy.  Denies past history of ulcers..  Denies erythema ecchymosis drainage or blistering.  Has CKD stage 3.   Primary care is above last seen 4/17/23.      ROS:  see above         Allergies   Allergen Reactions     No Known Drug Allergy        Current Outpatient Medications   Medication Sig Dispense Refill     albuterol (PROAIR HFA/PROVENTIL HFA/VENTOLIN HFA) 108 (90 Base) MCG/ACT inhaler Inhale 2 puffs into the lungs every 4 hours as needed for shortness of breath / dyspnea 18 g 3     albuterol (PROVENTIL) (2.5 MG/3ML) 0.083% neb solution INHALE 3 ML BY NEBULIZATION METHOD EVERY 6 HOURS AS NEEDED FOR SHORTNESS OF BREATH 360 mL 1     allopurinol (ZYLOPRIM) 100 MG tablet TAKE 2 TABLETS BY MOUTH EVERY  tablet 3     apixaban ANTICOAGULANT (ELIQUIS) 5 MG tablet Take 1 tablet (5 mg) by mouth in the morning and 1 tablet (5 mg) in the evening. 180 tablet 1     atorvastatin (LIPITOR) 40 MG tablet Take 1 tablet (40 mg) by mouth daily for 90 days 90 tablet 3     budesonide-formoterol (SYMBICORT) 160-4.5 MCG/ACT Inhaler TAKE 2 PUFFS BY MOUTH TWICE A DAY 30.6 g 1     carvedilol (COREG) 25 MG tablet TAKE 1 TABLET BY MOUTH TWICE A DAY WITH MEALS 180 tablet 3     cetirizine (ZYRTEC) 10 MG tablet TAKE 1 TABLET BY MOUTH DAILY AT BEDTIME (Patient not taking: Reported on 4/26/2023) 90 tablet 1     empagliflozin (JARDIANCE) 10 MG TABS tablet Take 1 tablet (10 mg) by  mouth daily 90 tablet 3     enoxaparin ANTICOAGULANT (LOVENOX) 100 MG/ML syringe Inject 1 mL (100 mg) Subcutaneous in the morning and 1 mL (100 mg) in the evening. Until INR 2.3 or greater (Patient not taking: Reported on 4/26/2023) 20 mL 1     furosemide (LASIX) 20 MG tablet Take 1 tablet (20 mg) by mouth daily 90 tablet 3     gabapentin (NEURONTIN) 300 MG capsule Take 1 capsule (300 mg) by mouth 3 times daily 270 capsule 1     montelukast (SINGULAIR) 10 MG tablet TAKE 1 TABLET BY MOUTH EVERYDAY AT BEDTIME 90 tablet 3     nitroGLYcerin (NITROSTAT) 0.4 MG sublingual tablet Place 1 tablet (0.4 mg) under the tongue every 5 minutes as needed for chest pain 0.4 mg by Sublingual route every 5 (five) minutes as needed. 25 tablet 11     omeprazole (PRILOSEC) 20 MG DR capsule TAKE 1 CAPSULE BY MOUTH EVERY DAY 90 capsule 0     oxyCODONE (ROXICODONE) 5 MG tablet Take 1 tablet (5 mg) by mouth 2 times daily as needed for moderate to severe pain (up to 2 a day) 60 tablet 0     sacubitril-valsartan (ENTRESTO) 24-26 MG per tablet Take 1 tablet by mouth 2 times daily 60 tablet 0     spironolactone (ALDACTONE) 25 MG tablet Take 1 tablet (25 mg) by mouth daily 90 tablet 1     tamsulosin (FLOMAX) 0.4 MG capsule Take 1 capsule (0.4 mg) by mouth daily 90 capsule 3     traZODone (DESYREL) 100 MG tablet Take 1 tablet (100 mg) by mouth At Bedtime 30 tablet 1     venlafaxine (EFFEXOR XR) 75 MG 24 hr capsule Take 1 capsule (75 mg) by mouth daily 90 capsule 1       Patient Active Problem List   Diagnosis     Hypertension goal BP (blood pressure) < 140/90     Hyperlipidemia LDL goal <100     CHF (congestive heart failure) (H)     Mild recurrent major depression (H)     Gout     GERD (gastroesophageal reflux disease)     Chronic rhinitis     History of colonic polyps     Advanced directives, counseling/discussion     Chronic hepatitis C (H)     Coronary artery disease of native artery of native heart with stable angina pectoris (H)     Chronic  obstructive airway disease with asthma (H)     Obesity (BMI 35.0-39.9) with comorbidity (H)     Chronic pain syndrome     CKD (chronic kidney disease) stage 3, GFR 30-59 ml/min (H)     Internal hemorrhoids     Chronic ischemic heart disease     Claudication of both lower extremities (H)     Rectal bleeding     Idiopathic peripheral neuropathy     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 (H)     Grief     F11.9 - Chronic, continuous use of opioids       Past Medical History:   Diagnosis Date     CAD (coronary artery disease) 8/12/2012    S/P MI and stenting 2001, 2005 at [facility]      CHF (congestive heart failure) (H) 8/3/2012     Chronic hepatitis C (H) 11/21/2014     CKD (chronic kidney disease) stage 2, GFR 60-89 ml/min 12/3/2012     COPD (chronic obstructive pulmonary disease) (H) 11/21/2014     GERD (gastroesophageal reflux disease) 8/12/2012     Gout 8/12/2012     History of colonic polyps 9/30/2008     Hyperlipidemia LDL goal <100 8/3/2012     Hypertension goal BP (blood pressure) < 140/90 8/3/2012     Mild persistent asthma 5/29/2013    Patient does not have COPD      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     Nonsenile cataract      Tobacco abuse 9/13/2013     Type 2 diabetes, HbA1C goal < 8% (H) 8/3/2012       Past Surgical History:   Procedure Laterality Date     CARDIAC SURGERY      stent     CATARACT IOL, RT/LT       COLONOSCOPY  9/18/2012    Procedure: COLONOSCOPY;  COLONOSCOPY, SCREEN;  Surgeon: Cl Johnson MD;  Location:  OR     ORTHOPEDIC SURGERY      ankle     PHACOEMULSIFICATION WITH STANDARD INTRAOCULAR LENS IMPLANT Right 1/28/2016    Procedure: PHACOEMULSIFICATION WITH STANDARD INTRAOCULAR LENS IMPLANT;  Surgeon: Fly Merrill MD;  Location:  OR     PHACOEMULSIFICATION WITH STANDARD INTRAOCULAR LENS IMPLANT Left 2/11/2016    Procedure: PHACOEMULSIFICATION WITH STANDARD INTRAOCULAR LENS IMPLANT;  Surgeon: Fly Merrill MD;  Location:  OR        Family History   Problem Relation Age of Onset     Heart Disease Maternal Grandmother      Hypertension Maternal Grandmother      Hypertension Father      Hypertension Mother      Hypertension Sister      Thyroid Disease Sister      Cancer Brother      Diabetes Brother      Hypertension Brother      Hypertension Maternal Aunt      Cancer Maternal Uncle      Hypertension Maternal Uncle      Hypertension Paternal Aunt      Hypertension Paternal Uncle      Hypertension Maternal Grandfather      Hypertension Paternal Grandmother      Hypertension Paternal Grandfather      Cerebrovascular Disease No family hx of      Glaucoma No family hx of      Macular Degeneration No family hx of        Social History     Tobacco Use     Smoking status: Former     Packs/day: 0.25     Years: 15.00     Pack years: 3.75     Types: Cigarettes     Quit date: 2016     Years since quittin.6     Smokeless tobacco: Never     Tobacco comments:     3-4 a day   Vaping Use     Vaping status: Not on file   Substance Use Topics     Alcohol use: Yes     Comment: weekend beer         Exam:    Vitals: BP (!) 136/90   Pulse 83   BMI: There is no height or weight on file to calculate BMI.  Height: Data Unavailable    Constitutional/ general:  Pt is in no apparent distress, appears well-nourished.  Cooperative with history and physical exam.     Psych:  The patient answered questions appropriately.  Normal affect.  Seems to have reasonable expectations, in terms of treatment.     Lungs:  Non labored breathing, non labored speech. No cough.  No audible wheezing. Even, quiet breathing.       Vascular:  positive PT arteries.  DP 2/4.  CFT < 3 sec.   Ankle edema and varicosities noted.  No pedal hair growth.    Neuro:  Alert and oriented x 3.  Monofilament absent to ankles bilaterally.    Derm: skin thin, shiny, atrophic.  No erythema, ecchymosis, or cyanosis.      Musculoskeletal:    Lower extremity muscle strength is normal.  Patient is  ambulatory without an assistive device or brace.  Both fourth toes hammered.  Normal arch.   MS 5/5 all compartments.  Normal ROM all fore foot and rearfoot joints.  All nails are thickened, elongated, discolored with subungual debris.  Patient has calluses on bilateral fourth toes distal lateral with the right much worse than the left..  No masses or breakdown in the skin bilaterally.  No erythema or ecchymosis noted bilateral.     A/P  CKD stage 3  Peripheral neuropathy with loss of protective sensation  Onychomycosis  Calluses x 2  Bilateral fourth hammertoes    Mycotic nails manually debrided with a .  Calluses debrided with a fifteen blade.   Explained to patient how his hammered fourth toe deformities are causing pain.  Dispensed crest pads to offload this.  Explained he is lost the protective sensation in his feet.  He will watch these carefully every day for any signs of skin breakdown or infection.  He will make sure shoes are not too tight.  We will refer him to foot care nurse for further foot care.  RTC as needed.  Thank you for allowing me participate in the care of this patient.        Gary Vo DPM, FACFAS               Again, thank you for allowing me to participate in the care of your patient.        Sincerely,        Gary Vo DPM

## 2023-05-04 ENCOUNTER — MYC REFILL (OUTPATIENT)
Dept: FAMILY MEDICINE | Facility: CLINIC | Age: 68
End: 2023-05-04
Payer: COMMERCIAL

## 2023-05-04 DIAGNOSIS — G89.4 CHRONIC PAIN SYNDROME: ICD-10-CM

## 2023-05-04 DIAGNOSIS — G60.9 IDIOPATHIC PERIPHERAL NEUROPATHY: ICD-10-CM

## 2023-05-04 RX ORDER — OXYCODONE HYDROCHLORIDE 5 MG/1
5 TABLET ORAL 2 TIMES DAILY PRN
Qty: 60 TABLET | Refills: 0 | Status: SHIPPED | OUTPATIENT
Start: 2023-05-04 | End: 2023-06-04

## 2023-05-05 ENCOUNTER — ANCILLARY PROCEDURE (OUTPATIENT)
Dept: GENERAL RADIOLOGY | Facility: CLINIC | Age: 68
End: 2023-05-05
Attending: FAMILY MEDICINE
Payer: COMMERCIAL

## 2023-05-05 ENCOUNTER — OFFICE VISIT (OUTPATIENT)
Dept: FAMILY MEDICINE | Facility: CLINIC | Age: 68
End: 2023-05-05
Payer: COMMERCIAL

## 2023-05-05 VITALS
HEIGHT: 65 IN | OXYGEN SATURATION: 96 % | WEIGHT: 187.6 LBS | BODY MASS INDEX: 31.25 KG/M2 | HEART RATE: 82 BPM | TEMPERATURE: 97.9 F | DIASTOLIC BLOOD PRESSURE: 94 MMHG | SYSTOLIC BLOOD PRESSURE: 166 MMHG | RESPIRATION RATE: 15 BRPM
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
## 2023-05-05 DIAGNOSIS — I10 HYPERTENSION GOAL BP (BLOOD PRESSURE) < 140/90: ICD-10-CM

## 2023-05-05 DIAGNOSIS — E11.9 DIABETES MELLITUS TYPE 2, NONINSULIN DEPENDENT (H): ICD-10-CM

## 2023-05-05 PROCEDURE — 71101 X-RAY EXAM UNILAT RIBS/CHEST: CPT | Mod: TC | Performed by: RADIOLOGY

## 2023-05-05 PROCEDURE — 99214 OFFICE O/P EST MOD 30 MIN: CPT | Performed by: FAMILY MEDICINE

## 2023-05-05 PROCEDURE — 73030 X-RAY EXAM OF SHOULDER: CPT | Mod: TC | Performed by: RADIOLOGY

## 2023-05-05 RX ORDER — CYCLOBENZAPRINE HCL 10 MG
10 TABLET ORAL 3 TIMES DAILY PRN
Qty: 30 TABLET | Refills: 0 | Status: SHIPPED | OUTPATIENT
Start: 2023-05-05 | End: 2023-06-06

## 2023-05-05 ASSESSMENT — ANXIETY QUESTIONNAIRES
GAD7 TOTAL SCORE: 4
7. FEELING AFRAID AS IF SOMETHING AWFUL MIGHT HAPPEN: NOT AT ALL
IF YOU CHECKED OFF ANY PROBLEMS ON THIS QUESTIONNAIRE, HOW DIFFICULT HAVE THESE PROBLEMS MADE IT FOR YOU TO DO YOUR WORK, TAKE CARE OF THINGS AT HOME, OR GET ALONG WITH OTHER PEOPLE: SOMEWHAT DIFFICULT
GAD7 TOTAL SCORE: 4
6. BECOMING EASILY ANNOYED OR IRRITABLE: SEVERAL DAYS
1. FEELING NERVOUS, ANXIOUS, OR ON EDGE: SEVERAL DAYS
3. WORRYING TOO MUCH ABOUT DIFFERENT THINGS: NOT AT ALL
5. BEING SO RESTLESS THAT IT IS HARD TO SIT STILL: NOT AT ALL
8. IF YOU CHECKED OFF ANY PROBLEMS, HOW DIFFICULT HAVE THESE MADE IT FOR YOU TO DO YOUR WORK, TAKE CARE OF THINGS AT HOME, OR GET ALONG WITH OTHER PEOPLE?: SOMEWHAT DIFFICULT
7. FEELING AFRAID AS IF SOMETHING AWFUL MIGHT HAPPEN: NOT AT ALL
2. NOT BEING ABLE TO STOP OR CONTROL WORRYING: SEVERAL DAYS
4. TROUBLE RELAXING: SEVERAL DAYS

## 2023-05-05 ASSESSMENT — PAIN SCALES - GENERAL: PAINLEVEL: NO PAIN (0)

## 2023-05-05 NOTE — PATIENT INSTRUCTIONS
At [facility], we strive to deliver an exceptional experience to you, every time we see you. If you receive a survey, please complete it as we do value your feedback.  If you have MyChart, you can expect to receive results automatically within 24 hours of their completion.  Your provider will send a note interpreting your results as well.   If you do not have MyChart, you should receive your results in about a week by mail.    Your care team:                            Family Medicine Internal Medicine   MD Robb Teague MD Shantel Branch-Fleming, MD Srinivasa Vaka, MD Katya Belousova, PAFEDERICA Thakur CNP, MD ([place]   Tyler Morgan, MD Brenda Chew MD Amelia Massimini APRN DERIC Mcintosh APRN MD Osiris Page MD          Clinic hours: Monday - Thursday 7 am-6 pm; Fridays 7 am-5 pm.   Urgent care: Monday - Friday 10 am- 8 pm; Saturday and Sunday 9 am-5 pm.    Clinic: [phone]       [facility]: Monday - Thursday 8 am - 7 pm; Friday 8 am - 6 pm  [facility]: [phone]

## 2023-05-05 NOTE — PROGRESS NOTES
Assessment & Plan     Fall, initial encounter  -Tripped by cat and fell down on his right shoulder yesterday night.  -He has been having discomfort in his right shoulder, right side of his chest since yesterday.  -Range of movements good but painful.  -History of CAD on blood thinners.  -No episodes of blurry vision/confusion/headache/pre syncope/syncope/dizziness.  -AAOx3, Pupils equal reactive to light.  Normal mentation.    PLAN:  -X-ray shoulder and ribs ordered.  Muscle relaxants, Tylenol as needed, IcyHot gel.  -Plan depending on x-ray results.  -Meanwhile, if he is noticing any blurry vision, headache, confusion, syncope, dizzy spells he should go to the ER without delay to consider CT head.    - XR Shoulder Right G/E 3 Views; Future  - XR Ribs & Chest Right G/E 3 Views; Future  - cyclobenzaprine (FLEXERIL) 10 MG tablet; Take 1 tablet (10 mg) by mouth 3 times daily as needed for muscle spasms    Hypertension goal BP (blood pressure) < 140/90  -Blood pressure elevated in clinic today.  -He does have blood pressure cuff at home and reported his blood pressures are usually normal.  -Notified Santiago that pain can also cause increased blood pressure.  -If his blood pressures continues to be high more than 140s/90s once his pain is stabilized at home, he should follow-up in the clinic.    Diabetes mellitus type 2, noninsulin dependent (H)  -HbA1c of 6-2 months ago.                   Osiris Hill MD  [facility]    Gopal Henderson is a 68 year old, presenting for the following health issues:  Musculoskeletal Problem (From Fall)        5/5/2023     8:58 AM   Additional Questions   Roomed by hSanique     Musculoskeletal Problem    History of Present Illness       Reason for visit:  Fell feeling pain on right side of body  Symptom onset:  1-3 days ago  Symptoms include:  Pain on entire right side of body 8/10 on pain scale  Symptom intensity:  Severe  Symptom  "progression:  Worsening  Had these symptoms before:  No  What makes it worse:  None  What makes it better:  None    He eats 2-3 servings of fruits and vegetables daily.He consumes 2 sweetened beverage(s) daily.He exercises with enough effort to increase his heart rate 30 to 60 minutes per day.  He exercises with enough effort to increase his heart rate 3 or less days per week.   He is taking medications regularly.  Today's GENARO-7 Score: 4     -On blood thinner            Review of Systems   Constitutional, HEENT, cardiovascular, pulmonary, gi and gu systems are negative, except as otherwise noted.      Objective    BP (!) 166/94 (BP Location: Left arm, Patient Position: Sitting, Cuff Size: Adult Large)   Pulse 82   Temp 97.9  F (36.6  C) (Oral)   Resp 15   Ht 1.656 m (5' 5.2\")   Wt 85.1 kg (187 lb 9.6 oz)   SpO2 96%   BMI 31.03 kg/m    Body mass index is 31.03 kg/m .  Physical Exam   GENERAL: healthy, alert and no distress  NECK: no adenopathy, no asymmetry, masses, or scars and thyroid normal to palpation  RESP: lungs clear to auscultation - no rales, rhonchi or wheezes  CV: regular rate and rhythm, normal S1 S2, no S3 or S4, no murmur, click or rub, no peripheral edema and peripheral pulses strong  ABDOMEN: soft, nontender, no hepatosplenomegaly, no masses and bowel sounds normal  MS: no gross musculoskeletal defects noted, no edema                    "

## 2023-05-12 DIAGNOSIS — I23.6 LV (LEFT VENTRICULAR) MURAL THROMBUS FOLLOWING MI (H): ICD-10-CM

## 2023-05-12 NOTE — TELEPHONE ENCOUNTER
[facility] phone call message- patient requests medication or medication refill:refill    If this is a refill request, has the caller requested the refill from the pharmacy already? Yes  Name of the pharmacy and phone number for the current request:  [facility]/pharmacy #[id number] - [facility], [place] - [address].     Name of the medication requested:apixaban ANTICOAGULANT (ELIQUIS) 5 MG tablets   Other request:     OK to leave the result message on voice mail or with a family member? YES    par Call taken on 5/12/2023 at 11:29 AM by Deandre Machado MA

## 2023-06-04 ENCOUNTER — MYC REFILL (OUTPATIENT)
Dept: FAMILY MEDICINE | Facility: CLINIC | Age: 68
End: 2023-06-04
Payer: COMMERCIAL

## 2023-06-04 DIAGNOSIS — G60.9 IDIOPATHIC PERIPHERAL NEUROPATHY: ICD-10-CM

## 2023-06-04 DIAGNOSIS — G89.4 CHRONIC PAIN SYNDROME: ICD-10-CM
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
## 2023-06-05 RX ORDER — OXYCODONE HYDROCHLORIDE 5 MG/1
5 TABLET ORAL 2 TIMES DAILY PRN
Qty: 60 TABLET | Refills: 0 | Status: SHIPPED | OUTPATIENT
Start: 2023-06-05 | End: 2023-07-03

## 2023-06-06 RX ORDER — CYCLOBENZAPRINE HCL 10 MG
TABLET ORAL
Qty: 30 TABLET | Refills: 0 | Status: SHIPPED | OUTPATIENT
Start: 2023-06-06 | End: 2023-07-18

## 2023-06-09 DIAGNOSIS — I50.22 CHRONIC SYSTOLIC HEART FAILURE (H): ICD-10-CM

## 2023-06-12 RX ORDER — SACUBITRIL AND VALSARTAN 24; 26 MG/1; MG/1
1 TABLET, FILM COATED ORAL 2 TIMES DAILY
Qty: 180 TABLET | Refills: 0 | Status: SHIPPED | OUTPATIENT
Start: 2023-06-12 | End: 2023-06-20

## 2023-06-12 RX ORDER — SPIRONOLACTONE 25 MG/1
25 TABLET ORAL DAILY
Qty: 90 TABLET | Refills: 0 | Status: SHIPPED | OUTPATIENT
Start: 2023-06-12 | End: 2023-09-06

## 2023-06-12 NOTE — TELEPHONE ENCOUNTER
Last Clinic Visit: 4/26/2023 [facility] [place]  Future Visit: 8/30/2023     BP above medication protocol parameters: Refills of Entresto and Aldactone were sent for 90 day supply and FYI to Cardiology [place].    BP Readings from Last 3 Encounters:   05/05/23 (!) 166/94   05/03/23 (!) 136/90   04/26/23 (!) 149/89

## 2023-06-15 NOTE — TELEPHONE ENCOUNTER
Kayla Burnett APRN CNP Krist, Chana, RN; P [facility] Cardiology Adult [place]  Caller: Unspecified (1 week ago, 12:54 PM)  Since his blood pressures are higher lets go ahead and increase the Entresto 49/51 mg BID - he should let us know if he has symptoms again - he should continue to check BP's as well.               Attempted to call patient to discuss plan.  Left message for patient to call back.    Monika Solis, HERNAN, BSN  Cardiology RN Care Coordinator   [place]   Phone: [phone]  Fax: [phone] ([place]) [phone] ([place])

## 2023-06-20 NOTE — TELEPHONE ENCOUNTER
Called and spoke with patient. Informed patient to try and increase Entresto to 49-51mg BID. Prescription updated and sent in. Reviewed with patient to keep track of blood pressures and to notify clinics if he starts having symptoms again. Patient verbalized understanding and had no questions at this time.     Monika Solis, RN, BSN  Cardiology RN Care Coordinator   [place]   Phone: [phone]  Fax: [phone] ([place]) [phone] ([place])

## 2023-06-21 ENCOUNTER — PATIENT OUTREACH (OUTPATIENT)
Dept: CARE COORDINATION | Facility: CLINIC | Age: 68
End: 2023-06-21
Payer: COMMERCIAL

## 2023-06-24 DIAGNOSIS — K21.00 GASTROESOPHAGEAL REFLUX DISEASE WITH ESOPHAGITIS: ICD-10-CM

## 2023-06-24 DIAGNOSIS — J45.30 MILD PERSISTENT ASTHMA WITHOUT COMPLICATION: ICD-10-CM

## 2023-06-26 ENCOUNTER — TELEPHONE (OUTPATIENT)
Dept: FAMILY MEDICINE | Facility: CLINIC | Age: 68
End: 2023-06-26
Payer: COMMERCIAL

## 2023-06-26 RX ORDER — BUDESONIDE AND FORMOTEROL FUMARATE DIHYDRATE 160; 4.5 UG/1; UG/1
AEROSOL RESPIRATORY (INHALATION)
Qty: 30.6 G | Refills: 1 | Status: SHIPPED | OUTPATIENT
Start: 2023-06-26

## 2023-06-26 NOTE — TELEPHONE ENCOUNTER
Patient Quality Outreach    Patient is due for the following:   Diabetes -  A1C and Eye Exam  Hypertension -  BP check  Depression  -  PHQ-9 needed  IVD  -  LDL ([place])      Topic Date Due     Zoster (Shingles) Vaccine (1 of 2) Never done     COVID-19 Vaccine (4 - Pfizer series) 12/28/2021       Next Steps:   Schedule a office visit for diabetes and bp     Type of outreach:    Sent [employer] message.      Questions for provider review:    None           Ernestine Ugalde RN

## 2023-07-03 ENCOUNTER — MYC REFILL (OUTPATIENT)
Dept: FAMILY MEDICINE | Facility: CLINIC | Age: 68
End: 2023-07-03
Payer: COMMERCIAL

## 2023-07-03 DIAGNOSIS — G60.9 IDIOPATHIC PERIPHERAL NEUROPATHY: ICD-10-CM

## 2023-07-03 DIAGNOSIS — G89.4 CHRONIC PAIN SYNDROME: ICD-10-CM

## 2023-07-04 RX ORDER — OXYCODONE HYDROCHLORIDE 5 MG/1
5 TABLET ORAL 2 TIMES DAILY PRN
Qty: 60 TABLET | Refills: 0 | Status: SHIPPED | OUTPATIENT
Start: 2023-07-04 | End: 2023-08-07

## 2023-07-05 ENCOUNTER — PATIENT OUTREACH (OUTPATIENT)
Dept: CARE COORDINATION | Facility: CLINIC | Age: 68
End: 2023-07-05
Payer: COMMERCIAL

## 2023-07-06 ENCOUNTER — TELEPHONE (OUTPATIENT)
Dept: CARDIOLOGY | Facility: CLINIC | Age: 68
End: 2023-07-06
Payer: COMMERCIAL

## 2023-07-06 DIAGNOSIS — I50.22 CHRONIC SYSTOLIC HEART FAILURE (H): Primary | ICD-10-CM

## 2023-07-06 NOTE — TELEPHONE ENCOUNTER
Kayla Burnett APRN CNP Krist, Chana, RN  Caller: Unspecified (Today, 12:41 PM)  Ok lets have him get labs in a week or so to make sure he is tolerating the higher dose. Looks like his BP's are fine               Attempted to call patient. Phone answered but no response. Unable to leave message.    Monika Solis, RN, BSN  Cardiology RN Care Coordinator   [place]   Phone: [phone]  Fax: [phone] ([facility] [phone] ([place])

## 2023-07-06 NOTE — TELEPHONE ENCOUNTER
Follow up call to patient to see how patient is doing since their most recent medication change. The plan on 6/20/23 was for the patient to increase Entresto to 49-51mg BID. Patient's last weight was at 192lbs.      Most recent weights: Patient did not have any exact weights but states that it is staying about the same.    Most recent BPs: Patient states that blood pressures are lower and he is doing better. He says that last BP was 118/80. Patient did not have any other BP reads at this time.    Patient states their shortness of breath is better    Patient states their swelling is better. No issues with swelling at this time.    Other Symptoms/Concerns: No concerns. Patient reports he is doing well.    Current Medications: Entresto 49-51 mg BID    Follow up Appointments: [facility] on 8/30/23    Message will be sent to Kayla Burnett CNP to review.     Monika Solis, RN, BSN  Cardiology RN Care Coordinator   [place]   Phone: [phone]  Fax: [phone] ([place]) [phone] ([place])

## 2023-07-10 ENCOUNTER — MYC MEDICAL ADVICE (OUTPATIENT)
Dept: CARDIOLOGY | Facility: CLINIC | Age: 68
End: 2023-07-10
Payer: COMMERCIAL

## 2023-07-10 DIAGNOSIS — I50.22 CHRONIC SYSTOLIC HEART FAILURE (H): Primary | ICD-10-CM

## 2023-07-10 NOTE — TELEPHONE ENCOUNTER
Kayla Burnett, Monika Sanchez CNP, RN  Caller: Unspecified (Today, 12:41 PM)  Ok lets have him get labs in a week or so to make sure he is tolerating the higher dose. Looks like his BP's are fine              See telephone encounter. Attempted to call patient. Unable to get a hold of patient. [employer] message sent to patient. BMP lab order in place.    Monika Solis RN, BSN  Cardiology RN Care Coordinator   [place]   Phone: [phone]  Fax: [phone] ([place]) [phone] ([place])

## 2023-07-10 NOTE — TELEPHONE ENCOUNTER
[employer] message sent to patient. See [employer] encounter.    Monika Solis, RN, BSN  Cardiology RN Care Coordinator   [place]   Phone: [phone]  Fax: [phone] ([place]) [phone] ([place])

## 2023-07-11 NOTE — TELEPHONE ENCOUNTER
Called and spoke with patient. Relayed information. Patient is at [facility] tomorrow. Scheduled lab appointment for tomorrow morning per patient request. BMP already in place.    Monika Slois, RN, BSN  Cardiology RN Care Coordinator   [place]   Phone: [phone]  Fax: [phone] ([place]) [phone] ([place])

## 2023-07-12 ENCOUNTER — LAB (OUTPATIENT)
Dept: LAB | Facility: CLINIC | Age: 68
End: 2023-07-12
Payer: COMMERCIAL

## 2023-07-12 ENCOUNTER — OFFICE VISIT (OUTPATIENT)
Dept: PODIATRY | Facility: CLINIC | Age: 68
End: 2023-07-12
Payer: COMMERCIAL

## 2023-07-12 VITALS — SYSTOLIC BLOOD PRESSURE: 126 MMHG | HEART RATE: 80 BPM | DIASTOLIC BLOOD PRESSURE: 87 MMHG

## 2023-07-12 DIAGNOSIS — M20.41 HAMMER TOES OF BOTH FEET: ICD-10-CM

## 2023-07-12 DIAGNOSIS — E78.5 HYPERLIPIDEMIA LDL GOAL <100: ICD-10-CM

## 2023-07-12 DIAGNOSIS — I50.22 CHRONIC SYSTOLIC HEART FAILURE (H): ICD-10-CM

## 2023-07-12 DIAGNOSIS — M20.42 HAMMER TOES OF BOTH FEET: ICD-10-CM

## 2023-07-12 DIAGNOSIS — N18.30 CKD (CHRONIC KIDNEY DISEASE) STAGE 3, GFR 30-59 ML/MIN (H): ICD-10-CM

## 2023-07-12 DIAGNOSIS — G60.9 IDIOPATHIC PERIPHERAL NEUROPATHY: Primary | ICD-10-CM

## 2023-07-12 DIAGNOSIS — G89.4 CHRONIC PAIN SYNDROME: ICD-10-CM

## 2023-07-12 LAB
ANION GAP SERPL CALCULATED.3IONS-SCNC: 11 MMOL/L (ref 7–15)
BUN SERPL-MCNC: 14.3 MG/DL (ref 8–23)
CALCIUM SERPL-MCNC: 9.4 MG/DL (ref 8.8–10.2)
CHLORIDE SERPL-SCNC: 100 MMOL/L (ref 98–107)
CREAT SERPL-MCNC: 1.18 MG/DL (ref 0.67–1.17)
DEPRECATED HCO3 PLAS-SCNC: 27 MMOL/L (ref 22–29)
GFR SERPL CREATININE-BSD FRML MDRD: 68 ML/MIN/1.73M2
GLUCOSE SERPL-MCNC: 115 MG/DL (ref 70–99)
POTASSIUM SERPL-SCNC: 4.2 MMOL/L (ref 3.4–5.3)
SODIUM SERPL-SCNC: 138 MMOL/L (ref 136–145)

## 2023-07-12 PROCEDURE — 99213 OFFICE O/P EST LOW 20 MIN: CPT | Performed by: PODIATRIST

## 2023-07-12 PROCEDURE — 80048 BASIC METABOLIC PNL TOTAL CA: CPT

## 2023-07-12 PROCEDURE — 36415 COLL VENOUS BLD VENIPUNCTURE: CPT

## 2023-07-12 NOTE — PROGRESS NOTES
Subjective:    5/3/23   Pt is seen today in consult from [facility] for bilateral foot pain right is greater than left.  Has been ongoing for years.  Points to the fourth toes.  Pain aggravated by activity and relieved by rest.  Describes as a burning pain.  It is gotten worse over the last 6 months.  Patient has peripheral neuropathy.  Denies past history of ulcers..  Denies erythema ecchymosis drainage or blistering.  Has CKD stage 3.   Primary care is above last seen 4/17/23.     7/12/23   patient returns for bilateral foot pain.  Describes it as a burning pain.  It is constant and always there.  Patient currently taking gabapentin for his neuropathic pain.  He is wondering about something stronger.  We gave him crest pads last visit which helped his hammertoes.  He has lost these and he would like to get some new ones.  Denies new erythema or ecchymosis.    ROS:  see above         Allergies   Allergen Reactions     No Known Drug Allergy        Current Outpatient Medications   Medication Sig Dispense Refill     albuterol (PROAIR HFA/PROVENTIL HFA/VENTOLIN HFA) 108 (90 Base) MCG/ACT inhaler Inhale 2 puffs into the lungs every 4 hours as needed for shortness of breath / dyspnea 18 g 3     albuterol (PROVENTIL) (2.5 MG/3ML) 0.083% neb solution INHALE 3 ML BY NEBULIZATION METHOD EVERY 6 HOURS AS NEEDED FOR SHORTNESS OF BREATH 360 mL 1     allopurinol (ZYLOPRIM) 100 MG tablet TAKE 2 TABLETS BY MOUTH EVERY  tablet 3     apixaban ANTICOAGULANT (ELIQUIS) 5 MG tablet Take 1 tablet (5 mg) by mouth 2 times daily 180 tablet 1     atorvastatin (LIPITOR) 40 MG tablet Take 1 tablet (40 mg) by mouth daily for 90 days 90 tablet 3     budesonide-formoterol (SYMBICORT) 160-4.5 MCG/ACT Inhaler TAKE 2 PUFFS BY MOUTH TWICE A DAY 30.6 g 1     carvedilol (COREG) 25 MG tablet TAKE 1 TABLET BY MOUTH TWICE A DAY WITH MEALS 180 tablet 3     cetirizine (ZYRTEC) 10 MG tablet TAKE 1 TABLET BY MOUTH DAILY AT BEDTIME 90 tablet 1      cyclobenzaprine (FLEXERIL) 10 MG tablet TAKE 1 TABLET BY MOUTH 3 TIMES DAILY AS NEEDED FOR MUSCLE SPASMS 30 tablet 0     empagliflozin (JARDIANCE) 10 MG TABS tablet Take 1 tablet (10 mg) by mouth daily 90 tablet 3     enoxaparin ANTICOAGULANT (LOVENOX) 100 MG/ML syringe Inject 1 mL (100 mg) Subcutaneous in the morning and 1 mL (100 mg) in the evening. Until INR 2.3 or greater 20 mL 1     furosemide (LASIX) 20 MG tablet Take 1 tablet (20 mg) by mouth daily 90 tablet 3     gabapentin (NEURONTIN) 300 MG capsule Take 1 capsule (300 mg) by mouth 3 times daily 270 capsule 1     montelukast (SINGULAIR) 10 MG tablet TAKE 1 TABLET BY MOUTH EVERYDAY AT BEDTIME 90 tablet 3     nitroGLYcerin (NITROSTAT) 0.4 MG sublingual tablet Place 1 tablet (0.4 mg) under the tongue every 5 minutes as needed for chest pain 0.4 mg by Sublingual route every 5 (five) minutes as needed. 25 tablet 11     omeprazole (PRILOSEC) 20 MG DR capsule TAKE 1 CAPSULE BY MOUTH EVERY DAY 90 capsule 0     oxyCODONE (ROXICODONE) 5 MG tablet Take 1 tablet (5 mg) by mouth 2 times daily as needed for moderate to severe pain (up to 2 a day) 60 tablet 0     sacubitril-valsartan (ENTRESTO) 49-51 MG per tablet Take 1 tablet by mouth 2 times daily 180 tablet 1     spironolactone (ALDACTONE) 25 MG tablet Take 1 tablet (25 mg) by mouth daily 90 tablet 0     tamsulosin (FLOMAX) 0.4 MG capsule Take 1 capsule (0.4 mg) by mouth daily 90 capsule 3     traZODone (DESYREL) 100 MG tablet Take 1 tablet (100 mg) by mouth At Bedtime 90 tablet 2     venlafaxine (EFFEXOR XR) 75 MG 24 hr capsule Take 1 capsule (75 mg) by mouth daily 90 capsule 1       Patient Active Problem List   Diagnosis     Hypertension goal BP (blood pressure) < 140/90     Hyperlipidemia LDL goal <100     CHF (congestive heart failure) (H)     Mild recurrent major depression (H)     Gout     GERD (gastroesophageal reflux disease)     Chronic rhinitis     History of colonic polyps     Advanced directives,  counseling/discussion     Chronic hepatitis C (H)     Coronary artery disease of native artery of native heart with stable angina pectoris (H)     Chronic obstructive airway disease with asthma (H)     Obesity (BMI 35.0-39.9) with comorbidity (H)     Chronic pain syndrome     CKD (chronic kidney disease) stage 3, GFR 30-59 ml/min (H)     Internal hemorrhoids     Chronic ischemic heart disease     Claudication of both lower extremities (H)     Rectal bleeding     Idiopathic peripheral neuropathy     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 (H)     Grief     F11.9 - Chronic, continuous use of opioids       Past Medical History:   Diagnosis Date     CAD (coronary artery disease) 8/12/2012    [place] [facility] [place]F (congestive heart failure) (H) 8/3/2012     Chronic hepatitis C (H) 11/21/2014     CKD (chronic kidney disease) stage 2, GFR 60-89 ml/min 12/3/2012     COPD (chronic obstructive pulmonary disease) (H) 11/21/2014     GERD (gastroesophageal reflux disease) 8/12/2012     Gout 8/12/2012     History of colonic polyps 9/30/2008     Hyperlipidemia LDL goal <100 8/3/2012     Hypertension goal BP (blood pressure) < 140/90 8/3/2012     Mild persistent asthma 5/29/2013    Patient does not have COPD      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     Nonsenile cataract      Tobacco abuse 9/13/2013     Type 2 diabetes, HbA1C goal < 8% (H) 8/3/2012       Past Surgical History:   Procedure Laterality Date     CARDIAC SURGERY      stent     CATARACT IOL, RT/LT       COLONOSCOPY  9/18/2012    Procedure: COLONOSCOPY;  COLONOSCOPY, SCREEN;  Surgeon: Cl Johnson MD;  Location: [facility]     IR THORACENTESIS  11/10/2017     ORTHOPEDIC SURGERY      ankle     PHACOEMULSIFICATION WITH STANDARD INTRAOCULAR LENS IMPLANT Right 1/28/2016    Procedure: PHACOEMULSIFICATION WITH STANDARD INTRAOCULAR LENS IMPLANT;  Surgeon: Fly Merrill MD;  Location: [facility]     PHACOEMULSIFICATION WITH  STANDARD INTRAOCULAR LENS IMPLANT Left 2016    Procedure: PHACOEMULSIFICATION WITH STANDARD INTRAOCULAR LENS IMPLANT;  Surgeon: Fly Merrill MD;  Location: [facility]       Family History   Problem Relation Age of Onset     Heart Disease Maternal Grandmother      Hypertension Maternal Grandmother      Hypertension Father      Hypertension Mother      Hypertension Sister      Thyroid Disease Sister      Cancer Brother      Diabetes Brother      Hypertension Brother      Hypertension Maternal Aunt      Cancer Maternal Uncle      Hypertension Maternal Uncle      Hypertension Paternal Aunt      Hypertension Paternal Uncle      Hypertension Maternal Grandfather      Hypertension Paternal Grandmother      Hypertension Paternal Grandfather      Cerebrovascular Disease No family hx of      Glaucoma No family hx of      Macular Degeneration No family hx of        Social History     Tobacco Use     Smoking status: Former     Packs/day: 0.25     Years: 15.00     Pack years: 3.75     Types: Cigarettes     Quit date: 2016     Years since quittin.8     Smokeless tobacco: Never     Tobacco comments:     3-4 a day   Substance Use Topics     Alcohol use: Yes     Comment: weekend beer         Exam:    Vitals: /87   Pulse 80   BMI: There is no height or weight on file to calculate BMI.  Height: Data Unavailable    Constitutional/ general:  Pt is in no apparent distress, appears well-nourished.  Cooperative with history and physical exam.     Psych:  The patient answered questions appropriately.  Normal affect.  Seems to have reasonable expectations, in terms of treatment.     Lungs:  Non labored breathing, non labored speech. No cough.  No audible wheezing. Even, quiet breathing.       Vascular:  positive PT arteries.  DP 2/4.  CFT < 3 sec.   Ankle edema and varicosities noted.  No pedal hair growth.    Neuro:  Alert and oriented x 3.  Monofilament absent to ankles bilaterally.    Derm: skin thin, shiny,  atrophic.  No erythema, ecchymosis, or cyanosis.      Musculoskeletal:    Lower extremity muscle strength is normal.  Patient is ambulatory without an assistive device or brace.  Both fourth toes hammered.  Normal arch.   MS 5/5 all compartments.  Normal ROM all fore foot and rearfoot joints.  All nails are thickened, elongated, discolored with subungual debris.  Patient has calluses on bilateral fourth toes distal lateral with the right much worse than the left..  No masses or breakdown in the skin bilaterally.  No erythema or ecchymosis noted bilateral.     A/P  CKD stage 3  Peripheral neuropathy with loss of protective sensation  Bilateral foot pain  Bilateral painful hammertoes    Dispensed new crest pads.  Orthotics may also be helpful but he would have to pay for these out-of-pocket.  Discussed importance of good shoes.  Discussed if he needs additional medications to treat his neuropathic pain will discuss with primary care.  Discussed pain clinic may also be helpful for this.  RTC as needed      Gary Vo DPM, FACFAS

## 2023-07-12 NOTE — PATIENT INSTRUCTIONS
We wish you continued good healing. If you have any questions or concerns, please do not hesitate to contact us at  [phone]    [employer] (secure e-mail communication and access to your chart) to send a message or to make an appointment.    Please remember to call and schedule a follow up appointment if one was recommended at your earliest convenience.     PODIATRY CLINIC HOURS  TELEPHONE NUMBER    Dr. Gary [facility]EDAP[facility]        Clinics:  Diogo Concepcion  [facility], [facility] [place], [facility]  Tuesday 1PM-6PM  [facility]  Wednesday 745AM-330PM  [place]  Thursday/Friday 745AM-230PM  [place]   1st and 3rd Mondays  845-430 PM   [facility] APPOINTMENTS  [phone]    [place] APPOINTMENTS  [phone]    [place] APPOINTMENTS  [phone]        If you need a medication refill, please contact us you may need lab work and/or a follow up visit prior to your refill (i.e. Antifungal medications).  If MRI needed please call Imaging at [phone]   HOW DO I GET MY KNEE SCOOTER? Knee scooters can be picked up at ANY Medical Supply stores with your knee scooter Prescription.  OR  Bring your signed prescription to an [facility] showroom.  Call or bring in your Orthotics order to any Orthotics locations. Or call [phone]

## 2023-07-12 NOTE — LETTER
7/12/2023         RE: Santiago Hylton  [address]  [place] [address]        Dear Colleague,    Thank you for referring your patient, Santiago Hylton, to the [facility]. Please see a copy of my visit note below.    Subjective:    5/3/23   Pt is seen today in consult from [facility] for bilateral foot pain right is greater than left.  Has been ongoing for years.  Points to the fourth toes.  Pain aggravated by activity and relieved by rest.  Describes as a burning pain.  It is gotten worse over the last 6 months.  Patient has peripheral neuropathy.  Denies past history of ulcers..  Denies erythema ecchymosis drainage or blistering.  Has CKD stage 3.   Primary care is above last seen 4/17/23.     7/12/23   patient returns for bilateral foot pain.  Describes it as a burning pain.  It is constant and always there.  Patient currently taking gabapentin for his neuropathic pain.  He is wondering about something stronger.  We gave him crest pads last visit which helped his hammertoes.  He has lost these and he would like to get some new ones.  Denies new erythema or ecchymosis.    ROS:  see above         Allergies   Allergen Reactions     No Known Drug Allergy        Current Outpatient Medications   Medication Sig Dispense Refill     albuterol (PROAIR HFA/PROVENTIL HFA/VENTOLIN HFA) 108 (90 Base) MCG/ACT inhaler Inhale 2 puffs into the lungs every 4 hours as needed for shortness of breath / dyspnea 18 g 3     albuterol (PROVENTIL) (2.5 MG/3ML) 0.083% neb solution INHALE 3 ML BY NEBULIZATION METHOD EVERY 6 HOURS AS NEEDED FOR SHORTNESS OF BREATH 360 mL 1     allopurinol (ZYLOPRIM) 100 MG tablet TAKE 2 TABLETS BY MOUTH EVERY  tablet 3     apixaban ANTICOAGULANT (ELIQUIS) 5 MG tablet Take 1 tablet (5 mg) by mouth 2 times daily 180 tablet 1     atorvastatin (LIPITOR) 40 MG tablet Take 1 tablet (40 mg) by mouth daily for 90 days 90 tablet 3     budesonide-formoterol (SYMBICORT) 160-4.5  MCG/ACT Inhaler TAKE 2 PUFFS BY MOUTH TWICE A DAY 30.6 g 1     carvedilol (COREG) 25 MG tablet TAKE 1 TABLET BY MOUTH TWICE A DAY WITH MEALS 180 tablet 3     cetirizine (ZYRTEC) 10 MG tablet TAKE 1 TABLET BY MOUTH DAILY AT BEDTIME 90 tablet 1     cyclobenzaprine (FLEXERIL) 10 MG tablet TAKE 1 TABLET BY MOUTH 3 TIMES DAILY AS NEEDED FOR MUSCLE SPASMS 30 tablet 0     empagliflozin (JARDIANCE) 10 MG TABS tablet Take 1 tablet (10 mg) by mouth daily 90 tablet 3     enoxaparin ANTICOAGULANT (LOVENOX) 100 MG/ML syringe Inject 1 mL (100 mg) Subcutaneous in the morning and 1 mL (100 mg) in the evening. Until INR 2.3 or greater 20 mL 1     furosemide (LASIX) 20 MG tablet Take 1 tablet (20 mg) by mouth daily 90 tablet 3     gabapentin (NEURONTIN) 300 MG capsule Take 1 capsule (300 mg) by mouth 3 times daily 270 capsule 1     montelukast (SINGULAIR) 10 MG tablet TAKE 1 TABLET BY MOUTH EVERYDAY AT BEDTIME 90 tablet 3     nitroGLYcerin (NITROSTAT) 0.4 MG sublingual tablet Place 1 tablet (0.4 mg) under the tongue every 5 minutes as needed for chest pain 0.4 mg by Sublingual route every 5 (five) minutes as needed. 25 tablet 11     omeprazole (PRILOSEC) 20 MG DR capsule TAKE 1 CAPSULE BY MOUTH EVERY DAY 90 capsule 0     oxyCODONE (ROXICODONE) 5 MG tablet Take 1 tablet (5 mg) by mouth 2 times daily as needed for moderate to severe pain (up to 2 a day) 60 tablet 0     sacubitril-valsartan (ENTRESTO) 49-51 MG per tablet Take 1 tablet by mouth 2 times daily 180 tablet 1     spironolactone (ALDACTONE) 25 MG tablet Take 1 tablet (25 mg) by mouth daily 90 tablet 0     tamsulosin (FLOMAX) 0.4 MG capsule Take 1 capsule (0.4 mg) by mouth daily 90 capsule 3     traZODone (DESYREL) 100 MG tablet Take 1 tablet (100 mg) by mouth At Bedtime 90 tablet 2     venlafaxine (EFFEXOR XR) 75 MG 24 hr capsule Take 1 capsule (75 mg) by mouth daily 90 capsule 1       Patient Active Problem List   Diagnosis     Hypertension goal BP (blood pressure) < 140/90      Hyperlipidemia LDL goal <100     CHF (congestive heart failure) (H)     Mild recurrent major depression (H)     Gout     GERD (gastroesophageal reflux disease)     Chronic rhinitis     History of colonic polyps     Advanced directives, counseling/discussion     Chronic hepatitis C (H)     Coronary artery disease of native artery of native heart with stable angina pectoris (H)     Chronic obstructive airway disease with asthma (H)     Obesity (BMI 35.0-39.9) with comorbidity (H)     Chronic pain syndrome     CKD (chronic kidney disease) stage 3, GFR 30-59 ml/min (H)     Internal hemorrhoids     Chronic ischemic heart disease     Claudication of both lower extremities (H)     Rectal bleeding     Idiopathic peripheral neuropathy     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 (H)     Grief     F11.9 - Chronic, continuous use of opioids       Past Medical History:   Diagnosis Date     CAD (coronary artery disease) 8/12/2012    [place] [facility] [place]      CHF (congestive heart failure) (H) 8/3/2012     Chronic hepatitis C (H) 11/21/2014     CKD (chronic kidney disease) stage 2, GFR 60-89 ml/min 12/3/2012     COPD (chronic obstructive pulmonary disease) (H) 11/21/2014     GERD (gastroesophageal reflux disease) 8/12/2012     Gout 8/12/2012     History of colonic polyps 9/30/2008     Hyperlipidemia LDL goal <100 8/3/2012     Hypertension goal BP (blood pressure) < 140/90 8/3/2012     Mild persistent asthma 5/29/2013    Patient does not have COPD      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     Nonsenile cataract      Tobacco abuse 9/13/2013     Type 2 diabetes, HbA1C goal < 8% (H) 8/3/2012       Past Surgical History:   Procedure Laterality Date     CARDIAC SURGERY      stent     CATARACT IOL, RT/LT       COLONOSCOPY  9/18/2012    Procedure: COLONOSCOPY;  COLONOSCOPY, SCREEN;  Surgeon: Cl Johnson MD;  Location: [facility]     IR THORACENTESIS  11/10/2017     ORTHOPEDIC SURGERY      ankle      PHACOEMULSIFICATION WITH STANDARD INTRAOCULAR LENS IMPLANT Right 2016    Procedure: PHACOEMULSIFICATION WITH STANDARD INTRAOCULAR LENS IMPLANT;  Surgeon: Fly Merrill MD;  Location: [facility]     PHACOEMULSIFICATION WITH STANDARD INTRAOCULAR LENS IMPLANT Left 2016    Procedure: PHACOEMULSIFICATION WITH STANDARD INTRAOCULAR LENS IMPLANT;  Surgeon: Fly Merrill MD;  Location: [facility] OR       Family History   Problem Relation Age of Onset     Heart Disease Maternal Grandmother      Hypertension Maternal Grandmother      Hypertension Father      Hypertension Mother      Hypertension Sister      Thyroid Disease Sister      Cancer Brother      Diabetes Brother      Hypertension Brother      Hypertension Maternal Aunt      Cancer Maternal Uncle      Hypertension Maternal Uncle      Hypertension Paternal Aunt      Hypertension Paternal Uncle      Hypertension Maternal Grandfather      Hypertension Paternal Grandmother      Hypertension Paternal Grandfather      Cerebrovascular Disease No family hx of      Glaucoma No family hx of      Macular Degeneration No family hx of        Social History     Tobacco Use     Smoking status: Former     Packs/day: 0.25     Years: 15.00     Pack years: 3.75     Types: Cigarettes     Quit date: 2016     Years since quittin.8     Smokeless tobacco: Never     Tobacco comments:     3-4 a day   Substance Use Topics     Alcohol use: Yes     Comment: weekend beer         Exam:    Vitals: /87   Pulse 80   BMI: There is no height or weight on file to calculate BMI.  Height: Data Unavailable    Constitutional/ general:  Pt is in no apparent distress, appears well-nourished.  Cooperative with history and physical exam.     Psych:  The patient answered questions appropriately.  Normal affect.  Seems to have reasonable expectations, in terms of treatment.     Lungs:  Non labored breathing, non labored speech. No cough.  No audible wheezing. Even, quiet  breathing.       Vascular:  positive PT arteries.  DP 2/4.  CFT < 3 sec.   Ankle edema and varicosities noted.  No pedal hair growth.    Neuro:  Alert and oriented x 3.  Monofilament absent to ankles bilaterally.    Derm: skin thin, shiny, atrophic.  No erythema, ecchymosis, or cyanosis.      Musculoskeletal:    Lower extremity muscle strength is normal.  Patient is ambulatory without an assistive device or brace.  Both fourth toes hammered.  Normal arch.   MS 5/5 all compartments.  Normal ROM all fore foot and rearfoot joints.  All nails are thickened, elongated, discolored with subungual debris.  Patient has calluses on bilateral fourth toes distal lateral with the right much worse than the left..  No masses or breakdown in the skin bilaterally.  No erythema or ecchymosis noted bilateral.     A/P  CKD stage 3  Peripheral neuropathy with loss of protective sensation  Bilateral foot pain  Bilateral painful hammertoes    Dispensed new crest pads.  Orthotics may also be helpful but he would have to pay for these out-of-pocket.  Discussed importance of good shoes.  Discussed if he needs additional medications to treat his neuropathic pain will discuss with primary care.  Discussed pain clinic may also be helpful for this.  RTC as needed      Gary Vo DPM, FACFAS               Again, thank you for allowing me to participate in the care of your patient.        Sincerely,        Gary Vo DPM

## 2023-07-13 ENCOUNTER — MYC MEDICAL ADVICE (OUTPATIENT)
Dept: CARDIOLOGY | Facility: CLINIC | Age: 68
End: 2023-07-13
Payer: COMMERCIAL

## 2023-07-13 NOTE — TELEPHONE ENCOUNTER
Kayla Burnett, APRN CNP  Monika Solis, RN  Labs appear stable no changes to the plan.    [employer] message sent to patient with above recommendations.    Monika Solis RN, BSN  Cardiology RN Care Coordinator   [place]   Phone: [phone]  Fax: [phone] ([place]) [phone] ([place])

## 2023-07-14 NOTE — TELEPHONE ENCOUNTER
Patient saw [employer] message. Provider also updated and aware of recommendation to follow up with PCP.    Monika Solis, RN, BSN  Cardiology RN Care Coordinator   [place]   Phone: [phone]  Fax: [phone] ([place]) [phone] ([place])
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
## 2023-07-18 RX ORDER — CYCLOBENZAPRINE HCL 10 MG
10 TABLET ORAL 3 TIMES DAILY PRN
Qty: 30 TABLET | Refills: 0 | Status: SHIPPED | OUTPATIENT
Start: 2023-07-18

## 2023-08-07 ENCOUNTER — MYC REFILL (OUTPATIENT)
Dept: FAMILY MEDICINE | Facility: CLINIC | Age: 68
End: 2023-08-07
Payer: COMMERCIAL

## 2023-08-07 ENCOUNTER — MYC MEDICAL ADVICE (OUTPATIENT)
Dept: FAMILY MEDICINE | Facility: CLINIC | Age: 68
End: 2023-08-07
Payer: COMMERCIAL

## 2023-08-07 DIAGNOSIS — G89.4 CHRONIC PAIN SYNDROME: ICD-10-CM

## 2023-08-07 DIAGNOSIS — G60.9 IDIOPATHIC PERIPHERAL NEUROPATHY: ICD-10-CM

## 2023-08-08 RX ORDER — OXYCODONE HYDROCHLORIDE 5 MG/1
5 TABLET ORAL 2 TIMES DAILY PRN
Qty: 60 TABLET | Refills: 0 | Status: SHIPPED | OUTPATIENT
Start: 2023-08-08

## 2023-08-15 NOTE — TELEPHONE ENCOUNTER
RN triage   Call from pt   Pt states he developed swelling both sides of neck and both shoulders today   Pt states he recently started blood thinners    No pain -   no diff breathing or swallowing or drinking   No chest pain - not sweaty  Able to move neck/ chin to chest   Neck feels warm to touch --- not tender   No fever   Not feeling sick -- pt states he feels fine   No other swelling   No numbness or tingling or weakness     Advised pt be seen     Transferred to       Frances Douglass RN  [facility]  Triage Nurse Advisor    COVID 19 Nurse Triage Plan/Patient Instructions    Please be aware that novel coronavirus (COVID-19) may be circulating in the community. If you develop symptoms such as fever, cough, or SOB or if you have concerns about the presence of another infection including coronavirus (COVID-19), please contact your health care provider or visit [email or web address].     Disposition/Instructions    In-Person Visit with provider recommended. Reference Visit Selection Guide.    Thank you for taking steps to prevent the spread of this virus.  o Limit your contact with others.  o Wear a simple mask to cover your cough.  o Wash your hands well and often.    Resources    [facility]: About COVID-19: [email or web address]    CDC: What to Do If You're Sick: www.cdc.gov/coronavirus/2019-ncov/about/steps-when-sick.html    CDC: Ending Home Isolation: www.cdc.gov/coronavirus/2019-ncov/hcp/disposition-in-home-patients.html     CDC: Caring for Someone: www.cdc.gov/coronavirus/2019-ncov/if-you-are-sick/care-for-someone.html     [facility]: Interim Guidance for Hospital Discharge to Home: www.health.[facility].mn.us/diseases/coronavirus/hcp/hospdischarge.pdf    [facility] clinical trials (COVID-19 research studies): clinicalaffairs.[facility]/umn-clinical-trials     Below are the COVID-19 hotlines at the [place] Department of Health ([facility]). Interpreters are available.   o For health questions:  Call 303-528-5156 or 1-813.208.3213 (7 a.m. to 7 p.m.)  o For questions about schools and childcare: Call 742-409-9225 or 1-763.289.4535 (7 a.m. to 7 p.m.)    Reason for Disposition    Patient wants to be seen    Additional Information    Negative: Shock suspected (e.g., cold/pale/clammy skin, too weak to stand, low BP, rapid pulse)    Negative: Similar pain previously and it was from 'heart attack'    Negative: Similar pain previously from 'angina' and not relieved by nitroglycerin    Negative: Difficult to awaken or acting confused (e.g., disoriented, slurred speech)    Negative: Sounds like a life-threatening emergency to the triager    Negative: Chest pain    Negative: Lymph node in the neck is swollen or painful to the touch    Negative: Sore throat is the main symptom    Negative: Difficulty breathing or unusual sweating (e.g., sweating without exertion)    Negative: Chest pain lasting longer than 5 minutes    Negative: Stiff neck (can't touch chin to chest) and has headache    Negative: Stiff neck (can't touch chin to chest) and fever    Negative: Weakness of an arm or hand    Negative: Problems with bowel or bladder control    Negative: SEVERE pain (e.g., excruciating, unable to do any normal activities)    Negative: Head is twisting to one side (or ask 'is it turning against your will?')    Negative: Fever > 103 F (39.4 C)    Negative: Fever > 100.0 F (37.8 C) and IVDA (intravenous drug abuse)    Negative: Fever > 100.0 F (37.8 C) and has diabetes mellitus or a weak immune system (e.g., HIV positive, cancer chemotherapy, organ transplant, splenectomy, chronic steroids)    Negative: Numbness in an arm or hand (i.e., loss of sensation)    Negative: Painful rash with multiple small blisters grouped together (i.e., dermatomal distribution or 'band' or 'stripe')    Negative: Tenderness in front of neck over windpipe    Protocols used: NECK PAIN OR [id number]       [facility]

## 2023-08-21 DIAGNOSIS — I50.22 CHRONIC SYSTOLIC HEART FAILURE (H): Primary | ICD-10-CM

## 2023-09-06 DIAGNOSIS — M1A.3790 CHRONIC GOUT DUE TO RENAL IMPAIRMENT INVOLVING ANKLE WITHOUT TOPHUS, UNSPECIFIED LATERALITY: ICD-10-CM

## 2023-09-06 DIAGNOSIS — K21.00 GASTROESOPHAGEAL REFLUX DISEASE WITH ESOPHAGITIS: ICD-10-CM

## 2023-09-06 DIAGNOSIS — R35.0 URINARY FREQUENCY: ICD-10-CM

## 2023-09-06 DIAGNOSIS — R35.1 NOCTURIA: ICD-10-CM

## 2023-09-06 DIAGNOSIS — I50.22 CHRONIC SYSTOLIC HEART FAILURE (H): ICD-10-CM

## 2023-09-06 RX ORDER — TAMSULOSIN HYDROCHLORIDE 0.4 MG/1
0.4 CAPSULE ORAL DAILY
Qty: 90 CAPSULE | Refills: 0 | Status: SHIPPED | OUTPATIENT
Start: 2023-09-06 | End: 2023-11-28

## 2023-09-07 RX ORDER — SPIRONOLACTONE 25 MG/1
25 TABLET ORAL DAILY
Qty: 90 TABLET | Refills: 0 | Status: SHIPPED | OUTPATIENT
Start: 2023-09-07 | End: 2023-12-01

## 2023-09-07 NOTE — TELEPHONE ENCOUNTER
spironolactone (ALDACTONE) 25 MG tablet 90 tablet 0 6/12/2023     Last Office Visit: 4/26/23  Future Office visit:   none      Diuretics (Including Combos) Protocol Pnipxf7809/06/2023 11:02 AM   Protocol Details Normal serum creatinine on file in past 12 months      CKD (chronic kidney disease) stage 3, GFR 30-59 ml/min (H)     90 day hiral refill sent to the pharmacy with instructions for patient to schedule an appointment. Message sent to clinic for scheduling a follow up appointment.     Sarah Faustin RN

## 2023-09-08 RX ORDER — ALLOPURINOL 100 MG/1
TABLET ORAL
Qty: 60 TABLET | Refills: 0 | Status: SHIPPED | OUTPATIENT
Start: 2023-09-08 | End: 2023-10-10

## 2023-09-10 ENCOUNTER — HEALTH MAINTENANCE LETTER (OUTPATIENT)
Age: 68
End: 2023-09-10

## 2023-09-20 DIAGNOSIS — J45.31 MILD PERSISTENT ASTHMA WITH ACUTE EXACERBATION: ICD-10-CM

## 2023-09-20 RX ORDER — MONTELUKAST SODIUM 10 MG/1
TABLET ORAL
Qty: 30 TABLET | Refills: 0 | Status: SHIPPED | OUTPATIENT
Start: 2023-09-20 | End: 2023-10-24

## 2023-09-27 NOTE — TELEPHONE ENCOUNTER
SLEEP EVALUATION NOTE     Larry Azevedo is a 45 year old male presenting for evaluation of: Sleep Problem, Consultation, and Office Visit   .    Referring provider: Brayan Corcoran MD  PCP: Brayan Corcoran MD       HPI     45 year old male referred by PCP due to prior diagnosis of ARCADIO and needing to restart treatment. He was originally diagnosed with sleep apnea in 2014/2015. He was referred by his PCP at the time due to snoring. His study came back positive and he was started on the [employer] RemStar System One. He used the unit for about 3-4 years and then discontinued due to life stressors. He does continue to experience snoring, witnessed apneas, dyspneic arousals, and daytime fatigue. He denies having access to his previous sleep study results and indicates a family history. His mom is a current CPAP user and his dad was on one.    Bedtime: [id number]  Awake time: 0530  Sleep position: Supine, Bilateral, Prone  Sleep onset latency: 5-10 minutes  Sleeping aid medications: No  Awakenings # and episodes of nocturia: 1X    [x]Snoring  [x]Witnessed apneas  [x]Dyspneic arousal  [x]Morning dry mouth   []Morning headache    [x]Non-restorative sleep  [x]Excessive daytime sleepiness or tired during the day  [x]Naps    []Urge to move legs and/or uncomfortable tingling or burning in legs  []Cramping or jerking of the legs during sleep    []Cataplexy  []Sleep paralysis   []Hypnogognic or hypnopompnic hallucinations  [x]Sleep attacks     []Parasomnias:    STOP BANG SCREENING  STOP  S: Do you snore loudly?: Yes  T: Do you often feel tired, fatigued or sleepy during daytime?: Yes  O: Has anyone observed you stop breathing during your sleep?: Yes  P: Do you have or are you being treated for high blood pressure?: Yes  BANG  B: BMI more than 35 kg/m2?: Yes  A: Is age over 50 years old?: No  N: Neck circumfrence >16 inches (40 cm)?: Yes (19)  G: Is gender Male?: Yes  Total Score  Stop Bang Total Score (out of 8):  "Requested Prescriptions   Pending Prescriptions Disp Refills     DULoxetine (CYMBALTA) 60 MG EC capsule [Pharmacy Med Name: DULOXETINE HCL DR 60 MG CAP]    Last Written Prescription Date:  2/7/18  Last Fill Quantity: 60,  # refills: 0   Last Office Visit with [facility], P or [facility] prescribing provider:  1/9/18   Future Office Visit:    Next 5 appointments (look out 90 days)     May 03, 2018  8:40 AM CDT   Office Visit with Tori Rizvi MD   [facility] ([facility])    [address] [place] [id number]   [phone]                  90 capsule 0     Sig: TAKE ONE CAPSULE BY MOUTH EVERY DAY    Serotonin-Norepinephrine Reuptake Inhibitors  Passed    4/29/2018 10:09 AM       Passed - Blood pressure under 140/90 in past 12 months    BP Readings from Last 3 Encounters:   01/09/18 130/88   11/06/17 150/82   07/13/17 133/88                Passed - Recent (12 mo) or future (30 days) visit within the authorizing provider's specialty    Patient had office visit in the last 12 months or has a visit in the next 30 days with authorizing provider or within the authorizing provider's specialty.  See \"Patient Info\" tab in inbasket, or \"Choose Columns\" in Meds & Orders section of the refill encounter.           Passed - Patient is age 18 or older              Koby Faarax  [facility]  " 7    PRIOR SLEEP STUDY DATE: 2015  RESULTS: 1. AHI: ? - Diagnosed with ARCADIO                   2. DESATURATION KHOA: ?                 CURRENT CO-MORBIDITIES:    [x] HTN     [] AF/ARRHYTHMIA    [] HYPOTHYROIDISM     [] CAD     [x] OBESITY                  [] INSOMNIA  [] CHF     [] DM                         [] STROKE/TIA  [x]OTHER: ARCADIO, mixed hyperlipidemia, anxiety, depression, marijuana use         Matador Sleepiness Scale:     0 = would never doze  1 = slight chance of dozing  2 = moderate chance of dozing  3 = high chance of dozing    Situation     Chance of Dozing       1. Sitting and reading   1  2. Watching TV    2  3. Sitting, inactive in a public place       (e.g. a theatre or a meeting)  3  4. As a passenger in a car for an hour       without a break    0    5. Lying down to rest in the afternoon      when circumstances permit  3  6. Sitting and talking to someone  0    7. Sitting quietly after lunch without      Alcohol     3  8. In a car, while stopped for a few      Minutes in traffic    2          TOTAL: 14      Past Medical History:     Past Medical History:   Diagnosis Date   • Obesity     Morbid   • ARCADIO on CPAP      Past Surgical History:   Procedure Laterality Date   • Wrist fracture surgery Left      Family History   Problem Relation Age of Onset   • Sleep Apnea Mother    • Myocardial Infarction Father 48   • Hypertension Father    • Sleep Apnea Father    • Other Sister         diverticular sx a.40   • Coronary Artery Disease Other      Social History     Tobacco Use   • Smoking status: Some Days     Current packs/day: 0.00     Types: Cigarettes     Last attempt to quit: 2017     Years since quittin.7   • Smokeless tobacco: Never   • Tobacco comments:     2x/mo   Vaping Use   • Vaping Use: Every day   • Substances: Nicotine   Substance Use Topics   • Alcohol use: Yes     Comment: occasional   • Drug use: Not Currently     Types: Marijuana       ALLERGIES:   Allergen  Reactions   • Sulfa Antibiotics Other (See Comments)     As a child, rxn unknown     Current Outpatient Medications   Medication Sig   • atorvastatin (LIPITOR) 10 MG tablet TAKE ONE TABLET BY MOUTH ONE TIME DAILY   • amlodipine-benazepril (LOTREL) 5-10 MG per capsule TAKE 1 CAPSULE BY MOUTH DAILY   • escitalopram (LEXAPRO) 20 MG tablet TAKE 1 TABLET BY MOUTH DAILY.   • sildenafil (REVATIO) 20 MG tablet Take 3 tablets by mouth daily as needed (ED).   • Cholecalciferol (VITAMIN D) 2000 units capsule Take 1 capsule by mouth daily.     No current facility-administered medications for this visit.          Review of Systems:     Review of Systems   Constitutional: Negative.   HENT: Negative.    Eyes: Negative.    Cardiovascular: Negative.    Respiratory: Positive for sleep disturbances due to breathing and snoring.    Endocrine: Negative.    Hematologic/Lymphatic: Negative.    Skin: Negative.    Musculoskeletal: Negative.    Gastrointestinal: Negative.    Genitourinary: Negative.    Neurological: Positive for excessive daytime sleepiness.   Psychiatric/Behavioral: Negative.    Allergic/Immunologic: Negative.         Physical Examination:     Vitals:    09/27/23 0919   BP: 130/88   BP Location: RUE - Right upper extremity   Patient Position: Sitting   Cuff Size: Large Adult   Pulse: 85   Temp: 97.7 °F (36.5 °C)   TempSrc: Temporal   SpO2: 97%   Weight: (!) 147.6 kg (325 lb 6.4 oz)   Height: 5' 11.5\" (1.816 m)     Body mass index is 44.75 kg/m².         Nasal Passages: [x]Clear   [] Congested  Palate: Donato [] I  [x] II   [] III   [] IV              Eyrtherna/edema [x]none []+1  []+2  []+3  []+4    Gen: No acute distress. Pleasant and interactive.  Head:  Normocephalic and atraumatic.  Eyes: Conjunctiva and sclera normal.   Lungs:  Normal respiratory rate and effort.  Extremities:  No edema in lower extremities.   Skin: Warm and dry, no rash.  Musculoskeletal:  No joint swelling or tenderness  Psych:  Affect was  appropriate to situation and mood was normal.  Neuro:  Alert and oriented, gait normal.     Assessment and Plan:     PROBLEMS ADDRESSED THIS VISIT:  Problem List Items Addressed This Visit    None  Visit Diagnoses     Obstructive sleep apnea (adult) (pediatric)    -  Primary    Relevant Orders    SLEEP STUDY HOME 4 CHANNEL PORTABLE UNATTENDED           Comments: recommended a HST to determine current severity of sleep apnea and to proceed with ordering a new unit. We reviewed what a HST entails and the steps post HST. Pt is aware that results will be reviewed at follow up appointment. Denies any questions and/or concerns.     PLAN & Patient Counseling:     We reviewed the nature of sleep apnea, the elevated cardiovascular risks and other health consequences associated with this condition and reviewed treatment options in detail.  Pt to undergo HST - no contraindications to HST.   The patient was cautioned not to drive while sleepy.    Janna Suarez CNP    ** note was routed to referring provider **

## 2023-09-29 DIAGNOSIS — I50.22 CHRONIC SYSTOLIC HEART FAILURE (H): ICD-10-CM

## 2023-09-29 DIAGNOSIS — I10 ESSENTIAL HYPERTENSION WITH GOAL BLOOD PRESSURE LESS THAN 140/90: ICD-10-CM

## 2023-09-29 DIAGNOSIS — F33.0 MAJOR DEPRESSIVE DISORDER, RECURRENT EPISODE, MILD (H): ICD-10-CM

## 2023-10-02 RX ORDER — VENLAFAXINE HYDROCHLORIDE 75 MG/1
75 CAPSULE, EXTENDED RELEASE ORAL DAILY
Qty: 90 CAPSULE | Refills: 1 | Status: SHIPPED | OUTPATIENT
Start: 2023-10-02

## 2023-10-02 NOTE — TELEPHONE ENCOUNTER
furosemide (LASIX) 20 MG tablet 90 tablet 3 11/9/2022       Diuretics (Including Combos) Protocol [id number]/29/2023 01:08 PM   Protocol Details Normal serum creatinine on file in past 12 months        7/12/23-CREATININE 1.18  4/26/23-CREATININE 1.03      Routing refill request to provider for review/approval because:  JUMP IN CREATININE      empagliflozin (JARDIANCE) 10 MG TABS tablet 90 tablet 3 10/26/2022     NOT ON CARDIOLOGY PROTOCOL    Last Office Visit: 4/26/23  Future Office visit:   11/30/23    Sarah Faustin RN

## 2023-10-03 RX ORDER — FUROSEMIDE 20 MG
20 TABLET ORAL DAILY
Qty: 90 TABLET | Refills: 3 | Status: SHIPPED | OUTPATIENT
Start: 2023-10-03

## 2023-10-07 DIAGNOSIS — M1A.3790 CHRONIC GOUT DUE TO RENAL IMPAIRMENT INVOLVING ANKLE WITHOUT TOPHUS, UNSPECIFIED LATERALITY: ICD-10-CM

## 2023-10-10 RX ORDER — ALLOPURINOL 100 MG/1
TABLET ORAL
Qty: 60 TABLET | Refills: 0 | Status: SHIPPED | OUTPATIENT
Start: 2023-10-10 | End: 2023-11-27

## 2023-10-10 NOTE — TELEPHONE ENCOUNTER
"Attempted to call patient x2 at only number on file, both times call went to \"call cannot be completed at this time.\" Writer unable to leave message.    Will send MyC message with provider's message below re: Allopurinol refills:        If patient calls back, please relay provider message.      RAFAEL HolleyN, RN  [facility]    "

## 2023-10-11 NOTE — TELEPHONE ENCOUNTER
Patient has read Xiimo message sent. No further action at this time needed.     Latanya Weber, RAFAELN, RN  [facility]  Nurse Triage, Family Practice

## 2023-10-21 DIAGNOSIS — J45.31 MILD PERSISTENT ASTHMA WITH ACUTE EXACERBATION: ICD-10-CM

## 2023-10-24 RX ORDER — MONTELUKAST SODIUM 10 MG/1
TABLET ORAL
Qty: 30 TABLET | Refills: 0 | Status: SHIPPED | OUTPATIENT
Start: 2023-10-24 | End: 2023-11-27

## 2023-11-14 ENCOUNTER — MEDICAL CORRESPONDENCE (OUTPATIENT)
Dept: HEALTH INFORMATION MANAGEMENT | Facility: CLINIC | Age: 68
End: 2023-11-14

## 2023-11-14 DIAGNOSIS — I50.22 CHRONIC SYSTOLIC HEART FAILURE (H): Primary | ICD-10-CM

## 2023-11-22 DIAGNOSIS — M1A.3790 CHRONIC GOUT DUE TO RENAL IMPAIRMENT INVOLVING ANKLE WITHOUT TOPHUS, UNSPECIFIED LATERALITY: ICD-10-CM

## 2023-11-22 DIAGNOSIS — J45.31 MILD PERSISTENT ASTHMA WITH ACUTE EXACERBATION: ICD-10-CM

## 2023-11-27 RX ORDER — ALLOPURINOL 100 MG/1
TABLET ORAL
Qty: 60 TABLET | Refills: 0 | Status: SHIPPED | OUTPATIENT
Start: 2023-11-27

## 2023-11-27 RX ORDER — MONTELUKAST SODIUM 10 MG/1
TABLET ORAL
Qty: 30 TABLET | Refills: 0 | Status: SHIPPED | OUTPATIENT
Start: 2023-11-27 | End: 2023-12-21

## 2023-11-28 DIAGNOSIS — R35.0 URINARY FREQUENCY: ICD-10-CM

## 2023-11-28 DIAGNOSIS — R35.1 NOCTURIA: ICD-10-CM

## 2023-11-28 DIAGNOSIS — K21.00 GASTROESOPHAGEAL REFLUX DISEASE WITH ESOPHAGITIS: ICD-10-CM

## 2023-11-29 RX ORDER — TAMSULOSIN HYDROCHLORIDE 0.4 MG/1
0.4 CAPSULE ORAL DAILY
Qty: 90 CAPSULE | Refills: 0 | Status: SHIPPED | OUTPATIENT
Start: 2023-11-29 | End: 2024-02-26

## 2023-12-01 DIAGNOSIS — I50.22 CHRONIC SYSTOLIC HEART FAILURE (H): ICD-10-CM

## 2023-12-01 RX ORDER — SPIRONOLACTONE 25 MG/1
25 TABLET ORAL DAILY
Qty: 90 TABLET | Refills: 3 | Status: SHIPPED | OUTPATIENT
Start: 2023-12-01

## 2023-12-01 NOTE — TELEPHONE ENCOUNTER
spironolactone (ALDACTONE) 25 MG tablet 90 tablet 0 9/7/2023     Last Office Visit: 4/26/23  Future Office visit:   1/10/24      Diuretics (Including Combos) Protocol Pcesaw4012/01/2023 07:01 AM   Protocol Details Normal serum creatinine on file in past 12 months        Creatinine   Date Value Ref Range Status   07/12/2023 1.18 (H) 0.67 - 1.17 mg/dL Final   06/18/2021 1.38 (H) 0.66 - 1.25 mg/dL Final       Routing refill request to provider for review/approval because:  Abnormal creat    Sarah Faustin RN

## 2023-12-05 DIAGNOSIS — E11.42 DIABETIC POLYNEUROPATHY ASSOCIATED WITH TYPE 2 DIABETES MELLITUS (H): ICD-10-CM

## 2023-12-06 RX ORDER — GABAPENTIN 300 MG/1
300 CAPSULE ORAL 3 TIMES DAILY
Qty: 270 CAPSULE | Refills: 1 | Status: SHIPPED | OUTPATIENT
Start: 2023-12-06

## 2023-12-19 ENCOUNTER — TELEPHONE (OUTPATIENT)
Dept: CARDIOLOGY | Facility: CLINIC | Age: 68
End: 2023-12-19
Payer: COMMERCIAL

## 2023-12-19 NOTE — TELEPHONE ENCOUNTER
Health Call Center    Phone Message    May a detailed message be left on voicemail: yes     Reason for Call: Other: Santiago called to schedule his follow up with Kayla and requested his lab orders be sent to his primary at [facility] at [address] [place] [address] so he can have them drawn there. Please fax lab orders. Thank you!     Action Taken: Other: Cardiology    Travel Screening: Not Applicable    Thank you!  [facility]

## 2023-12-19 NOTE — TELEPHONE ENCOUNTER
I called Santiago to let him know that lab orders were faxed to his PCP per his request. No option to leave VM. Memeoirs message sent.    Rosy Byrd RN

## 2023-12-20 DIAGNOSIS — J45.31 MILD PERSISTENT ASTHMA WITH ACUTE EXACERBATION: ICD-10-CM

## 2023-12-21 RX ORDER — MONTELUKAST SODIUM 10 MG/1
TABLET ORAL
Qty: 30 TABLET | Refills: 0 | Status: SHIPPED | OUTPATIENT
Start: 2023-12-21 | End: 2024-01-23

## 2024-01-17 ENCOUNTER — PATIENT OUTREACH (OUTPATIENT)
Dept: CARE COORDINATION | Facility: CLINIC | Age: 69
End: 2024-01-17
Payer: COMMERCIAL

## 2024-01-17 NOTE — PROGRESS NOTES
Social Work Telephone Note  [facility]     Patient Name:  Santiago Hylton  /Age:  1955 (68 year old)    Referral Source: Dr Burnett - cardiology  Reason for Referral:  insurance and financial concerns     contacted Patient via telephone on 24. Sw had been consulted to connect with patient regarding insurance and medication affordability. Spoke with Santiago, he shared that he has [employer] Medicare, however struggles with the co-payments for appointments and medications. He shared that his wife always helped figure theses things out but recently passed and he's all alone. He stated that he is planning to go to [facility] tomorrow to apply for MA. Writer encouraged him to do so and that he was already on the right track.     Based on information Santiago provided, he may qualify for MA or a [employer] product to help offset and cover some additional medical costs.    Santiago denied any additional concerns or questions. Provided him with writer contact information and encouraged him to call with any additional concerns or questions. Sw will continue to assist as needed.           ANA MARIA Griffith, [facility]    [facility]  [phone]  [email or web address]

## 2024-01-22 DIAGNOSIS — J45.31 MILD PERSISTENT ASTHMA WITH ACUTE EXACERBATION: ICD-10-CM

## 2024-01-23 RX ORDER — MONTELUKAST SODIUM 10 MG/1
TABLET ORAL
Qty: 90 TABLET | Refills: 1 | Status: SHIPPED | OUTPATIENT
Start: 2024-01-23

## 2024-01-27 DIAGNOSIS — I50.22 CHRONIC SYSTOLIC HEART FAILURE (H): ICD-10-CM

## 2024-01-30 NOTE — TELEPHONE ENCOUNTER
Last Clinic Visit: 4/26/2023 [facility]

## 2024-02-01 ENCOUNTER — PATIENT OUTREACH (OUTPATIENT)
Dept: CARE COORDINATION | Facility: CLINIC | Age: 69
End: 2024-02-01
Payer: COMMERCIAL

## 2024-02-01 DIAGNOSIS — I10 ESSENTIAL HYPERTENSION WITH GOAL BLOOD PRESSURE LESS THAN 140/90: ICD-10-CM

## 2024-02-01 NOTE — PROGRESS NOTES
Social Work Telephone Message Note  [facility]     Patient Name:  Santiago Hylton DOB/Age:  1955 (68 year old)    Referral Source: cardiology  Reason for Referral:  follow-up     attempted to contact Patient via telephone on 24. Sw had been consulted to connect with patient regarding medication affordability. Attempted to contact patient for follow-up. Left a message providing writer contact information encouraging a return call if needed.  will await Patient's return phone call and will provide assistance at that time.        ANA MARIA Griffith, [facility]    [facility]  [phone]  [email or web address]

## 2024-02-04 RX ORDER — CARVEDILOL 25 MG/1
TABLET ORAL
Qty: 180 TABLET | Refills: 3 | OUTPATIENT
Start: 2024-02-04

## 2024-02-13 ENCOUNTER — LAB (OUTPATIENT)
Dept: LAB | Facility: CLINIC | Age: 69
End: 2024-02-13
Payer: COMMERCIAL

## 2024-02-13 DIAGNOSIS — I50.22 CHRONIC SYSTOLIC HEART FAILURE (H): ICD-10-CM

## 2024-02-13 LAB
ANION GAP SERPL CALCULATED.3IONS-SCNC: 10 MMOL/L (ref 7–15)
BUN SERPL-MCNC: 26.5 MG/DL (ref 8–23)
CALCIUM SERPL-MCNC: 9.6 MG/DL (ref 8.8–10.2)
CHLORIDE SERPL-SCNC: 98 MMOL/L (ref 98–107)
CREAT SERPL-MCNC: 1.43 MG/DL (ref 0.67–1.17)
DEPRECATED HCO3 PLAS-SCNC: 28 MMOL/L (ref 22–29)
EGFRCR SERPLBLD CKD-EPI 2021: 53 ML/MIN/1.73M2
GLUCOSE SERPL-MCNC: 171 MG/DL (ref 70–99)
POTASSIUM SERPL-SCNC: 5.3 MMOL/L (ref 3.4–5.3)
SODIUM SERPL-SCNC: 136 MMOL/L (ref 135–145)

## 2024-02-13 PROCEDURE — 36415 COLL VENOUS BLD VENIPUNCTURE: CPT

## 2024-02-13 PROCEDURE — 80048 BASIC METABOLIC PNL TOTAL CA: CPT

## 2024-02-15 ENCOUNTER — OFFICE VISIT (OUTPATIENT)
Dept: CARDIOLOGY | Facility: CLINIC | Age: 69
End: 2024-02-15
Payer: COMMERCIAL

## 2024-02-15 VITALS
OXYGEN SATURATION: 97 % | HEART RATE: 89 BPM | SYSTOLIC BLOOD PRESSURE: 113 MMHG | WEIGHT: 190.6 LBS | DIASTOLIC BLOOD PRESSURE: 76 MMHG | BODY MASS INDEX: 31.53 KG/M2

## 2024-02-15 DIAGNOSIS — E11.9 DIABETES MELLITUS TYPE 2, NONINSULIN DEPENDENT (H): ICD-10-CM

## 2024-02-15 DIAGNOSIS — I25.5 ISCHEMIC CARDIOMYOPATHY: ICD-10-CM

## 2024-02-15 DIAGNOSIS — I10 ESSENTIAL HYPERTENSION WITH GOAL BLOOD PRESSURE LESS THAN 140/90: ICD-10-CM

## 2024-02-15 DIAGNOSIS — I50.22 CHRONIC SYSTOLIC HEART FAILURE (H): Primary | ICD-10-CM

## 2024-02-15 DIAGNOSIS — I25.118 CORONARY ARTERY DISEASE OF NATIVE ARTERY OF NATIVE HEART WITH STABLE ANGINA PECTORIS (H): ICD-10-CM

## 2024-02-15 DIAGNOSIS — Z63.4 SPOUSE DECEASED: ICD-10-CM

## 2024-02-15 PROCEDURE — 99214 OFFICE O/P EST MOD 30 MIN: CPT | Performed by: NURSE PRACTITIONER

## 2024-02-15 SDOH — SOCIAL STABILITY - SOCIAL INSECURITY: DISSAPEARANCE AND DEATH OF FAMILY MEMBER: Z63.4

## 2024-02-15 NOTE — LETTER
"2/15/2024      RE: Santiago Hylton  [address]  [place] [address]       Dear Colleague,    Thank you for the opportunity to participate in the care of your patient, Santiago Hylton, at the [facility]. Please see a copy of my visit note below.      HPI: Santiago is a 69 year old Black or  male with a past medical history  Ischemic cardiomyopathy with prior coronary angiography and intervention (status post PCI to LAD in 1995 for ACS), Hypertension, Dyslipidemia, Type II diabetes mellitus.    He has less stress and that has helped. He has pain in the feet- its better once he moves around- chronic.  At home < 120 systolic.   He has been breathing better. Takes the 20 mg of Lasix. He has less SOB and CANO. - he feels stronger and better he says. He says the weight 190 lbs .  He has no swelling in the legs/feet.  He says he has been taking 24/26 mg BID of Entresto and not the 49/51 mg - due to low BP's and being symptomatic. He had a \"stomach bug\" - now he is feeling better.  Creatinine is elevated     Denies weight gain, chest pain, palpitations, lightheadedness, dizziness, near syncopal/syncopal episodes. Santiago has been following salt and fluid restrictions.      PAST MEDICAL HISTORY:  Past Medical History:   Diagnosis Date    CAD (coronary artery disease) 8/12/2012    [place] [facility] [place]     CHF (congestive heart failure) (H) 8/3/2012    Chronic hepatitis C (H) 11/21/2014    CKD (chronic kidney disease) stage 2, GFR 60-89 ml/min 12/3/2012    COPD (chronic obstructive pulmonary disease) (H) 11/21/2014    GERD (gastroesophageal reflux disease) 8/12/2012    Gout 8/12/2012    History of colonic polyps 9/30/2008    Hyperlipidemia LDL goal <100 8/3/2012    Hypertension goal BP (blood pressure) < 140/90 8/3/2012    Mild persistent asthma 5/29/2013    Patient does not have COPD     Mild recurrent " major depression (H24) 8/3/2012    Nonsenile cataract     Tobacco abuse 2013    Type 2 diabetes, HbA1C goal < 8% (H) 8/3/2012       FAMILY HISTORY:  Family History   Problem Relation Age of Onset    Heart Disease Maternal Grandmother     Hypertension Maternal Grandmother     Hypertension Father     Hypertension Mother     Hypertension Sister     Thyroid Disease Sister     Cancer Brother     Diabetes Brother     Hypertension Brother     Hypertension Maternal Aunt     Cancer Maternal Uncle     Hypertension Maternal Uncle     Hypertension Paternal Aunt     Hypertension Paternal Uncle     Hypertension Maternal Grandfather     Hypertension Paternal Grandmother     Hypertension Paternal Grandfather     Cerebrovascular Disease No family hx of     Glaucoma No family hx of     Macular Degeneration No family hx of        SOCIAL HISTORY:  Social History     Tobacco Use    Smoking status: Former     Packs/day: 0.25     Years: 15.00     Additional pack years: 0.00     Total pack years: 3.75     Types: Cigarettes     Quit date: 2016     Years since quittin.4    Smokeless tobacco: Never    Tobacco comments:     3-4 a day   Vaping Use    Vaping Use: Never used   Substance Use Topics    Alcohol use: Yes     Comment: weekend beer    Drug use: No           CURRENT MEDICATIONS:  Current Outpatient Medications   Medication Sig Dispense Refill    allopurinol (ZYLOPRIM) 100 MG tablet TAKE 2 TABLETS BY MOUTH EVERY DAY NEEDS VISIT AND/OR LABS, WHICH CAN BE IN PERSON BUT A VIRTUAL VISIT MAY BE ACCEPTABLE, FOR MORE. 60 tablet 0    apixaban ANTICOAGULANT (ELIQUIS) 5 MG tablet Take 1 tablet (5 mg) by mouth 2 times daily 180 tablet 1    carvedilol (COREG) 25 MG tablet TAKE 1 TABLET BY MOUTH TWICE A DAY WITH MEALS 180 tablet 3    cyclobenzaprine (FLEXERIL) 10 MG tablet Take 1 tablet (10 mg) by mouth 3 times daily as needed for muscle spasms 30 tablet 0    empagliflozin (JARDIANCE) 10 MG TABS tablet Take 1 tablet (10 mg) by mouth  daily 90 tablet 3    furosemide (LASIX) 20 MG tablet Take 1 tablet (20 mg) by mouth daily 90 tablet 3    gabapentin (NEURONTIN) 300 MG capsule Take 1 capsule (300 mg) by mouth 3 times daily 270 capsule 1    montelukast (SINGULAIR) 10 MG tablet TAKE 1 TABLET BY MOUTH EVERYDAY AT BEDTIME. NEEDS TO ESTABLISH WITH NEW PCP. 90 tablet 1    nitroGLYcerin (NITROSTAT) 0.4 MG sublingual tablet Place 1 tablet (0.4 mg) under the tongue every 5 minutes as needed for chest pain 0.4 mg by Sublingual route every 5 (five) minutes as needed. 25 tablet 11    omeprazole (PRILOSEC) 20 MG DR capsule TAKE 1 CAPSULE BY MOUTH EVERY DAY. PLEASE FOLLOW UP IN CLINIC FOR NEXT REFILL. 90 capsule 0    oxyCODONE (ROXICODONE) 5 MG tablet Take 1 tablet (5 mg) by mouth 2 times daily as needed for moderate to severe pain (up to 2 a day) *PLEASE SCHEDULE APPT FOR FURTHER REFILLS* 60 tablet 0    sacubitril-valsartan (ENTRESTO) 49-51 MG per tablet Take 1 tablet by mouth 2 times daily 180 tablet 0    spironolactone (ALDACTONE) 25 MG tablet Take 1 tablet (25 mg) by mouth daily 90 tablet 3    tamsulosin (FLOMAX) 0.4 MG capsule Take 1 capsule (0.4 mg) by mouth daily Please follow up for next refill. 90 capsule 0    traZODone (DESYREL) 100 MG tablet Take 1 tablet (100 mg) by mouth At Bedtime 90 tablet 2    venlafaxine (EFFEXOR XR) 75 MG 24 hr capsule Take 1 capsule (75 mg) by mouth daily 90 capsule 1    albuterol (PROAIR HFA/PROVENTIL HFA/VENTOLIN HFA) 108 (90 Base) MCG/ACT inhaler Inhale 2 puffs into the lungs every 4 hours as needed for shortness of breath / dyspnea (Patient not taking: Reported on 2/15/2024) 18 g 3    albuterol (PROVENTIL) (2.5 MG/3ML) 0.083% neb solution INHALE 3 ML BY NEBULIZATION METHOD EVERY 6 HOURS AS NEEDED FOR SHORTNESS OF BREATH (Patient not taking: Reported on 2/15/2024) 360 mL 1    atorvastatin (LIPITOR) 40 MG tablet Take 1 tablet (40 mg) by mouth daily for 90 days 90 tablet 3    budesonide-formoterol (SYMBICORT) 160-4.5 MCG/ACT  Inhaler TAKE 2 PUFFS BY MOUTH TWICE A DAY (Patient not taking: Reported on 2/15/2024) 30.6 g 1    cetirizine (ZYRTEC) 10 MG tablet TAKE 1 TABLET BY MOUTH DAILY AT BEDTIME (Patient not taking: Reported on 2/15/2024) 90 tablet 1    enoxaparin ANTICOAGULANT (LOVENOX) 100 MG/ML syringe Inject 1 mL (100 mg) Subcutaneous in the morning and 1 mL (100 mg) in the evening. Until INR 2.3 or greater (Patient not taking: Reported on 2/15/2024) 20 mL 1       ROS:  Review Of Systems  Skin: negative  Eyes: negative  Ears/Nose/Throat: negative  Respiratory: No shortness of breath, dyspnea on exertion, cough, or hemoptysis  Cardiovascular: negative  Gastrointestinal: negative  Genitourinary: negative  Musculoskeletal: negative  Neurologic: negative  Psychiatric: negative  Hematologic/Lymphatic/Immunologic: negative  Endocrine: negative      EXAM:  /76 (BP Location: Left arm, Patient Position: Sitting, Cuff Size: Adult Regular)   Pulse 89   Wt 86.5 kg (190 lb 9.6 oz)   SpO2 97%   BMI 31.53 kg/m    Home weight:  General: alert, articulate, and in no acute distress.  HEENT: normocephalic, atraumatic, anicteric sclera, EOMI, mucosa moist, no cyanosis.   Neck: neck supple.  No adenopathy, masses, or carotid bruits.  JVP not detected at 90 degrees  Heart: regular rhythm, normal S1/S2, no murmur, gallop, rub.  Precordium quiet with normal PMI.     Lungs: clear, no rales, ronchi, or wheezing.  No accessory muscle use, respirations unlabored.   Abdomen: soft, non-tender, bowel sounds present, no hepatomegaly  Extremities: no  pitting edema.   No cyanosis.   Neurological: alert and oriented x 3.  normal speech and affect, no gross motor deficits  Skin:  No ecchymoses, rashes, or clubbing.    Labs:  CBC RESULTS:  Lab Results   Component Value Date    WBC 9.3 10/14/2022    WBC 9.9 06/18/2021    RBC 4.79 10/14/2022    RBC 4.94 06/18/2021    HGB 14.8 10/14/2022    HGB 15.1 06/18/2021    HCT 45.7 10/14/2022    HCT 46.3 06/18/2021    MCV  "95 10/14/2022    MCV 94 06/18/2021    MCH 30.9 10/14/2022    MCH 30.6 06/18/2021    MCHC 32.4 10/14/2022    MCHC 32.6 06/18/2021    RDW 13.5 10/14/2022    RDW 12.8 06/18/2021     10/14/2022     06/18/2021       CMP RESULTS:  Lab Results   Component Value Date     02/13/2024     06/18/2021    POTASSIUM 5.3 02/13/2024    POTASSIUM 4.0 04/26/2023    POTASSIUM 4.3 06/18/2021    CHLORIDE 98 02/13/2024    CHLORIDE 109 04/26/2023    CHLORIDE 102 06/18/2021    CO2 28 02/13/2024    CO2 27 04/26/2023    CO2 33 (H) 06/18/2021    ANIONGAP 10 02/13/2024    ANIONGAP 7 04/26/2023    ANIONGAP 4 06/18/2021     (H) 02/13/2024     (H) 04/26/2023    GLC 94 06/18/2021    BUN 26.5 (H) 02/13/2024    BUN 18 04/26/2023    BUN 24 06/18/2021    CR 1.43 (H) 02/13/2024    CR 1.38 (H) 06/18/2021    GFRESTIMATED 53 (L) 02/13/2024    GFRESTIMATED 53 (L) 06/18/2021    GFRESTBLACK 62 06/18/2021    FELICITY 9.6 02/13/2024    FELICITY 9.0 06/18/2021    BILITOTAL 0.5 09/11/2018    ALBUMIN 3.9 09/11/2018    ALKPHOS 86 09/11/2018    ALT 36 07/21/2022    ALT 30 06/18/2021    AST 29 09/11/2018        INR RESULTS:  Lab Results   Component Value Date    INR 1.12 10/06/2014       No components found for: \"CK\"  Lab Results   Component Value Date    MAG 1.9 01/18/2010     Lab Results   Component Value Date    NTBNP 432 08/26/2022    NTBNP 544 (H) 06/18/2021     @BRIEFLABR (dig)@    Most recent echocardiogram:  No results found for this or any previous visit (from the past 8760 hour(s)).      Assessment and Plan:    In summary,Santiago  is a  69 year old male who is here for a CORE visit. He is euvolemic today.Will keep the Entresto dose as is due to low BP with higher doses and patient had symptoms with it. Labs reviewed. He appeared to be dry - he said he wasn't hydrated much. Doesn't want to stop of reduce Lasix - says he starts swelling up quickly. Patient can't travel to  now- would like cardiologist at [place]. He hasn't had an " echo ( missed multiple appts) we will schedule in the next 2-4 weeks.     1.  Chronic systolic heart failure secondary to ICM.  Stage C  NYHA Class II  ACEi/ARB/ARNI/ -stay on Entresto 49/51 mg BID - higher dose causes him to be hypotensive.  BB: yes- max dose   Aldosterone antagonist: Spironolactone 25 mg   SCD prophylaxis: does not meet criteria for implant  Fluid status: euvolemic  Anticoagulation:   Antiplatelet:  ASA dose   Sleep apnea:  NSAID use:  Contraindicated.  Avoid use.  Remote Monitoring:  SGLT2- 10 mg - daily    2.  Other comordbid conditions:    Atypical anginal chest pain- not present today - Imdur has helped   Coronary artery disease with [place] proximal [place] [facility] [place]   Ischemic cardiomyopathy (ACC/AHA stage C, NYHA III)  Hypertension- start Entresto stop Losartan  Dyslipidemia  COPD   Type II diabetes mellitus, managed only with conservative measures      3.  Follow-up-  Echo in 2-4 weeks  HF MD - here in [place].   CORE in 4-5 months         Kayla STALLWORTH, CNP  [facility]                         Please do not hesitate to contact me if you have any questions/concerns.     Sincerely,     Kayla Burnett, [place] CNP

## 2024-02-15 NOTE — NURSING NOTE
"Chief Complaint   Patient presents with    Follow Up     Reason for visit: Return CORE, 70 yo male with systolic heart failure presents for follow up visit with labs prior.       Initial /76 (BP Location: Left arm, Patient Position: Sitting, Cuff Size: Adult Regular)   Pulse 89   Wt 86.5 kg (190 lb 9.6 oz)   SpO2 97%   BMI 31.53 kg/m   Estimated body mass index is 31.53 kg/m  as calculated from the following:    Height as of 5/5/23: 1.656 m (5' 5.2\").    Weight as of this encounter: 86.5 kg (190 lb 9.6 oz)..  BP completed using cuff size: regular    CHINA Mack    "

## 2024-02-15 NOTE — PATIENT INSTRUCTIONS
Take your medicines every day, as directed    Changes made today:  No medication changes     Monitor Your Weight and Symptoms    Contact us if you:    Gain 2 pounds in one day or 5 pounds in one week  Feel more short of breath  Notice more leg swelling  Feel lightheadeded   Change your lifestyle    Limit Salt or Sodium:  2000 mg  Limit Fluids:  2000 mL or approximately 64 ounces  Eat a Heart Healthy Diet  Low in saturated fats  Stay Active:  Aim to move at least 150 minutes every  week         To Contact us    During Business Hours:  [phone], option # 1      After hours, weekends or holidays:   [phone], Option #4  Ask to speak to the On-Call Cardiologist. Inform them you are a CORE/heart failure patient at the [facility].     Use [employer] allows you to communicate directly with your heart team through secure messaging.  [employer] can be accessed any time on your phone, computer, or tablet.  If you need assistance, we'd be happy to help!         Keep your Heart Appointments:    Echo in 2-4 weeks    Heart failure MD in [place] in March    CORE in June or July     Please consider attending our virtual support group which is held monthly. Please reach out to Ezequiel at [phone] for more information if you are interested in attending.     2024 dates:    Monday, February 5th , 1-2pm     Monday, March 4th , 1-2pm     Monday, April 1st, 1-2pm     Monday, May 6th, 1-2pm     Monday, June 3rd, 1-2pm     Monday, July 1st, 1-2pm     Monday, August 5th, 1-2pm     Monday, September 9th, 1-2pm     Monday, October 7th, 1-2pm     Monday, November 4th, 1-2pm     Monday, December 2nd, 1-2pm

## 2024-02-15 NOTE — PROGRESS NOTES
"  HPI: Santiago is a 69 year old Black or  male with a past medical history  Ischemic cardiomyopathy with prior coronary angiography and intervention (status post PCI to LAD in 1995 for ACS), Hypertension, Dyslipidemia, Type II diabetes mellitus.    He has less stress and that has helped. He has pain in the feet- its better once he moves around- chronic.  At home < 120 systolic.   He has been breathing better. Takes the 20 mg of Lasix. He has less SOB and CANO. - he feels stronger and better he says. He says the weight 190 lbs .  He has no swelling in the legs/feet.  He says he has been taking 24/26 mg BID of Entresto and not the 49/51 mg - due to low BP's and being symptomatic. He had a \"stomach bug\" - now he is feeling better.  Creatinine is elevated     Denies weight gain, chest pain, palpitations, lightheadedness, dizziness, near syncopal/syncopal episodes. Santiago has been following salt and fluid restrictions.      PAST MEDICAL HISTORY:  Past Medical History:   Diagnosis Date    CAD (coronary artery disease) 8/12/2012    S/[place] and [place]ing 2001, [place] [facility] [place]     CHF (congestive heart failure) (H) 8/3/2012    Chronic hepatitis C (H) 11/21/2014    CKD (chronic kidney disease) stage 2, GFR 60-89 ml/min 12/3/2012    COPD (chronic obstructive pulmonary disease) (H) 11/21/2014    GERD (gastroesophageal reflux disease) 8/12/2012    Gout 8/12/2012    History of colonic polyps 9/30/2008    Hyperlipidemia LDL goal <100 8/3/2012    Hypertension goal BP (blood pressure) < 140/90 8/3/2012    Mild persistent asthma 5/29/2013    Patient does not have COPD     Mild recurrent major depression (H24) 8/3/2012    Nonsenile cataract     Tobacco abuse 9/13/2013    Type 2 diabetes, HbA1C goal < 8% (H) 8/3/2012       FAMILY HISTORY:  Family History   Problem Relation Age of Onset    Heart Disease Maternal Grandmother     Hypertension Maternal Grandmother     Hypertension Father     Hypertension Mother     Hypertension " Sister     Thyroid Disease Sister     Cancer Brother     Diabetes Brother     Hypertension Brother     Hypertension Maternal Aunt     Cancer Maternal Uncle     Hypertension Maternal Uncle     Hypertension Paternal Aunt     Hypertension Paternal Uncle     Hypertension Maternal Grandfather     Hypertension Paternal Grandmother     Hypertension Paternal Grandfather     Cerebrovascular Disease No family hx of     Glaucoma No family hx of     Macular Degeneration No family hx of        SOCIAL HISTORY:  Social History     Tobacco Use    Smoking status: Former     Packs/day: 0.25     Years: 15.00     Additional pack years: 0.00     Total pack years: 3.75     Types: Cigarettes     Quit date: 2016     Years since quittin.4    Smokeless tobacco: Never    Tobacco comments:     3-4 a day   Vaping Use    Vaping Use: Never used   Substance Use Topics    Alcohol use: Yes     Comment: weekend beer    Drug use: No           CURRENT MEDICATIONS:  Current Outpatient Medications   Medication Sig Dispense Refill    allopurinol (ZYLOPRIM) 100 MG tablet TAKE 2 TABLETS BY MOUTH EVERY DAY NEEDS VISIT AND/OR LABS, WHICH CAN BE IN PERSON BUT A VIRTUAL VISIT MAY BE ACCEPTABLE, FOR MORE. 60 tablet 0    apixaban ANTICOAGULANT (ELIQUIS) 5 MG tablet Take 1 tablet (5 mg) by mouth 2 times daily 180 tablet 1    carvedilol (COREG) 25 MG tablet TAKE 1 TABLET BY MOUTH TWICE A DAY WITH MEALS 180 tablet 3    cyclobenzaprine (FLEXERIL) 10 MG tablet Take 1 tablet (10 mg) by mouth 3 times daily as needed for muscle spasms 30 tablet 0    empagliflozin (JARDIANCE) 10 MG TABS tablet Take 1 tablet (10 mg) by mouth daily 90 tablet 3    furosemide (LASIX) 20 MG tablet Take 1 tablet (20 mg) by mouth daily 90 tablet 3    gabapentin (NEURONTIN) 300 MG capsule Take 1 capsule (300 mg) by mouth 3 times daily 270 capsule 1    montelukast (SINGULAIR) 10 MG tablet TAKE 1 TABLET BY MOUTH EVERYDAY AT BEDTIME. NEEDS TO ESTABLISH WITH NEW PCP. 90 tablet 1     nitroGLYcerin (NITROSTAT) 0.4 MG sublingual tablet Place 1 tablet (0.4 mg) under the tongue every 5 minutes as needed for chest pain 0.4 mg by Sublingual route every 5 (five) minutes as needed. 25 tablet 11    omeprazole (PRILOSEC) 20 MG DR capsule TAKE 1 CAPSULE BY MOUTH EVERY DAY. PLEASE FOLLOW UP IN CLINIC FOR NEXT REFILL. 90 capsule 0    oxyCODONE (ROXICODONE) 5 MG tablet Take 1 tablet (5 mg) by mouth 2 times daily as needed for moderate to severe pain (up to 2 a day) *PLEASE SCHEDULE APPT FOR FURTHER REFILLS* 60 tablet 0    sacubitril-valsartan (ENTRESTO) 49-51 MG per tablet Take 1 tablet by mouth 2 times daily 180 tablet 0    spironolactone (ALDACTONE) 25 MG tablet Take 1 tablet (25 mg) by mouth daily 90 tablet 3    tamsulosin (FLOMAX) 0.4 MG capsule Take 1 capsule (0.4 mg) by mouth daily Please follow up for next refill. 90 capsule 0    traZODone (DESYREL) 100 MG tablet Take 1 tablet (100 mg) by mouth At Bedtime 90 tablet 2    venlafaxine (EFFEXOR XR) 75 MG 24 hr capsule Take 1 capsule (75 mg) by mouth daily 90 capsule 1    albuterol (PROAIR HFA/PROVENTIL HFA/VENTOLIN HFA) 108 (90 Base) MCG/ACT inhaler Inhale 2 puffs into the lungs every 4 hours as needed for shortness of breath / dyspnea (Patient not taking: Reported on 2/15/2024) 18 g 3    albuterol (PROVENTIL) (2.5 MG/3ML) 0.083% neb solution INHALE 3 ML BY NEBULIZATION METHOD EVERY 6 HOURS AS NEEDED FOR SHORTNESS OF BREATH (Patient not taking: Reported on 2/15/2024) 360 mL 1    atorvastatin (LIPITOR) 40 MG tablet Take 1 tablet (40 mg) by mouth daily for 90 days 90 tablet 3    budesonide-formoterol (SYMBICORT) 160-4.5 MCG/ACT Inhaler TAKE 2 PUFFS BY MOUTH TWICE A DAY (Patient not taking: Reported on 2/15/2024) 30.6 g 1    cetirizine (ZYRTEC) 10 MG tablet TAKE 1 TABLET BY MOUTH DAILY AT BEDTIME (Patient not taking: Reported on 2/15/2024) 90 tablet 1    enoxaparin ANTICOAGULANT (LOVENOX) 100 MG/ML syringe Inject 1 mL (100 mg) Subcutaneous in the morning and  1 mL (100 mg) in the evening. Until INR 2.3 or greater (Patient not taking: Reported on 2/15/2024) 20 mL 1       ROS:  Review Of Systems  Skin: negative  Eyes: negative  Ears/Nose/Throat: negative  Respiratory: No shortness of breath, dyspnea on exertion, cough, or hemoptysis  Cardiovascular: negative  Gastrointestinal: negative  Genitourinary: negative  Musculoskeletal: negative  Neurologic: negative  Psychiatric: negative  Hematologic/Lymphatic/Immunologic: negative  Endocrine: negative      EXAM:  /76 (BP Location: Left arm, Patient Position: Sitting, Cuff Size: Adult Regular)   Pulse 89   Wt 86.5 kg (190 lb 9.6 oz)   SpO2 97%   BMI 31.53 kg/m    Home weight:  General: alert, articulate, and in no acute distress.  HEENT: normocephalic, atraumatic, anicteric sclera, EOMI, mucosa moist, no cyanosis.   Neck: neck supple.  No adenopathy, masses, or carotid bruits.  JVP not detected at 90 degrees  Heart: regular rhythm, normal S1/S2, no murmur, gallop, rub.  Precordium quiet with normal PMI.     Lungs: clear, no rales, ronchi, or wheezing.  No accessory muscle use, respirations unlabored.   Abdomen: soft, non-tender, bowel sounds present, no hepatomegaly  Extremities: no  pitting edema.   No cyanosis.   Neurological: alert and oriented x 3.  normal speech and affect, no gross motor deficits  Skin:  No ecchymoses, rashes, or clubbing.    Labs:  CBC RESULTS:  Lab Results   Component Value Date    WBC 9.3 10/14/2022    WBC 9.9 06/18/2021    RBC 4.79 10/14/2022    RBC 4.94 06/18/2021    HGB 14.8 10/14/2022    HGB 15.1 06/18/2021    HCT 45.7 10/14/2022    HCT 46.3 06/18/2021    MCV 95 10/14/2022    MCV 94 06/18/2021    MCH 30.9 10/14/2022    MCH 30.6 06/18/2021    MCHC 32.4 10/14/2022    MCHC 32.6 06/18/2021    RDW 13.5 10/14/2022    RDW 12.8 06/18/2021     10/14/2022     06/18/2021       CMP RESULTS:  Lab Results   Component Value Date     02/13/2024     06/18/2021    POTASSIUM 5.3  "02/13/2024    POTASSIUM 4.0 04/26/2023    POTASSIUM 4.3 06/18/2021    CHLORIDE 98 02/13/2024    CHLORIDE 109 04/26/2023    CHLORIDE 102 06/18/2021    CO2 28 02/13/2024    CO2 27 04/26/2023    CO2 33 (H) 06/18/2021    ANIONGAP 10 02/13/2024    ANIONGAP 7 04/26/2023    ANIONGAP 4 06/18/2021     (H) 02/13/2024     (H) 04/26/2023    GLC 94 06/18/2021    BUN 26.5 (H) 02/13/2024    BUN 18 04/26/2023    BUN 24 06/18/2021    CR 1.43 (H) 02/13/2024    CR 1.38 (H) 06/18/2021    GFRESTIMATED 53 (L) 02/13/2024    GFRESTIMATED 53 (L) 06/18/2021    GFRESTBLACK 62 06/18/2021    FELICITY 9.6 02/13/2024    FELICITY 9.0 06/18/2021    BILITOTAL 0.5 09/11/2018    ALBUMIN 3.9 09/11/2018    ALKPHOS 86 09/11/2018    ALT 36 07/21/2022    ALT 30 06/18/2021    AST 29 09/11/2018        INR RESULTS:  Lab Results   Component Value Date    INR 1.12 10/06/2014       No components found for: \"CK\"  Lab Results   Component Value Date    MAG 1.9 01/18/2010     Lab Results   Component Value Date    NTBNP 432 08/26/2022    NTBNP 544 (H) 06/18/2021     @BRIEFLABR (dig)@    Most recent echocardiogram:  No results found for this or any previous visit (from the past 8760 hour(s)).      Assessment and Plan:    In summary,Santiago  is a  69 year old male who is here for a CORE visit. He is euvolemic today.Will keep the Entresto dose as is due to low BP with higher doses and patient had symptoms with it. Labs reviewed. He appeared to be dry - he said he wasn't hydrated much. Doesn't want to stop of reduce Lasix - says he starts swelling up quickly. Patient can't travel to  now- would like cardiologist at [place]. He hasn't had an echo ( missed multiple appts) we will schedule in the next 2-4 weeks.     1.  Chronic systolic heart failure secondary to ICM.  Stage C  NYHA Class II  ACEi/ARB/ARNI/ -stay on Entresto 49/51 mg BID - higher dose causes him to be hypotensive.  BB: yes- max dose   Aldosterone antagonist: Spironolactone 25 mg   SCD prophylaxis: does " not meet criteria for implant  Fluid status: euvolemic  Anticoagulation:   Antiplatelet:  ASA dose   Sleep apnea:  NSAID use:  Contraindicated.  Avoid use.  Remote Monitoring:  SGLT2- 10 mg - daily    2.  Other comordbid conditions:    Atypical anginal chest pain- not present today - Imdur has helped   Coronary artery disease with prior proximal [place] [facility] [place]   Ischemic cardiomyopathy (ACC/AHA stage C, NYHA III)  Hypertension- start Entresto stop Losartan  Dyslipidemia  COPD   Type II diabetes mellitus, managed only with conservative measures      3.  Follow-up-  Echo in 2-4 weeks  HF MD - here in [place].   CORE in 4-5 months         Kayla STALLWORTH, CNP  [facility]

## 2024-02-15 NOTE — NURSING NOTE
Labs: Patient was given results of the laboratory testing obtained today. Patient demonstrated understanding of this information and agreed to call with further questions or concerns.     Med Reconcile: Reviewed and verified all current medications with the patient. The updated medication list was printed and given to the patient.    Return Appointment: Patient given instructions regarding scheduling next clinic visit. Patient demonstrated understanding of this information and agreed to call with further questions or concerns. HF MD next available, CORE 3 months after MD.    Patient stated he understood all health information given and agreed to call with further questions or concerns.    Rosy Byrd RN

## 2024-02-24 DIAGNOSIS — R35.1 NOCTURIA: ICD-10-CM

## 2024-02-24 DIAGNOSIS — K21.00 GASTROESOPHAGEAL REFLUX DISEASE WITH ESOPHAGITIS: ICD-10-CM

## 2024-02-24 DIAGNOSIS — R35.0 URINARY FREQUENCY: ICD-10-CM

## 2024-02-26 RX ORDER — TAMSULOSIN HYDROCHLORIDE 0.4 MG/1
0.4 CAPSULE ORAL DAILY
Qty: 90 CAPSULE | Refills: 0 | Status: SHIPPED | OUTPATIENT
Start: 2024-02-26

## 2024-02-29 ENCOUNTER — ANCILLARY PROCEDURE (OUTPATIENT)
Dept: CARDIOLOGY | Facility: CLINIC | Age: 69
End: 2024-02-29
Attending: NURSE PRACTITIONER
Payer: COMMERCIAL

## 2024-02-29 DIAGNOSIS — I50.22 CHRONIC SYSTOLIC HEART FAILURE (H): ICD-10-CM

## 2024-02-29 LAB
BI-PLANE LVEF ECHO: NORMAL
LVEF ECHO: NORMAL

## 2024-02-29 PROCEDURE — 93306 TTE W/DOPPLER COMPLETE: CPT | Performed by: INTERNAL MEDICINE

## 2024-02-29 PROCEDURE — 99207 PR STATISTIC IV PUSH SINGLE INITIAL SUBSTANCE: CPT | Performed by: INTERNAL MEDICINE

## 2024-02-29 RX ADMIN — Medication 3 ML: at 10:17

## 2024-03-14 ENCOUNTER — TELEPHONE (OUTPATIENT)
Dept: FAMILY MEDICINE | Facility: CLINIC | Age: 69
End: 2024-03-14
Payer: COMMERCIAL

## 2024-03-14 NOTE — TELEPHONE ENCOUNTER
Patient Quality Outreach    Patient is due for the following:   Diabetes -  A1C, Eye Exam, and Foot Exam  Asthma  -  ACT needed  Physical Annual Wellness Visit      Topic Date Due    Hepatitis A Vaccine (1 of 2 - Risk 2-dose series) 02/02/1975    Zoster (Shingles) Vaccine (1 of 2) Never done       Next Steps:   Schedule a Annual Wellness Visit    Type of outreach:    Sent [employer] message.    Next Steps:  Reach out within 90 days via [employer].    Max number of attempts reached: Yes. Will try again in 90 days if patient still on fail list.    Questions for provider review:    None           Abena Mason MA  Chart routed to Care Team.

## 2024-03-21 DIAGNOSIS — F33.0 MAJOR DEPRESSIVE DISORDER, RECURRENT EPISODE, MILD (H): ICD-10-CM

## 2024-03-22 RX ORDER — TRAZODONE HYDROCHLORIDE 100 MG/1
100 TABLET ORAL AT BEDTIME
Qty: 90 TABLET | Refills: 0 | Status: SHIPPED | OUTPATIENT
Start: 2024-03-22 | End: 2024-06-19

## 2024-03-25 DIAGNOSIS — I10 ESSENTIAL HYPERTENSION WITH GOAL BLOOD PRESSURE LESS THAN 140/90: ICD-10-CM

## 2024-03-29 RX ORDER — CARVEDILOL 25 MG/1
TABLET ORAL
Qty: 180 TABLET | Refills: 3 | Status: SHIPPED | OUTPATIENT
Start: 2024-03-29

## 2024-03-29 NOTE — TELEPHONE ENCOUNTER
carvedilol (COREG) 25 MG tablet 180 tablet 3 3/22/2023       Last Office Visit: 2/15/24  Future Office visit:   4/23/24      Beta-Blockers Protocol Passed        Sarah Faustin RN  P Red Flag Triage/MRT

## 2024-04-03 ENCOUNTER — PATIENT OUTREACH (OUTPATIENT)
Dept: CARE COORDINATION | Facility: CLINIC | Age: 69
End: 2024-04-03
Payer: COMMERCIAL

## 2024-04-03 NOTE — PROGRESS NOTES
Social Work Telephone Message Note  [facility]     Patient Name:  Santiago PASTOR/Age:  1955 (69 year old)    Referral Source: Cardiology  Reason for Referral:  medication affordability     attempted to contact Patient via telephone on 4/3/24. Sw had been consulted to connect with patient regarding medication affordability. Left a message encouraging a return call if needed.  will await Patient's return phone call and will provide assistance at that time.          ANA MARIA Griffith, [facility]    [facility]  [phone]  [email or web address]

## 2024-04-06 ENCOUNTER — HEALTH MAINTENANCE LETTER (OUTPATIENT)
Age: 69
End: 2024-04-06

## 2024-04-10 DIAGNOSIS — I50.22 CHRONIC SYSTOLIC HEART FAILURE (H): Primary | ICD-10-CM

## 2024-05-08 DIAGNOSIS — I50.22 CHRONIC SYSTOLIC HEART FAILURE (H): ICD-10-CM

## 2024-05-16 DIAGNOSIS — K21.00 GASTROESOPHAGEAL REFLUX DISEASE WITH ESOPHAGITIS: ICD-10-CM

## 2024-05-16 DIAGNOSIS — R35.0 URINARY FREQUENCY: ICD-10-CM

## 2024-05-16 DIAGNOSIS — R35.1 NOCTURIA: ICD-10-CM

## 2024-05-16 RX ORDER — TAMSULOSIN HYDROCHLORIDE 0.4 MG/1
0.4 CAPSULE ORAL DAILY
Qty: 90 CAPSULE | Refills: 0 | OUTPATIENT
Start: 2024-05-16

## 2024-05-16 NOTE — TELEPHONE ENCOUNTER
Patient was contacted.  States his new primary care provider is Dr. Davila and they are at the [facility].      Writer called the pharmacy to change the refill requests to this new PCP.  The prescriptions for Omeprazole and Tamsulsin were updated to new PCP for future requests.         Kristina Kjellberg, MSN, RN

## 2024-05-16 NOTE — TELEPHONE ENCOUNTER
sacubitril-valsartan (ENTRESTO) 49-51 MG per tablet 180 tablet 0 1/30/2024       Last Office Visit: 2/15/24  Future Office visit:   7/25/24    Angiotensin-II Receptors Agnuod1805/09/2024 08:48 AM   Protocol Details Has GFR on file in past 12 months and most recent value is normal        Component      Latest Ref Rng 2/13/2024  10:40 AM   Sodium      135 - 145 mmol/L 136    Potassium      3.4 - 5.3 mmol/L 5.3    Chloride      98 - 107 mmol/L 98    Carbon Dioxide (CO2)      22 - 29 mmol/L 28    Anion Gap      7 - 15 mmol/L 10    Urea Nitrogen      8.0 - 23.0 mg/dL 26.5 (H)    Creatinine      0.67 - 1.17 mg/dL 1.43 (H)    GFR Estimate      >60 mL/min/1.73m2 53 (L)        Routing refill request to provider for review/approval because:  Abnormal lab    Sarah Faustin RN  P Red Flag Triage/MRT

## 2024-07-16 DIAGNOSIS — I50.22 CHRONIC SYSTOLIC HEART FAILURE (H): Primary | ICD-10-CM

## 2024-07-23 ENCOUNTER — LAB (OUTPATIENT)
Dept: LAB | Facility: CLINIC | Age: 69
End: 2024-07-23
Payer: COMMERCIAL

## 2024-07-23 DIAGNOSIS — I50.22 CHRONIC SYSTOLIC HEART FAILURE (H): ICD-10-CM

## 2024-07-23 LAB
ANION GAP SERPL CALCULATED.3IONS-SCNC: 10 MMOL/L (ref 7–15)
BUN SERPL-MCNC: 19.2 MG/DL (ref 8–23)
CALCIUM SERPL-MCNC: 9.5 MG/DL (ref 8.8–10.4)
CHLORIDE SERPL-SCNC: 101 MMOL/L (ref 98–107)
CREAT SERPL-MCNC: 1.47 MG/DL (ref 0.67–1.17)
EGFRCR SERPLBLD CKD-EPI 2021: 52 ML/MIN/1.73M2
GLUCOSE SERPL-MCNC: 104 MG/DL (ref 70–99)
HCO3 SERPL-SCNC: 29 MMOL/L (ref 22–29)
NT-PROBNP SERPL-MCNC: 313 PG/ML (ref 0–900)
POTASSIUM SERPL-SCNC: 4.9 MMOL/L (ref 3.4–5.3)
SODIUM SERPL-SCNC: 140 MMOL/L (ref 135–145)

## 2024-07-23 PROCEDURE — 36415 COLL VENOUS BLD VENIPUNCTURE: CPT

## 2024-07-23 PROCEDURE — 80048 BASIC METABOLIC PNL TOTAL CA: CPT

## 2024-07-23 PROCEDURE — 83880 ASSAY OF NATRIURETIC PEPTIDE: CPT

## 2024-08-07 ENCOUNTER — TELEPHONE (OUTPATIENT)
Dept: CARDIOLOGY | Facility: CLINIC | Age: 69
End: 2024-08-07

## 2024-08-07 ENCOUNTER — LAB (OUTPATIENT)
Dept: LAB | Facility: CLINIC | Age: 69
End: 2024-08-07
Payer: COMMERCIAL

## 2024-08-07 DIAGNOSIS — I50.22 CHRONIC SYSTOLIC HEART FAILURE (H): Primary | ICD-10-CM

## 2024-08-07 DIAGNOSIS — I50.22 CHRONIC SYSTOLIC HEART FAILURE (H): ICD-10-CM

## 2024-08-07 LAB
ANION GAP SERPL CALCULATED.3IONS-SCNC: 10 MMOL/L (ref 7–15)
BUN SERPL-MCNC: 18.9 MG/DL (ref 8–23)
CALCIUM SERPL-MCNC: 8.8 MG/DL (ref 8.8–10.4)
CHLORIDE SERPL-SCNC: 106 MMOL/L (ref 98–107)
CREAT SERPL-MCNC: 1.37 MG/DL (ref 0.67–1.17)
EGFRCR SERPLBLD CKD-EPI 2021: 56 ML/MIN/1.73M2
GLUCOSE SERPL-MCNC: 75 MG/DL (ref 70–99)
HCO3 SERPL-SCNC: 26 MMOL/L (ref 22–29)
POTASSIUM SERPL-SCNC: 5 MMOL/L (ref 3.4–5.3)
SODIUM SERPL-SCNC: 142 MMOL/L (ref 135–145)

## 2024-08-07 PROCEDURE — 80048 BASIC METABOLIC PNL TOTAL CA: CPT

## 2024-08-07 PROCEDURE — 36415 COLL VENOUS BLD VENIPUNCTURE: CPT

## 2024-08-07 NOTE — TELEPHONE ENCOUNTER
Called patient to see if he can do labs somewhere today as the ones we have are more than 2 weeks old. Patient agreed to come to [place] to do labs this afternoon.    Mirella Wood CMA ([facility])      ----- Message from Rosy ZABALA sent at 8/7/2024  9:21 AM CDT -----  Regarding: CORE labs  RimmazoranSantiago is scheduled to see Batul tomorrow, last set of labs are more than 2 weeks old. Can you call and see if he can get labs somewhere today?    Thanks!!  HERNAN Gallegos

## 2024-08-08 ENCOUNTER — OFFICE VISIT (OUTPATIENT)
Dept: CARDIOLOGY | Facility: CLINIC | Age: 69
End: 2024-08-08
Payer: COMMERCIAL

## 2024-08-08 VITALS
BODY MASS INDEX: 32.49 KG/M2 | HEART RATE: 60 BPM | WEIGHT: 196.4 LBS | DIASTOLIC BLOOD PRESSURE: 88 MMHG | SYSTOLIC BLOOD PRESSURE: 145 MMHG | OXYGEN SATURATION: 98 %

## 2024-08-08 DIAGNOSIS — E11.9 DIABETES MELLITUS TYPE 2, NONINSULIN DEPENDENT (H): ICD-10-CM

## 2024-08-08 DIAGNOSIS — I10 ESSENTIAL HYPERTENSION WITH GOAL BLOOD PRESSURE LESS THAN 140/90: ICD-10-CM

## 2024-08-08 DIAGNOSIS — I50.22 CHRONIC SYSTOLIC HEART FAILURE (H): Primary | ICD-10-CM

## 2024-08-08 DIAGNOSIS — Z63.4 SPOUSE DECEASED: ICD-10-CM

## 2024-08-08 DIAGNOSIS — I25.5 ISCHEMIC CARDIOMYOPATHY: ICD-10-CM

## 2024-08-08 PROCEDURE — 99214 OFFICE O/P EST MOD 30 MIN: CPT | Performed by: NURSE PRACTITIONER

## 2024-08-08 RX ORDER — IBUPROFEN 600 MG/1
TABLET, FILM COATED ORAL
COMMUNITY
Start: 2024-04-10

## 2024-08-08 RX ORDER — DULOXETIN HYDROCHLORIDE 60 MG/1
60 CAPSULE, DELAYED RELEASE ORAL DAILY
COMMUNITY

## 2024-08-08 SDOH — SOCIAL STABILITY - SOCIAL INSECURITY: DISSAPEARANCE AND DEATH OF FAMILY MEMBER: Z63.4

## 2024-08-08 ASSESSMENT — PATIENT HEALTH QUESTIONNAIRE - PHQ9: SUM OF ALL RESPONSES TO PHQ QUESTIONS 1-9: 0

## 2024-08-08 NOTE — NURSING NOTE
"Chief Complaint   Patient presents with    Follow Up     Reason for visit: Return CORE, 70 yo male with systolic heart failure presents for follow up visit with labs prior.       Initial BP (!) 146/87 (BP Location: Left arm, Patient Position: Sitting, Cuff Size: Adult Regular)   Pulse 65   Wt 89.1 kg (196 lb 6.4 oz)   SpO2 98%   BMI 32.49 kg/m   Estimated body mass index is 32.49 kg/m  as calculated from the following:    Height as of 5/5/23: 1.656 m (5' 5.2\").    Weight as of this encounter: 89.1 kg (196 lb 6.4 oz)..  BP completed using cuff size: regular    CHINA Mack    "

## 2024-08-08 NOTE — NURSING NOTE
Labs: Patient was given results of the laboratory testing obtained today. Patient demonstrated understanding of this information and agreed to call with further questions or concerns.     Med Reconcile: Reviewed and verified all current medications with the patient. The updated medication list was printed and given to the patient.    Return Appointment: Patient given instructions regarding scheduling next clinic visit. Patient demonstrated understanding of this information and agreed to call with further questions or concerns. CORE in 6 months. Referral for HF MD in [place].    Patient stated he understood all health information given and agreed to call with further questions or concerns.    Rosy Byrd RN

## 2024-08-08 NOTE — PATIENT INSTRUCTIONS
Take your medicines every day, as directed    Changes made today:  none     Monitor Your Weight and Symptoms    Contact us if you:    Gain 2 pounds in one day or 5 pounds in one week  Feel more short of breath  Notice more leg swelling  Feel lightheadeded   Change your lifestyle    Limit Salt or Sodium:  2000 mg  Limit Fluids:  2000 mL or approximately 64 ounces  Eat a Heart Healthy Diet  Low in saturated fats  Stay Active:  Aim to move at least 150 minutes every  week         To Contact us    During Business Hours:  [phone], option # 1      After hours, weekends or holidays:   [phone], Option #4  Ask to speak to the On-Call Cardiologist. Inform them you are a CORE/heart failure patient at the [place].     Use [employer] allows you to communicate directly with your heart team through secure messaging.  [employer] can be accessed any time on your phone, computer, or tablet.  If you need assistance, we'd be happy to help!         Keep your Heart Appointments:    Heart Failure cardiologist in [place] - we will call you to schedule    CORE in 6 months         Heart Failure Support Group  Virtual meetings will continue in 2024 Please reach out if you would like to attend and we can get you the information you need to log in.     2024 dates:    Monday, August 5th, 1-2pm     Monday, September 9th, 1-2pm     Monday, October 7th, 1-2pm     Monday, November 4th, 1-2pm     Monday, December 2nd, 1-2pm     Follow the American Heart Association Diet and Lifestyle recommendations:  Limit saturated fat, trans fat, sodium, red meat, sweets and sugar-sweetened beverages. If you choose to eat red meat, compare labels and select the leanest cuts available.  Aim for at least 150 minutes of moderate physical activity or 75 minutes of vigorous physical activity - or an equal combination of both - each week.     During business hours: [phone], press option # 1 to schedule an appointment or send a message to  your care team     After hours, weekends or holidays: On Call Cardiologist- [phone]   option #4 and ask to speak to the on-call Cardiologist. Inform them you are a CORE/heart failure patient at the [place].

## 2024-08-08 NOTE — LETTER
8/8/2024      RE: Santiago Hylton  [address]  [place] [address]       Dear Colleague,    Thank you for the opportunity to participate in the care of your patient, Santiago Hylton, at the [facility]. Please see a copy of my visit note below.      HPI: Santiago is a 69 year old Black or  male with a past medical history  Ischemic cardiomyopathy with prior coronary angiography and intervention (status post PCI to LAD in 1995 for ACS), Hypertension, Dyslipidemia, Type II diabetes mellitus.    He has less stress and that has helped. He has pain in the feet- its better once he moves around- chronic.  At home < 120 systolic.   He has been breathing better. Takes the 20 mg of Lasix. He has less SOB and CANO. - he feels stronger and better he says. He says the weight 190-192 lbs .  He has no swelling in the legs/feet.  He says he has been taking Entresto  49/51 mg BID -     Denies weight gain, chest pain, palpitations, lightheadedness, dizziness, near syncopal/syncopal episodes. Santiago has been following salt and fluid restrictions.      PAST MEDICAL HISTORY:  Past Medical History:   Diagnosis Date     CAD (coronary artery disease) 8/12/2012    S/P MI and stenting 2001, 2005 at [facility]      CHF (congestive heart failure) (H) 8/3/2012     Chronic hepatitis C (H) 11/21/2014     CKD (chronic kidney disease) stage 2, GFR 60-89 ml/min 12/3/2012     COPD (chronic obstructive pulmonary disease) (H) 11/21/2014     GERD (gastroesophageal reflux disease) 8/12/2012     Gout 8/12/2012     History of colonic polyps 9/30/2008     Hyperlipidemia LDL goal <100 8/3/2012     Hypertension goal BP (blood pressure) < 140/90 8/3/2012     Mild persistent asthma 5/29/2013    Patient does not have COPD      Mild recurrent major depression (H24) 8/3/2012     Nonsenile cataract      Tobacco abuse 9/13/2013     Type 2 diabetes, HbA1C goal < 8% (H)  8/3/2012       FAMILY HISTORY:  Family History   Problem Relation Age of Onset     Heart Disease Maternal Grandmother      Hypertension Maternal Grandmother      Hypertension Father      Hypertension Mother      Hypertension Sister      Thyroid Disease Sister      Cancer Brother      Diabetes Brother      Hypertension Brother      Hypertension Maternal Aunt      Cancer Maternal Uncle      Hypertension Maternal Uncle      Hypertension Paternal Aunt      Hypertension Paternal Uncle      Hypertension Maternal Grandfather      Hypertension Paternal Grandmother      Hypertension Paternal Grandfather      Cerebrovascular Disease No family hx of      Glaucoma No family hx of      Macular Degeneration No family hx of        SOCIAL HISTORY:  Social History     Tobacco Use     Smoking status: Former     Current packs/day: 0.00     Average packs/day: 0.3 packs/day for 15.0 years (3.8 ttl pk-yrs)     Types: Cigarettes     Start date: 2001     Quit date: 2016     Years since quittin.8     Smokeless tobacco: Never     Tobacco comments:     3-4 a day   Vaping Use     Vaping status: Never Used   Substance Use Topics     Alcohol use: Yes     Comment: weekend beer     Drug use: No           CURRENT MEDICATIONS:  Current Outpatient Medications   Medication Sig Dispense Refill     allopurinol (ZYLOPRIM) 100 MG tablet TAKE 2 TABLETS BY MOUTH EVERY DAY NEEDS VISIT AND/OR LABS, WHICH CAN BE IN PERSON BUT A VIRTUAL VISIT MAY BE ACCEPTABLE, FOR MORE. 60 tablet 0     apixaban ANTICOAGULANT (ELIQUIS) 5 MG tablet Take 1 tablet (5 mg) by mouth 2 times daily 180 tablet 1     carvedilol (COREG) 25 MG tablet TAKE 1 TABLET BY MOUTH TWICE A DAY WITH MEALS 180 tablet 3     cyclobenzaprine (FLEXERIL) 10 MG tablet Take 1 tablet (10 mg) by mouth 3 times daily as needed for muscle spasms 30 tablet 0     DULoxetine (CYMBALTA) 60 MG capsule Take 60 mg by mouth daily       empagliflozin (JARDIANCE) 10 MG TABS tablet Take 1 tablet (10 mg) by  mouth daily 90 tablet 3     furosemide (LASIX) 20 MG tablet Take 1 tablet (20 mg) by mouth daily 90 tablet 3     gabapentin (NEURONTIN) 300 MG capsule Take 1 capsule (300 mg) by mouth 3 times daily 270 capsule 1     ibuprofen (ADVIL/MOTRIN) 600 MG tablet TAKE 1 TABLET BY MOUTH EVERY 6-8 HOURS AS NEEDED       montelukast (SINGULAIR) 10 MG tablet TAKE 1 TABLET BY MOUTH EVERYDAY AT BEDTIME. NEEDS TO ESTABLISH WITH NEW PCP. 90 tablet 1     nitroGLYcerin (NITROSTAT) 0.4 MG sublingual tablet Place 1 tablet (0.4 mg) under the tongue every 5 minutes as needed for chest pain 0.4 mg by Sublingual route every 5 (five) minutes as needed. 25 tablet 11     omeprazole (PRILOSEC) 20 MG DR capsule TAKE 1 CAPSULE BY MOUTH EVERY DAY. PLEASE FOLLOW UP IN CLINIC FOR NEXT REFILL. 90 capsule 0     oxyCODONE (ROXICODONE) 5 MG tablet Take 1 tablet (5 mg) by mouth 2 times daily as needed for moderate to severe pain (up to 2 a day) *PLEASE SCHEDULE APPT FOR FURTHER REFILLS* 60 tablet 0     sacubitril-valsartan (ENTRESTO) 49-51 MG per tablet Take 1 tablet by mouth 2 times daily 180 tablet 3     spironolactone (ALDACTONE) 25 MG tablet Take 1 tablet (25 mg) by mouth daily 90 tablet 3     tamsulosin (FLOMAX) 0.4 MG capsule TAKE 1 CAPSULE (0.4 MG) BY MOUTH DAILY PLEASE FOLLOW UP FOR NEXT REFILL. 90 capsule 0     traZODone (DESYREL) 100 MG tablet Take 1 tablet (100 mg) by mouth at bedtime +++APPOINTMENT NEEDED FOR REFILLS+++ 60 tablet 0     venlafaxine (EFFEXOR XR) 75 MG 24 hr capsule Take 1 capsule (75 mg) by mouth daily 90 capsule 1     albuterol (PROAIR HFA/PROVENTIL HFA/VENTOLIN HFA) 108 (90 Base) MCG/ACT inhaler Inhale 2 puffs into the lungs every 4 hours as needed for shortness of breath / dyspnea (Patient not taking: Reported on 2/15/2024) 18 g 3     albuterol (PROVENTIL) (2.5 MG/3ML) 0.083% neb solution INHALE 3 ML BY NEBULIZATION METHOD EVERY 6 HOURS AS NEEDED FOR SHORTNESS OF BREATH (Patient not taking: Reported on 2/15/2024) 360 mL 1      atorvastatin (LIPITOR) 40 MG tablet Take 1 tablet (40 mg) by mouth daily for 90 days 90 tablet 3     budesonide-formoterol (SYMBICORT) 160-4.5 MCG/ACT Inhaler TAKE 2 PUFFS BY MOUTH TWICE A DAY (Patient not taking: Reported on 2/15/2024) 30.6 g 1     cetirizine (ZYRTEC) 10 MG tablet TAKE 1 TABLET BY MOUTH DAILY AT BEDTIME (Patient not taking: Reported on 2/15/2024) 90 tablet 1     enoxaparin ANTICOAGULANT (LOVENOX) 100 MG/ML syringe Inject 1 mL (100 mg) Subcutaneous in the morning and 1 mL (100 mg) in the evening. Until INR 2.3 or greater (Patient not taking: Reported on 2/15/2024) 20 mL 1       ROS:  Review Of Systems  Skin: negative  Eyes: negative  Ears/Nose/Throat: negative  Respiratory: No shortness of breath, dyspnea on exertion, cough, or hemoptysis  Cardiovascular: negative  Gastrointestinal: negative  Genitourinary: negative  Musculoskeletal: negative  Neurologic: negative  Psychiatric: negative  Hematologic/Lymphatic/Immunologic: negative  Endocrine: negative      EXAM:  BP (!) 146/87 (BP Location: Left arm, Patient Position: Sitting, Cuff Size: Adult Regular)   Pulse 65   Wt 89.1 kg (196 lb 6.4 oz)   SpO2 98%   BMI 32.49 kg/m    Home weight:  General: alert, articulate, and in no acute distress.  HEENT: normocephalic, atraumatic, anicteric sclera, EOMI, mucosa moist, no cyanosis.   Neck: neck supple.  No adenopathy, masses, or carotid bruits.  JVP not detected at 90 degrees  Heart: regular rhythm, normal S1/S2, no murmur, gallop, rub.  Precordium quiet with normal PMI.     Lungs: clear, no rales, ronchi, or wheezing.  No accessory muscle use, respirations unlabored.   Abdomen: soft, non-tender, bowel sounds present, no hepatomegaly  Extremities: no  pitting edema.   No cyanosis.   Neurological: alert and oriented x 3.  normal speech and affect, no gross motor deficits  Skin:  No ecchymoses, rashes, or clubbing.    Labs:  CBC RESULTS:  Lab Results   Component Value Date    WBC 9.3 10/14/2022    WBC 9.9  "06/18/2021    RBC 4.79 10/14/2022    RBC 4.94 06/18/2021    HGB 14.8 10/14/2022    HGB 15.1 06/18/2021    HCT 45.7 10/14/2022    HCT 46.3 06/18/2021    MCV 95 10/14/2022    MCV 94 06/18/2021    MCH 30.9 10/14/2022    MCH 30.6 06/18/2021    MCHC 32.4 10/14/2022    MCHC 32.6 06/18/2021    RDW 13.5 10/14/2022    RDW 12.8 06/18/2021     10/14/2022     06/18/2021       CMP RESULTS:  Lab Results   Component Value Date     08/07/2024     06/18/2021    POTASSIUM 5.0 08/07/2024    POTASSIUM 4.0 04/26/2023    POTASSIUM 4.3 06/18/2021    CHLORIDE 106 08/07/2024    CHLORIDE 109 04/26/2023    CHLORIDE 102 06/18/2021    CO2 26 08/07/2024    CO2 27 04/26/2023    CO2 33 (H) 06/18/2021    ANIONGAP 10 08/07/2024    ANIONGAP 7 04/26/2023    ANIONGAP 4 06/18/2021    GLC 75 08/07/2024     (H) 04/26/2023    GLC 94 06/18/2021    BUN 18.9 08/07/2024    BUN 18 04/26/2023    BUN 24 06/18/2021    CR 1.37 (H) 08/07/2024    CR 1.38 (H) 06/18/2021    GFRESTIMATED 56 (L) 08/07/2024    GFRESTIMATED 53 (L) 06/18/2021    GFRESTBLACK 62 06/18/2021    FELICITY 8.8 08/07/2024    FELICITY 9.0 06/18/2021    BILITOTAL 0.5 09/11/2018    ALBUMIN 3.9 09/11/2018    ALKPHOS 86 09/11/2018    ALT 36 07/21/2022    ALT 30 06/18/2021    AST 29 09/11/2018        INR RESULTS:  Lab Results   Component Value Date    INR 1.12 10/06/2014       No components found for: \"CK\"  Lab Results   Component Value Date    MAG 1.9 01/18/2010     Lab Results   Component Value Date    NTBNP 313 07/23/2024    NTBNP 544 (H) 06/18/2021     @BRIEFLABR (dig)@    Most recent echocardiogram:  No results found for this or any previous visit (from the past 8760 hour(s)).      Assessment and Plan:    In summary,Santiago  is a  69 year old male who is here for a CORE visit. He is euvolemic today.Will keep the Entresto dose as is due to low BP with higher doses and patient had symptoms such has lightheadedness and dizzuness. Labs reviewed. . Patient can't travel to  now- " would like cardiologist at [place].     1.  Chronic systolic heart failure secondary to ICM.  Stage C  NYHA Class II  ACEi/ARB/ARNI/ -stay on Entresto 49/51 mg BID - higher dose causes him to be hypotensive.  BB: yes- max dose   Aldosterone antagonist: Spironolactone 25 mg   SCD prophylaxis: does not meet criteria for implant  Fluid status: euvolemic  Anticoagulation:   Antiplatelet:  ASA dose   Sleep apnea:  NSAID use:  Contraindicated.  Avoid use.  Remote Monitoring:  SGLT2- 10 mg - daily    2.  Other comordbid conditions:    Atypical anginal chest pain- not present today - Imdur has helped   Coronary artery disease with prior proximal LAD stenting in 1995 at [facility]   Ischemic cardiomyopathy (ACC/AHA stage C, NYHA III)  Hypertension-Entresto  Dyslipidemia  COPD   Type II diabetes mellitus, managed only with conservative measures      3.  [place]  [place]  [facility] in 6 months         Kayla STALLWORTH, CNP  [facility]                         Please do not hesitate to contact me if you have any questions/concerns.     Sincerely,     FEDERICA Multani CNP

## 2024-08-08 NOTE — PROGRESS NOTES
HPI: Santiago is a 69 year old Black or  male with a past medical history  Ischemic cardiomyopathy with prior coronary angiography and intervention (status post PCI to LAD in 1995 for ACS), Hypertension, Dyslipidemia, Type II diabetes mellitus.    He has less stress and that has helped. He has pain in the feet- its better once he moves around- chronic.  At home < 120 systolic.   He has been breathing better. Takes the 20 mg of Lasix. He has less SOB and CANO. - he feels stronger and better he says. He says the weight 190-192 lbs .  He has no swelling in the legs/feet.  He says he has been taking Entresto  49/51 mg BID -     Denies weight gain, chest pain, palpitations, lightheadedness, dizziness, near syncopal/syncopal episodes. Santiago has been following salt and fluid restrictions.      PAST MEDICAL HISTORY:  Past Medical History:   Diagnosis Date    CAD (coronary artery disease) 8/12/2012    [place] [facility] [place]     CHF (congestive heart failure) (H) 8/3/2012    Chronic hepatitis C (H) 11/21/2014    CKD (chronic kidney disease) stage 2, GFR 60-89 ml/min 12/3/2012    COPD (chronic obstructive pulmonary disease) (H) 11/21/2014    GERD (gastroesophageal reflux disease) 8/12/2012    Gout 8/12/2012    History of colonic polyps 9/30/2008    Hyperlipidemia LDL goal <100 8/3/2012    Hypertension goal BP (blood pressure) < 140/90 8/3/2012    Mild persistent asthma 5/29/2013    Patient does not have COPD     Mild recurrent major depression (H24) 8/3/2012    Nonsenile cataract     Tobacco abuse 9/13/2013    Type 2 diabetes, HbA1C goal < 8% (H) 8/3/2012       FAMILY HISTORY:  Family History   Problem Relation Age of Onset    Heart Disease Maternal Grandmother     Hypertension Maternal Grandmother     Hypertension Father     Hypertension Mother     Hypertension Sister     Thyroid Disease Sister     Cancer Brother     Diabetes Brother     Hypertension Brother     Hypertension Maternal Aunt      Cancer Maternal Uncle     Hypertension Maternal Uncle     Hypertension Paternal Aunt     Hypertension Paternal Uncle     Hypertension Maternal Grandfather     Hypertension Paternal Grandmother     Hypertension Paternal Grandfather     Cerebrovascular Disease No family hx of     Glaucoma No family hx of     Macular Degeneration No family hx of        SOCIAL HISTORY:  Social History     Tobacco Use    Smoking status: Former     Current packs/day: 0.00     Average packs/day: 0.3 packs/day for 15.0 years (3.8 ttl pk-yrs)     Types: Cigarettes     Start date: 2001     Quit date: 2016     Years since quittin.8    Smokeless tobacco: Never    Tobacco comments:     3-4 a day   Vaping Use    Vaping status: Never Used   Substance Use Topics    Alcohol use: Yes     Comment: weekend beer    Drug use: No           CURRENT MEDICATIONS:  Current Outpatient Medications   Medication Sig Dispense Refill    allopurinol (ZYLOPRIM) 100 MG tablet TAKE 2 TABLETS BY MOUTH EVERY DAY NEEDS VISIT AND/OR LABS, WHICH CAN BE IN PERSON BUT A VIRTUAL VISIT MAY BE ACCEPTABLE, FOR MORE. 60 tablet 0    apixaban ANTICOAGULANT (ELIQUIS) 5 MG tablet Take 1 tablet (5 mg) by mouth 2 times daily 180 tablet 1    carvedilol (COREG) 25 MG tablet TAKE 1 TABLET BY MOUTH TWICE A DAY WITH MEALS 180 tablet 3    cyclobenzaprine (FLEXERIL) 10 MG tablet Take 1 tablet (10 mg) by mouth 3 times daily as needed for muscle spasms 30 tablet 0    DULoxetine (CYMBALTA) 60 MG capsule Take 60 mg by mouth daily      empagliflozin (JARDIANCE) 10 MG TABS tablet Take 1 tablet (10 mg) by mouth daily 90 tablet 3    furosemide (LASIX) 20 MG tablet Take 1 tablet (20 mg) by mouth daily 90 tablet 3    gabapentin (NEURONTIN) 300 MG capsule Take 1 capsule (300 mg) by mouth 3 times daily 270 capsule 1    ibuprofen (ADVIL/MOTRIN) 600 MG tablet TAKE 1 TABLET BY MOUTH EVERY 6-8 HOURS AS NEEDED      montelukast (SINGULAIR) 10 MG tablet TAKE 1 TABLET BY MOUTH EVERYDAY AT BEDTIME.  NEEDS TO ESTABLISH WITH NEW PCP. 90 tablet 1    nitroGLYcerin (NITROSTAT) 0.4 MG sublingual tablet Place 1 tablet (0.4 mg) under the tongue every 5 minutes as needed for chest pain 0.4 mg by Sublingual route every 5 (five) minutes as needed. 25 tablet 11    omeprazole (PRILOSEC) 20 MG DR capsule TAKE 1 CAPSULE BY MOUTH EVERY DAY. PLEASE FOLLOW UP IN CLINIC FOR NEXT REFILL. 90 capsule 0    oxyCODONE (ROXICODONE) 5 MG tablet Take 1 tablet (5 mg) by mouth 2 times daily as needed for moderate to severe pain (up to 2 a day) *PLEASE SCHEDULE APPT FOR FURTHER REFILLS* 60 tablet 0    sacubitril-valsartan (ENTRESTO) 49-51 MG per tablet Take 1 tablet by mouth 2 times daily 180 tablet 3    spironolactone (ALDACTONE) 25 MG tablet Take 1 tablet (25 mg) by mouth daily 90 tablet 3    tamsulosin (FLOMAX) 0.4 MG capsule TAKE 1 CAPSULE (0.4 MG) BY MOUTH DAILY PLEASE FOLLOW UP FOR NEXT REFILL. 90 capsule 0    traZODone (DESYREL) 100 MG tablet Take 1 tablet (100 mg) by mouth at bedtime +++APPOINTMENT NEEDED FOR REFILLS+++ 60 tablet 0    venlafaxine (EFFEXOR XR) 75 MG 24 hr capsule Take 1 capsule (75 mg) by mouth daily 90 capsule 1    albuterol (PROAIR HFA/PROVENTIL HFA/VENTOLIN HFA) 108 (90 Base) MCG/ACT inhaler Inhale 2 puffs into the lungs every 4 hours as needed for shortness of breath / dyspnea (Patient not taking: Reported on 2/15/2024) 18 g 3    albuterol (PROVENTIL) (2.5 MG/3ML) 0.083% neb solution INHALE 3 ML BY NEBULIZATION METHOD EVERY 6 HOURS AS NEEDED FOR SHORTNESS OF BREATH (Patient not taking: Reported on 2/15/2024) 360 mL 1    atorvastatin (LIPITOR) 40 MG tablet Take 1 tablet (40 mg) by mouth daily for 90 days 90 tablet 3    budesonide-formoterol (SYMBICORT) 160-4.5 MCG/ACT Inhaler TAKE 2 PUFFS BY MOUTH TWICE A DAY (Patient not taking: Reported on 2/15/2024) 30.6 g 1    cetirizine (ZYRTEC) 10 MG tablet TAKE 1 TABLET BY MOUTH DAILY AT BEDTIME (Patient not taking: Reported on 2/15/2024) 90 tablet 1    enoxaparin  ANTICOAGULANT (LOVENOX) 100 MG/ML syringe Inject 1 mL (100 mg) Subcutaneous in the morning and 1 mL (100 mg) in the evening. Until INR 2.3 or greater (Patient not taking: Reported on 2/15/2024) 20 mL 1       ROS:  Review Of Systems  Skin: negative  Eyes: negative  Ears/Nose/Throat: negative  Respiratory: No shortness of breath, dyspnea on exertion, cough, or hemoptysis  Cardiovascular: negative  Gastrointestinal: negative  Genitourinary: negative  Musculoskeletal: negative  Neurologic: negative  Psychiatric: negative  Hematologic/Lymphatic/Immunologic: negative  Endocrine: negative      EXAM:  BP (!) 146/87 (BP Location: Left arm, Patient Position: Sitting, Cuff Size: Adult Regular)   Pulse 65   Wt 89.1 kg (196 lb 6.4 oz)   SpO2 98%   BMI 32.49 kg/m    Home weight:  General: alert, articulate, and in no acute distress.  HEENT: normocephalic, atraumatic, anicteric sclera, EOMI, mucosa moist, no cyanosis.   Neck: neck supple.  No adenopathy, masses, or carotid bruits.  JVP not detected at 90 degrees  Heart: regular rhythm, normal S1/S2, no murmur, gallop, rub.  Precordium quiet with normal PMI.     Lungs: clear, no rales, ronchi, or wheezing.  No accessory muscle use, respirations unlabored.   Abdomen: soft, non-tender, bowel sounds present, no hepatomegaly  Extremities: no  pitting edema.   No cyanosis.   Neurological: alert and oriented x 3.  normal speech and affect, no gross motor deficits  Skin:  No ecchymoses, rashes, or clubbing.    Labs:  CBC RESULTS:  Lab Results   Component Value Date    WBC 9.3 10/14/2022    WBC 9.9 06/18/2021    RBC 4.79 10/14/2022    RBC 4.94 06/18/2021    HGB 14.8 10/14/2022    HGB 15.1 06/18/2021    HCT 45.7 10/14/2022    HCT 46.3 06/18/2021    MCV 95 10/14/2022    MCV 94 06/18/2021    MCH 30.9 10/14/2022    MCH 30.6 06/18/2021    MCHC 32.4 10/14/2022    MCHC 32.6 06/18/2021    RDW 13.5 10/14/2022    RDW 12.8 06/18/2021     10/14/2022     06/18/2021       CMP  "RESULTS:  Lab Results   Component Value Date     08/07/2024     06/18/2021    POTASSIUM 5.0 08/07/2024    POTASSIUM 4.0 04/26/2023    POTASSIUM 4.3 06/18/2021    CHLORIDE 106 08/07/2024    CHLORIDE 109 04/26/2023    CHLORIDE 102 06/18/2021    CO2 26 08/07/2024    CO2 27 04/26/2023    CO2 33 (H) 06/18/2021    ANIONGAP 10 08/07/2024    ANIONGAP 7 04/26/2023    ANIONGAP 4 06/18/2021    GLC 75 08/07/2024     (H) 04/26/2023    GLC 94 06/18/2021    BUN 18.9 08/07/2024    BUN 18 04/26/2023    BUN 24 06/18/2021    CR 1.37 (H) 08/07/2024    CR 1.38 (H) 06/18/2021    GFRESTIMATED 56 (L) 08/07/2024    GFRESTIMATED 53 (L) 06/18/2021    GFRESTBLACK 62 06/18/2021    FELICITY 8.8 08/07/2024    FELICITY 9.0 06/18/2021    BILITOTAL 0.5 09/11/2018    ALBUMIN 3.9 09/11/2018    ALKPHOS 86 09/11/2018    ALT 36 07/21/2022    ALT 30 06/18/2021    AST 29 09/11/2018        INR RESULTS:  Lab Results   Component Value Date    INR 1.12 10/06/2014       No components found for: \"CK\"  Lab Results   Component Value Date    MAG 1.9 01/18/2010     Lab Results   Component Value Date    NTBNP 313 07/23/2024    NTBNP 544 (H) 06/18/2021     @BRIEFLABR (dig)@    Most recent echocardiogram:  No results found for this or any previous visit (from the past 8760 hour(s)).      Assessment and Plan:    In summary,Santiago  is a  69 year old male who is here for a CORE visit. He is euvolemic today.Will keep the Entresto dose as is due to low BP with higher doses and patient had symptoms such has lightheadedness and dizzuness. Labs reviewed. . Patient can't travel to  now- would like cardiologist at [place].     1.  Chronic systolic heart failure secondary to ICM.  Stage C  NYHA Class II  ACEi/ARB/ARNI/ -stay on Entresto 49/51 mg BID - higher dose causes him to be hypotensive.  BB: yes- max dose   Aldosterone antagonist: Spironolactone 25 mg   SCD prophylaxis: does not meet criteria for implant  Fluid status: euvolemic  Anticoagulation:   Antiplatelet:  " ASA dose   Sleep apnea:  NSAID use:  Contraindicated.  Avoid use.  Remote Monitoring:  SGLT2- 10 mg - daily    2.  Other comordbid conditions:    Atypical anginal chest pain- not present today - Imdur has helped   [place]onary artery [place] [facility] [place]   Ischemic cardiomyopathy (ACC/AHA stage C, NYHA III)  Hypertension-Entresto  Dyslipidemia  COPD   Type II diabetes mellitus, managed only with conservative measures      3.  Follow-up-  Cardiologist in [place]  CORE in 6 months         Kayla STALLWORTH CNP  [facility]

## 2024-08-09 ENCOUNTER — TELEPHONE (OUTPATIENT)
Dept: CARDIOLOGY | Facility: CLINIC | Age: 69
End: 2024-08-09
Payer: COMMERCIAL

## 2024-08-09 NOTE — TELEPHONE ENCOUNTER
[facility] Call Center    Phone Message    May a detailed message be left on voicemail: yes     Reason for Call: Appointment Intake    Referring Provider Name: Kayla Burnett APRN CNP   Diagnosis and/or Symptoms:     Chronic systolic heart failure (H) [I50.22]     NEW HF. Please assist patient for an appointment.    Action Taken: Other: Cardio    Travel Screening: Not Applicable     Date of Service:

## 2024-08-12 ENCOUNTER — TELEPHONE (OUTPATIENT)
Dept: FAMILY MEDICINE | Facility: CLINIC | Age: 69
End: 2024-08-12
Payer: COMMERCIAL

## 2024-08-12 DIAGNOSIS — F33.0 MAJOR DEPRESSIVE DISORDER, RECURRENT EPISODE, MILD (H): ICD-10-CM

## 2024-08-12 RX ORDER — TRAZODONE HYDROCHLORIDE 100 MG/1
100 TABLET ORAL AT BEDTIME
Qty: 90 TABLET | Refills: 1 | OUTPATIENT
Start: 2024-08-12

## 2024-08-12 NOTE — TELEPHONE ENCOUNTER
Referral from Kayla Burnett, [facility].  Patient has EF 40-45%.  Has been seen at [place] and [place] in past so either may be an option.    8/16 - Spoke with patient.  Offered appt with Dr. Orozco in [place] on 11/5 at 10:00.

## 2024-08-12 NOTE — TELEPHONE ENCOUNTER
Called and informed patient refill request has been denied due to the need for a follow up appointment. Patient declines scheduling at this time and states not to worry about this medication request.    Shanique Pitts

## 2024-08-15 ENCOUNTER — TELEPHONE (OUTPATIENT)
Dept: FAMILY MEDICINE | Facility: CLINIC | Age: 69
End: 2024-08-15
Payer: COMMERCIAL

## 2024-08-15 NOTE — TELEPHONE ENCOUNTER
Patient Quality Outreach    Patient is due for the following:   Diabetes -  Foot Exam  Asthma  -  ACT needed  Physical Annual Wellness Visit      Topic Date Due    Hepatitis A Vaccine (1 of 2 - Risk 2-dose series) [birth year]1975    Zoster (Shingles) Vaccine (1 of 2) Never done    COVID-19 Vaccine (5 - 2023-24 season) 02/07/2024       Next Steps:   Schedule a Annual Wellness Visit    Type of outreach:    Sent [employer] message.    Next Steps:  Reach out within 90 days via [employer].    Max number of attempts reached: Yes. Will try again in 90 days if patient still on fail list.    Questions for provider review:    None           Abena Mason MA

## 2024-08-16 DIAGNOSIS — F33.0 MAJOR DEPRESSIVE DISORDER, RECURRENT EPISODE, MILD (H): ICD-10-CM

## 2024-08-16 RX ORDER — TRAZODONE HYDROCHLORIDE 100 MG/1
100 TABLET ORAL AT BEDTIME
Qty: 60 TABLET | Refills: 0 | OUTPATIENT
Start: 2024-08-16

## 2024-09-16 DIAGNOSIS — I50.22 CHRONIC SYSTOLIC HEART FAILURE (H): ICD-10-CM

## 2024-09-23 NOTE — TELEPHONE ENCOUNTER
empagliflozin (JARDIANCE) 10 MG TABS tablet       Last Written Prescription Date:  10-3-23  Last Fill Quantity: 90,   # refills: 3  Last Office Visit : 8-8-24  Future Office visit:  2-13-25    Routing refill request to provider for review/approval because:  Med not on cards protocol

## 2024-09-25 DIAGNOSIS — F33.0 MAJOR DEPRESSIVE DISORDER, RECURRENT EPISODE, MILD (H): ICD-10-CM

## 2024-09-25 DIAGNOSIS — J45.31 MILD PERSISTENT ASTHMA WITH ACUTE EXACERBATION: ICD-10-CM

## 2024-09-25 RX ORDER — TRAZODONE HYDROCHLORIDE 100 MG/1
100 TABLET ORAL AT BEDTIME
Qty: 60 TABLET | Refills: 0 | OUTPATIENT
Start: 2024-09-25

## 2024-09-25 RX ORDER — MONTELUKAST SODIUM 10 MG/1
TABLET ORAL
Qty: 90 TABLET | Refills: 1 | OUTPATIENT
Start: 2024-09-25

## 2024-09-25 NOTE — TELEPHONE ENCOUNTER
Called pt and he states he does not go to this clinic for primary care. Patient will reach out to his pharmacy about this mix up.

## 2024-10-11 NOTE — TELEPHONE ENCOUNTER
Care Due:                  Date            Visit Type   Department     Provider  --------------------------------------------------------------------------------                                ESTABLISHED                              PATIENT -    [facility] PRIMARY  Last Visit: 08-      [facility]           Cat Glover  Next Visit: None Scheduled  None         None Found                                                            Last  Test          Frequency    Reason                     Performed    Due Date  --------------------------------------------------------------------------------    CBC.........  12 months..  meloxicam................  Not Found    Overdue    CMP.........  12 months..  famotidine,                Not Found    Overdue                             losartan-hydrochlorothiaz                             jose antonio, meloxicam...........    [facility] Embedded Care Due Messages. Reference number: [id number].   10/11/2024 2:05:13 PM CDT   montelukast (SINGULAIR) 10 MG tablet       Last Written Prescription Date: 6/16/16  Last Fill Quantity: 90, # refills: 3    Last Office Visit with [facility], [facility] or [facility] prescribing provider:  4/28/17   Future Office Visit:       Date of Last Asthma Action Plan Letter:   Asthma Action Plan Q1 Year    Topic Date Due     Asthma Action Plan - yearly  09/29/2017      Asthma Control Test:   ACT Total Scores 4/28/2017   ACT TOTAL SCORE (Goal Greater than or Equal to 20) 11   In the past 12 months, how many times did you visit the emergency room for your asthma without being admitted to the hospital? 2   In the past 12 months, how many times were you hospitalized overnight because of your asthma? 0       Date of Last Spirometry Test:   No results found for this or any previous visit.        Siobhan RICHARDSON Radiology

## 2024-10-20 DIAGNOSIS — I50.22 CHRONIC SYSTOLIC HEART FAILURE (H): ICD-10-CM

## 2024-10-24 DIAGNOSIS — I10 ESSENTIAL HYPERTENSION WITH GOAL BLOOD PRESSURE LESS THAN 140/90: ICD-10-CM

## 2024-10-25 NOTE — TELEPHONE ENCOUNTER
spironolactone (ALDACTONE) 25 MG tablet 90 tablet 3 12/1/2023     Last Office Visit: 8/8/24  Future Office visit:   11/5/24    Diuretics (Including Combos) Protocol Isbdtv92/22/2024 08:32 AM   Protocol Details Most recent blood pressure under 140/90 in past 12 months    Has GFR on file in past 12 months and most recent value is normal        Component      Latest Ref Rng 8/7/2024  11:46 AM   Sodium      135 - 145 mmol/L 142    Potassium      3.4 - 5.3 mmol/L 5.0    Chloride      98 - 107 mmol/L 106    Carbon Dioxide (CO2)      22 - 29 mmol/L 26    Anion Gap      7 - 15 mmol/L 10    Urea Nitrogen      8.0 - 23.0 mg/dL 18.9    Creatinine      0.67 - 1.17 mg/dL 1.37 (H)    GFR Estimate      >60 mL/min/1.73m2 56 (L)    Calcium      8.8 - 10.4 mg/dL 8.8    Glucose      70 - 99 mg/dL 75       BP Readings from Last 3 Encounters:   08/08/24 (!) 145/88   02/15/24 113/76   07/12/23 126/87       Routing refill request to provider for review/approval because:  Abnormal lab    Sarah Faustin RN  [facility]/Red Flag Triage & Med Refill Team

## 2024-10-29 ENCOUNTER — TELEPHONE (OUTPATIENT)
Dept: CARDIOLOGY | Facility: CLINIC | Age: 69
End: 2024-10-29
Payer: COMMERCIAL

## 2024-10-29 DIAGNOSIS — I50.22 CHRONIC SYSTOLIC HEART FAILURE (H): Primary | ICD-10-CM

## 2024-10-29 NOTE — TELEPHONE ENCOUNTER
furosemide (LASIX) 20 MG tablet 90 tablet 3 10/3/2023     Last Office Visit: 8/8/24  Future Office visit:   11/5/24    Diuretics (Including Combos) Protocol [id number]/26/2024 06:38 PM   Protocol Details Most recent blood pressure under 140/90 in past 12 months    Has GFR on file in past 12 months and most recent value is normal     Component      Latest Ref Rng 8/7/2024  11:46 AM   Sodium      135 - 145 mmol/L 142    Potassium      3.4 - 5.3 mmol/L 5.0    Chloride      98 - 107 mmol/L 106    Carbon Dioxide (CO2)      22 - 29 mmol/L 26    Anion Gap      7 - 15 mmol/L 10    Urea Nitrogen      8.0 - 23.0 mg/dL 18.9    Creatinine      0.67 - 1.17 mg/dL 1.37 (H)    GFR Estimate      >60 mL/min/1.73m2 56 (L)    Calcium      8.8 - 10.4 mg/dL 8.8    Glucose      70 - 99 mg/dL 75      Routing refill request to provider for review/approval because:  Abnormal lab    Sarah Faustin RN  [facility] Central Nursing/Red Flag Triage & Med Refill Team

## 2024-10-30 ENCOUNTER — LAB (OUTPATIENT)
Dept: LAB | Facility: CLINIC | Age: 69
End: 2024-10-30
Payer: COMMERCIAL

## 2024-10-30 DIAGNOSIS — I50.22 CHRONIC SYSTOLIC HEART FAILURE (H): ICD-10-CM

## 2024-10-30 LAB
ANION GAP SERPL CALCULATED.3IONS-SCNC: 8 MMOL/L (ref 7–15)
BUN SERPL-MCNC: 20.6 MG/DL (ref 8–23)
CALCIUM SERPL-MCNC: 9.3 MG/DL (ref 8.8–10.4)
CHLORIDE SERPL-SCNC: 101 MMOL/L (ref 98–107)
CREAT SERPL-MCNC: 1.41 MG/DL (ref 0.67–1.17)
EGFRCR SERPLBLD CKD-EPI 2021: 54 ML/MIN/1.73M2
GLUCOSE SERPL-MCNC: 109 MG/DL (ref 70–99)
HCO3 SERPL-SCNC: 29 MMOL/L (ref 22–29)
NT-PROBNP SERPL-MCNC: 477 PG/ML (ref 0–900)
POTASSIUM SERPL-SCNC: 5.3 MMOL/L (ref 3.4–5.3)
SODIUM SERPL-SCNC: 138 MMOL/L (ref 135–145)

## 2024-10-30 PROCEDURE — 83880 ASSAY OF NATRIURETIC PEPTIDE: CPT

## 2024-10-30 PROCEDURE — 36415 COLL VENOUS BLD VENIPUNCTURE: CPT

## 2024-10-30 PROCEDURE — 80048 BASIC METABOLIC PNL TOTAL CA: CPT

## 2024-11-01 RX ORDER — FUROSEMIDE 20 MG/1
20 TABLET ORAL DAILY
Qty: 90 TABLET | Refills: 3 | Status: SHIPPED | OUTPATIENT
Start: 2024-11-01

## 2024-11-01 RX ORDER — SPIRONOLACTONE 25 MG/1
25 TABLET ORAL DAILY
Qty: 90 TABLET | Refills: 3 | Status: SHIPPED | OUTPATIENT
Start: 2024-11-01 | End: 2024-11-05

## 2024-11-02 ENCOUNTER — HEALTH MAINTENANCE LETTER (OUTPATIENT)
Age: 69
End: 2024-11-02

## 2024-11-04 NOTE — PROGRESS NOTES
Advanced Heart Failure/Transplant Clinic Note    HPI  Dear colleagues,     I had the pleasure of seeing Mr. Santiago Hylton in the Cardiology clinic.  As you know, Mr. Santiago Hylton is a pleasant 69 year old male with a past medical history of chronic HFrEF 2/2 ICM, CAD s/p PCI in '95, HTN, HLD, DM Type II c/b neuropathy, chronic hepatitis, COPD, and CKD stage III who presents as new referral for HFrEF.    Patient reports overall feeling well recently. He tries to stay active, but sometimes his neuropathy limits him. He has chronic episodes of sharp central chest pain that occurs sometimes with exertion and relives with rest. He is unsure if these episodes occurred prior to his heart attack in the 1990s. He has presyncope with quick position changes, but no syncope. He denies palpitations, orthopnea, PND, abdominal pain, nausea, emesis, LE edema or weight gain. He watches his salt and fluid intake and monitors his weight. He reports compliance with his medications.     ROS:  A complete 12-point ROS was negative except as above.    PAST MEDICAL HISTORY:  Patient Active Problem List   Diagnosis    Hypertension goal BP (blood pressure) < 140/90    Hyperlipidemia LDL goal <100    CHF (congestive heart failure) (H)    Mild recurrent major depression (H)    Gout    GERD (gastroesophageal reflux disease)    Chronic rhinitis    History of colonic polyps    Chronic hepatitis C (H)    Coronary artery disease of native artery of native heart with stable angina pectoris (H)    Chronic obstructive airway disease with asthma (H)    Obesity (BMI 35.0-39.9) with comorbidity (H)    Chronic pain syndrome    CKD (chronic kidney disease) stage 3, GFR 30-59 ml/min (H)    Internal hemorrhoids    Chronic ischemic heart disease    Claudication of both lower extremities (H)    Rectal bleeding    Idiopathic peripheral neuropathy    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 (H)    Grief    F11.9 - Chronic, continuous use of opioids         FAMILY HISTORY:  Family History   Problem Relation Age of Onset    Heart Disease Maternal Grandmother     Hypertension Maternal Grandmother     Hypertension Father     Hypertension Mother     Hypertension Sister     Thyroid Disease Sister     Cancer Brother     Diabetes Brother     Hypertension Brother     Hypertension Maternal Aunt     Cancer Maternal Uncle     Hypertension Maternal Uncle     Hypertension Paternal Aunt     Hypertension Paternal Uncle     Hypertension Maternal Grandfather     Hypertension Paternal Grandmother     Hypertension Paternal Grandfather     Cerebrovascular Disease No family hx of     Glaucoma No family hx of     Macular Degeneration No family hx of        SOCIAL HISTORY:  Social History     Tobacco Use    Smoking status: Former     Current packs/day: 0.00     Average packs/day: 0.3 packs/day for 15.0 years (3.8 ttl pk-yrs)     Types: Cigarettes     Start date: 2001     Quit date: 2016     Years since quittin.1    Smokeless tobacco: Never    Tobacco comments:     3-4 a day   Vaping Use    Vaping status: Never Used   Substance Use Topics    Alcohol use: Yes     Comment: weekend beer    Drug use: No        ALLERGIES:  Allergies   Allergen Reactions    No Known Drug Allergy        CURRENT MEDICATIONS:  Current Outpatient Medications   Medication Sig Dispense Refill    allopurinol (ZYLOPRIM) 100 MG tablet TAKE 2 TABLETS BY MOUTH EVERY DAY NEEDS VISIT AND/OR LABS, WHICH CAN BE IN PERSON BUT A VIRTUAL VISIT MAY BE ACCEPTABLE, FOR MORE. 60 tablet 0    apixaban ANTICOAGULANT (ELIQUIS) 5 MG tablet Take 1 tablet (5 mg) by mouth 2 times daily 180 tablet 1    carvedilol (COREG) 25 MG tablet TAKE 1 TABLET BY MOUTH TWICE A DAY WITH MEALS 180 tablet 3    cyclobenzaprine (FLEXERIL) 10 MG tablet Take 1 tablet (10 mg) by mouth 3 times daily as needed for muscle spasms 30 tablet 0    DULoxetine (CYMBALTA) 60 MG capsule Take 60 mg by mouth daily      empagliflozin (JARDIANCE) 10 MG TABS  tablet Take 1 tablet (10 mg) by mouth daily. 90 tablet 1    furosemide (LASIX) 20 MG tablet Take 1 tablet (20 mg) by mouth daily. 90 tablet 3    gabapentin (NEURONTIN) 300 MG capsule Take 1 capsule (300 mg) by mouth 3 times daily 270 capsule 1    montelukast (SINGULAIR) 10 MG tablet TAKE 1 TABLET BY MOUTH EVERYDAY AT BEDTIME. NEEDS TO ESTABLISH WITH NEW PCP. 90 tablet 1    nitroGLYcerin (NITROSTAT) 0.4 MG sublingual tablet Place 1 tablet (0.4 mg) under the tongue every 5 minutes as needed for chest pain 0.4 mg by Sublingual route every 5 (five) minutes as needed. 25 tablet 11    omeprazole (PRILOSEC) 20 MG DR capsule TAKE 1 CAPSULE BY MOUTH EVERY DAY. PLEASE FOLLOW UP IN CLINIC FOR NEXT REFILL. 90 capsule 0    oxyCODONE (ROXICODONE) 5 MG tablet Take 1 tablet (5 mg) by mouth 2 times daily as needed for moderate to severe pain (up to 2 a day) *PLEASE SCHEDULE APPT FOR FURTHER REFILLS* 60 tablet 0    reason aspirin not prescribed, intentional, Please choose reason not prescribed from choices below.      sacubitril-valsartan (ENTRESTO) 49-51 MG per tablet Take 1.5 tablets by mouth 2 times daily. 270 tablet 3    spironolactone (ALDACTONE) 25 MG tablet Take 0.5 tablets (12.5 mg) by mouth daily. 45 tablet 3    tamsulosin (FLOMAX) 0.4 MG capsule TAKE 1 CAPSULE (0.4 MG) BY MOUTH DAILY PLEASE FOLLOW UP FOR NEXT REFILL. 90 capsule 0    traZODone (DESYREL) 100 MG tablet Take 1 tablet (100 mg) by mouth at bedtime +++APPOINTMENT NEEDED FOR REFILLS+++ 60 tablet 0    venlafaxine (EFFEXOR XR) 75 MG 24 hr capsule Take 1 capsule (75 mg) by mouth daily 90 capsule 1    albuterol (PROAIR HFA/PROVENTIL HFA/VENTOLIN HFA) 108 (90 Base) MCG/ACT inhaler Inhale 2 puffs into the lungs every 4 hours as needed for shortness of breath / dyspnea (Patient not taking: Reported on 11/5/2024) 18 g 3    albuterol (PROVENTIL) (2.5 MG/3ML) 0.083% neb solution INHALE 3 ML BY NEBULIZATION METHOD EVERY 6 HOURS AS NEEDED FOR SHORTNESS OF BREATH (Patient not  taking: Reported on 11/5/2024) 360 mL 1    atorvastatin (LIPITOR) 40 MG tablet Take 1 tablet (40 mg) by mouth daily for 90 days 90 tablet 3    budesonide-formoterol (SYMBICORT) 160-4.5 MCG/ACT Inhaler TAKE 2 PUFFS BY MOUTH TWICE A DAY (Patient not taking: Reported on 11/5/2024) 30.6 g 1    cetirizine (ZYRTEC) 10 MG tablet TAKE 1 TABLET BY MOUTH DAILY AT BEDTIME (Patient not taking: Reported on 11/5/2024) 90 tablet 1    enoxaparin ANTICOAGULANT (LOVENOX) 100 MG/ML syringe Inject 1 mL (100 mg) Subcutaneous in the morning and 1 mL (100 mg) in the evening. Until INR 2.3 or greater (Patient not taking: Reported on 11/5/2024) 20 mL 1    ibuprofen (ADVIL/MOTRIN) 600 MG tablet TAKE 1 TABLET BY MOUTH EVERY 6-8 HOURS AS NEEDED (Patient not taking: Reported on 11/5/2024)         EXAM:  /79 (BP Location: Left arm, Patient Position: Chair, Cuff Size: Adult Regular)   Pulse 63   Wt 88.5 kg (195 lb)   SpO2 97%   BMI 32.25 kg/m    General: appears comfortable, alert and interactive, in no acute distress  Head: normocephalic, atraumatic  Eyes: anicteric sclera, EOMI  Mouth: MMM  Neck: supple, no cervical adenopathy  CV: regular rate and rhythm, no murmur, gallop, rub, estimated JVP ~7 cm  Resp: clear, no rales or wheezing  GI: soft, nontender, nondistended  Extremities: warm, no peripheral edema, 2+ bilateral radial pulses  Neurological: alert and oriented, no focal deficits  Psych: normal mood and affect  Derm: no rashes on exposed surfaces    Weight  Wt Readings from Last 10 Encounters:   11/05/24 88.5 kg (195 lb)   08/08/24 89.1 kg (196 lb 6.4 oz)   02/15/24 86.5 kg (190 lb 9.6 oz)   05/05/23 85.1 kg (187 lb 9.6 oz)   04/26/23 90.3 kg (199 lb)   04/17/23 84.8 kg (187 lb)   03/29/23 87.1 kg (192 lb)   03/16/23 88.4 kg (194 lb 12.8 oz)   03/01/23 86.9 kg (191 lb 8 oz)   12/21/22 91.4 kg (201 lb 6.4 oz)       I personally reviewed recent labs and data as below and discussed the results with the patient in clinic  today.  Labs:  CBC RESULTS:  Lab Results   Component Value Date    WBC 9.3 10/14/2022    WBC 9.9 06/18/2021    RBC 4.79 10/14/2022    RBC 4.94 06/18/2021    HGB 14.8 10/14/2022    HGB 15.1 06/18/2021    HCT 45.7 10/14/2022    HCT 46.3 06/18/2021    MCV 95 10/14/2022    MCV 94 06/18/2021    MCH 30.9 10/14/2022    MCH 30.6 06/18/2021    MCHC 32.4 10/14/2022    MCHC 32.6 06/18/2021    RDW 13.5 10/14/2022    RDW 12.8 06/18/2021     10/14/2022     06/18/2021       CMP RESULTS:  Lab Results   Component Value Date     10/30/2024     06/18/2021    POTASSIUM 5.3 10/30/2024    POTASSIUM 4.0 04/26/2023    POTASSIUM 4.3 06/18/2021    CHLORIDE 101 10/30/2024    CHLORIDE 109 04/26/2023    CHLORIDE 102 06/18/2021    CO2 29 10/30/2024    CO2 27 04/26/2023    CO2 33 (H) 06/18/2021    ANIONGAP 8 10/30/2024    ANIONGAP 7 04/26/2023    ANIONGAP 4 06/18/2021     (H) 10/30/2024     (H) 04/26/2023    GLC 94 06/18/2021    BUN 20.6 10/30/2024    BUN 18 04/26/2023    BUN 24 06/18/2021    CR 1.41 (H) 10/30/2024    CR 1.38 (H) 06/18/2021    GFRESTIMATED 54 (L) 10/30/2024    GFRESTIMATED 53 (L) 06/18/2021    GFRESTBLACK 62 06/18/2021    FELICITY 9.3 10/30/2024    FELICITY 9.0 06/18/2021    BILITOTAL 0.5 09/11/2018    ALBUMIN 3.9 09/11/2018    ALKPHOS 86 09/11/2018    ALT 36 07/21/2022    ALT 30 06/18/2021    AST 29 09/11/2018        Recent Labs   Lab Test 04/17/23  1130 07/21/22  1007   CHOL 191 167   HDL 62 69   * 76   TRIG 88 108        Testing/Procedures:  I personally visualized and interpreted:    Cardiac CTA 4/8/22  IMPRESSION:  1.  Mild non-obstructive CAD.  2.  Widely patent LAD stent.  3.  Diffuse coronary atherosclerosis.  4.  Please review the separate Radiology report for incidental  noncardiac findings.    cMRI 7/12/22  SUMMARY   ==========================================================================================================     Clinical history: 67-year old male with a history of  ischemic cardiomyopathy. He was found to have an  intracardiac thrombus on his TTE in 04/2022 and started on anticoagulation. CMR to evaluate for  intracardiac thrombus after anticoagulation course.   Comparison CMR: 12/18/2013     1. The LV is normal in cavity size. The global systolic function is moderately-severely reduced. The LVEF  is 31%. There is moderate hypokinesis and thinning and akinesis of the apical segments.     2. The RV is normal in cavity size. The global systolic function is mildly reduced. The RVEF is 46%.      3. Both atria are normal in size.     4. There is no significant valvular disease.      5. Late gadolinium enhancement imaging shows a contiguous pattern of subendocardial enhancement in the  basal anterior, basal anteroseptal, mid anterior, mid anteroseptal, apical anterior, apical septal, apical  lateral, and true apical segments. This is consistent with a prior LAD culprit infarction. There is 20%  viability in the LAD territory but 100% viability in the LCx and RCA territories.      6. There is no pericardial effusion or thickening.     7. There is a 1.0 x 0.6 cm LV apical thrombus overlying the akinetic true apex. It shows some contrast  uptake on repeat (delayed long TI imaging, possibly implying vascularization and an element of chronicity.      CONCLUSIONS: Ischemic cardiomyopathy. There is moderately-severely reduced LV function and mildly reduced  RV function. There is an large burden of fibrosis that is consistent with a prior LAD culprit infarction.  There is also a persistent LV apical thrombus in spite of anticoagulation.     Echocardiogram 2/15/24  Interpretation Summary  Left ventricular function is decreased. The ejection fraction is 40-45%  (mildly reduced). Biplane LVEF is 43%. There is akinesis involving the mid anterior/anteroseptal and all apical segments consistent with prior LAD territory infarct, unchanged from prior studies.  RV size and function are probably  normal on limited views.  This study was compared with the study from 12/20/16: No significant changes noted.    Outside results of note:  Outside records were obtained and relevant results/notes have been incorporated into HPI.    Assessment and Plan:     In summary, 69 year old male with a past medical history of chronic HFrEF 2/2 ICM, CAD s/p PCI in '95, HTN, HLD, DM Type II c/b neuropathy, chronic hepatitis, COPD, and CKD stage III who presents as new referral for HFrEF.    Chronic systolic heart failure/HFrEF (EF 40-45%) secondary to ischemic cardiomyopathy  NYHA Symptom Class II  Stage C  ACE-I/ARB/ARNi: Increase Entresto to 1.5 tablets of 49-51 mg BID  BB: Continue carvedilol 25 mg BID  Aldosterone antagonist: Decrease spironolactone to 12.5 mg daily  SGLT2i: Continue empagliflozin 10 mg daily  SCD prophylaxis: N/A given LVEF >35%  %BiV pacing: N/A  Fluid status: grossly euvolemic on lasix 20 mg daily  Cardiac Rehab: N/A  Remote PA Pressure Monitoring (CardioMems): N/A  - Recommend heart healthy diet and regular aerobic exercise as above    LV Thrombus  - Continue apixaban 5 mg BID    CAD s/p PCI with stable angina  HLD  - Not on ASA given on apixaban  - Continue atorvastatin 40 mg daily  - Continue nitroglycerin PRN    Optimal Vascular Metrics    Blood Pressure   BP < 140/90 Yes    On Aspirin  No: Contraindicated due to: Already on DOAC    On Statin  Yes    Tobacco use  No       To Do:  - Increase Entresto to 1.5 tablets of 49-51 mg BID  - Decrease spironolactone 12.5 mg daily  - Repeat BMP in one week  - Follow up with [facility] as scheduled  - Follow up with me in 6 months with labs and echo prior    The patient states understanding and is agreeable with plan.   Feel free to contact myself regarding questions or concerns. It was a pleasure to see this patient today.    A total of 64 minutes was spent on the day of the visit, which includes preparation for the visit (reviewing previous medical records,  laboratories and investigations), in conjunction with the actual clinic visit with the patient, which includes obtaining a history and physical exam, creating and reviewing the care plan, patient education (and family if present), counseling, documenting clinical information in the electronic health record and care coordination.     The longitudinal plan of care for the diagnosis(es)/condition(s) as documented were addressed during this visit. Due to the added complexity in care, I will continue to support Santiago in the subsequent management and with ongoing continuity of care.     Kaylee Orozco MD   of Medicine, [facility]  Advanced Heart Failure and Transplant Cardiology     CC  Dayanna Davila

## 2024-11-05 ENCOUNTER — OFFICE VISIT (OUTPATIENT)
Dept: CARDIOLOGY | Facility: CLINIC | Age: 69
End: 2024-11-05
Payer: COMMERCIAL

## 2024-11-05 VITALS
BODY MASS INDEX: 32.25 KG/M2 | SYSTOLIC BLOOD PRESSURE: 120 MMHG | HEART RATE: 63 BPM | DIASTOLIC BLOOD PRESSURE: 79 MMHG | OXYGEN SATURATION: 97 % | WEIGHT: 195 LBS

## 2024-11-05 DIAGNOSIS — I25.5 ISCHEMIC CARDIOMYOPATHY: ICD-10-CM

## 2024-11-05 DIAGNOSIS — I25.118 CORONARY ARTERY DISEASE OF NATIVE ARTERY OF NATIVE HEART WITH STABLE ANGINA PECTORIS (H): ICD-10-CM

## 2024-11-05 DIAGNOSIS — E78.5 HYPERLIPIDEMIA LDL GOAL <100: ICD-10-CM

## 2024-11-05 DIAGNOSIS — I50.22 CHRONIC SYSTOLIC HEART FAILURE (H): Primary | ICD-10-CM

## 2024-11-05 DIAGNOSIS — I10 HYPERTENSION GOAL BP (BLOOD PRESSURE) < 140/90: ICD-10-CM

## 2024-11-05 RX ORDER — SPIRONOLACTONE 25 MG/1
12.5 TABLET ORAL DAILY
Qty: 45 TABLET | Refills: 3 | Status: SHIPPED | OUTPATIENT
Start: 2024-11-05

## 2024-11-05 NOTE — LETTER
11/5/2024      RE: Santiago Hylton  [address]  [place] [address]       Dear Colleague,    Thank you for the opportunity to participate in the care of your patient, Santiago Hylton, at the [facility]. Please see a copy of my visit note below.    Advanced Heart Failure/Transplant Clinic Note    HPI  Dear colleagues,     I had the pleasure of seeing Mr. Santiago Hylton in the Cardiology clinic.  As you know, Mr. Santiago Hylton is a pleasant 69 year old male with a past medical history of chronic HFrEF 2/2 ICM, CAD s/p PCI in '95, HTN, HLD, DM Type II c/b neuropathy, chronic hepatitis, COPD, and CKD stage III who presents as new referral for HFrEF.    Patient reports overall feeling well recently. He tries to stay active, but sometimes his neuropathy limits him. He has chronic episodes of sharp central chest pain that occurs sometimes with exertion and relives with rest. He is unsure if these episodes occurred prior to his heart attack in the 1990s. He has presyncope with quick position changes, but no syncope. He denies palpitations, orthopnea, PND, abdominal pain, nausea, emesis, LE edema or weight gain. He watches his salt and fluid intake and monitors his weight. He reports compliance with his medications.     ROS:  A complete 12-point ROS was negative except as above.    PAST MEDICAL HISTORY:  Patient Active Problem List   Diagnosis     Hypertension goal BP (blood pressure) < 140/90     Hyperlipidemia LDL goal <100     CHF (congestive heart failure) (H)     Mild recurrent major depression (H)     Gout     GERD (gastroesophageal reflux disease)     Chronic rhinitis     History of colonic polyps     Chronic hepatitis C (H)     Coronary artery disease of native artery of native heart with stable angina pectoris (H)     Chronic obstructive airway disease with asthma (H)     Obesity (BMI 35.0-39.9) with comorbidity (H)      Chronic pain syndrome     CKD (chronic kidney disease) stage 3, GFR 30-59 ml/min (H)     Internal hemorrhoids     Chronic ischemic heart disease     Claudication of both lower extremities (H)     Rectal bleeding     Idiopathic peripheral neuropathy     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 (H)     Grief     F11.9 - Chronic, continuous use of opioids        FAMILY HISTORY:  Family History   Problem Relation Age of Onset     Heart Disease Maternal Grandmother      Hypertension Maternal Grandmother      Hypertension Father      Hypertension Mother      Hypertension Sister      Thyroid Disease Sister      Cancer Brother      Diabetes Brother      Hypertension Brother      Hypertension Maternal Aunt      Cancer Maternal Uncle      Hypertension Maternal Uncle      Hypertension Paternal Aunt      Hypertension Paternal Uncle      Hypertension Maternal Grandfather      Hypertension Paternal Grandmother      Hypertension Paternal Grandfather      Cerebrovascular Disease No family hx of      Glaucoma No family hx of      Macular Degeneration No family hx of        SOCIAL HISTORY:  Social History     Tobacco Use     Smoking status: Former     Current packs/day: 0.00     Average packs/day: 0.3 packs/day for 15.0 years (3.8 ttl pk-yrs)     Types: Cigarettes     Start date: 2001     Quit date: 2016     Years since quittin.1     Smokeless tobacco: Never     Tobacco comments:     3-4 a day   Vaping Use     Vaping status: Never Used   Substance Use Topics     Alcohol use: Yes     Comment: weekend beer     Drug use: No        ALLERGIES:  Allergies   Allergen Reactions     No Known Drug Allergy        CURRENT MEDICATIONS:  Current Outpatient Medications   Medication Sig Dispense Refill     allopurinol (ZYLOPRIM) 100 MG tablet TAKE 2 TABLETS BY MOUTH EVERY DAY NEEDS VISIT AND/OR LABS, WHICH CAN BE IN PERSON BUT A VIRTUAL VISIT MAY BE ACCEPTABLE, FOR MORE. 60 tablet 0     apixaban ANTICOAGULANT (ELIQUIS) 5 MG  tablet Take 1 tablet (5 mg) by mouth 2 times daily 180 tablet 1     carvedilol (COREG) 25 MG tablet TAKE 1 TABLET BY MOUTH TWICE A DAY WITH MEALS 180 tablet 3     cyclobenzaprine (FLEXERIL) 10 MG tablet Take 1 tablet (10 mg) by mouth 3 times daily as needed for muscle spasms 30 tablet 0     DULoxetine (CYMBALTA) 60 MG capsule Take 60 mg by mouth daily       empagliflozin (JARDIANCE) 10 MG TABS tablet Take 1 tablet (10 mg) by mouth daily. 90 tablet 1     furosemide (LASIX) 20 MG tablet Take 1 tablet (20 mg) by mouth daily. 90 tablet 3     gabapentin (NEURONTIN) 300 MG capsule Take 1 capsule (300 mg) by mouth 3 times daily 270 capsule 1     montelukast (SINGULAIR) 10 MG tablet TAKE 1 TABLET BY MOUTH EVERYDAY AT BEDTIME. NEEDS TO ESTABLISH WITH NEW PCP. 90 tablet 1     nitroGLYcerin (NITROSTAT) 0.4 MG sublingual tablet Place 1 tablet (0.4 mg) under the tongue every 5 minutes as needed for chest pain 0.4 mg by Sublingual route every 5 (five) minutes as needed. 25 tablet 11     omeprazole (PRILOSEC) 20 MG DR capsule TAKE 1 CAPSULE BY MOUTH EVERY DAY. PLEASE FOLLOW UP IN CLINIC FOR NEXT REFILL. 90 capsule 0     oxyCODONE (ROXICODONE) 5 MG tablet Take 1 tablet (5 mg) by mouth 2 times daily as needed for moderate to severe pain (up to 2 a day) *PLEASE SCHEDULE APPT FOR FURTHER REFILLS* 60 tablet 0     reason aspirin not prescribed, intentional, Please choose reason not prescribed from choices below.       sacubitril-valsartan (ENTRESTO) 49-51 MG per tablet Take 1.5 tablets by mouth 2 times daily. 270 tablet 3     spironolactone (ALDACTONE) 25 MG tablet Take 0.5 tablets (12.5 mg) by mouth daily. 45 tablet 3     tamsulosin (FLOMAX) 0.4 MG capsule TAKE 1 CAPSULE (0.4 MG) BY MOUTH DAILY PLEASE FOLLOW UP FOR NEXT REFILL. 90 capsule 0     traZODone (DESYREL) 100 MG tablet Take 1 tablet (100 mg) by mouth at bedtime +++APPOINTMENT NEEDED FOR REFILLS+++ 60 tablet 0     venlafaxine (EFFEXOR XR) 75 MG 24 hr capsule Take 1 capsule (75  mg) by mouth daily 90 capsule 1     albuterol (PROAIR HFA/PROVENTIL HFA/VENTOLIN HFA) 108 (90 Base) MCG/ACT inhaler Inhale 2 puffs into the lungs every 4 hours as needed for shortness of breath / dyspnea (Patient not taking: Reported on 11/5/2024) 18 g 3     albuterol (PROVENTIL) (2.5 MG/3ML) 0.083% neb solution INHALE 3 ML BY NEBULIZATION METHOD EVERY 6 HOURS AS NEEDED FOR SHORTNESS OF BREATH (Patient not taking: Reported on 11/5/2024) 360 mL 1     atorvastatin (LIPITOR) 40 MG tablet Take 1 tablet (40 mg) by mouth daily for 90 days 90 tablet 3     budesonide-formoterol (SYMBICORT) 160-4.5 MCG/ACT Inhaler TAKE 2 PUFFS BY MOUTH TWICE A DAY (Patient not taking: Reported on 11/5/2024) 30.6 g 1     cetirizine (ZYRTEC) 10 MG tablet TAKE 1 TABLET BY MOUTH DAILY AT BEDTIME (Patient not taking: Reported on 11/5/2024) 90 tablet 1     enoxaparin ANTICOAGULANT (LOVENOX) 100 MG/ML syringe Inject 1 mL (100 mg) Subcutaneous in the morning and 1 mL (100 mg) in the evening. Until INR 2.3 or greater (Patient not taking: Reported on 11/5/2024) 20 mL 1     ibuprofen (ADVIL/MOTRIN) 600 MG tablet TAKE 1 TABLET BY MOUTH EVERY 6-8 HOURS AS NEEDED (Patient not taking: Reported on 11/5/2024)         EXAM:  /79 (BP Location: Left arm, Patient Position: Chair, Cuff Size: Adult Regular)   Pulse 63   Wt 88.5 kg (195 lb)   SpO2 97%   BMI 32.25 kg/m    General: appears comfortable, alert and interactive, in no acute distress  Head: normocephalic, atraumatic  Eyes: anicteric sclera, EOMI  Mouth: MMM  Neck: supple, no cervical adenopathy  CV: regular rate and rhythm, no murmur, gallop, rub, estimated JVP ~7 cm  Resp: clear, no rales or wheezing  GI: soft, nontender, nondistended  Extremities: warm, no peripheral edema, 2+ bilateral radial pulses  Neurological: alert and oriented, no focal deficits  Psych: normal mood and affect  Derm: no rashes on exposed surfaces    Weight  Wt Readings from Last 10 Encounters:   11/05/24 88.5 kg (195 lb)    08/08/24 89.1 kg (196 lb 6.4 oz)   02/15/24 86.5 kg (190 lb 9.6 oz)   05/05/23 85.1 kg (187 lb 9.6 oz)   04/26/23 90.3 kg (199 lb)   04/17/23 84.8 kg (187 lb)   03/29/23 87.1 kg (192 lb)   03/16/23 88.4 kg (194 lb 12.8 oz)   03/01/23 86.9 kg (191 lb 8 oz)   12/21/22 91.4 kg (201 lb 6.4 oz)       I personally reviewed recent labs and data as below and discussed the results with the patient in clinic today.  Labs:  CBC RESULTS:  Lab Results   Component Value Date    WBC 9.3 10/14/2022    WBC 9.9 06/18/2021    RBC 4.79 10/14/2022    RBC 4.94 06/18/2021    HGB 14.8 10/14/2022    HGB 15.1 06/18/2021    HCT 45.7 10/14/2022    HCT 46.3 06/18/2021    MCV 95 10/14/2022    MCV 94 06/18/2021    MCH 30.9 10/14/2022    MCH 30.6 06/18/2021    MCHC 32.4 10/14/2022    MCHC 32.6 06/18/2021    RDW 13.5 10/14/2022    RDW 12.8 06/18/2021     10/14/2022     06/18/2021       CMP RESULTS:  Lab Results   Component Value Date     10/30/2024     06/18/2021    POTASSIUM 5.3 10/30/2024    POTASSIUM 4.0 04/26/2023    POTASSIUM 4.3 06/18/2021    CHLORIDE 101 10/30/2024    CHLORIDE 109 04/26/2023    CHLORIDE 102 06/18/2021    CO2 29 10/30/2024    CO2 27 04/26/2023    CO2 33 (H) 06/18/2021    ANIONGAP 8 10/30/2024    ANIONGAP 7 04/26/2023    ANIONGAP 4 06/18/2021     (H) 10/30/2024     (H) 04/26/2023    GLC 94 06/18/2021    BUN 20.6 10/30/2024    BUN 18 04/26/2023    BUN 24 06/18/2021    CR 1.41 (H) 10/30/2024    CR 1.38 (H) 06/18/2021    GFRESTIMATED 54 (L) 10/30/2024    GFRESTIMATED 53 (L) 06/18/2021    GFRESTBLACK 62 06/18/2021    FELICITY 9.3 10/30/2024    FELICITY 9.0 06/18/2021    BILITOTAL 0.5 09/11/2018    ALBUMIN 3.9 09/11/2018    ALKPHOS 86 09/11/2018    ALT 36 07/21/2022    ALT 30 06/18/2021    AST 29 09/11/2018        Recent Labs   Lab Test 04/17/23  1130 07/21/22  1007   CHOL 191 167   HDL 62 69   * 76   TRIG 88 108        Testing/Procedures:  I personally visualized and interpreted:    Cardiac  CTA 4/8/22  IMPRESSION:  1.  Mild non-obstructive CAD.  2.  Widely patent LAD stent.  3.  Diffuse coronary atherosclerosis.  4.  Please review the separate Radiology report for incidental  noncardiac findings.    cMRI 7/12/22  SUMMARY   ==========================================================================================================     Clinical history: 67-year old male with a history of ischemic cardiomyopathy. He was found to have an  intracardiac thrombus on his TTE in 04/2022 and started on anticoagulation. CMR to evaluate for  intracardiac thrombus after anticoagulation course.   Comparison CMR: 12/18/2013     1. The LV is normal in cavity size. The global systolic function is moderately-severely reduced. The LVEF  is 31%. There is moderate hypokinesis and thinning and akinesis of the apical segments.     2. The RV is normal in cavity size. The global systolic function is mildly reduced. The RVEF is 46%.      3. Both atria are normal in size.     4. There is no significant valvular disease.      5. Late gadolinium enhancement imaging shows a contiguous pattern of subendocardial enhancement in the  basal anterior, basal anteroseptal, mid anterior, mid anteroseptal, apical anterior, apical septal, apical  lateral, and true apical segments. This is consistent with a prior LAD culprit infarction. There is 20%  viability in the LAD territory but 100% viability in the LCx and RCA territories.      6. There is no pericardial effusion or thickening.     7. There is a 1.0 x 0.6 cm LV apical thrombus overlying the akinetic true apex. It shows some contrast  uptake on repeat (delayed long TI imaging, possibly implying vascularization and an element of chronicity.      CONCLUSIONS: Ischemic cardiomyopathy. There is moderately-severely reduced LV function and mildly reduced  RV function. There is an large burden of fibrosis that is consistent with a prior LAD culprit infarction.  There is also a persistent LV  apical thrombus in spite of anticoagulation.     Echocardiogram 2/15/24  Interpretation Summary  Left ventricular function is decreased. The ejection fraction is 40-45%  (mildly reduced). Biplane LVEF is 43%. There is akinesis involving the mid anterior/anteroseptal and all apical segments consistent with prior LAD territory infarct, unchanged from prior studies.  RV size and function are probably normal on limited views.  This study was compared with the study from 12/20/16: No significant changes noted.    Outside results of note:  Outside records were obtained and relevant results/notes have been incorporated into HPI.    Assessment and Plan:     In summary, 69 year old male with a past medical history of chronic HFrEF 2/2 ICM, CAD s/p PCI in '95, HTN, HLD, DM Type II c/b neuropathy, chronic hepatitis, COPD, and CKD stage III who presents as new referral for HFrEF.    Chronic systolic heart failure/HFrEF (EF 40-45%) secondary to ischemic cardiomyopathy  NYHA Symptom Class II  Stage C  ACE-I/ARB/ARNi: Increase Entresto to 1.5 tablets of 49-51 mg BID  BB: Continue carvedilol 25 mg BID  Aldosterone antagonist: Decrease spironolactone to 12.5 mg daily  SGLT2i: Continue empagliflozin 10 mg daily  SCD prophylaxis: N/A given LVEF >35%  %BiV pacing: N/A  Fluid status: grossly euvolemic on lasix 20 mg daily  Cardiac Rehab: N/A  Remote PA Pressure Monitoring (CardioMems): N/A  - Recommend heart healthy diet and regular aerobic exercise as above    LV Thrombus  - Continue apixaban 5 mg BID    CAD s/p PCI with stable angina  HLD  - Not on ASA given on apixaban  - Continue atorvastatin 40 mg daily  - Continue nitroglycerin PRN    Optimal Vascular Metrics    Blood Pressure   BP < 140/90 Yes    On Aspirin  No: Contraindicated due to: Already on DOAC    On Statin  Yes    Tobacco use  No       To Do:  - Increase Entresto to 1.5 tablets of 49-51 mg BID  - Decrease spironolactone 12.5 mg daily  - Repeat BMP in one week  - Follow up  with CORE as scheduled  - Follow up with me in 6 months with labs and echo prior    The patient states understanding and is agreeable with plan.   Feel free to contact myself regarding questions or concerns. It was a pleasure to see this patient today.    A total of 64 minutes was spent on the day of the visit, which includes preparation for the visit (reviewing previous medical records, laboratories and investigations), in conjunction with the actual clinic visit with the patient, which includes obtaining a history and physical exam, creating and reviewing the care plan, patient education (and family if present), counseling, documenting clinical information in the electronic health record and care coordination.     The longitudinal plan of care for the diagnosis(es)/condition(s) as documented were addressed during this visit. Due to the added complexity in care, I will continue to support Santiago in the subsequent management and with ongoing continuity of care.     Kaylee Orozco MD   of Medicine, [facility]  Advanced Heart Failure and Transplant Cardiology     CC  Dayanna Davila         Please do not hesitate to contact me if you have any questions/concerns.     Sincerely,     Kaylee Orozco MD

## 2024-11-05 NOTE — NURSING NOTE
"Chief Complaint   Patient presents with    Advanced Heart Failure Referral     [facility] referred by Kayla Burnett       Initial /79 (BP Location: Left arm, Patient Position: Chair, Cuff Size: Adult Regular)   Pulse 63   Wt 88.5 kg (195 lb)   SpO2 97%   BMI 32.25 kg/m   Estimated body mass index is 32.25 kg/m  as calculated from the following:    Height as of 5/5/23: 1.656 m (5' 5.2\").    Weight as of this encounter: 88.5 kg (195 lb)..  BP completed using cuff size: regular    Denisse Sahu, Visit Facilitator    "

## 2024-11-05 NOTE — NURSING NOTE
To Do    Medication changes:  1- INCREASE your entresto to 49/51, 1.5 tablets, twice daily  2- DECREASE your spironolactone to 1/2 tab, 12.5 mg daily     Follow up:  1- CORE as scheduled  2- Dr Orozco in 6 months with an echocardiogram prior  3- Labs in one week        To Do  Get your RSV and your second shingles vaccine    Justine Kinney RN

## 2024-11-05 NOTE — PATIENT INSTRUCTIONS
Cardiology Providers you saw during your visit:  Dr. Kaylee Orozco     Medication changes:  1- INCREASE your entresto to 49/51, 1.5 tablets, twice daily  2- DECREASE your spironolactone to 1/2 tab, 12.5 mg daily     Follow up:  1- [facility] as scheduled  2- Dr Orozco in 6 months with an echocardiogram prior  3- Labs in one week        To Do  Get your RSV and your second shingles vaccine      Please call if you have:  1. Weight gain of more than 2 pounds in a day or 5 pounds in a week  2. Increased shortness of breath, swelling or bloating  3. Dizziness, lightheadedness   4. Any questions or concerns.      Heart Failure Support Group  Virtual meetings will continue in 2024 Please reach out if you would like to attend and we can get you the information you need to log in.     2024 dates:    Monday, September 9th, 1-2pm     Monday, October 7th, 1-2pm     Monday, November 4th, 1-2pm     Monday, December 2nd, 1-2pm     Follow the American Heart Association Diet and Lifestyle recommendations:  Limit saturated fat, trans fat, sodium, red meat, sweets and sugar-sweetened beverages. If you choose to eat red meat, compare labels and select the leanest cuts available.  Aim for at least 150 minutes of moderate physical activity or 75 minutes of vigorous physical activity - or an equal combination of both - each week.     During business hours: [phone], press option # 1 to schedule an appointment or send a message to your care team     After hours, weekends or holidays: On Call Cardiologist- [phone]   option #4 and ask to speak to the on-call Cardiologist. Inform them you are a [facility]/heart failure patient at the [facility].     HERNAN Fletcher RN

## 2024-11-12 ENCOUNTER — LAB (OUTPATIENT)
Dept: LAB | Facility: CLINIC | Age: 69
End: 2024-11-12
Payer: COMMERCIAL

## 2024-11-12 DIAGNOSIS — I50.22 CHRONIC SYSTOLIC HEART FAILURE (H): ICD-10-CM

## 2024-11-12 LAB
ANION GAP SERPL CALCULATED.3IONS-SCNC: 12 MMOL/L (ref 7–15)
BUN SERPL-MCNC: 33.7 MG/DL (ref 8–23)
CALCIUM SERPL-MCNC: 9.5 MG/DL (ref 8.8–10.4)
CHLORIDE SERPL-SCNC: 100 MMOL/L (ref 98–107)
CREAT SERPL-MCNC: 1.82 MG/DL (ref 0.67–1.17)
EGFRCR SERPLBLD CKD-EPI 2021: 40 ML/MIN/1.73M2
GLUCOSE SERPL-MCNC: 94 MG/DL (ref 70–99)
HCO3 SERPL-SCNC: 25 MMOL/L (ref 22–29)
POTASSIUM SERPL-SCNC: 4.9 MMOL/L (ref 3.4–5.3)
SODIUM SERPL-SCNC: 137 MMOL/L (ref 135–145)

## 2024-11-12 PROCEDURE — 80048 BASIC METABOLIC PNL TOTAL CA: CPT

## 2024-11-12 PROCEDURE — 36415 COLL VENOUS BLD VENIPUNCTURE: CPT

## 2024-11-13 ENCOUNTER — PATIENT OUTREACH (OUTPATIENT)
Dept: CARDIOLOGY | Facility: CLINIC | Age: 69
End: 2024-11-13
Payer: COMMERCIAL

## 2024-11-13 DIAGNOSIS — I50.22 CHRONIC SYSTOLIC HEART FAILURE (H): Primary | ICD-10-CM

## 2024-11-13 NOTE — CONFIDENTIAL NOTE
RN call to pt with lab results and planning. Per Dr Orozco, Would hold lasix and repeat BMP in one week.     Pt did not answer phone, VM full. Will attempt to reach at a later time, [employer] also sent    Justine Kinney RN

## 2024-11-26 ENCOUNTER — LAB (OUTPATIENT)
Dept: LAB | Facility: CLINIC | Age: 69
End: 2024-11-26
Payer: COMMERCIAL

## 2024-11-26 ENCOUNTER — PATIENT OUTREACH (OUTPATIENT)
Dept: CARDIOLOGY | Facility: CLINIC | Age: 69
End: 2024-11-26

## 2024-11-26 DIAGNOSIS — I50.22 CHRONIC SYSTOLIC HEART FAILURE (H): ICD-10-CM

## 2024-11-26 DIAGNOSIS — I10 ESSENTIAL HYPERTENSION WITH GOAL BLOOD PRESSURE LESS THAN 140/90: ICD-10-CM

## 2024-11-26 LAB
ANION GAP SERPL CALCULATED.3IONS-SCNC: 9 MMOL/L (ref 7–15)
BUN SERPL-MCNC: 21 MG/DL (ref 8–23)
CALCIUM SERPL-MCNC: 9.3 MG/DL (ref 8.8–10.4)
CHLORIDE SERPL-SCNC: 100 MMOL/L (ref 98–107)
CREAT SERPL-MCNC: 1.58 MG/DL (ref 0.67–1.17)
EGFRCR SERPLBLD CKD-EPI 2021: 47 ML/MIN/1.73M2
GLUCOSE SERPL-MCNC: 130 MG/DL (ref 70–99)
HCO3 SERPL-SCNC: 30 MMOL/L (ref 22–29)
POTASSIUM SERPL-SCNC: 4.7 MMOL/L (ref 3.4–5.3)
SODIUM SERPL-SCNC: 139 MMOL/L (ref 135–145)

## 2024-11-26 PROCEDURE — 36415 COLL VENOUS BLD VENIPUNCTURE: CPT

## 2024-11-26 PROCEDURE — 80048 BASIC METABOLIC PNL TOTAL CA: CPT

## 2024-11-26 RX ORDER — FUROSEMIDE 20 MG/1
20 TABLET ORAL PRN
Qty: 90 TABLET | Refills: 3 | Status: SHIPPED | OUTPATIENT
Start: 2024-11-26

## 2024-11-26 NOTE — CONFIDENTIAL NOTE
RN call to patient, answering machine full, unable to leave message. Mychart message sent    Per Matthew Kay improved off routine lasix, continue using as needed for worsening shortness of breath, swelling, or weight gain.     Rx updated, will attempt to reach pt again at later time if mychart not read     Justine Kinney RN

## 2024-12-03 ENCOUNTER — TELEPHONE (OUTPATIENT)
Dept: FAMILY MEDICINE | Facility: CLINIC | Age: 69
End: 2024-12-03

## 2024-12-03 NOTE — TELEPHONE ENCOUNTER
Patient Quality Outreach    Patient is due for the following:   Diabetes -  Eye Exam  Asthma  -  ACT needed  Physical Annual Wellness Visit      Topic Date Due    Hepatitis A Vaccine (1 of 2 - Risk 2-dose series) [birth year]02/1975    Zoster (Shingles) Vaccine (2 of 2) 11/30/2024       Action(s) Taken:   Schedule a Annual Wellness Visit    Type of outreach:    Sent DDN message.    Questions for provider review:    None           Abena Mason MA

## 2024-12-28 ENCOUNTER — MYC REFILL (OUTPATIENT)
Dept: CARDIOLOGY | Facility: CLINIC | Age: 69
End: 2024-12-28
Payer: COMMERCIAL

## 2024-12-28 DIAGNOSIS — I23.6 LV (LEFT VENTRICULAR) MURAL THROMBUS FOLLOWING MI (H): ICD-10-CM

## 2024-12-31 NOTE — TELEPHONE ENCOUNTER
apixaban ANTICOAGULANT (ELIQUIS) 5 MG tablet   Last Written Prescription Date:  5/12/2023  Last Fill Quantity: 180,   # refills: 1  Last Office Visit : 11/5/2024  [place]  Future Office visit:  2/20/2025  Routing apixaban ANTICOAGULANT (ELIQUIS) 5 MG tablet refill request to provider for review/approval because: failed - required labs  - >24 months last Platelet count  - Creatinine (H)  - GFR (L)      Platelet Count   Date Value Ref Range Status   10/14/2022 220 150 - 450 10e3/uL Final   06/18/2021 239 150 - 450 10e9/L Final     Creatinine   Date Value Ref Range Status   11/26/2024 1.58 (H) 0.67 - 1.17 mg/dL Final   06/18/2021 1.38 (H) 0.66 - 1.25 mg/dL Final     GFR Estimate   Date Value Ref Range Status   11/26/2024 47 (L) >60 mL/min/1.73m2 Final     Comment:     eGFR calculated using 2021 CKD-EPI equation.   06/18/2021 53 (L) >60 mL/min/[1.73_m2] Final     Comment:     Non  GFR Calc  Starting 12/18/2018, serum creatinine based estimated GFR (eGFR) will be   calculated using the Chronic Kidney Disease Epidemiology Collaboration   (CKD-EPI) equation.

## 2025-01-13 ENCOUNTER — OFFICE VISIT (OUTPATIENT)
Dept: PODIATRY | Facility: CLINIC | Age: 70
End: 2025-01-13
Payer: COMMERCIAL

## 2025-01-13 VITALS
DIASTOLIC BLOOD PRESSURE: 84 MMHG | BODY MASS INDEX: 31.34 KG/M2 | RESPIRATION RATE: 18 BRPM | HEIGHT: 66 IN | SYSTOLIC BLOOD PRESSURE: 125 MMHG | WEIGHT: 195 LBS | HEART RATE: 75 BPM

## 2025-01-13 DIAGNOSIS — M20.42 HAMMER TOES OF BOTH FEET: ICD-10-CM

## 2025-01-13 DIAGNOSIS — L84 CALLUS: ICD-10-CM

## 2025-01-13 DIAGNOSIS — N18.31 STAGE 3A CHRONIC KIDNEY DISEASE (H): ICD-10-CM

## 2025-01-13 DIAGNOSIS — M20.41 HAMMER TOES OF BOTH FEET: ICD-10-CM

## 2025-01-13 DIAGNOSIS — G60.9 IDIOPATHIC PERIPHERAL NEUROPATHY: Primary | ICD-10-CM

## 2025-01-13 NOTE — PROGRESS NOTES
Subjective:    5/3/23   Pt is seen today in consult from [facility] for bilateral foot pain right is greater than left.  Has been ongoing for years.  Points to the fourth toes.  Pain aggravated by activity and relieved by rest.  Describes as a burning pain.  It is gotten worse over the last 6 months.  Patient has peripheral neuropathy.  Denies past history of ulcers..  Denies erythema ecchymosis drainage or blistering.  Has CKD stage 3.   Primary care is above last seen 4/17/23.     7/12/23   patient returns for bilateral foot pain.  Describes it as a burning pain.  It is constant and always there.  Patient currently taking gabapentin for his neuropathic pain.  He is wondering about something stronger.  We gave him crest pads last visit which helped his hammertoes.  He has lost these and he would like to get some new ones.  Denies new erythema or ecchymosis.    1/13/25   patient complains of left foot pain.  Points to the end of his fourth toe.  Pain aggravated by activity and relieved by rest.  Has had this for several months.  He works as a  and is on his feet.  Denies erythema edema or drainage.  He does have peripheral neuropathy.  He has chronic kidney disease stage III.  Primary care is Dr. Davila last seen 12/20/24.      ROS:  see above         Allergies   Allergen Reactions    No Known Drug Allergy        Current Outpatient Medications   Medication Sig Dispense Refill    albuterol (PROAIR HFA/PROVENTIL HFA/VENTOLIN HFA) 108 (90 Base) MCG/ACT inhaler Inhale 2 puffs into the lungs every 4 hours as needed for shortness of breath / dyspnea 18 g 3    albuterol (PROVENTIL) (2.5 MG/3ML) 0.083% neb solution INHALE 3 ML BY NEBULIZATION METHOD EVERY 6 HOURS AS NEEDED FOR SHORTNESS OF BREATH 360 mL 1    allopurinol (ZYLOPRIM) 100 MG tablet TAKE 2 TABLETS BY MOUTH EVERY DAY NEEDS VISIT AND/OR LABS, WHICH CAN BE IN PERSON BUT A VIRTUAL VISIT MAY BE ACCEPTABLE, FOR MORE. 60 tablet 0    apixaban ANTICOAGULANT  (ELIQUIS) 5 MG tablet Take 1 tablet (5 mg) by mouth 2 times daily. 180 tablet 3    budesonide-formoterol (SYMBICORT) 160-4.5 MCG/ACT Inhaler TAKE 2 PUFFS BY MOUTH TWICE A DAY 30.6 g 1    carvedilol (COREG) 25 MG tablet TAKE 1 TABLET BY MOUTH TWICE A DAY WITH MEALS 180 tablet 3    cetirizine (ZYRTEC) 10 MG tablet TAKE 1 TABLET BY MOUTH DAILY AT BEDTIME 90 tablet 1    cyclobenzaprine (FLEXERIL) 10 MG tablet Take 1 tablet (10 mg) by mouth 3 times daily as needed for muscle spasms 30 tablet 0    DULoxetine (CYMBALTA) 60 MG capsule Take 60 mg by mouth daily      empagliflozin (JARDIANCE) 10 MG TABS tablet Take 1 tablet (10 mg) by mouth daily. 90 tablet 1    enoxaparin ANTICOAGULANT (LOVENOX) 100 MG/ML syringe Inject 1 mL (100 mg) Subcutaneous in the morning and 1 mL (100 mg) in the evening. Until INR 2.3 or greater 20 mL 1    furosemide (LASIX) 20 MG tablet Take 1 tablet (20 mg) by mouth as needed (Use as needed for swelling, shortness of breath, or weight gain). 90 tablet 3    gabapentin (NEURONTIN) 300 MG capsule Take 1 capsule (300 mg) by mouth 3 times daily 270 capsule 1    ibuprofen (ADVIL/MOTRIN) 600 MG tablet       montelukast (SINGULAIR) 10 MG tablet TAKE 1 TABLET BY MOUTH EVERYDAY AT BEDTIME. NEEDS TO ESTABLISH WITH NEW PCP. 90 tablet 1    nitroGLYcerin (NITROSTAT) 0.4 MG sublingual tablet Place 1 tablet (0.4 mg) under the tongue every 5 minutes as needed for chest pain 0.4 mg by Sublingual route every 5 (five) minutes as needed. 25 tablet 11    omeprazole (PRILOSEC) 20 MG DR capsule TAKE 1 CAPSULE BY MOUTH EVERY DAY. PLEASE FOLLOW UP IN CLINIC FOR NEXT REFILL. 90 capsule 0    oxyCODONE (ROXICODONE) 5 MG tablet Take 1 tablet (5 mg) by mouth 2 times daily as needed for moderate to severe pain (up to 2 a day) *PLEASE SCHEDULE APPT FOR FURTHER REFILLS* 60 tablet 0    reason aspirin not prescribed, intentional, Please choose reason not prescribed from choices below.      sacubitril-valsartan (ENTRESTO) 49-51 MG per  tablet Take 1.5 tablets by mouth 2 times daily. 270 tablet 3    spironolactone (ALDACTONE) 25 MG tablet Take 0.5 tablets (12.5 mg) by mouth daily. 45 tablet 3    tamsulosin (FLOMAX) 0.4 MG capsule TAKE 1 CAPSULE (0.4 MG) BY MOUTH DAILY PLEASE FOLLOW UP FOR NEXT REFILL. 90 capsule 0    traZODone (DESYREL) 100 MG tablet Take 1 tablet (100 mg) by mouth at bedtime +++APPOINTMENT NEEDED FOR REFILLS+++ 60 tablet 0    venlafaxine (EFFEXOR XR) 75 MG 24 hr capsule Take 1 capsule (75 mg) by mouth daily 90 capsule 1    atorvastatin (LIPITOR) 40 MG tablet Take 1 tablet (40 mg) by mouth daily for 90 days 90 tablet 3       Patient Active Problem List   Diagnosis    Hypertension goal BP (blood pressure) < 140/90    Hyperlipidemia LDL goal <100    CHF (congestive heart failure) (H)    Mild recurrent major depression    Gout    GERD (gastroesophageal reflux disease)    Chronic rhinitis    History of colonic polyps    Chronic hepatitis C (H)    Coronary artery disease of native artery of native heart with stable angina pectoris    Chronic obstructive airway disease with asthma (H)    Obesity (BMI 35.0-39.9) with comorbidity (H)    Chronic pain syndrome    CKD (chronic kidney disease) stage 3, GFR 30-59 ml/min (H)    Internal hemorrhoids    Chronic ischemic heart disease    Claudication of both lower extremities    Rectal bleeding    Idiopathic peripheral neuropathy    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 (H)    Grief    F11.9 - Chronic, continuous use of opioids       Past Medical History:   Diagnosis Date    CAD (coronary artery disease) 8/12/2012    S/P MI and stenting 2001, 2005 at [facility]     CHF (congestive heart failure) (H) 8/3/2012    Chronic hepatitis C (H) 11/21/2014    CKD (chronic kidney disease) stage 2, GFR 60-89 ml/min 12/3/2012    COPD (chronic obstructive pulmonary disease) (H) 11/21/2014    GERD (gastroesophageal reflux disease) 8/12/2012    Gout 8/12/2012    History of colonic polyps 9/30/2008     Hyperlipidemia LDL goal <100 8/3/2012    Hypertension goal BP (blood pressure) < 140/90 8/3/2012    Mild persistent asthma 5/29/2013    Patient does not have COPD     Mild recurrent major depression 8/3/2012    Nonsenile cataract     Tobacco abuse 9/13/2013    Type 2 diabetes, HbA1C goal < 8% (H) 8/3/2012       Past Surgical History:   Procedure Laterality Date    CARDIAC SURGERY      stent    CATARACT IOL, RT/LT      COLONOSCOPY  9/18/2012    Procedure: COLONOSCOPY;  COLONOSCOPY, SCREEN;  Surgeon: Cl Johnson MD;  Location: [facility] OR    IR THORACENTESIS  11/10/2017    ORTHOPEDIC SURGERY      ankle    PHACOEMULSIFICATION WITH STANDARD INTRAOCULAR LENS IMPLANT Right 1/28/2016    Procedure: PHACOEMULSIFICATION WITH STANDARD INTRAOCULAR LENS IMPLANT;  Surgeon: Fly Merrill MD;  Location: [facility] OR    PHACOEMULSIFICATION WITH STANDARD INTRAOCULAR LENS IMPLANT Left 2/11/2016    Procedure: PHACOEMULSIFICATION WITH STANDARD INTRAOCULAR LENS IMPLANT;  Surgeon: Fly Merrill MD;  Location: [facility] OR       Family History   Problem Relation Age of Onset    Heart Disease Maternal Grandmother     Hypertension Maternal Grandmother     Hypertension Father     Hypertension Mother     Hypertension Sister     Thyroid Disease Sister     Cancer Brother     Diabetes Brother     Hypertension Brother     Hypertension Maternal Aunt     Cancer Maternal Uncle     Hypertension Maternal Uncle     Hypertension Paternal Aunt     Hypertension Paternal Uncle     Hypertension Maternal Grandfather     Hypertension Paternal Grandmother     Hypertension Paternal Grandfather     Cerebrovascular Disease No family hx of     Glaucoma No family hx of     Macular Degeneration No family hx of        Social History     Tobacco Use    Smoking status: Former     Current packs/day: 0.00     Average packs/day: 0.3 packs/day for 15.0 years (3.8 ttl pk-yrs)     Types: Cigarettes     Start date: 9/21/2001     Quit date: 9/21/2016     Years since  "quittin.3    Smokeless tobacco: Never    Tobacco comments:     3-4 a day   Substance Use Topics    Alcohol use: Yes     Comment: weekend beer         Exam:    Vitals: /84   Pulse 75   Resp 18   Ht 1.676 m (5' 6\")   Wt 88.5 kg (195 lb)   BMI 31.47 kg/m    BMI: Body mass index is 31.47 kg/m .  Height: 5' 6\"    Constitutional/ general:  Pt is in no apparent distress, appears well-nourished.  Cooperative with history and physical exam.     Psych:  The patient answered questions appropriately.  Normal affect.  Seems to have reasonable expectations, in terms of treatment.     Lungs:  Non labored breathing, non labored speech. No cough.  No audible wheezing. Even, quiet breathing.       Vascular:  positive PT arteries.  DP 2/4.  CFT < 3 sec.   Ankle edema and varicosities noted.  No pedal hair growth.    Neuro:  Alert and oriented x 3.  Monofilament absent to ankles bilaterally.    Derm: skin thin, shiny, atrophic.  No erythema, ecchymosis, or cyanosis.      Musculoskeletal:    Lower extremity muscle strength is normal.  Patient is ambulatory without an assistive device or brace.  Both fourth toes hammered.  Normal arch.   MS 5/5 all compartments.  Normal ROM all fore foot and rearfoot joints.  All nails are thickened, elongated, discolored with subungual debris.  Patient has calluses on bilateral fourth toes distal lateral with the right much worse than the left..  No masses or breakdown in the skin bilaterally.  No erythema or ecchymosis noted bilateral.     A/P  CKD stage 3  Peripheral neuropathy with loss of protective sensation  Eschar  Callus     Callus debrided with a fifteen blade.  Explained mechanics causing this.  Discussed he has lost protective sensation.  Will be careful to watch.  Small eschar here.  Hopefully will resolve with removing callus.  If he notices any signs of infection or nonhealing will return back to see me.  Dispensed a crest pad to offload this.  RTC as needed.        Gary " LETY VoM, FACFAS

## 2025-02-05 DIAGNOSIS — I50.22 CHRONIC SYSTOLIC HEART FAILURE (H): Primary | ICD-10-CM

## 2025-02-19 ENCOUNTER — LAB (OUTPATIENT)
Dept: LAB | Facility: CLINIC | Age: 70
End: 2025-02-19
Payer: COMMERCIAL

## 2025-02-19 DIAGNOSIS — I50.22 CHRONIC SYSTOLIC HEART FAILURE (H): ICD-10-CM

## 2025-02-19 LAB
ANION GAP SERPL CALCULATED.3IONS-SCNC: 13 MMOL/L (ref 7–15)
BUN SERPL-MCNC: 26.2 MG/DL (ref 8–23)
CALCIUM SERPL-MCNC: 9.5 MG/DL (ref 8.8–10.4)
CHLORIDE SERPL-SCNC: 100 MMOL/L (ref 98–107)
CREAT SERPL-MCNC: 1.91 MG/DL (ref 0.67–1.17)
EGFRCR SERPLBLD CKD-EPI 2021: 37 ML/MIN/1.73M2
GLUCOSE SERPL-MCNC: 111 MG/DL (ref 70–99)
HCO3 SERPL-SCNC: 27 MMOL/L (ref 22–29)
POTASSIUM SERPL-SCNC: 5 MMOL/L (ref 3.4–5.3)
SODIUM SERPL-SCNC: 140 MMOL/L (ref 135–145)

## 2025-02-19 PROCEDURE — 36415 COLL VENOUS BLD VENIPUNCTURE: CPT

## 2025-02-19 PROCEDURE — 80048 BASIC METABOLIC PNL TOTAL CA: CPT

## 2025-02-20 ENCOUNTER — OFFICE VISIT (OUTPATIENT)
Dept: CARDIOLOGY | Facility: CLINIC | Age: 70
End: 2025-02-20
Payer: COMMERCIAL

## 2025-02-20 VITALS
WEIGHT: 197 LBS | DIASTOLIC BLOOD PRESSURE: 80 MMHG | OXYGEN SATURATION: 98 % | BODY MASS INDEX: 31.8 KG/M2 | HEART RATE: 61 BPM | SYSTOLIC BLOOD PRESSURE: 133 MMHG

## 2025-02-20 DIAGNOSIS — I25.118 CORONARY ARTERY DISEASE OF NATIVE ARTERY OF NATIVE HEART WITH STABLE ANGINA PECTORIS: ICD-10-CM

## 2025-02-20 DIAGNOSIS — E11.9 DIABETES MELLITUS TYPE 2, NONINSULIN DEPENDENT (H): ICD-10-CM

## 2025-02-20 DIAGNOSIS — I25.5 ISCHEMIC CARDIOMYOPATHY: ICD-10-CM

## 2025-02-20 DIAGNOSIS — I50.22 CHRONIC SYSTOLIC HEART FAILURE (H): Primary | ICD-10-CM

## 2025-02-20 DIAGNOSIS — Z63.4 SPOUSE DECEASED: ICD-10-CM

## 2025-02-20 DIAGNOSIS — I10 ESSENTIAL HYPERTENSION WITH GOAL BLOOD PRESSURE LESS THAN 140/90: ICD-10-CM

## 2025-02-20 RX ORDER — SACUBITRIL AND VALSARTAN 97; 103 MG/1; MG/1
1 TABLET, FILM COATED ORAL 2 TIMES DAILY
Qty: 180 TABLET | Refills: 3 | Status: SHIPPED | OUTPATIENT
Start: 2025-02-20

## 2025-02-20 RX ORDER — FUROSEMIDE 20 MG/1
20 TABLET ORAL
Qty: 40 TABLET | Refills: 3 | Status: SHIPPED | OUTPATIENT
Start: 2025-02-21

## 2025-02-20 SDOH — SOCIAL STABILITY - SOCIAL INSECURITY: DISSAPEARANCE AND DEATH OF FAMILY MEMBER: Z63.4

## 2025-02-20 NOTE — NURSING NOTE
"Chief Complaint   Patient presents with    CORE       Initial /80 (BP Location: Left arm, Patient Position: Chair, Cuff Size: Adult Regular)   Pulse 61   Wt 89.4 kg (197 lb)   SpO2 98%   BMI 31.80 kg/m   Estimated body mass index is 31.8 kg/m  as calculated from the following:    Height as of 1/13/25: 1.676 m (5' 6\").    Weight as of this encounter: 89.4 kg (197 lb)..  BP completed using cuff size: regular    Denisse Sahu, Visit Facilitator    "

## 2025-02-20 NOTE — LETTER
2/20/2025      RE: Santiago Hylton  [address]  [place] [address]       Dear Colleague,    Thank you for the opportunity to participate in the care of your patient, Santiago Hylton, at the [facility]. Please see a copy of my visit note below.      HPI: Santiago is a 70 year old Black or  male with a past medical history  Ischemic cardiomyopathy with prior coronary angiography and intervention (status post PCI to LAD in 1995 for ACS), Hypertension, Dyslipidemia, Type II diabetes mellitus.    He has less stress and that has helped. He has pain in the feet- its better once he moves around- chronic.  At home < 120 systolic. He didn't feel well after stopping the Lasix - he saw swelling , SOB .  He has been taking it daily.     He has been breathing better.  He says the weight 189 lbs .  He has no swelling in the legs/feet.  He says he has been taking Entresto  49/51 mg- 1.5 tabs BID -     Denies weight gain, chest pain, palpitations, lightheadedness, dizziness, near syncopal/syncopal episodes. Santiago has been following salt and fluid restrictions.      PAST MEDICAL HISTORY:  Past Medical History:   Diagnosis Date     CAD (coronary artery disease) 8/12/2012    [place] [facility] [place]      CHF (congestive heart failure) (H) 8/3/2012     Chronic hepatitis C (H) 11/21/2014     CKD (chronic kidney disease) stage 2, GFR 60-89 ml/min 12/3/2012     COPD (chronic obstructive pulmonary disease) (H) 11/21/2014     GERD (gastroesophageal reflux disease) 8/12/2012     Gout 8/12/2012     History of colonic polyps 9/30/2008     Hyperlipidemia LDL goal <100 8/3/2012     Hypertension goal BP (blood pressure) < 140/90 8/3/2012     Mild persistent asthma 5/29/2013    Patient does not have COPD      Mild recurrent major depression 8/3/2012     Nonsenile cataract      Tobacco abuse 9/13/2013     Type 2 diabetes, HbA1C  goal < 8% (H) 8/3/2012       FAMILY HISTORY:  Family History   Problem Relation Age of Onset     Heart Disease Maternal Grandmother      Hypertension Maternal Grandmother      Hypertension Father      Hypertension Mother      Hypertension Sister      Thyroid Disease Sister      Cancer Brother      Diabetes Brother      Hypertension Brother      Hypertension Maternal Aunt      Cancer Maternal Uncle      Hypertension Maternal Uncle      Hypertension Paternal Aunt      Hypertension Paternal Uncle      Hypertension Maternal Grandfather      Hypertension Paternal Grandmother      Hypertension Paternal Grandfather      Cerebrovascular Disease No family hx of      Glaucoma No family hx of      Macular Degeneration No family hx of        SOCIAL HISTORY:  Social History     Tobacco Use     Smoking status: Former     Current packs/day: 0.00     Average packs/day: 0.3 packs/day for 15.0 years (3.8 ttl pk-yrs)     Types: Cigarettes     Start date: 2001     Quit date: 2016     Years since quittin.4     Smokeless tobacco: Never     Tobacco comments:     3-4 a day   Vaping Use     Vaping status: Never Used   Substance Use Topics     Alcohol use: Yes     Comment: weekend beer     Drug use: No           CURRENT MEDICATIONS:  Current Outpatient Medications   Medication Sig Dispense Refill     allopurinol (ZYLOPRIM) 100 MG tablet TAKE 2 TABLETS BY MOUTH EVERY DAY NEEDS VISIT AND/OR LABS, WHICH CAN BE IN PERSON BUT A VIRTUAL VISIT MAY BE ACCEPTABLE, FOR MORE. 60 tablet 0     apixaban ANTICOAGULANT (ELIQUIS) 5 MG tablet Take 1 tablet (5 mg) by mouth 2 times daily. 180 tablet 3     carvedilol (COREG) 25 MG tablet TAKE 1 TABLET BY MOUTH TWICE A DAY WITH MEALS 180 tablet 3     cyclobenzaprine (FLEXERIL) 10 MG tablet Take 1 tablet (10 mg) by mouth 3 times daily as needed for muscle spasms 30 tablet 0     DULoxetine (CYMBALTA) 60 MG capsule Take 60 mg by mouth daily       empagliflozin (JARDIANCE) 10 MG TABS tablet Take 1  tablet (10 mg) by mouth daily. 90 tablet 1     furosemide (LASIX) 20 MG tablet Take 1 tablet (20 mg) by mouth as needed (Use as needed for swelling, shortness of breath, or weight gain). 90 tablet 3     gabapentin (NEURONTIN) 300 MG capsule Take 1 capsule (300 mg) by mouth 3 times daily 270 capsule 1     montelukast (SINGULAIR) 10 MG tablet TAKE 1 TABLET BY MOUTH EVERYDAY AT BEDTIME. NEEDS TO ESTABLISH WITH NEW PCP. 90 tablet 1     nitroGLYcerin (NITROSTAT) 0.4 MG sublingual tablet Place 1 tablet (0.4 mg) under the tongue every 5 minutes as needed for chest pain 0.4 mg by Sublingual route every 5 (five) minutes as needed. 25 tablet 11     omeprazole (PRILOSEC) 20 MG DR capsule TAKE 1 CAPSULE BY MOUTH EVERY DAY. PLEASE FOLLOW UP IN CLINIC FOR NEXT REFILL. 90 capsule 0     oxyCODONE (ROXICODONE) 5 MG tablet Take 1 tablet (5 mg) by mouth 2 times daily as needed for moderate to severe pain (up to 2 a day) *PLEASE SCHEDULE APPT FOR FURTHER REFILLS* 60 tablet 0     reason aspirin not prescribed, intentional, Please choose reason not prescribed from choices below.       sacubitril-valsartan (ENTRESTO) 49-51 MG per tablet Take 1.5 tablets by mouth 2 times daily. 270 tablet 3     spironolactone (ALDACTONE) 25 MG tablet Take 0.5 tablets (12.5 mg) by mouth daily. 45 tablet 3     tamsulosin (FLOMAX) 0.4 MG capsule TAKE 1 CAPSULE (0.4 MG) BY MOUTH DAILY PLEASE FOLLOW UP FOR NEXT REFILL. 90 capsule 0     traZODone (DESYREL) 100 MG tablet Take 1 tablet (100 mg) by mouth at bedtime +++APPOINTMENT NEEDED FOR REFILLS+++ 60 tablet 0     venlafaxine (EFFEXOR XR) 75 MG 24 hr capsule Take 1 capsule (75 mg) by mouth daily 90 capsule 1     albuterol (PROAIR HFA/PROVENTIL HFA/VENTOLIN HFA) 108 (90 Base) MCG/ACT inhaler Inhale 2 puffs into the lungs every 4 hours as needed for shortness of breath / dyspnea (Patient not taking: Reported on 2/20/2025) 18 g 3     albuterol (PROVENTIL) (2.5 MG/3ML) 0.083% neb solution INHALE 3 ML BY NEBULIZATION  METHOD EVERY 6 HOURS AS NEEDED FOR SHORTNESS OF BREATH (Patient not taking: Reported on 2/20/2025) 360 mL 1     atorvastatin (LIPITOR) 40 MG tablet Take 1 tablet (40 mg) by mouth daily for 90 days 90 tablet 3     budesonide-formoterol (SYMBICORT) 160-4.5 MCG/ACT Inhaler TAKE 2 PUFFS BY MOUTH TWICE A DAY (Patient not taking: Reported on 2/20/2025) 30.6 g 1     cetirizine (ZYRTEC) 10 MG tablet TAKE 1 TABLET BY MOUTH DAILY AT BEDTIME (Patient not taking: Reported on 2/20/2025) 90 tablet 1     enoxaparin ANTICOAGULANT (LOVENOX) 100 MG/ML syringe Inject 1 mL (100 mg) Subcutaneous in the morning and 1 mL (100 mg) in the evening. Until INR 2.3 or greater (Patient not taking: Reported on 2/20/2025) 20 mL 1     ibuprofen (ADVIL/MOTRIN) 600 MG tablet  (Patient not taking: Reported on 2/20/2025)         ROS:  Review Of Systems  Skin: negative  Eyes: negative  Ears/Nose/Throat: negative  Respiratory: No shortness of breath, dyspnea on exertion, cough, or hemoptysis  Cardiovascular: negative  Gastrointestinal: negative  Genitourinary: negative  Musculoskeletal: negative  Neurologic: negative  Psychiatric: negative  Hematologic/Lymphatic/Immunologic: negative  Endocrine: negative      EXAM:  /80 (BP Location: Left arm, Patient Position: Chair, Cuff Size: Adult Regular)   Pulse 61   Wt 89.4 kg (197 lb)   SpO2 98%   BMI 31.80 kg/m    Home weight:  General: alert, articulate, and in no acute distress.  HEENT: normocephalic, atraumatic, anicteric sclera, EOMI, mucosa moist, no cyanosis.   Neck: neck supple.  No adenopathy, masses, or carotid bruits.  JVP not detected at 90 degrees  Heart: regular rhythm, normal S1/S2, no murmur, gallop, rub.  Precordium quiet with normal PMI.     Lungs: clear, no rales, ronchi, or wheezing.  No accessory muscle use, respirations unlabored.   Abdomen: soft, non-tender, bowel sounds present, no hepatomegaly  Extremities: no  pitting edema.   No cyanosis.   Neurological: alert and oriented x  "3.  normal speech and affect, no gross motor deficits  Skin:  No ecchymoses, rashes, or clubbing.    Labs:  CBC RESULTS:  Lab Results   Component Value Date    WBC 9.3 10/14/2022    WBC 9.9 06/18/2021    RBC 4.79 10/14/2022    RBC 4.94 06/18/2021    HGB 14.8 10/14/2022    HGB 15.1 06/18/2021    HCT 45.7 10/14/2022    HCT 46.3 06/18/2021    MCV 95 10/14/2022    MCV 94 06/18/2021    MCH 30.9 10/14/2022    MCH 30.6 06/18/2021    MCHC 32.4 10/14/2022    MCHC 32.6 06/18/2021    RDW 13.5 10/14/2022    RDW 12.8 06/18/2021     10/14/2022     06/18/2021       CMP RESULTS:  Lab Results   Component Value Date     02/19/2025     06/18/2021    POTASSIUM 5.0 02/19/2025    POTASSIUM 4.0 04/26/2023    POTASSIUM 4.3 06/18/2021    CHLORIDE 100 02/19/2025    CHLORIDE 109 04/26/2023    CHLORIDE 102 06/18/2021    CO2 27 02/19/2025    CO2 27 04/26/2023    CO2 33 (H) 06/18/2021    ANIONGAP 13 02/19/2025    ANIONGAP 7 04/26/2023    ANIONGAP 4 06/18/2021     (H) 02/19/2025     (H) 04/26/2023    GLC 94 06/18/2021    BUN 26.2 (H) 02/19/2025    BUN 18 04/26/2023    BUN 24 06/18/2021    CR 1.91 (H) 02/19/2025    CR 1.38 (H) 06/18/2021    GFRESTIMATED 37 (L) 02/19/2025    GFRESTIMATED 53 (L) 06/18/2021    GFRESTBLACK 62 06/18/2021    FELICITY 9.5 02/19/2025    FELICITY 9.0 06/18/2021    BILITOTAL 0.5 09/11/2018    ALBUMIN 3.9 09/11/2018    ALKPHOS 86 09/11/2018    ALT 36 07/21/2022    ALT 30 06/18/2021    AST 29 09/11/2018        INR RESULTS:  Lab Results   Component Value Date    INR 1.12 10/06/2014       No components found for: \"CK\"  Lab Results   Component Value Date    MAG 1.9 01/18/2010     Lab Results   Component Value Date    NTBNP 477 10/30/2024    NTBNP 544 (H) 06/18/2021     @BRIEFLABR (dig)@    Most recent echocardiogram:  No results found for this or any previous visit (from the past 8760 hours).      Assessment and Plan:    In summary,Santiago  is a  70 year old male who is here for a CORE visit. He is " euvolemic today. He reports feeling well on the current dose of Entresto - will increase to 97/103 mg BID . Labs reviewed. His creatinine is elevated and we talked about going to three times a week for the Lasix.  The patient agrees to this.       1.  Chronic systolic heart failure secondary to ICM.  Stage C  NYHA Class II  ACEi/ARB/ARNI/ - increase Entresto 97/103 mg -   BID -   BB: yes- max dose   Aldosterone antagonist: Spironolactone 12.5 mg   SCD prophylaxis: does not meet criteria for implant  Fluid status: euvolemic  Anticoagulation:   Antiplatelet:  ASA dose   Sleep apnea:  NSAID use:  Contraindicated.  Avoid use.  Remote Monitoring:  SGLT2- 10 mg - daily    2.  Other comordbid conditions:    Atypical anginal chest pain- not present today - Imdur has helped   Coronary artery disease [place] [facility] [place]   Ischemic cardiomyopathy (ACC/AHA stage C, NYHA III)  Hypertension-Entresto  Dyslipidemia  COPD - inhalers - taking as recommended   Type II diabetes mellitus, managed only with conservative measures      3.  Follow-up-  Increase Entresto 97/103 mg BID   Lasix 20 mg every Mon, Wed, and Fri  CORE in 6 months         Kayla STALLWORTH, CNP  [facility]                         Please do not hesitate to contact me if you have any questions/concerns.     Sincerely,     FEDERICA Multani CNP

## 2025-02-20 NOTE — PATIENT INSTRUCTIONS
Take your medicines every day, as directed    Changes made today:  Increase Entresto to  mg twice daily  Take lasix 20 mg every Monday, Wednesday, and Friday     Monitor Your Weight and Symptoms    Contact us if you:    Gain 2 pounds in one day or 5 pounds in one week  Feel more short of breath  Notice more leg swelling  Feel lightheadeded   Change your lifestyle    Limit Salt or Sodium:  2000 mg  Limit Fluids:  2000 mL or approximately 64 ounces  Eat a Heart Healthy Diet  Low in saturated fats  Stay Active:  Aim to move at least 150 minutes every  week         To Contact us    During Business Hours:  [phone], option # 1      After hours, weekends or holidays:   [phone], Option #4  Ask to speak to the On-Call Cardiologist. Inform them you are a CORE/heart failure patient at the [facility].     Use [employer] allows you to communicate directly with your heart team through secure messaging.  [employer] can be accessed any time on your phone, computer, or tablet.  If you need assistance, we'd be happy to help!         Keep your Heart Appointments:    Dr. Orozco in April as scheduled    CORE in 6 months           Heart Failure Support Group  Virtual meetings will continue in 2024 Please reach out if you would like to attend and we can get you the information you need to log in.     2024 dates:    Monday, November 4th, 1-2pm     Monday, December 2nd, 1-2pm     Follow the American Heart Association Diet and Lifestyle recommendations:  Limit saturated fat, trans fat, sodium, red meat, sweets and sugar-sweetened beverages. If you choose to eat red meat, compare labels and select the leanest cuts available.  Aim for at least 150 minutes of moderate physical activity or 75 minutes of vigorous physical activity - or an equal combination of both - each week.     During business hours: [phone], press option # 1 to schedule an appointment or send a message to your care team     After hours,  weekends or holidays: On Call Cardiologist- [phone]   option #4 and ask to speak to the on-call Cardiologist. Inform them you are a CORE/heart failure patient at the [place].

## 2025-02-20 NOTE — PROGRESS NOTES
HPI: Santiago is a 70 year old Black or  male with a past medical history  Ischemic cardiomyopathy with prior coronary angiography and intervention (status post PCI to LAD in 1995 for ACS), Hypertension, Dyslipidemia, Type II diabetes mellitus.    He has less stress and that has helped. He has pain in the feet- its better once he moves around- chronic.  At home < 120 systolic. He didn't feel well after stopping the Lasix - he saw swelling , SOB .  He has been taking it daily.     He has been breathing better.  He says the weight 189 lbs .  He has no swelling in the legs/feet.  He says he has been taking Entresto  49/51 mg- 1.5 tabs BID -     Denies weight gain, chest pain, palpitations, lightheadedness, dizziness, near syncopal/syncopal episodes. Santiago has been following salt and fluid restrictions.      PAST MEDICAL HISTORY:  Past Medical History:   Diagnosis Date    CAD (coronary artery disease) 8/12/2012    S/P MI and stenting 2001, 2005 at [facility] [place]     CHF (congestive heart failure) (H) 8/3/2012    Chronic hepatitis C (H) 11/21/2014    CKD (chronic kidney disease) stage 2, GFR 60-89 ml/min 12/3/2012    COPD (chronic obstructive pulmonary disease) (H) 11/21/2014    GERD (gastroesophageal reflux disease) 8/12/2012    Gout 8/12/2012    History of colonic polyps 9/30/2008    Hyperlipidemia LDL goal <100 8/3/2012    Hypertension goal BP (blood pressure) < 140/90 8/3/2012    Mild persistent asthma 5/29/2013    Patient does not have COPD     Mild recurrent major depression 8/3/2012    Nonsenile cataract     Tobacco abuse 9/13/2013    Type 2 diabetes, HbA1C goal < 8% (H) 8/3/2012       FAMILY HISTORY:  Family History   Problem Relation Age of Onset    Heart Disease Maternal Grandmother     Hypertension Maternal Grandmother     Hypertension Father     Hypertension Mother     Hypertension Sister     Thyroid Disease Sister     Cancer Brother     Diabetes Brother     Hypertension Brother     Hypertension Maternal  Aunt     Cancer Maternal Uncle     Hypertension Maternal Uncle     Hypertension Paternal Aunt     Hypertension Paternal Uncle     Hypertension Maternal Grandfather     Hypertension Paternal Grandmother     Hypertension Paternal Grandfather     Cerebrovascular Disease No family hx of     Glaucoma No family hx of     Macular Degeneration No family hx of        SOCIAL HISTORY:  Social History     Tobacco Use    Smoking status: Former     Current packs/day: 0.00     Average packs/day: 0.3 packs/day for 15.0 years (3.8 ttl pk-yrs)     Types: Cigarettes     Start date: 2001     Quit date: 2016     Years since quittin.4    Smokeless tobacco: Never    Tobacco comments:     3-4 a day   Vaping Use    Vaping status: Never Used   Substance Use Topics    Alcohol use: Yes     Comment: weekend beer    Drug use: No           CURRENT MEDICATIONS:  Current Outpatient Medications   Medication Sig Dispense Refill    allopurinol (ZYLOPRIM) 100 MG tablet TAKE 2 TABLETS BY MOUTH EVERY DAY NEEDS VISIT AND/OR LABS, WHICH CAN BE IN PERSON BUT A VIRTUAL VISIT MAY BE ACCEPTABLE, FOR MORE. 60 tablet 0    apixaban ANTICOAGULANT (ELIQUIS) 5 MG tablet Take 1 tablet (5 mg) by mouth 2 times daily. 180 tablet 3    carvedilol (COREG) 25 MG tablet TAKE 1 TABLET BY MOUTH TWICE A DAY WITH MEALS 180 tablet 3    cyclobenzaprine (FLEXERIL) 10 MG tablet Take 1 tablet (10 mg) by mouth 3 times daily as needed for muscle spasms 30 tablet 0    DULoxetine (CYMBALTA) 60 MG capsule Take 60 mg by mouth daily      empagliflozin (JARDIANCE) 10 MG TABS tablet Take 1 tablet (10 mg) by mouth daily. 90 tablet 1    furosemide (LASIX) 20 MG tablet Take 1 tablet (20 mg) by mouth as needed (Use as needed for swelling, shortness of breath, or weight gain). 90 tablet 3    gabapentin (NEURONTIN) 300 MG capsule Take 1 capsule (300 mg) by mouth 3 times daily 270 capsule 1    montelukast (SINGULAIR) 10 MG tablet TAKE 1 TABLET BY MOUTH EVERYDAY AT BEDTIME. NEEDS TO  ESTABLISH WITH NEW PCP. 90 tablet 1    nitroGLYcerin (NITROSTAT) 0.4 MG sublingual tablet Place 1 tablet (0.4 mg) under the tongue every 5 minutes as needed for chest pain 0.4 mg by Sublingual route every 5 (five) minutes as needed. 25 tablet 11    omeprazole (PRILOSEC) 20 MG DR capsule TAKE 1 CAPSULE BY MOUTH EVERY DAY. PLEASE FOLLOW UP IN CLINIC FOR NEXT REFILL. 90 capsule 0    oxyCODONE (ROXICODONE) 5 MG tablet Take 1 tablet (5 mg) by mouth 2 times daily as needed for moderate to severe pain (up to 2 a day) *PLEASE SCHEDULE APPT FOR FURTHER REFILLS* 60 tablet 0    reason aspirin not prescribed, intentional, Please choose reason not prescribed from choices below.      sacubitril-valsartan (ENTRESTO) 49-51 MG per tablet Take 1.5 tablets by mouth 2 times daily. 270 tablet 3    spironolactone (ALDACTONE) 25 MG tablet Take 0.5 tablets (12.5 mg) by mouth daily. 45 tablet 3    tamsulosin (FLOMAX) 0.4 MG capsule TAKE 1 CAPSULE (0.4 MG) BY MOUTH DAILY PLEASE FOLLOW UP FOR NEXT REFILL. 90 capsule 0    traZODone (DESYREL) 100 MG tablet Take 1 tablet (100 mg) by mouth at bedtime +++APPOINTMENT NEEDED FOR REFILLS+++ 60 tablet 0    venlafaxine (EFFEXOR XR) 75 MG 24 hr capsule Take 1 capsule (75 mg) by mouth daily 90 capsule 1    albuterol (PROAIR HFA/PROVENTIL HFA/VENTOLIN HFA) 108 (90 Base) MCG/ACT inhaler Inhale 2 puffs into the lungs every 4 hours as needed for shortness of breath / dyspnea (Patient not taking: Reported on 2/20/2025) 18 g 3    albuterol (PROVENTIL) (2.5 MG/3ML) 0.083% neb solution INHALE 3 ML BY NEBULIZATION METHOD EVERY 6 HOURS AS NEEDED FOR SHORTNESS OF BREATH (Patient not taking: Reported on 2/20/2025) 360 mL 1    atorvastatin (LIPITOR) 40 MG tablet Take 1 tablet (40 mg) by mouth daily for 90 days 90 tablet 3    budesonide-formoterol (SYMBICORT) 160-4.5 MCG/ACT Inhaler TAKE 2 PUFFS BY MOUTH TWICE A DAY (Patient not taking: Reported on 2/20/2025) 30.6 g 1    cetirizine (ZYRTEC) 10 MG tablet TAKE 1 TABLET  BY MOUTH DAILY AT BEDTIME (Patient not taking: Reported on 2/20/2025) 90 tablet 1    enoxaparin ANTICOAGULANT (LOVENOX) 100 MG/ML syringe Inject 1 mL (100 mg) Subcutaneous in the morning and 1 mL (100 mg) in the evening. Until INR 2.3 or greater (Patient not taking: Reported on 2/20/2025) 20 mL 1    ibuprofen (ADVIL/MOTRIN) 600 MG tablet  (Patient not taking: Reported on 2/20/2025)         ROS:  Review Of Systems  Skin: negative  Eyes: negative  Ears/Nose/Throat: negative  Respiratory: No shortness of breath, dyspnea on exertion, cough, or hemoptysis  Cardiovascular: negative  Gastrointestinal: negative  Genitourinary: negative  Musculoskeletal: negative  Neurologic: negative  Psychiatric: negative  Hematologic/Lymphatic/Immunologic: negative  Endocrine: negative      EXAM:  /80 (BP Location: Left arm, Patient Position: Chair, Cuff Size: Adult Regular)   Pulse 61   Wt 89.4 kg (197 lb)   SpO2 98%   BMI 31.80 kg/m    Home weight:  General: alert, articulate, and in no acute distress.  HEENT: normocephalic, atraumatic, anicteric sclera, EOMI, mucosa moist, no cyanosis.   Neck: neck supple.  No adenopathy, masses, or carotid bruits.  JVP not detected at 90 degrees  Heart: regular rhythm, normal S1/S2, no murmur, gallop, rub.  Precordium quiet with normal PMI.     Lungs: clear, no rales, ronchi, or wheezing.  No accessory muscle use, respirations unlabored.   Abdomen: soft, non-tender, bowel sounds present, no hepatomegaly  Extremities: no  pitting edema.   No cyanosis.   Neurological: alert and oriented x 3.  normal speech and affect, no gross motor deficits  Skin:  No ecchymoses, rashes, or clubbing.    Labs:  CBC RESULTS:  Lab Results   Component Value Date    WBC 9.3 10/14/2022    WBC 9.9 06/18/2021    RBC 4.79 10/14/2022    RBC 4.94 06/18/2021    HGB 14.8 10/14/2022    HGB 15.1 06/18/2021    HCT 45.7 10/14/2022    HCT 46.3 06/18/2021    MCV 95 10/14/2022    MCV 94 06/18/2021    MCH 30.9 10/14/2022    MCH  "30.6 06/18/2021    MCHC 32.4 10/14/2022    MCHC 32.6 06/18/2021    RDW 13.5 10/14/2022    RDW 12.8 06/18/2021     10/14/2022     06/18/2021       CMP RESULTS:  Lab Results   Component Value Date     02/19/2025     06/18/2021    POTASSIUM 5.0 02/19/2025    POTASSIUM 4.0 04/26/2023    POTASSIUM 4.3 06/18/2021    CHLORIDE 100 02/19/2025    CHLORIDE 109 04/26/2023    CHLORIDE 102 06/18/2021    CO2 27 02/19/2025    CO2 27 04/26/2023    CO2 33 (H) 06/18/2021    ANIONGAP 13 02/19/2025    ANIONGAP 7 04/26/2023    ANIONGAP 4 06/18/2021     (H) 02/19/2025     (H) 04/26/2023    GLC 94 06/18/2021    BUN 26.2 (H) 02/19/2025    BUN 18 04/26/2023    BUN 24 06/18/2021    CR 1.91 (H) 02/19/2025    CR 1.38 (H) 06/18/2021    GFRESTIMATED 37 (L) 02/19/2025    GFRESTIMATED 53 (L) 06/18/2021    GFRESTBLACK 62 06/18/2021    FELICITY 9.5 02/19/2025    FELICITY 9.0 06/18/2021    BILITOTAL 0.5 09/11/2018    ALBUMIN 3.9 09/11/2018    ALKPHOS 86 09/11/2018    ALT 36 07/21/2022    ALT 30 06/18/2021    AST 29 09/11/2018        INR RESULTS:  Lab Results   Component Value Date    INR 1.12 10/06/2014       No components found for: \"CK\"  Lab Results   Component Value Date    MAG 1.9 01/18/2010     Lab Results   Component Value Date    NTBNP 477 10/30/2024    NTBNP 544 (H) 06/18/2021     @BRIEFLABR (dig)@    Most recent echocardiogram:  No results found for this or any previous visit (from the past 8760 hours).      Assessment and Plan:    In summary,Santiago  is a  70 year old male who is here for a [facility] visit. He is euvolemic today. He reports feeling well on the current dose of Entresto - will increase to 97/103 mg BID . Labs reviewed. His creatinine is elevated and we talked about going to three times a week for the Lasix.  The patient agrees to this.       1.  Chronic systolic heart failure secondary to ICM.  Stage C  NYHA Class II  ACEi/ARB/ARNI/ - increase Entresto 97/103 mg -   BID -   BB: yes- max dose "   Aldosterone antagonist: Spironolactone 12.5 mg   SCD prophylaxis: does not meet criteria for implant  Fluid status: euvolemic  Anticoagulation:   Antiplatelet:  ASA dose   Sleep apnea:  NSAID use:  Contraindicated.  Avoid use.  Remote Monitoring:  SGLT2- 10 mg - daily    2.  Other comordbid conditions:    Atypical anginal chest pain- not present today - Imdur has helped   Coronary artery disease with prior proximal LAD stenting in 1995 at [facility]   Ischemic cardiomyopathy (ACC/AHA stage C, NYHA III)  Hypertension-Entresto  Dyslipidemia  COPD - inhalers - taking as recommended   Type II diabetes mellitus, managed only with conservative measures      3.  Follow-up-  Increase Entresto 97/103 mg BID   Lasix 20 mg every Mon, Wed, and Fri  [facility] in 6 months         Kayla STALLWORTH, CNP  [facility]

## 2025-02-20 NOTE — NURSING NOTE
Labs: Patient was given results of the laboratory testing obtained today. Patient demonstrated understanding of this information and agreed to call with further questions or concerns.     Med Reconcile: Reviewed and verified all current medications with the patient. The updated medication list was printed and given to the patient.    Return Appointment: Patient given instructions regarding scheduling next clinic visit. Patient demonstrated understanding of this information and agreed to call with further questions or concerns. [facility] in 6 months.    Medication Change: Patient was educated regarding prescribed medication change, including discussion of the indication, administration, side effects, and when to report to MD or RN. Patient demonstrated understanding of this information and agreed to call with further questions or concerns. Increase Entresto to  mg BID, decrease lasix to 20 mg MWF.    Patient stated he understood all health information given and agreed to call with further questions or concerns.    Rosy Byrd, HERNAN

## 2025-03-29 ENCOUNTER — HEALTH MAINTENANCE LETTER (OUTPATIENT)
Age: 70
End: 2025-03-29

## 2025-04-08 DIAGNOSIS — I50.22 CHRONIC SYSTOLIC HEART FAILURE (H): Primary | ICD-10-CM

## 2025-04-21 NOTE — PROGRESS NOTES
Advanced Heart Failure/Transplant Clinic Note    HPI  Dear colleagues,     I had the pleasure of seeing Mr. Santiago Hylton in the Cardiology clinic.  As you know, Mr. Santiago Hylton is a pleasant 70 year old male with a past medical history of chronic HFrEF 2/2 ICM, CAD s/p PCI in '95, HTN, HLD, DM Type II c/b neuropathy, chronic hepatitis, COPD, and CKD stage III who presents as follow up for HFrEF.    Patient reports overall feeling well recently except for the last week while in [place] when he developed dyspnea on exertion and cough. He came back and saw a provider and was diagnosed with a COPD flare and started on steroids and is already starting to improve this. He tries to stay active, but sometimes his neuropathy limits him. He has not had any chest pain recently. He has presyncope with quick position changes, but no syncope. He denies palpitations, orthopnea, PND, abdominal pain, nausea, emesis, LE edema or weight gain. He watches his salt and fluid intake and monitors his weight. He reports compliance with his medications.     ROS:  A complete 12-point ROS was negative except as above.    PAST MEDICAL HISTORY:  Patient Active Problem List   Diagnosis    Hypertension goal BP (blood pressure) < 140/90    Hyperlipidemia LDL goal <100    CHF (congestive heart failure) (H)    Mild recurrent major depression    Gout    GERD (gastroesophageal reflux disease)    Chronic rhinitis    History of colonic polyps    Chronic hepatitis C (H)    Coronary artery disease of native artery of native heart with stable angina pectoris    Chronic obstructive airway disease with asthma (H)    Obesity (BMI 35.0-39.9) with comorbidity (H)    Chronic pain syndrome    CKD (chronic kidney disease) stage 3, GFR 30-59 ml/min (H)    Internal hemorrhoids    Chronic ischemic heart disease    Claudication of both lower extremities    Rectal bleeding    Idiopathic peripheral neuropathy    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 (H)     Grief    F11.9 - Chronic, continuous use of opioids        FAMILY HISTORY:  Family History   Problem Relation Age of Onset    Heart Disease Maternal Grandmother     Hypertension Maternal Grandmother     Hypertension Father     Hypertension Mother     Hypertension Sister     Thyroid Disease Sister     Cancer Brother     Diabetes Brother     Hypertension Brother     Hypertension Maternal Aunt     Cancer Maternal Uncle     Hypertension Maternal Uncle     Hypertension Paternal Aunt     Hypertension Paternal Uncle     Hypertension Maternal Grandfather     Hypertension Paternal Grandmother     Hypertension Paternal Grandfather     Cerebrovascular Disease No family hx of     Glaucoma No family hx of     Macular Degeneration No family hx of        SOCIAL HISTORY:  Social History     Tobacco Use    Smoking status: Former     Current packs/day: 0.00     Average packs/day: 0.3 packs/day for 15.0 years (3.8 ttl pk-yrs)     Types: Cigarettes     Start date: 2001     Quit date: 2016     Years since quittin.5    Smokeless tobacco: Never    Tobacco comments:     3-4 a day   Vaping Use    Vaping status: Never Used   Substance Use Topics    Alcohol use: Yes     Comment: weekend beer    Drug use: No        ALLERGIES:  Allergies   Allergen Reactions    No Known Drug Allergy        CURRENT MEDICATIONS:  Current Outpatient Medications   Medication Sig Dispense Refill    albuterol (PROAIR HFA/PROVENTIL HFA/VENTOLIN HFA) 108 (90 Base) MCG/ACT inhaler Inhale 2 puffs into the lungs every 4 hours as needed for shortness of breath / dyspnea 18 g 3    albuterol (PROVENTIL) (2.5 MG/3ML) 0.083% neb solution INHALE 3 ML BY NEBULIZATION METHOD EVERY 6 HOURS AS NEEDED FOR SHORTNESS OF BREATH 360 mL 1    allopurinol (ZYLOPRIM) 100 MG tablet TAKE 2 TABLETS BY MOUTH EVERY DAY NEEDS VISIT AND/OR LABS, WHICH CAN BE IN PERSON BUT A VIRTUAL VISIT MAY BE ACCEPTABLE, FOR MORE. 60 tablet 0    apixaban ANTICOAGULANT (ELIQUIS) 5 MG tablet Take 1  tablet (5 mg) by mouth 2 times daily. 180 tablet 3    atorvastatin (LIPITOR) 40 MG tablet Take 1 tablet (40 mg) by mouth daily. 90 tablet 3    carvedilol (COREG) 25 MG tablet TAKE 1 TABLET BY MOUTH TWICE A DAY WITH MEALS 180 tablet 3    cetirizine (ZYRTEC) 10 MG tablet TAKE 1 TABLET BY MOUTH DAILY AT BEDTIME 90 tablet 1    cyclobenzaprine (FLEXERIL) 10 MG tablet Take 1 tablet (10 mg) by mouth 3 times daily as needed for muscle spasms 30 tablet 0    DULoxetine (CYMBALTA) 60 MG capsule Take 60 mg by mouth daily      empagliflozin (JARDIANCE) 10 MG TABS tablet Take 1 tablet (10 mg) by mouth daily. 90 tablet 3    enoxaparin ANTICOAGULANT (LOVENOX) 100 MG/ML syringe Inject 1 mL (100 mg) Subcutaneous in the morning and 1 mL (100 mg) in the evening. Until INR 2.3 or greater 20 mL 1    furosemide (LASIX) 20 MG tablet Take 1 tablet (20 mg) by mouth three times a week. Monday, Wednesday, and Friday 40 tablet 3    gabapentin (NEURONTIN) 300 MG capsule Take 1 capsule (300 mg) by mouth 3 times daily 270 capsule 1    montelukast (SINGULAIR) 10 MG tablet TAKE 1 TABLET BY MOUTH EVERYDAY AT BEDTIME. NEEDS TO ESTABLISH WITH NEW PCP. 90 tablet 1    nitroGLYcerin (NITROSTAT) 0.4 MG sublingual tablet Place 1 tablet (0.4 mg) under the tongue every 5 minutes as needed for chest pain 0.4 mg by Sublingual route every 5 (five) minutes as needed. 25 tablet 11    omeprazole (PRILOSEC) 20 MG DR capsule TAKE 1 CAPSULE BY MOUTH EVERY DAY. PLEASE FOLLOW UP IN CLINIC FOR NEXT REFILL. 90 capsule 0    oxyCODONE (ROXICODONE) 5 MG tablet Take 1 tablet (5 mg) by mouth 2 times daily as needed for moderate to severe pain (up to 2 a day) *PLEASE SCHEDULE APPT FOR FURTHER REFILLS* 60 tablet 0    sacubitril-valsartan (ENTRESTO)  MG per tablet Take 1 tablet by mouth 2 times daily. 180 tablet 3    spironolactone (ALDACTONE) 25 MG tablet Take 0.5 tablets (12.5 mg) by mouth daily. 45 tablet 3    tamsulosin (FLOMAX) 0.4 MG capsule TAKE 1 CAPSULE (0.4 MG) BY  MOUTH DAILY PLEASE FOLLOW UP FOR NEXT REFILL. 90 capsule 0    traZODone (DESYREL) 100 MG tablet Take 1 tablet (100 mg) by mouth at bedtime +++APPOINTMENT NEEDED FOR REFILLS+++ 60 tablet 0    venlafaxine (EFFEXOR XR) 75 MG 24 hr capsule Take 1 capsule (75 mg) by mouth daily 90 capsule 1    budesonide-formoterol (SYMBICORT) 160-4.5 MCG/ACT Inhaler TAKE 2 PUFFS BY MOUTH TWICE A DAY (Patient not taking: Reported on 4/22/2025) 30.6 g 1    ibuprofen (ADVIL/MOTRIN) 600 MG tablet  (Patient not taking: Reported on 4/22/2025)      reason aspirin not prescribed, intentional, Please choose reason not prescribed from choices below.         EXAM:  /74 (BP Location: Left arm, Patient Position: Chair, Cuff Size: Adult Large)   Pulse 87   Wt 90.7 kg (200 lb)   SpO2 (!) 91%   BMI 32.28 kg/m    General: appears comfortable, alert and interactive, in no acute distress  Head: normocephalic, atraumatic  Eyes: anicteric sclera, EOMI  Mouth: MMM  Neck: supple, no cervical adenopathy  CV: regular rate and rhythm, no murmur, gallop, rub, estimated JVP ~7 cm  Resp: Mild expiratory wheezes  GI: soft, nontender, nondistended  Extremities: warm, no peripheral edema, 2+ bilateral radial pulses  Neurological: alert and oriented, no focal deficits  Psych: normal mood and affect  Derm: no rashes on exposed surfaces    Weight  Wt Readings from Last 10 Encounters:   04/22/25 90.7 kg (200 lb)   02/20/25 89.4 kg (197 lb)   01/13/25 88.5 kg (195 lb)   11/05/24 88.5 kg (195 lb)   08/08/24 89.1 kg (196 lb 6.4 oz)   02/15/24 86.5 kg (190 lb 9.6 oz)   05/05/23 85.1 kg (187 lb 9.6 oz)   04/26/23 90.3 kg (199 lb)   04/17/23 84.8 kg (187 lb)   03/29/23 87.1 kg (192 lb)       I personally reviewed recent labs and data as below and discussed the results with the patient in clinic today.  Labs:  CBC RESULTS:  Lab Results   Component Value Date    WBC 9.3 10/14/2022    WBC 9.9 06/18/2021    RBC 4.79 10/14/2022    RBC 4.94 06/18/2021    HGB 14.8 10/14/2022     HGB 15.1 06/18/2021    HCT 45.7 10/14/2022    HCT 46.3 06/18/2021    MCV 95 10/14/2022    MCV 94 06/18/2021    MCH 30.9 10/14/2022    MCH 30.6 06/18/2021    MCHC 32.4 10/14/2022    MCHC 32.6 06/18/2021    RDW 13.5 10/14/2022    RDW 12.8 06/18/2021     10/14/2022     06/18/2021       CMP RESULTS:  Lab Results   Component Value Date     02/19/2025     06/18/2021    POTASSIUM 5.0 02/19/2025    POTASSIUM 4.0 04/26/2023    POTASSIUM 4.3 06/18/2021    CHLORIDE 100 02/19/2025    CHLORIDE 109 04/26/2023    CHLORIDE 102 06/18/2021    CO2 27 02/19/2025    CO2 27 04/26/2023    CO2 33 (H) 06/18/2021    ANIONGAP 13 02/19/2025    ANIONGAP 7 04/26/2023    ANIONGAP 4 06/18/2021     (H) 02/19/2025     (H) 04/26/2023    GLC 94 06/18/2021    BUN 26.2 (H) 02/19/2025    BUN 18 04/26/2023    BUN 24 06/18/2021    CR 1.91 (H) 02/19/2025    CR 1.38 (H) 06/18/2021    GFRESTIMATED 37 (L) 02/19/2025    GFRESTIMATED 53 (L) 06/18/2021    GFRESTBLACK 62 06/18/2021    FELICITY 9.5 02/19/2025    FELICITY 9.0 06/18/2021    BILITOTAL 0.5 09/11/2018    ALBUMIN 3.9 09/11/2018    ALKPHOS 86 09/11/2018    ALT 36 07/21/2022    ALT 30 06/18/2021    AST 29 09/11/2018      Recent Labs   Lab Test 04/17/23  1130 07/21/22  1007   CHOL 191 167   HDL 62 69   * 76   TRIG 88 108      Testing/Procedures:  I personally visualized and interpreted:    Cardiac CTA 4/8/22  IMPRESSION:  1.  Mild non-obstructive CAD.  2.  Widely patent LAD stent.  3.  Diffuse coronary atherosclerosis.  4.  Please review the separate Radiology report for incidental  noncardiac findings.    cMRI 7/12/22  SUMMARY  ==========================================================================================================  Clinical history: 67-year old male with a history of ischemic cardiomyopathy. He was found to have an intracardiac thrombus on his TTE in 04/2022 and started on anticoagulation. CMR to evaluate for intracardiac thrombus after  anticoagulation course.   Comparison CMR: 12/18/2013     1. The LV is normal in cavity size. The global systolic function is moderately-severely reduced. The LVEF is 31%. There is moderate hypokinesis and thinning and akinesis of the apical segments.     2. The RV is normal in cavity size. The global systolic function is mildly reduced. The RVEF is 46%.      3. Both atria are normal in size.     4. There is no significant valvular disease.      5. Late gadolinium enhancement imaging shows a contiguous pattern of subendocardial enhancement in the basal anterior, basal anteroseptal, mid anterior, mid anteroseptal, apical anterior, apical septal, apical lateral, and true apical segments. This is consistent with a prior LAD culprit infarction. There is 20%  viability in the LAD territory but 100% viability in the LCx and RCA territories.      6. There is no pericardial effusion or thickening.     7. There is a 1.0 x 0.6 cm LV apical thrombus overlying the akinetic true apex. It shows some contrast uptake on repeat (delayed long TI imaging, possibly implying vascularization and an element of chronicity.      CONCLUSIONS: Ischemic cardiomyopathy. There is moderately-severely reduced LV function and mildly reduced RV function. There is an large burden of fibrosis that is consistent with a prior LAD culprit infarction. There is also a persistent LV apical thrombus in spite of anticoagulation.     Echocardiogram 2/15/24  Interpretation Summary  Left ventricular function is decreased. The ejection fraction is 40-45%  (mildly reduced). Biplane LVEF is 43%. There is akinesis involving the mid anterior/anteroseptal and all apical segments consistent with prior LAD territory infarct, unchanged from prior studies.  RV size and function are probably normal on limited views.  This study was compared with the study from 12/20/16: No significant changes noted.    Outside results of note:  Outside records were obtained and relevant  results/notes have been incorporated into HPI.    Assessment and Plan:     In summary, 70 year old male with a past medical history of chronic HFrEF 2/2 ICM, CAD s/p PCI in '95, HTN, HLD, DM Type II c/b neuropathy, chronic hepatitis, COPD, and CKD stage III who presents as follow up for HFrEF.    Chronic systolic heart failure/HFrEF (EF 40-45%) secondary to ischemic cardiomyopathy  NYHA Symptom Class II  Stage C  ACE-I/ARB/ARNi: Continue Entresto  mg BID  BB: Continue carvedilol 25 mg BID  Aldosterone antagonist: Continue spironolactone 12.5 mg daily  SGLT2i: Restart empagliflozin 10 mg daily (just ran out recently)  SCD prophylaxis: N/A given LVEF >35%  %BiV pacing: N/A  Fluid status: grossly euvolemic on lasix 20 mg daily  Cardiac Rehab: N/A  Remote PA Pressure Monitoring (CardioMems): N/A  - Recommend heart healthy diet and regular aerobic exercise as above    LV Thrombus  - Continue apixaban 5 mg BID    CAD s/p PCI with stable angina  HLD  - Not on ASA given on apixaban  - Continue atorvastatin 40 mg daily  - Continue nitroglycerin PRN    CKD Stage III  - Will continue to monitor    Optimal Vascular Metrics    Blood Pressure   BP < 140/90 Yes    On Aspirin  No: Contraindicated due to: Already on DOAC    On Statin  Yes    Tobacco use  No       To Do:  - Restart Jardiance 10 mg daily  - Repeat TTE and BMP in 3-4 weeks  - Follow up with [facility] as scheduled in August '25  - Follow up with me in 12 months with labs prior    The patient states understanding and is agreeable with plan.   Feel free to contact myself regarding questions or concerns. It was a pleasure to see this patient today.    A total of 48 minutes was spent on the day of the visit, which includes preparation for the visit (reviewing previous medical records, laboratories and investigations), in conjunction with the actual clinic visit with the patient, which includes obtaining a history and physical exam, creating and reviewing the care plan,  patient education (and family if present), counseling, documenting clinical information in the electronic health record and care coordination.     The longitudinal plan of care for the diagnosis(es)/condition(s) as documented were addressed during this visit. Due to the added complexity in care, I will continue to support Santiago in the subsequent management and with ongoing continuity of care.     Kaylee Orozco MD   of Medicine, [facility]  Advanced Heart Failure and Transplant Cardiology     CC  Dayanna Davila

## 2025-04-22 ENCOUNTER — OFFICE VISIT (OUTPATIENT)
Dept: CARDIOLOGY | Facility: CLINIC | Age: 70
End: 2025-04-22
Attending: STUDENT IN AN ORGANIZED HEALTH CARE EDUCATION/TRAINING PROGRAM
Payer: COMMERCIAL

## 2025-04-22 VITALS
BODY MASS INDEX: 32.28 KG/M2 | HEART RATE: 87 BPM | SYSTOLIC BLOOD PRESSURE: 134 MMHG | DIASTOLIC BLOOD PRESSURE: 74 MMHG | OXYGEN SATURATION: 91 % | WEIGHT: 200 LBS

## 2025-04-22 DIAGNOSIS — I25.118 CORONARY ARTERY DISEASE OF NATIVE ARTERY OF NATIVE HEART WITH STABLE ANGINA PECTORIS: ICD-10-CM

## 2025-04-22 DIAGNOSIS — I50.22 CHRONIC SYSTOLIC HEART FAILURE (H): Primary | ICD-10-CM

## 2025-04-22 DIAGNOSIS — I23.6 LV (LEFT VENTRICULAR) MURAL THROMBUS FOLLOWING MI (H): ICD-10-CM

## 2025-04-22 DIAGNOSIS — E78.5 HYPERLIPIDEMIA LDL GOAL <100: ICD-10-CM

## 2025-04-22 DIAGNOSIS — I25.5 ISCHEMIC CARDIOMYOPATHY: ICD-10-CM

## 2025-04-22 PROCEDURE — 3078F DIAST BP <80 MM HG: CPT | Performed by: STUDENT IN AN ORGANIZED HEALTH CARE EDUCATION/TRAINING PROGRAM

## 2025-04-22 PROCEDURE — 3075F SYST BP GE 130 - 139MM HG: CPT | Performed by: STUDENT IN AN ORGANIZED HEALTH CARE EDUCATION/TRAINING PROGRAM

## 2025-04-22 PROCEDURE — 99215 OFFICE O/P EST HI 40 MIN: CPT | Performed by: STUDENT IN AN ORGANIZED HEALTH CARE EDUCATION/TRAINING PROGRAM

## 2025-04-22 PROCEDURE — G2211 COMPLEX E/M VISIT ADD ON: HCPCS | Performed by: STUDENT IN AN ORGANIZED HEALTH CARE EDUCATION/TRAINING PROGRAM

## 2025-04-22 RX ORDER — ATORVASTATIN CALCIUM 40 MG/1
40 TABLET, FILM COATED ORAL DAILY
Qty: 90 TABLET | Refills: 3 | Status: SHIPPED | OUTPATIENT
Start: 2025-04-22

## 2025-04-22 NOTE — LETTER
4/22/2025      RE: Santiago Hylton  [address]  [place] [address]       Dear Colleague,    Thank you for the opportunity to participate in the care of your patient, Santiago Hylton, at the [facility] HEART CLINIC [facility]. Please see a copy of my visit note below.    Advanced Heart Failure/Transplant Clinic Note    HPI  Dear colleagues,     I had the pleasure of seeing Mr. Santiago Hylton in the Cardiology clinic.  As you know, Mr. Santiago yHlton is a pleasant 70 year old male with a past medical history of chronic HFrEF 2/2 ICM, CAD s/p PCI in '95, HTN, HLD, DM Type II c/b neuropathy, chronic hepatitis, COPD, and CKD stage III who presents as follow up for HFrEF.    Patient reports overall feeling well recently except for the last week while in [place] when he developed dyspnea on exertion and cough. He came back and saw a provider and was diagnosed with a COPD flare and started on steroids and is already starting to improve this. He tries to stay active, but sometimes his neuropathy limits him. He has not had any chest pain recently. He has presyncope with quick position changes, but no syncope. He denies palpitations, orthopnea, PND, abdominal pain, nausea, emesis, LE edema or weight gain. He watches his salt and fluid intake and monitors his weight. He reports compliance with his medications.     ROS:  A complete 12-point ROS was negative except as above.    PAST MEDICAL HISTORY:  Patient Active Problem List   Diagnosis     Hypertension goal BP (blood pressure) < 140/90     Hyperlipidemia LDL goal <100     CHF (congestive heart failure) (H)     Mild recurrent major depression     Gout     GERD (gastroesophageal reflux disease)     Chronic rhinitis     History of colonic polyps     Chronic hepatitis C (H)     Coronary artery disease of native artery of native heart with stable angina pectoris     Chronic obstructive airway disease with asthma  (H)     Obesity (BMI 35.0-39.9) with comorbidity (H)     Chronic pain syndrome     CKD (chronic kidney disease) stage 3, GFR 30-59 ml/min (H)     Internal hemorrhoids     Chronic ischemic heart disease     Claudication of both lower extremities     Rectal bleeding     Idiopathic peripheral neuropathy     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 (H)     Grief     F11.9 - Chronic, continuous use of opioids        FAMILY HISTORY:  Family History   Problem Relation Age of Onset     Heart Disease Maternal Grandmother      Hypertension Maternal Grandmother      Hypertension Father      Hypertension Mother      Hypertension Sister      Thyroid Disease Sister      Cancer Brother      Diabetes Brother      Hypertension Brother      Hypertension Maternal Aunt      Cancer Maternal Uncle      Hypertension Maternal Uncle      Hypertension Paternal Aunt      Hypertension Paternal Uncle      Hypertension Maternal Grandfather      Hypertension Paternal Grandmother      Hypertension Paternal Grandfather      Cerebrovascular Disease No family hx of      Glaucoma No family hx of      Macular Degeneration No family hx of        SOCIAL HISTORY:  Social History     Tobacco Use     Smoking status: Former     Current packs/day: 0.00     Average packs/day: 0.3 packs/day for 15.0 years (3.8 ttl pk-yrs)     Types: Cigarettes     Start date: 2001     Quit date: 2016     Years since quittin.5     Smokeless tobacco: Never     Tobacco comments:     3-4 a day   Vaping Use     Vaping status: Never Used   Substance Use Topics     Alcohol use: Yes     Comment: weekend beer     Drug use: No        ALLERGIES:  Allergies   Allergen Reactions     No Known Drug Allergy        CURRENT MEDICATIONS:  Current Outpatient Medications   Medication Sig Dispense Refill     albuterol (PROAIR HFA/PROVENTIL HFA/VENTOLIN HFA) 108 (90 Base) MCG/ACT inhaler Inhale 2 puffs into the lungs every 4 hours as needed for shortness of breath / dyspnea 18  g 3     albuterol (PROVENTIL) (2.5 MG/3ML) 0.083% neb solution INHALE 3 ML BY NEBULIZATION METHOD EVERY 6 HOURS AS NEEDED FOR SHORTNESS OF BREATH 360 mL 1     allopurinol (ZYLOPRIM) 100 MG tablet TAKE 2 TABLETS BY MOUTH EVERY DAY NEEDS VISIT AND/OR LABS, WHICH CAN BE IN PERSON BUT A VIRTUAL VISIT MAY BE ACCEPTABLE, FOR MORE. 60 tablet 0     apixaban ANTICOAGULANT (ELIQUIS) 5 MG tablet Take 1 tablet (5 mg) by mouth 2 times daily. 180 tablet 3     atorvastatin (LIPITOR) 40 MG tablet Take 1 tablet (40 mg) by mouth daily. 90 tablet 3     carvedilol (COREG) 25 MG tablet TAKE 1 TABLET BY MOUTH TWICE A DAY WITH MEALS 180 tablet 3     cetirizine (ZYRTEC) 10 MG tablet TAKE 1 TABLET BY MOUTH DAILY AT BEDTIME 90 tablet 1     cyclobenzaprine (FLEXERIL) 10 MG tablet Take 1 tablet (10 mg) by mouth 3 times daily as needed for muscle spasms 30 tablet 0     DULoxetine (CYMBALTA) 60 MG capsule Take 60 mg by mouth daily       empagliflozin (JARDIANCE) 10 MG TABS tablet Take 1 tablet (10 mg) by mouth daily. 90 tablet 3     enoxaparin ANTICOAGULANT (LOVENOX) 100 MG/ML syringe Inject 1 mL (100 mg) Subcutaneous in the morning and 1 mL (100 mg) in the evening. Until INR 2.3 or greater 20 mL 1     furosemide (LASIX) 20 MG tablet Take 1 tablet (20 mg) by mouth three times a week. Monday, Wednesday, and Friday 40 tablet 3     gabapentin (NEURONTIN) 300 MG capsule Take 1 capsule (300 mg) by mouth 3 times daily 270 capsule 1     montelukast (SINGULAIR) 10 MG tablet TAKE 1 TABLET BY MOUTH EVERYDAY AT BEDTIME. NEEDS TO ESTABLISH WITH NEW PCP. 90 tablet 1     nitroGLYcerin (NITROSTAT) 0.4 MG sublingual tablet Place 1 tablet (0.4 mg) under the tongue every 5 minutes as needed for chest pain 0.4 mg by Sublingual route every 5 (five) minutes as needed. 25 tablet 11     omeprazole (PRILOSEC) 20 MG DR capsule TAKE 1 CAPSULE BY MOUTH EVERY DAY. PLEASE FOLLOW UP IN CLINIC FOR NEXT REFILL. 90 capsule 0     oxyCODONE (ROXICODONE) 5 MG tablet Take 1 tablet  (5 mg) by mouth 2 times daily as needed for moderate to severe pain (up to 2 a day) *PLEASE SCHEDULE APPT FOR FURTHER REFILLS* 60 tablet 0     sacubitril-valsartan (ENTRESTO)  MG per tablet Take 1 tablet by mouth 2 times daily. 180 tablet 3     spironolactone (ALDACTONE) 25 MG tablet Take 0.5 tablets (12.5 mg) by mouth daily. 45 tablet 3     tamsulosin (FLOMAX) 0.4 MG capsule TAKE 1 CAPSULE (0.4 MG) BY MOUTH DAILY PLEASE FOLLOW UP FOR NEXT REFILL. 90 capsule 0     traZODone (DESYREL) 100 MG tablet Take 1 tablet (100 mg) by mouth at bedtime +++APPOINTMENT NEEDED FOR REFILLS+++ 60 tablet 0     venlafaxine (EFFEXOR XR) 75 MG 24 hr capsule Take 1 capsule (75 mg) by mouth daily 90 capsule 1     budesonide-formoterol (SYMBICORT) 160-4.5 MCG/ACT Inhaler TAKE 2 PUFFS BY MOUTH TWICE A DAY (Patient not taking: Reported on 4/22/2025) 30.6 g 1     ibuprofen (ADVIL/MOTRIN) 600 MG tablet  (Patient not taking: Reported on 4/22/2025)       reason aspirin not prescribed, intentional, Please choose reason not prescribed from choices below.         EXAM:  /74 (BP Location: Left arm, Patient Position: Chair, Cuff Size: Adult Large)   Pulse 87   Wt 90.7 kg (200 lb)   SpO2 (!) 91%   BMI 32.28 kg/m    General: appears comfortable, alert and interactive, in no acute distress  Head: normocephalic, atraumatic  Eyes: anicteric sclera, EOMI  Mouth: MMM  Neck: supple, no cervical adenopathy  CV: regular rate and rhythm, no murmur, gallop, rub, estimated JVP ~7 cm  Resp: Mild expiratory wheezes  GI: soft, nontender, nondistended  Extremities: warm, no peripheral edema, 2+ bilateral radial pulses  Neurological: alert and oriented, no focal deficits  Psych: normal mood and affect  Derm: no rashes on exposed surfaces    Weight  Wt Readings from Last 10 Encounters:   04/22/25 90.7 kg (200 lb)   02/20/25 89.4 kg (197 lb)   01/13/25 88.5 kg (195 lb)   11/05/24 88.5 kg (195 lb)   08/08/24 89.1 kg (196 lb 6.4 oz)   02/15/24 86.5 kg (190  lb 9.6 oz)   05/05/23 85.1 kg (187 lb 9.6 oz)   04/26/23 90.3 kg (199 lb)   04/17/23 84.8 kg (187 lb)   03/29/23 87.1 kg (192 lb)       I personally reviewed recent labs and data as below and discussed the results with the patient in clinic today.  Labs:  CBC RESULTS:  Lab Results   Component Value Date    WBC 9.3 10/14/2022    WBC 9.9 06/18/2021    RBC 4.79 10/14/2022    RBC 4.94 06/18/2021    HGB 14.8 10/14/2022    HGB 15.1 06/18/2021    HCT 45.7 10/14/2022    HCT 46.3 06/18/2021    MCV 95 10/14/2022    MCV 94 06/18/2021    MCH 30.9 10/14/2022    MCH 30.6 06/18/2021    MCHC 32.4 10/14/2022    MCHC 32.6 06/18/2021    RDW 13.5 10/14/2022    RDW 12.8 06/18/2021     10/14/2022     06/18/2021       CMP RESULTS:  Lab Results   Component Value Date     02/19/2025     06/18/2021    POTASSIUM 5.0 02/19/2025    POTASSIUM 4.0 04/26/2023    POTASSIUM 4.3 06/18/2021    CHLORIDE 100 02/19/2025    CHLORIDE 109 04/26/2023    CHLORIDE 102 06/18/2021    CO2 27 02/19/2025    CO2 27 04/26/2023    CO2 33 (H) 06/18/2021    ANIONGAP 13 02/19/2025    ANIONGAP 7 04/26/2023    ANIONGAP 4 06/18/2021     (H) 02/19/2025     (H) 04/26/2023    GLC 94 06/18/2021    BUN 26.2 (H) 02/19/2025    BUN 18 04/26/2023    BUN 24 06/18/2021    CR 1.91 (H) 02/19/2025    CR 1.38 (H) 06/18/2021    GFRESTIMATED 37 (L) 02/19/2025    GFRESTIMATED 53 (L) 06/18/2021    GFRESTBLACK 62 06/18/2021    FELICITY 9.5 02/19/2025    FELICITY 9.0 06/18/2021    BILITOTAL 0.5 09/11/2018    ALBUMIN 3.9 09/11/2018    ALKPHOS 86 09/11/2018    ALT 36 07/21/2022    ALT 30 06/18/2021    AST 29 09/11/2018      Recent Labs   Lab Test 04/17/23  1130 07/21/22  1007   CHOL 191 167   HDL 62 69   * 76   TRIG 88 108      Testing/Procedures:  I personally visualized and interpreted:    Cardiac CTA 4/8/22  IMPRESSION:  1.  Mild non-obstructive CAD.  2.  Widely patent LAD stent.  3.  Diffuse coronary atherosclerosis.  4.  Please review the separate  Radiology report for incidental  noncardiac findings.    cMRI 7/12/22  SUMMARY  ==========================================================================================================  Clinical history: 67-year old male with a history of ischemic cardiomyopathy. He was found to have an intracardiac thrombus on his TTE in 04/2022 and started on anticoagulation. CMR to evaluate for intracardiac thrombus after anticoagulation course.   Comparison CMR: 12/18/2013     1. The LV is normal in cavity size. The global systolic function is moderately-severely reduced. The LVEF is 31%. There is moderate hypokinesis and thinning and akinesis of the apical segments.     2. The RV is normal in cavity size. The global systolic function is mildly reduced. The RVEF is 46%.      3. Both atria are normal in size.     4. There is no significant valvular disease.      5. Late gadolinium enhancement imaging shows a contiguous pattern of subendocardial enhancement in the basal anterior, basal anteroseptal, mid anterior, mid anteroseptal, apical anterior, apical septal, apical lateral, and true apical segments. This is consistent with a prior LAD culprit infarction. There is 20%  viability in the LAD territory but 100% viability in the LCx and RCA territories.      6. There is no pericardial effusion or thickening.     7. There is a 1.0 x 0.6 cm LV apical thrombus overlying the akinetic true apex. It shows some contrast uptake on repeat (delayed long TI imaging, possibly implying vascularization and an element of chronicity.      CONCLUSIONS: Ischemic cardiomyopathy. There is moderately-severely reduced LV function and mildly reduced RV function. There is an large burden of fibrosis that is consistent with a prior LAD culprit infarction. There is also a persistent LV apical thrombus in spite of anticoagulation.     Echocardiogram 2/15/24  Interpretation Summary  Left ventricular function is decreased. The ejection fraction is  40-45%  (mildly reduced). Biplane LVEF is 43%. There is akinesis involving the mid anterior/anteroseptal and all apical segments consistent with prior LAD territory infarct, unchanged from prior studies.  RV size and function are probably normal on limited views.  This study was compared with the study from 12/20/16: No significant changes noted.    Outside results of note:  Outside records were obtained and relevant results/notes have been incorporated into HPI.    Assessment and Plan:     In summary, 70 year old male with a past medical history of chronic HFrEF 2/2 ICM, CAD s/p PCI in '95, HTN, HLD, DM Type II c/b neuropathy, chronic hepatitis, COPD, and CKD stage III who presents as follow up for HFrEF.    Chronic systolic heart failure/HFrEF (EF 40-45%) secondary to ischemic cardiomyopathy  NYHA Symptom Class II  Stage C  ACE-I/ARB/ARNi: Continue Entresto  mg BID  BB: Continue carvedilol 25 mg BID  Aldosterone antagonist: Continue spironolactone 12.5 mg daily  SGLT2i: Restart empagliflozin 10 mg daily (just ran out recently)  SCD prophylaxis: N/A given LVEF >35%  %BiV pacing: N/A  Fluid status: grossly euvolemic on lasix 20 mg daily  Cardiac Rehab: N/A  Remote PA Pressure Monitoring (CardioMems): N/A  - Recommend heart healthy diet and regular aerobic exercise as above    LV Thrombus  - Continue apixaban 5 mg BID    CAD s/p PCI with stable angina  HLD  - Not on ASA given on apixaban  - Continue atorvastatin 40 mg daily  - Continue nitroglycerin PRN    CKD Stage III  - Will continue to monitor    Optimal Vascular Metrics    Blood Pressure   BP < 140/90 Yes    On Aspirin  No: Contraindicated due to: Already on DOAC    On Statin  Yes    Tobacco use  No       To Do:  - Restart Jardiance 10 mg daily  - Repeat TTE and BMP in 3-4 weeks  - Follow up with [facility] as scheduled in August '25  - Follow up with me in 12 months with labs prior    The patient states understanding and is agreeable with plan.   Feel free to  contact myself regarding questions or concerns. It was a pleasure to see this patient today.    A total of 48 minutes was spent on the day of the visit, which includes preparation for the visit (reviewing previous medical records, laboratories and investigations), in conjunction with the actual clinic visit with the patient, which includes obtaining a history and physical exam, creating and reviewing the care plan, patient education (and family if present), counseling, documenting clinical information in the electronic health record and care coordination.     The longitudinal plan of care for the diagnosis(es)/condition(s) as documented were addressed during this visit. Due to the added complexity in care, I will continue to support Santiago in the subsequent management and with ongoing continuity of care.     Kaylee Orozco MD   of Medicine, [facility]  Advanced Heart Failure and Transplant Cardiology     CC  Dayanna Davila         Please do not hesitate to contact me if you have any questions/concerns.     Sincerely,     Kaylee Orozco MD

## 2025-04-22 NOTE — PATIENT INSTRUCTIONS
Thank you for coming to the [facility] @ [facility]; please note the following instructions:    1. RESTART Jardiance 10 mg  2. Echo and labs in 3-4 weeks  3. [facility] in August   4. Dr Orozco in one year     To Do  Call us if you notice any fluid retention        If you have any questions regarding your visit please contact your care team:     Cardiology  Telephone Number   Allison HOLLAND., RN  Monika CARBAJAL, RN  Justine THOMPSON, RN  Stephy LEWIS, ETRIA  Vinita OCHOA, LATOYA RABAGO, [place]  Tessa CARBAJAL, VF  Fernando OCHOA, [phone] (option [phone])   For scheduling appts:     [phone] (select option 1)       For the Device Clinic (Pacemakers and ICD's)  RN's :  Kaur Berger   During business hours: [phone]    *After business hours:  [phone] (select option 4)      Normal test result notifications will be released via [employer] or mailed within 7 business days.  All other test results, will be communicated via telephone once reviewed by your cardiologist.    If you need a medication refill please contact your pharmacy.  Please allow 3 business days for your refill to be completed.    As always, thank you for trusting us with your health care needs!

## 2025-04-22 NOTE — NURSING NOTE
To Do      1. RESTART Jardiance 10 mg  2. Echo and labs in 3-4 weeks  3. [facility] in August 4. Dr Orozco in one year     To Do  Call us if you notice any fluid retention    Justine Kinney RN

## 2025-04-22 NOTE — NURSING NOTE
"Chief Complaint   Patient presents with    RECHECK     Return general cardiology for RHF       Initial /74 (BP Location: Left arm, Patient Position: Chair, Cuff Size: Adult Large)   Pulse 87   Wt 90.7 kg (200 lb)   SpO2 (!) 91%   BMI 32.28 kg/m   Estimated body mass index is 32.28 kg/m  as calculated from the following:    Height as of 1/13/25: 1.676 m (5' 6\").    Weight as of this encounter: 90.7 kg (200 lb)..  BP completed using cuff size: ankush CORONEL, EMT    "

## 2025-05-08 ENCOUNTER — RESULTS FOLLOW-UP (OUTPATIENT)
Dept: CARDIOLOGY | Facility: CLINIC | Age: 70
End: 2025-05-08

## 2025-05-10 ENCOUNTER — HEALTH MAINTENANCE LETTER (OUTPATIENT)
Age: 70
End: 2025-05-10

## 2025-05-15 ENCOUNTER — LAB (OUTPATIENT)
Dept: LAB | Facility: CLINIC | Age: 70
End: 2025-05-15
Payer: COMMERCIAL

## 2025-05-15 DIAGNOSIS — I50.22 CHRONIC SYSTOLIC HEART FAILURE (H): ICD-10-CM

## 2025-05-16 ENCOUNTER — RESULTS FOLLOW-UP (OUTPATIENT)
Dept: CARDIOLOGY | Facility: CLINIC | Age: 70
End: 2025-05-16

## 2025-08-11 ENCOUNTER — DOCUMENTATION ONLY (OUTPATIENT)
Dept: CARDIOLOGY | Facility: CLINIC | Age: 70
End: 2025-08-11
Payer: COMMERCIAL

## 2025-08-11 DIAGNOSIS — I50.22 CHRONIC SYSTOLIC HEART FAILURE (H): Primary | ICD-10-CM

## (undated) RX ORDER — METOPROLOL TARTRATE 1 MG/ML
INJECTION, SOLUTION INTRAVENOUS
Status: DISPENSED
Start: 2022-04-08

## (undated) RX ORDER — METOPROLOL TARTRATE 50 MG
TABLET ORAL
Status: DISPENSED
Start: 2022-04-08

## (undated) RX ORDER — IVABRADINE 5 MG/1
TABLET, FILM COATED ORAL
Status: DISPENSED
Start: 2022-04-08

## (undated) RX ORDER — NITROGLYCERIN 0.4 MG/1
TABLET SUBLINGUAL
Status: DISPENSED
Start: 2022-04-08